# Patient Record
Sex: MALE | Race: WHITE | Employment: UNEMPLOYED | URBAN - METROPOLITAN AREA
[De-identification: names, ages, dates, MRNs, and addresses within clinical notes are randomized per-mention and may not be internally consistent; named-entity substitution may affect disease eponyms.]

---

## 2017-02-24 ENCOUNTER — HOSPITAL ENCOUNTER (EMERGENCY)
Age: 47
Discharge: HOME | End: 2017-02-24
Attending: EMERGENCY MEDICINE
Payer: COMMERCIAL

## 2017-02-24 ENCOUNTER — APPOINTMENT (OUTPATIENT)
Dept: GENERAL RADIOLOGY | Age: 47
End: 2017-02-24
Payer: COMMERCIAL

## 2017-02-24 VITALS
HEART RATE: 110 BPM | DIASTOLIC BLOOD PRESSURE: 87 MMHG | OXYGEN SATURATION: 97 % | BODY MASS INDEX: 31.5 KG/M2 | SYSTOLIC BLOOD PRESSURE: 138 MMHG | TEMPERATURE: 98.2 F | HEIGHT: 71 IN | WEIGHT: 225 LBS | RESPIRATION RATE: 18 BRPM

## 2017-02-24 DIAGNOSIS — L02.414 ABSCESS OF LEFT ARM: Primary | ICD-10-CM

## 2017-02-24 DIAGNOSIS — F41.1 ANXIETY STATE: ICD-10-CM

## 2017-02-24 LAB
ABSOLUTE EOS #: 0 K/UL (ref 0–0.4)
ABSOLUTE LYMPH #: 2.44 K/UL (ref 1–4.8)
ABSOLUTE MONO #: 1.91 K/UL (ref 0.1–0.8)
ANION GAP SERPL CALCULATED.3IONS-SCNC: 13 MMOL/L (ref 9–17)
ATYPICAL LYMPHOCYTE ABSOLUTE COUNT: 0.17 K/UL
ATYPICAL LYMPHOCYTES: 1 % (ref 0–10)
BASOPHILS # BLD: 0 % (ref 0–2)
BASOPHILS ABSOLUTE: 0 K/UL (ref 0–0.2)
BUN BLDV-MCNC: 15 MG/DL (ref 6–20)
BUN/CREAT BLD: ABNORMAL (ref 9–20)
CALCIUM SERPL-MCNC: 9.3 MG/DL (ref 8.6–10.4)
CHLORIDE BLD-SCNC: 96 MMOL/L (ref 98–107)
CO2: 27 MMOL/L (ref 20–31)
CREAT SERPL-MCNC: 0.76 MG/DL (ref 0.7–1.2)
DIFFERENTIAL TYPE: ABNORMAL
EOSINOPHILS RELATIVE PERCENT: 0 % (ref 1–4)
GFR AFRICAN AMERICAN: >60 ML/MIN
GFR NON-AFRICAN AMERICAN: >60 ML/MIN
GFR SERPL CREATININE-BSD FRML MDRD: ABNORMAL ML/MIN/{1.73_M2}
GFR SERPL CREATININE-BSD FRML MDRD: ABNORMAL ML/MIN/{1.73_M2}
GLUCOSE BLD-MCNC: 99 MG/DL (ref 70–99)
HCT VFR BLD CALC: 36 % (ref 41–53)
HEMOGLOBIN: 12.4 G/DL (ref 13.5–17.5)
LACTIC ACID, WHOLE BLOOD: 1.9 MMOL/L (ref 0.7–2.1)
LYMPHOCYTES # BLD: 14 % (ref 24–44)
MCH RBC QN AUTO: 28.9 PG (ref 26–34)
MCHC RBC AUTO-ENTMCNC: 34.4 G/DL (ref 31–37)
MCV RBC AUTO: 83.9 FL (ref 80–100)
MONOCYTES # BLD: 11 % (ref 1–7)
MORPHOLOGY: NORMAL
PDW BLD-RTO: 14.8 % (ref 12.5–15.4)
PLATELET # BLD: 385 K/UL (ref 140–450)
PLATELET ESTIMATE: ABNORMAL
PMV BLD AUTO: 7.4 FL (ref 6–12)
POTASSIUM SERPL-SCNC: 4 MMOL/L (ref 3.7–5.3)
RBC # BLD: 4.29 M/UL (ref 4.5–5.9)
RBC # BLD: ABNORMAL 10*6/UL
SEG NEUTROPHILS: 74 % (ref 36–66)
SEGMENTED NEUTROPHILS ABSOLUTE COUNT: 12.88 K/UL (ref 1.8–7.7)
SODIUM BLD-SCNC: 136 MMOL/L (ref 135–144)
WBC # BLD: 17.4 K/UL (ref 3.5–11)
WBC # BLD: ABNORMAL 10*3/UL

## 2017-02-24 PROCEDURE — G0383 LEV 4 HOSP TYPE B ED VISIT: HCPCS

## 2017-02-24 PROCEDURE — 96365 THER/PROPH/DIAG IV INF INIT: CPT

## 2017-02-24 PROCEDURE — 6360000002 HC RX W HCPCS: Performed by: EMERGENCY MEDICINE

## 2017-02-24 PROCEDURE — 96375 TX/PRO/DX INJ NEW DRUG ADDON: CPT

## 2017-02-24 PROCEDURE — 73060 X-RAY EXAM OF HUMERUS: CPT

## 2017-02-24 PROCEDURE — 2580000003 HC RX 258: Performed by: EMERGENCY MEDICINE

## 2017-02-24 PROCEDURE — 83605 ASSAY OF LACTIC ACID: CPT

## 2017-02-24 PROCEDURE — 85025 COMPLETE CBC W/AUTO DIFF WBC: CPT

## 2017-02-24 PROCEDURE — 80048 BASIC METABOLIC PNL TOTAL CA: CPT

## 2017-02-24 PROCEDURE — 6370000000 HC RX 637 (ALT 250 FOR IP): Performed by: EMERGENCY MEDICINE

## 2017-02-24 PROCEDURE — 10060 I&D ABSCESS SIMPLE/SINGLE: CPT

## 2017-02-24 PROCEDURE — 87040 BLOOD CULTURE FOR BACTERIA: CPT

## 2017-02-24 PROCEDURE — 2500000003 HC RX 250 WO HCPCS: Performed by: EMERGENCY MEDICINE

## 2017-02-24 RX ORDER — ALPRAZOLAM 0.25 MG/1
0.5 TABLET ORAL ONCE
Status: COMPLETED | OUTPATIENT
Start: 2017-02-24 | End: 2017-02-24

## 2017-02-24 RX ORDER — LORAZEPAM 2 MG/ML
1 INJECTION INTRAMUSCULAR ONCE
Status: COMPLETED | OUTPATIENT
Start: 2017-02-24 | End: 2017-02-24

## 2017-02-24 RX ORDER — SULFAMETHOXAZOLE AND TRIMETHOPRIM 800; 160 MG/1; MG/1
2 TABLET ORAL 2 TIMES DAILY
Qty: 40 TABLET | Refills: 0 | Status: SHIPPED | OUTPATIENT
Start: 2017-02-24 | End: 2017-03-06

## 2017-02-24 RX ORDER — SULFAMETHOXAZOLE AND TRIMETHOPRIM 800; 160 MG/1; MG/1
2 TABLET ORAL ONCE
Status: COMPLETED | OUTPATIENT
Start: 2017-02-24 | End: 2017-02-24

## 2017-02-24 RX ORDER — 0.9 % SODIUM CHLORIDE 0.9 %
1000 INTRAVENOUS SOLUTION INTRAVENOUS ONCE
Status: COMPLETED | OUTPATIENT
Start: 2017-02-24 | End: 2017-02-24

## 2017-02-24 RX ORDER — LIDOCAINE HYDROCHLORIDE 10 MG/ML
20 INJECTION, SOLUTION INFILTRATION; PERINEURAL ONCE
Status: COMPLETED | OUTPATIENT
Start: 2017-02-24 | End: 2017-02-24

## 2017-02-24 RX ORDER — KETOROLAC TROMETHAMINE 30 MG/ML
30 INJECTION, SOLUTION INTRAMUSCULAR; INTRAVENOUS ONCE
Status: COMPLETED | OUTPATIENT
Start: 2017-02-24 | End: 2017-02-24

## 2017-02-24 RX ORDER — CEPHALEXIN 500 MG/1
500 CAPSULE ORAL 4 TIMES DAILY
Qty: 40 CAPSULE | Refills: 0 | Status: SHIPPED | OUTPATIENT
Start: 2017-02-24 | End: 2017-03-06

## 2017-02-24 RX ORDER — MORPHINE SULFATE 4 MG/ML
4 INJECTION, SOLUTION INTRAMUSCULAR; INTRAVENOUS ONCE
Status: DISCONTINUED | OUTPATIENT
Start: 2017-02-24 | End: 2017-02-24

## 2017-02-24 RX ORDER — ALPRAZOLAM 0.5 MG/1
0.5 TABLET ORAL NIGHTLY PRN
Qty: 2 TABLET | Refills: 0 | Status: SHIPPED | OUTPATIENT
Start: 2017-02-24 | End: 2017-03-14

## 2017-02-24 RX ADMIN — ALPRAZOLAM 0.5 MG: 0.25 TABLET ORAL at 18:11

## 2017-02-24 RX ADMIN — SODIUM CHLORIDE 1000 ML: 9 INJECTION, SOLUTION INTRAVENOUS at 17:35

## 2017-02-24 RX ADMIN — CEFTRIAXONE SODIUM 1 G: 1 INJECTION, POWDER, FOR SOLUTION INTRAMUSCULAR; INTRAVENOUS at 17:45

## 2017-02-24 RX ADMIN — SULFAMETHOXAZOLE AND TRIMETHOPRIM 2 TABLET: 800; 160 TABLET ORAL at 18:46

## 2017-02-24 RX ADMIN — LIDOCAINE HYDROCHLORIDE 20 ML: 10 INJECTION, SOLUTION INFILTRATION; PERINEURAL at 18:11

## 2017-02-24 RX ADMIN — LORAZEPAM 1 MG: 2 INJECTION INTRAMUSCULAR; INTRAVENOUS at 17:32

## 2017-02-24 RX ADMIN — KETOROLAC TROMETHAMINE 30 MG: 30 INJECTION, SOLUTION INTRAMUSCULAR at 17:32

## 2017-02-24 ASSESSMENT — ENCOUNTER SYMPTOMS
DIARRHEA: 0
ABDOMINAL PAIN: 0
VOMITING: 0
NAUSEA: 0
TROUBLE SWALLOWING: 0
BLOOD IN STOOL: 0
SHORTNESS OF BREATH: 0

## 2017-02-24 ASSESSMENT — PAIN SCALES - GENERAL
PAINLEVEL_OUTOF10: 10

## 2017-02-24 ASSESSMENT — PAIN DESCRIPTION - PAIN TYPE: TYPE: ACUTE PAIN

## 2017-02-24 ASSESSMENT — PAIN DESCRIPTION - LOCATION: LOCATION: ARM

## 2017-02-24 ASSESSMENT — PAIN DESCRIPTION - ORIENTATION: ORIENTATION: LEFT;RIGHT

## 2017-03-02 LAB
CULTURE: NORMAL
Lab: NORMAL
Lab: NORMAL
SPECIMEN DESCRIPTION: NORMAL
SPECIMEN DESCRIPTION: NORMAL
STATUS: NORMAL
STATUS: NORMAL

## 2017-03-14 ENCOUNTER — HOSPITAL ENCOUNTER (EMERGENCY)
Age: 47
Discharge: HOME | End: 2017-03-14
Attending: EMERGENCY MEDICINE
Payer: COMMERCIAL

## 2017-03-14 ENCOUNTER — HOSPITAL ENCOUNTER (EMERGENCY)
Age: 47
Discharge: LEFT BEFORE DISCHARGE | End: 2017-03-14
Attending: EMERGENCY MEDICINE
Payer: COMMERCIAL

## 2017-03-14 VITALS
DIASTOLIC BLOOD PRESSURE: 103 MMHG | TEMPERATURE: 97.6 F | HEART RATE: 97 BPM | RESPIRATION RATE: 16 BRPM | OXYGEN SATURATION: 97 % | SYSTOLIC BLOOD PRESSURE: 156 MMHG

## 2017-03-14 VITALS
DIASTOLIC BLOOD PRESSURE: 105 MMHG | TEMPERATURE: 97.6 F | WEIGHT: 230 LBS | HEIGHT: 71 IN | OXYGEN SATURATION: 95 % | HEART RATE: 77 BPM | SYSTOLIC BLOOD PRESSURE: 155 MMHG | BODY MASS INDEX: 32.2 KG/M2 | RESPIRATION RATE: 18 BRPM

## 2017-03-14 DIAGNOSIS — Z53.20 LEFT BEFORE TREATMENT COMPLETED: Primary | ICD-10-CM

## 2017-03-14 DIAGNOSIS — S81.801A OPEN WOUND OF RIGHT LOWER EXTREMITY, INITIAL ENCOUNTER: Primary | ICD-10-CM

## 2017-03-14 PROCEDURE — 6370000000 HC RX 637 (ALT 250 FOR IP): Performed by: PHYSICIAN ASSISTANT

## 2017-03-14 PROCEDURE — G0381 LEV 2 HOSP TYPE B ED VISIT: HCPCS

## 2017-03-14 PROCEDURE — G0382 LEV 3 HOSP TYPE B ED VISIT: HCPCS

## 2017-03-14 RX ORDER — HYDROXYZINE HYDROCHLORIDE 25 MG/1
25 TABLET, FILM COATED ORAL ONCE
Status: COMPLETED | OUTPATIENT
Start: 2017-03-14 | End: 2017-03-14

## 2017-03-14 RX ORDER — CEPHALEXIN 250 MG/1
500 CAPSULE ORAL ONCE
Status: COMPLETED | OUTPATIENT
Start: 2017-03-14 | End: 2017-03-14

## 2017-03-14 RX ORDER — SULFAMETHOXAZOLE AND TRIMETHOPRIM 800; 160 MG/1; MG/1
1 TABLET ORAL ONCE
Status: COMPLETED | OUTPATIENT
Start: 2017-03-14 | End: 2017-03-14

## 2017-03-14 RX ORDER — SULFAMETHOXAZOLE AND TRIMETHOPRIM 800; 160 MG/1; MG/1
1 TABLET ORAL 2 TIMES DAILY
Qty: 14 TABLET | Refills: 0 | Status: SHIPPED | OUTPATIENT
Start: 2017-03-14 | End: 2017-03-21

## 2017-03-14 RX ORDER — CEPHALEXIN 500 MG/1
500 CAPSULE ORAL 4 TIMES DAILY
Qty: 28 CAPSULE | Refills: 0 | Status: SHIPPED | OUTPATIENT
Start: 2017-03-14 | End: 2017-03-21

## 2017-03-14 RX ORDER — HYDROXYZINE HYDROCHLORIDE 25 MG/1
25 TABLET, FILM COATED ORAL EVERY 8 HOURS PRN
Qty: 10 TABLET | Refills: 0 | Status: SHIPPED | OUTPATIENT
Start: 2017-03-14 | End: 2017-09-27 | Stop reason: ALTCHOICE

## 2017-03-14 RX ADMIN — SULFAMETHOXAZOLE AND TRIMETHOPRIM 1 TABLET: 800; 160 TABLET ORAL at 15:32

## 2017-03-14 RX ADMIN — HYDROXYZINE HYDROCHLORIDE 25 MG: 25 TABLET, FILM COATED ORAL at 15:32

## 2017-03-14 RX ADMIN — CEPHALEXIN 500 MG: 250 CAPSULE ORAL at 15:32

## 2017-03-14 ASSESSMENT — ENCOUNTER SYMPTOMS
SHORTNESS OF BREATH: 0
COUGH: 0
SHORTNESS OF BREATH: 0
TROUBLE SWALLOWING: 0
VOMITING: 0
NAUSEA: 0
WHEEZING: 0
NAUSEA: 0
VOMITING: 0
WHEEZING: 0
TROUBLE SWALLOWING: 0
COLOR CHANGE: 0
COUGH: 0

## 2017-03-14 ASSESSMENT — PAIN DESCRIPTION - PAIN TYPE
TYPE: ACUTE PAIN
TYPE: ACUTE PAIN

## 2017-03-14 ASSESSMENT — PAIN DESCRIPTION - ORIENTATION
ORIENTATION: RIGHT
ORIENTATION: RIGHT

## 2017-03-14 ASSESSMENT — PAIN DESCRIPTION - ONSET
ONSET: PROGRESSIVE
ONSET: ON-GOING

## 2017-03-14 ASSESSMENT — PAIN SCALES - GENERAL
PAINLEVEL_OUTOF10: 8
PAINLEVEL_OUTOF10: 10

## 2017-03-14 ASSESSMENT — PAIN DESCRIPTION - DESCRIPTORS
DESCRIPTORS: ACHING
DESCRIPTORS: ACHING

## 2017-03-14 ASSESSMENT — PAIN DESCRIPTION - LOCATION
LOCATION: LEG
LOCATION: LEG

## 2017-03-14 ASSESSMENT — PAIN DESCRIPTION - PROGRESSION
CLINICAL_PROGRESSION: GRADUALLY WORSENING
CLINICAL_PROGRESSION: GRADUALLY WORSENING

## 2017-09-27 ENCOUNTER — APPOINTMENT (OUTPATIENT)
Dept: CT IMAGING | Age: 47
End: 2017-09-27
Payer: COMMERCIAL

## 2017-09-27 ENCOUNTER — HOSPITAL ENCOUNTER (EMERGENCY)
Age: 47
Discharge: LEFT BEFORE DISCHARGE | End: 2017-09-28
Attending: EMERGENCY MEDICINE | Admitting: INTERNAL MEDICINE
Payer: COMMERCIAL

## 2017-09-27 DIAGNOSIS — D72.829 LEUKOCYTOSIS, UNSPECIFIED TYPE: ICD-10-CM

## 2017-09-27 DIAGNOSIS — A41.9 SEPSIS, DUE TO UNSPECIFIED ORGANISM: ICD-10-CM

## 2017-09-27 DIAGNOSIS — R79.82 ELEVATED C-REACTIVE PROTEIN (CRP): ICD-10-CM

## 2017-09-27 DIAGNOSIS — D64.9 ANEMIA, UNSPECIFIED TYPE: ICD-10-CM

## 2017-09-27 DIAGNOSIS — L02.91 ABSCESS: Primary | ICD-10-CM

## 2017-09-27 PROCEDURE — G0383 LEV 4 HOSP TYPE B ED VISIT: HCPCS

## 2017-09-27 PROCEDURE — 2580000003 HC RX 258: Performed by: EMERGENCY MEDICINE

## 2017-09-27 PROCEDURE — 6370000000 HC RX 637 (ALT 250 FOR IP): Performed by: EMERGENCY MEDICINE

## 2017-09-27 PROCEDURE — 73201 CT UPPER EXTREMITY W/DYE: CPT

## 2017-09-27 PROCEDURE — 85730 THROMBOPLASTIN TIME PARTIAL: CPT

## 2017-09-27 PROCEDURE — 85610 PROTHROMBIN TIME: CPT

## 2017-09-27 PROCEDURE — 81001 URINALYSIS AUTO W/SCOPE: CPT

## 2017-09-27 PROCEDURE — 96361 HYDRATE IV INFUSION ADD-ON: CPT

## 2017-09-27 PROCEDURE — 83605 ASSAY OF LACTIC ACID: CPT

## 2017-09-27 PROCEDURE — 6360000002 HC RX W HCPCS: Performed by: EMERGENCY MEDICINE

## 2017-09-27 PROCEDURE — 96365 THER/PROPH/DIAG IV INF INIT: CPT

## 2017-09-27 PROCEDURE — 10061 I&D ABSCESS COMP/MULTIPLE: CPT

## 2017-09-27 PROCEDURE — 85025 COMPLETE CBC W/AUTO DIFF WBC: CPT

## 2017-09-27 PROCEDURE — 6360000004 HC RX CONTRAST MEDICATION: Performed by: EMERGENCY MEDICINE

## 2017-09-27 PROCEDURE — 87040 BLOOD CULTURE FOR BACTERIA: CPT

## 2017-09-27 PROCEDURE — 87086 URINE CULTURE/COLONY COUNT: CPT

## 2017-09-27 PROCEDURE — 86140 C-REACTIVE PROTEIN: CPT

## 2017-09-27 PROCEDURE — 96372 THER/PROPH/DIAG INJ SC/IM: CPT

## 2017-09-27 PROCEDURE — 80053 COMPREHEN METABOLIC PANEL: CPT

## 2017-09-27 RX ORDER — ACETAMINOPHEN 500 MG
1000 TABLET ORAL ONCE
Status: COMPLETED | OUTPATIENT
Start: 2017-09-27 | End: 2017-09-27

## 2017-09-27 RX ORDER — 0.9 % SODIUM CHLORIDE 0.9 %
30 INTRAVENOUS SOLUTION INTRAVENOUS ONCE
Status: COMPLETED | OUTPATIENT
Start: 2017-09-27 | End: 2017-09-28

## 2017-09-27 RX ORDER — MORPHINE SULFATE 4 MG/ML
4 INJECTION, SOLUTION INTRAMUSCULAR; INTRAVENOUS ONCE
Status: COMPLETED | OUTPATIENT
Start: 2017-09-27 | End: 2017-09-27

## 2017-09-27 RX ORDER — LIDOCAINE HYDROCHLORIDE AND EPINEPHRINE 10; 10 MG/ML; UG/ML
20 INJECTION, SOLUTION INFILTRATION; PERINEURAL ONCE
Status: DISCONTINUED | OUTPATIENT
Start: 2017-09-28 | End: 2017-09-28 | Stop reason: HOSPADM

## 2017-09-27 RX ADMIN — ACETAMINOPHEN 1000 MG: 500 TABLET ORAL at 22:58

## 2017-09-27 RX ADMIN — MORPHINE SULFATE 4 MG: 4 INJECTION, SOLUTION INTRAMUSCULAR; INTRAVENOUS at 22:56

## 2017-09-27 RX ADMIN — SODIUM CHLORIDE 1000 ML: 9 INJECTION, SOLUTION INTRAVENOUS at 22:57

## 2017-09-27 RX ADMIN — VANCOMYCIN HYDROCHLORIDE 1500 MG: 1 INJECTION, POWDER, LYOPHILIZED, FOR SOLUTION INTRAVENOUS at 23:50

## 2017-09-27 RX ADMIN — IOPAMIDOL 90 ML: 755 INJECTION, SOLUTION INTRAVENOUS at 23:27

## 2017-09-27 ASSESSMENT — PAIN DESCRIPTION - DESCRIPTORS: DESCRIPTORS: ACHING;SORE;DISCOMFORT

## 2017-09-27 ASSESSMENT — PAIN DESCRIPTION - FREQUENCY: FREQUENCY: CONTINUOUS

## 2017-09-27 ASSESSMENT — PAIN SCALES - GENERAL
PAINLEVEL_OUTOF10: 10

## 2017-09-27 ASSESSMENT — PAIN DESCRIPTION - PAIN TYPE: TYPE: ACUTE PAIN

## 2017-09-27 ASSESSMENT — PAIN DESCRIPTION - LOCATION: LOCATION: ARM

## 2017-09-27 ASSESSMENT — ENCOUNTER SYMPTOMS: SHORTNESS OF BREATH: 0

## 2017-09-27 ASSESSMENT — PAIN DESCRIPTION - ONSET: ONSET: SUDDEN

## 2017-09-27 ASSESSMENT — PAIN DESCRIPTION - ORIENTATION: ORIENTATION: LEFT

## 2017-09-27 NOTE — ED AVS SNAPSHOT
After Visit Summary  (Discharge Instructions)    Medication List for Home    Based on the information you provided to us as well as any changes during this visit, the following is your updated medication list.  Compare this with your prescription bottles at home. If you have any questions or concerns, contact your primary care physician's office. Daily Medication List (This medication list can be shared with any Healthcare provider who is helping you manage your medications)      There are NEW medications for you. START taking them after you leave the hospital     cephALEXin 500 MG capsule   Commonly known as:  KEFLEX   Take 1 capsule by mouth 2 times daily for 10 days       HYDROcodone-acetaminophen 5-325 MG per tablet   Commonly known as:  NORCO   Take 1 tablet by mouth every 6 hours as needed for Pain . sulfamethoxazole-trimethoprim 800-160 MG per tablet   Commonly known as:  BACTRIM DS   Take 1 tablet by mouth 2 times daily for 10 days            Where to Get Your Medications      You can get these medications from any pharmacy     Bring a paper prescription for each of these medications     cephALEXin 500 MG capsule    HYDROcodone-acetaminophen 5-325 MG per tablet    sulfamethoxazole-trimethoprim 800-160 MG per tablet               Allergies as of 9/28/2017     No Known Allergies      Immunizations as of 9/28/2017     Name Date Dose VIS Date Route    Tdap (Boostrix, Adacel) 12/2/2016 0.5 mL 2/24/2015 Intramuscular         After Visit Summary    This summary was created for you. Thank you for entrusting your care to us.   The following information includes details about your hospital/visit stay along with steps you should take to help with your recovery once you leave the hospital.  In this packet, you will find information about the topics listed below:    · Instructions about your medications including a list of your home medications  · A summary of your hospital visit · Follow-up appointments once you have left the hospital  · Your care plan at home      You may receive a survey regarding the care you received during your stay. Your input is valuable to us. We encourage you to complete and return your survey in the envelope provided. We hope you will choose us in the future for your healthcare needs. Patient Information     Patient Name KIANA Mccall 1970      Care Provided at:     Name Address Phone       [facility] [address] [facility] [phone]            Your Visit    Here you will find information about your visit, including the reason for your visit. Please take this sheet with you when you visit your doctor or other health care provider in the future. It will help determine the best possible medical care for you at that time. If you have any questions once you leave the hospital, please call the department phone number listed below. Diagnoses this visit     Your diagnoses were ABSCESS, SEPSIS, DUE TO UNSPECIFIED ORGANISM ([facility] [address] LEUKOCYTOSIS, UNSPECIFIED TYPE, ANEMIA, UNSPECIFIED TYPE, and ELEVATED C-REACTIVE PROTEIN (CRP). Visit Information     Date of Visit Department Dept Phone    2017 [facility] -[phone]      You were seen by     You were seen by Adele Duke MD, Shaun Barrera MD, Saud Harper MD, Wallace Grove DO, [facility], and Randa Johnston MD.       Follow-up Appointments    Below is a list of your follow-up and future appointments. This may not be a complete list as you may have made appointments directly with providers that we are not aware of or your providers may have made some for you. Please call your providers to confirm appointments. It is important to keep your appointments.  Please bring your current insurance card, photo ID, co-pay, and all medication bottles to your smoke.              Important information for a smoker       SMOKING: QUIT SMOKING. THIS IS THE MOST IMPORTANT ACTION YOU CAN TAKE TO IMPROVE YOUR CURRENT AND FUTURE HEALTH. Call the Critical access hospital3 [facility] NOW ([phone])    Smoking harms nonsmokers. When nonsmokers are around people who smoke, they absorb nicotine, carbon monoxide, and other ingredients of tobacco smoke. DO NOT SMOKE AROUND CHILDREN     Children exposed to secondhand smoke are at an increased risk of:  Sudden Infant Death Syndrome (SIDS), acute respiratory infections, inflammation of the middle ear, and severe asthma. Over a longer time, it causes heart disease and lung cancer. There is no safe level of exposure to secondhand smoke. EDUShart Signup     DocVerse allows you to send messages to your doctor, view your test results, renew your prescriptions, schedule appointments, view visit notes, and more. How Do I Sign Up? 1. In your Internet browser, go to [email or web address]  2. Click on the Sign Up Now link in the Sign In box. You will see the New Member Sign Up page. 3. Enter your DocVerse Access Code exactly as it appears below. You will not need to use this code after youve completed the sign-up process. If you do not sign up before the expiration date, you must request a new code. DocVerse Access Code: [email or web address]  Expires: 11/27/2017 10:57 AM    4. Enter your Social Security Number ([id number]) and Date of Birth (mm/dd/yyyy) as indicated and click Submit. You will be taken to the next sign-up page. 5. Create a DocVerse ID. This will be your DocVerse login ID and cannot be changed, so think of one that is secure and easy to remember. 6. Create a DocVerse password. You can change your password at any time. 7. Enter your Password Reset Question and Answer. This can be used at a later time if you forget your password.

## 2017-09-28 VITALS
HEART RATE: 88 BPM | SYSTOLIC BLOOD PRESSURE: 138 MMHG | WEIGHT: 218 LBS | OXYGEN SATURATION: 99 % | HEIGHT: 71 IN | BODY MASS INDEX: 30.52 KG/M2 | RESPIRATION RATE: 18 BRPM | TEMPERATURE: 99.7 F | DIASTOLIC BLOOD PRESSURE: 98 MMHG

## 2017-09-28 LAB
ABSOLUTE EOS #: 0.14 K/UL (ref 0–0.4)
ABSOLUTE EOS #: 0.62 K/UL (ref 0–0.4)
ABSOLUTE LYMPH #: 1.56 K/UL (ref 1–4.8)
ABSOLUTE LYMPH #: 2.82 K/UL (ref 1–4.8)
ABSOLUTE MONO #: 0.56 K/UL (ref 0.1–0.8)
ABSOLUTE MONO #: 1.25 K/UL (ref 0.1–0.8)
ALBUMIN SERPL-MCNC: 3.1 G/DL (ref 3.5–5.2)
ALBUMIN SERPL-MCNC: 3.3 G/DL (ref 3.5–5.2)
ALBUMIN/GLOBULIN RATIO: 0.9 (ref 1–2.5)
ALBUMIN/GLOBULIN RATIO: 1 (ref 1–2.5)
ALP BLD-CCNC: 58 U/L (ref 40–129)
ALP BLD-CCNC: 60 U/L (ref 40–129)
ALT SERPL-CCNC: 26 U/L (ref 5–41)
ALT SERPL-CCNC: 27 U/L (ref 5–41)
ANION GAP SERPL CALCULATED.3IONS-SCNC: 10 MMOL/L (ref 9–17)
ANION GAP SERPL CALCULATED.3IONS-SCNC: 11 MMOL/L (ref 9–17)
AST SERPL-CCNC: 20 U/L
AST SERPL-CCNC: 22 U/L
BASOPHILS # BLD: 0 %
BASOPHILS # BLD: 0 %
BASOPHILS ABSOLUTE: 0 K/UL (ref 0–0.2)
BASOPHILS ABSOLUTE: 0 K/UL (ref 0–0.2)
BILIRUB SERPL-MCNC: 0.47 MG/DL (ref 0.3–1.2)
BILIRUB SERPL-MCNC: 0.56 MG/DL (ref 0.3–1.2)
BUN BLDV-MCNC: 10 MG/DL (ref 6–20)
BUN BLDV-MCNC: 11 MG/DL (ref 6–20)
BUN/CREAT BLD: ABNORMAL (ref 9–20)
BUN/CREAT BLD: ABNORMAL (ref 9–20)
C-REACTIVE PROTEIN: 90.7 MG/L (ref 0–5)
CALCIUM SERPL-MCNC: 8.2 MG/DL (ref 8.6–10.4)
CALCIUM SERPL-MCNC: 8.4 MG/DL (ref 8.6–10.4)
CHLORIDE BLD-SCNC: 100 MMOL/L (ref 98–107)
CHLORIDE BLD-SCNC: 103 MMOL/L (ref 98–107)
CO2: 24 MMOL/L (ref 20–31)
CO2: 25 MMOL/L (ref 20–31)
CREAT SERPL-MCNC: 0.71 MG/DL (ref 0.7–1.2)
CREAT SERPL-MCNC: 0.8 MG/DL (ref 0.7–1.2)
DIFFERENTIAL TYPE: ABNORMAL
DIFFERENTIAL TYPE: ABNORMAL
EOSINOPHILS RELATIVE PERCENT: 1 %
EOSINOPHILS RELATIVE PERCENT: 4 %
GFR AFRICAN AMERICAN: >60 ML/MIN
GFR AFRICAN AMERICAN: >60 ML/MIN
GFR NON-AFRICAN AMERICAN: >60 ML/MIN
GFR NON-AFRICAN AMERICAN: >60 ML/MIN
GFR SERPL CREATININE-BSD FRML MDRD: ABNORMAL ML/MIN/{1.73_M2}
GLUCOSE BLD-MCNC: 110 MG/DL (ref 70–99)
GLUCOSE BLD-MCNC: 130 MG/DL (ref 70–99)
HCT VFR BLD CALC: 35.3 % (ref 41–53)
HCT VFR BLD CALC: 36.2 % (ref 41–53)
HEMOGLOBIN: 11.6 G/DL (ref 13.5–17.5)
HEMOGLOBIN: 11.7 G/DL (ref 13.5–17.5)
INR BLD: 1
LACTIC ACID, WHOLE BLOOD: 0.9 MMOL/L (ref 0.7–2.1)
LACTIC ACID, WHOLE BLOOD: 1.2 MMOL/L (ref 0.7–2.1)
LACTIC ACID: NORMAL MMOL/L
LYMPHOCYTES # BLD: 10 %
LYMPHOCYTES # BLD: 20 %
MCH RBC QN AUTO: 29 PG (ref 26–34)
MCH RBC QN AUTO: 29.3 PG (ref 26–34)
MCHC RBC AUTO-ENTMCNC: 32.2 G/DL (ref 31–37)
MCHC RBC AUTO-ENTMCNC: 32.8 G/DL (ref 31–37)
MCV RBC AUTO: 88.3 FL (ref 80–100)
MCV RBC AUTO: 90.8 FL (ref 80–100)
MONOCYTES # BLD: 4 %
MONOCYTES # BLD: 8 %
MORPHOLOGY: ABNORMAL
MORPHOLOGY: ABNORMAL
PARTIAL THROMBOPLASTIN TIME: 25.4 SEC (ref 21.3–31.3)
PDW BLD-RTO: 14.5 % (ref 12.5–15.4)
PDW BLD-RTO: 15 % (ref 12.5–15.4)
PLATELET # BLD: 317 K/UL (ref 140–450)
PLATELET # BLD: 337 K/UL (ref 140–450)
PLATELET ESTIMATE: ABNORMAL
PLATELET ESTIMATE: ABNORMAL
PMV BLD AUTO: 7.4 FL (ref 6–12)
PMV BLD AUTO: 7.4 FL (ref 6–12)
POTASSIUM SERPL-SCNC: 3.8 MMOL/L (ref 3.7–5.3)
POTASSIUM SERPL-SCNC: 3.9 MMOL/L (ref 3.7–5.3)
PROTHROMBIN TIME: 10.6 SEC (ref 9.4–12.6)
RBC # BLD: 3.98 M/UL (ref 4.5–5.9)
RBC # BLD: 3.99 M/UL (ref 4.5–5.9)
RBC # BLD: ABNORMAL 10*6/UL
RBC # BLD: ABNORMAL 10*6/UL
SEG NEUTROPHILS: 75 %
SEG NEUTROPHILS: 78 %
SEGMENTED NEUTROPHILS ABSOLUTE COUNT: 10.58 K/UL (ref 1.8–7.7)
SEGMENTED NEUTROPHILS ABSOLUTE COUNT: 12.17 K/UL (ref 1.8–7.7)
SODIUM BLD-SCNC: 136 MMOL/L (ref 135–144)
SODIUM BLD-SCNC: 137 MMOL/L (ref 135–144)
TOTAL PROTEIN: 6.4 G/DL (ref 6.4–8.3)
TOTAL PROTEIN: 6.5 G/DL (ref 6.4–8.3)
TROPONIN INTERP: NORMAL
TROPONIN T: <0.03 NG/ML
WBC # BLD: 14.1 K/UL (ref 3.5–11)
WBC # BLD: 15.6 K/UL (ref 3.5–11)
WBC # BLD: ABNORMAL 10*3/UL
WBC # BLD: ABNORMAL 10*3/UL

## 2017-09-28 PROCEDURE — 6360000002 HC RX W HCPCS

## 2017-09-28 PROCEDURE — 2580000003 HC RX 258: Performed by: NURSE PRACTITIONER

## 2017-09-28 PROCEDURE — 84484 ASSAY OF TROPONIN QUANT: CPT

## 2017-09-28 PROCEDURE — 96375 TX/PRO/DX INJ NEW DRUG ADDON: CPT

## 2017-09-28 PROCEDURE — 6360000002 HC RX W HCPCS: Performed by: NURSE PRACTITIONER

## 2017-09-28 PROCEDURE — 85025 COMPLETE CBC W/AUTO DIFF WBC: CPT

## 2017-09-28 PROCEDURE — 96367 TX/PROPH/DG ADDL SEQ IV INF: CPT

## 2017-09-28 PROCEDURE — 83605 ASSAY OF LACTIC ACID: CPT

## 2017-09-28 PROCEDURE — 96376 TX/PRO/DX INJ SAME DRUG ADON: CPT

## 2017-09-28 PROCEDURE — 87075 CULTR BACTERIA EXCEPT BLOOD: CPT

## 2017-09-28 PROCEDURE — 80053 COMPREHEN METABOLIC PANEL: CPT

## 2017-09-28 PROCEDURE — 96361 HYDRATE IV INFUSION ADD-ON: CPT

## 2017-09-28 PROCEDURE — 6360000002 HC RX W HCPCS: Performed by: EMERGENCY MEDICINE

## 2017-09-28 PROCEDURE — 2580000003 HC RX 258: Performed by: EMERGENCY MEDICINE

## 2017-09-28 PROCEDURE — 87070 CULTURE OTHR SPECIMN AEROBIC: CPT

## 2017-09-28 PROCEDURE — 87205 SMEAR GRAM STAIN: CPT

## 2017-09-28 PROCEDURE — 96366 THER/PROPH/DIAG IV INF ADDON: CPT

## 2017-09-28 PROCEDURE — 36415 COLL VENOUS BLD VENIPUNCTURE: CPT

## 2017-09-28 PROCEDURE — 86140 C-REACTIVE PROTEIN: CPT

## 2017-09-28 PROCEDURE — 6370000000 HC RX 637 (ALT 250 FOR IP): Performed by: EMERGENCY MEDICINE

## 2017-09-28 RX ORDER — SODIUM CHLORIDE 0.9 % (FLUSH) 0.9 %
10 SYRINGE (ML) INJECTION PRN
Status: DISCONTINUED | OUTPATIENT
Start: 2017-09-28 | End: 2017-09-28 | Stop reason: HOSPADM

## 2017-09-28 RX ORDER — MIDAZOLAM HYDROCHLORIDE 1 MG/ML
2 INJECTION INTRAMUSCULAR; INTRAVENOUS ONCE
Status: DISCONTINUED | OUTPATIENT
Start: 2017-09-28 | End: 2017-09-28 | Stop reason: HOSPADM

## 2017-09-28 RX ORDER — SODIUM CHLORIDE 0.9 % (FLUSH) 0.9 %
10 SYRINGE (ML) INJECTION EVERY 12 HOURS SCHEDULED
Status: DISCONTINUED | OUTPATIENT
Start: 2017-09-28 | End: 2017-09-28 | Stop reason: HOSPADM

## 2017-09-28 RX ORDER — SODIUM CHLORIDE 9 MG/ML
INJECTION, SOLUTION INTRAVENOUS CONTINUOUS
Status: DISCONTINUED | OUTPATIENT
Start: 2017-09-28 | End: 2017-09-28 | Stop reason: HOSPADM

## 2017-09-28 RX ORDER — ONDANSETRON 4 MG/1
TABLET, FILM COATED ORAL
Status: DISCONTINUED
Start: 2017-09-28 | End: 2017-09-28 | Stop reason: HOSPADM

## 2017-09-28 RX ORDER — HYDROCODONE BITARTRATE AND ACETAMINOPHEN 5; 325 MG/1; MG/1
1 TABLET ORAL EVERY 6 HOURS PRN
Status: DISCONTINUED | OUTPATIENT
Start: 2017-09-28 | End: 2017-09-28 | Stop reason: HOSPADM

## 2017-09-28 RX ORDER — MIDAZOLAM HYDROCHLORIDE 1 MG/ML
INJECTION INTRAMUSCULAR; INTRAVENOUS
Status: COMPLETED
Start: 2017-09-28 | End: 2017-09-28

## 2017-09-28 RX ORDER — CEPHALEXIN 500 MG/1
500 CAPSULE ORAL 2 TIMES DAILY
Qty: 20 CAPSULE | Refills: 0 | Status: SHIPPED | OUTPATIENT
Start: 2017-09-28 | End: 2017-10-08

## 2017-09-28 RX ORDER — HYDROCODONE BITARTRATE AND ACETAMINOPHEN 5; 325 MG/1; MG/1
1 TABLET ORAL EVERY 6 HOURS PRN
Qty: 10 TABLET | Refills: 0 | Status: SHIPPED | OUTPATIENT
Start: 2017-09-28 | End: 2017-10-05

## 2017-09-28 RX ORDER — ACETAMINOPHEN 325 MG/1
650 TABLET ORAL EVERY 4 HOURS PRN
Status: DISCONTINUED | OUTPATIENT
Start: 2017-09-28 | End: 2017-09-28 | Stop reason: HOSPADM

## 2017-09-28 RX ORDER — SULFAMETHOXAZOLE AND TRIMETHOPRIM 800; 160 MG/1; MG/1
1 TABLET ORAL 2 TIMES DAILY
Qty: 20 TABLET | Refills: 0 | Status: SHIPPED | OUTPATIENT
Start: 2017-09-28 | End: 2017-10-08

## 2017-09-28 RX ORDER — IBUPROFEN 400 MG/1
400 TABLET ORAL EVERY 6 HOURS PRN
Status: DISCONTINUED | OUTPATIENT
Start: 2017-09-28 | End: 2017-09-28 | Stop reason: HOSPADM

## 2017-09-28 RX ADMIN — HYDROMORPHONE HYDROCHLORIDE 1 MG: 1 INJECTION, SOLUTION INTRAMUSCULAR; INTRAVENOUS; SUBCUTANEOUS at 00:00

## 2017-09-28 RX ADMIN — Medication 1 MG: at 08:53

## 2017-09-28 RX ADMIN — HYDROCODONE BITARTRATE AND ACETAMINOPHEN 1 TABLET: 5; 325 TABLET ORAL at 10:59

## 2017-09-28 RX ADMIN — PIPERACILLIN AND TAZOBACTAM 3.38 G: 3; .375 INJECTION, POWDER, LYOPHILIZED, FOR SOLUTION INTRAVENOUS; PARENTERAL at 09:30

## 2017-09-28 RX ADMIN — MIDAZOLAM HYDROCHLORIDE 2 MG: 1 INJECTION, SOLUTION INTRAMUSCULAR; INTRAVENOUS at 00:21

## 2017-09-28 RX ADMIN — HYDROMORPHONE HYDROCHLORIDE 1 MG: 1 INJECTION, SOLUTION INTRAMUSCULAR; INTRAVENOUS; SUBCUTANEOUS at 05:33

## 2017-09-28 RX ADMIN — HYDROMORPHONE HYDROCHLORIDE 1 MG: 1 INJECTION, SOLUTION INTRAMUSCULAR; INTRAVENOUS; SUBCUTANEOUS at 08:53

## 2017-09-28 RX ADMIN — HYDROMORPHONE HYDROCHLORIDE 1 MG: 1 INJECTION, SOLUTION INTRAMUSCULAR; INTRAVENOUS; SUBCUTANEOUS at 02:28

## 2017-09-28 RX ADMIN — SODIUM CHLORIDE: 9 INJECTION, SOLUTION INTRAVENOUS at 09:33

## 2017-09-28 RX ADMIN — DEXTROSE MONOHYDRATE 4.5 G: 5 INJECTION INTRAVENOUS at 02:27

## 2017-09-28 ASSESSMENT — PAIN SCALES - GENERAL
PAINLEVEL_OUTOF10: 10
PAINLEVEL_OUTOF10: 10
PAINLEVEL_OUTOF10: 9
PAINLEVEL_OUTOF10: 9
PAINLEVEL_OUTOF10: 10

## 2017-09-28 NOTE — ED PROVIDER NOTES
[facility]     Emergency Department     Faculty Attestation    I performed a history and physical examination of the patient and discussed management with the resident. I reviewed the residents note and agree with the documented findings and plan of care. Any areas of disagreement are noted on the chart. I was personally present for the key portions of any procedures. I have documented in the chart those procedures where I was not present during the key portions. I have reviewed the emergency nurses triage note. I agree with the chief complaint, past medical history, past surgical history, allergies, medications, social and family history as documented unless otherwise noted below. For Physician Assistant/ Nurse Practitioner cases/documentation I have personally evaluated this patient and have completed at least one if not all key elements of the E/M (history, physical exam, and MDM). Additional findings are as noted. Large abscess lateral left arm just above the elbow, elbow joint does not appear to be involved.        Flako Chan MD  Attending Emergency  Physician             Aimee French MD  09/27/17 3470

## 2017-09-28 NOTE — PROGRESS NOTES
Pharmacy Note  Vancomycin Consult    Neema Mathias is a 52 y.o. male started on Vancomycin for Sepsis; consult received from Dr. Don Hong  to manage therapy. Also receiving the following antibiotics: Zosyn. Patient Active Problem List   Diagnosis    Multiple substance abuse    Alcohol abuse    Facial cellulitis    Fall    Right shoulder pain    Rib pain on left side    Elevated LFTs    Polysubstance abuse    Thrombocytopenia (HCC)    HTN (hypertension)    Major depressive disorder, recurrent episode, severe (HCC)    Compulsive skin picking    Anxiety       Allergies:  Review of patient's allergies indicates no known allergies. Temp max: 100.2    No results for input(s): BUN in the last 72 hours. No results for input(s): CREATININE in the last 72 hours. No results for input(s): WBC in the last 72 hours. No intake or output data in the 24 hours ending 09/27/17 2206    Culture Date      Source                       Results      Ht Readings from Last 1 Encounters:   09/27/17 5' 11\" (1.803 m)        Wt Readings from Last 1 Encounters:   09/27/17 218 lb (98.9 kg)         Body mass index is 30.4 kg/(m^2). CrCl cannot be calculated (Patient's most recent sCr result is older than the maximum 10 days allowed. ). Assessment/Plan:  Will initiate vancomycin 1500 mg IV x1 dose now in ED  Timing of trough level will be determined based on culture results, renal function, and clinical response. Thank you for the consult. Will continue to follow. Isha Bernard Pharm. D.    9/27/2017  10:07 PM
2 times daily for 10 days     Dispense:  20 tablet     Refill:  0    cephALEXin (KEFLEX) 500 MG capsule     Sig: Take 1 capsule by mouth 2 times daily for 10 days     Dispense:  20 capsule     Refill:  0    HYDROcodone-acetaminophen (NORCO) 5-325 MG per tablet     Sig: Take 1 tablet by mouth every 6 hours as needed for Pain .      Dispense:  10 tablet     Refill:  0       LABS / RADIOLOGY:     Labs Reviewed   ANAEROBIC AND AEROBIC CULTURE - Abnormal; Notable for the following:        Result Value    Direct Exam FEW NEUTROPHILS (*)     All other components within normal limits   CBC WITH AUTO DIFFERENTIAL - Abnormal; Notable for the following:     WBC 15.6 (*)     RBC 3.99 (*)     Hemoglobin 11.6 (*)     Hematocrit 35.3 (*)     Segs Absolute 12.17 (*)     Absolute Mono # 1.25 (*)     Absolute Eos # 0.62 (*)     All other components within normal limits   COMPREHENSIVE METABOLIC PANEL - Abnormal; Notable for the following:     Glucose 130 (*)     Calcium 8.4 (*)     Alb 3.3 (*)     All other components within normal limits   C-REACTIVE PROTEIN - Abnormal; Notable for the following:     CRP 90.7 (*)     All other components within normal limits   CBC WITH AUTO DIFFERENTIAL - Abnormal; Notable for the following:     WBC 14.1 (*)     RBC 3.98 (*)     Hemoglobin 11.7 (*)     Hematocrit 36.2 (*)     Segs Absolute 10.58 (*)     All other components within normal limits   COMPREHENSIVE METABOLIC PANEL - Abnormal; Notable for the following:     Glucose 110 (*)     Calcium 8.2 (*)     Alb 3.1 (*)     Albumin/Globulin Ratio 0.9 (*)     All other components within normal limits   CULTURE BLOOD #1   LACTIC ACID, WHOLE BLOOD   APTT   PROTIME-INR   LACTIC ACID, PLASMA   TROPONIN   PREVIOUS SPECIMEN   LACTIC ACID, PLASMA   TROPONIN       Ct Upper Extremity Left W Contrast    Result Date: 9/27/2017  3.7 x 5.4 x 6.1 cm fluid collection within the lateral soft tissues of the distal left upper extremity just proximal to the forearm,

## 2017-09-28 NOTE — ED PROVIDER NOTES
[facility] [facility]  Emergency Department  Emergency Medicine Resident Sign-out     Care of Ministerio Roy was assumed from Dr. Aylin Alberto and is being seen for Abscess (left arm)  . The patient's initial evaluation and plan have been discussed with the prior provider who initially evaluated the patient.      EMERGENCY DEPARTMENT COURSE / MEDICAL DECISION MAKING:       MEDICATIONS GIVEN:  Orders Placed This Encounter   Medications    0.9 % sodium chloride bolus    acetaminophen (TYLENOL) tablet 1,000 mg    piperacillin-tazobactam (ZOSYN) 4.5 g in dextrose 5 % 100 mL IVPB (mini-bag)    vancomycin (VANCOCIN) 1,500 mg in dextrose 5 % 250 mL IVPB    vancomycin (VANCOCIN) intermittent dosing (placeholder)    morphine (PF) injection 4 mg    iopamidol (ISOVUE-370) 76 % injection 90 mL    HYDROmorphone (DILAUDID) injection 1 mg    lidocaine-EPINEPHrine 1 percent-1:921245 injection 20 mL    ondansetron (ZOFRAN) 4 MG tablet     ANTHONY SCHWAB: cabinet override    midazolam (VERSED) 2 MG/2ML injection     ANTHONY SCHWAB: cabinet override    midazolam (VERSED) injection 2 mg    HYDROmorphone (DILAUDID) injection 1 mg       LABS / RADIOLOGY:     Labs Reviewed   CBC WITH AUTO DIFFERENTIAL - Abnormal; Notable for the following:        Result Value    WBC 15.6 (*)     RBC 3.99 (*)     Hemoglobin 11.6 (*)     Hematocrit 35.3 (*)     Segs Absolute 12.17 (*)     Absolute Mono # 1.25 (*)     Absolute Eos # 0.62 (*)     All other components within normal limits   COMPREHENSIVE METABOLIC PANEL - Abnormal; Notable for the following:     Glucose 130 (*)     Calcium 8.4 (*)     Alb 3.3 (*)     All other components within normal limits   C-REACTIVE PROTEIN - Abnormal; Notable for the following:     CRP 90.7 (*)     All other components within normal limits   CULTURE BLOOD #1   CULTURE BLOOD #1   URINE CULTURE   ANAEROBIC AND AEROBIC CULTURE   LACTIC ACID, WHOLE BLOOD   APTT   PROTIME-INR   URINALYSIS WITH MICROSCOPIC   PREVIOUS SPECIMEN       Ct Upper Extremity Left W Contrast    Result Date: 9/27/2017  3.7 x 5.4 x 6.1 cm fluid collection within the lateral soft tissues of the distal left upper extremity just proximal to the forearm, consistent with an abscess. No osteomyelitis or soft tissue gas evident. RECENT VITALS:     Temp: 99.7 °F (37.6 °C),  Pulse: 104, Resp: 18, BP: (!) 147/81, SpO2: 96 %    This patient is a 52 y.o. Male with abscess to arm with surrounding cellulitis, white count of 15,000, admitted for IV antibiotics, started on vancomycin and Zosyn. Has received intermittent doses of Dilaudid for pain control here. Currently awaiting bed. OUTSTANDING TASKS / RECOMMENDATIONS:    1. Awaiting bed     FINAL IMPRESSION:     1. Abscess    2. Sepsis, due to unspecified organism ([facility] [address]    3. Leukocytosis, unspecified type    4. Anemia, unspecified type    5.  Elevated C-reactive protein (CRP)        DISPOSITION:         DISPOSITION:  []  Discharge   []  Transfer -    [x]  Admission - Med surg    []  Against Medical Advice   []  Eloped   FOLLOW-UP: Rocio Snyder NP  [address] [place] [address],[address]: New Prescriptions    No medications on file          Karlene Cagle DO  Emergency Medicine Resident  [address] [facility], [address] [place] [address]  Resident  09/28/17 6923

## 2017-09-28 NOTE — ED NOTES
Pt walk in to ER c/o left arm pain. Pt states he hit his arm on a wooden plank while at work and had pieces of wood stuck in his arm. Pt arrives with large abscess near elbow that appears to be fluid filled large and reddened. Pt arrives guarding arm and tearful. Pt in NAD and rr even and unlabored. Will continue to monitor.      Armando Berry RN  09/27/17 8881

## 2017-09-28 NOTE — ED PROVIDER NOTES
[facility]  ED  Emergency Department Encounter  Emergency Medicine Resident     Pt Name: Rahul Degroot  MRN: [id number]  [place] 1970  Date of evaluation: 9/27/17  PCP:  Toño Colby NP    [address] [place] [address]       Chief Complaint   Patient presents with    Abscess     left arm       HISTORY OF PRESENT ILLNESS  (Location/Symptom, Timing/Onset, Context/Setting, Quality, Duration, Modifying Factors, Severity.)      Rahul Degroot is a 52 y.o. male who presents With a large abscess and surrounding cellulitic region to his left lateral arm just proximal to the elbow. He reports his only been there for a few days. States that then he was working and he thinks he got some wood into it he brings in a Ziploc bag with a ~1 cm splinter in it. No numbness or weakness. History of MRSA. Denies a history of drug abuse however it's in his chart below. Denies fevers or chills. Reports he came in because the pain was too severe. Patient denies any past medical history other than anxiety and depression. He states he's been off of his psychiatric medications for a while but denies any suicidal ideation. PAST MEDICAL / SURGICAL / SOCIAL / FAMILY HISTORY      has a past medical history of Abscess; Anxiety; Cellulitis due to MRSA; Chronic back pain; Depression; Hypertension; Multiple substance abuse; and OCD (obsessive compulsive disorder). has a past surgical history that includes shoulder surgery (Right) and Eye surgery. Social History     Social History    Marital status:      Spouse name: N/A    Number of children: N/A    Years of education: N/A     Occupational History    Not on file.      Social History Main Topics    Smoking status: Former Smoker     Packs/day: 0.50     Years: 25.00     Types: Cigarettes    Smokeless tobacco: Never Used    Alcohol use No      Comment: has not  drank in 2 years    Drug use: Yes     Special: Marijuana      Comment: cady of marijuana/ denies today    Sexual activity: Not on file     Other Topics Concern    Not on file     Social History Narrative         Family History   Problem Relation Age of Onset    Diabetes Mother     Hearing Loss Father     High Blood Pressure Father          Allergies:  Review of patient's allergies indicates no known allergies. Home Medications:  Prior to Admission medications    Not on File       REVIEW OF SYSTEMS    (2-9 systems for level 4, 10 or more for level 5)      Review of Systems   Constitutional: Negative for chills and fever. Respiratory: Negative for shortness of breath. Cardiovascular: Negative for chest pain. Musculoskeletal: Negative for joint swelling. Skin:        Abscess and cellulitis left upper extremity   Neurological: Negative for weakness and numbness. Psychiatric/Behavioral: Negative for self-injury and suicidal ideas. PHYSICAL EXAM   (up to 7 for level 4, 8 or more for level 5)      INITIAL VITALS:   BP (!) 147/81  Pulse 104  Temp 99.7 °F (37.6 °C)  Resp 18  Ht 5' 11\" (1.803 m)  Wt 218 lb (98.9 kg)  SpO2 96%  BMI 30.4 kg/m2    Physical Exam   Constitutional: He is oriented to person, place, and time. He appears well-developed and well-nourished. HENT:   Head: Normocephalic and atraumatic. Eyes: Conjunctivae are normal. Right eye exhibits no discharge. Left eye exhibits no discharge. Neck: No tracheal deviation present. Cardiovascular:   Mild tachycardia   Pulmonary/Chest: Effort normal. No stridor. No respiratory distress. Abdominal: He exhibits no distension. Musculoskeletal: He exhibits no edema or deformity. Neurological: He is alert and oriented to person, place, and time. Skin: Skin is warm and dry. Large abscess with surrounding cellulitic region to patient's left lateral upper extremity proximal to the elbow joint. Does not appear to involve the elbow joint is distally neurovascularly intact.   C picture below   Vitals reviewed. DIFFERENTIAL  DIAGNOSIS     PLAN (LABS / IMAGING / EKG):  Orders Placed This Encounter   Procedures    Culture Blood #1    Culture Blood #1    Urine Culture    ANAEROBIC AND AEROBIC CULTURE    CT UPPER EXTREMITY LEFT W CONTRAST    CBC Auto Differential    Lactic Acid, Whole Blood    Urinalysis with Microscopic    APTT    Protime-INR    Comprehensive Metabolic Panel    C-Reactive Protein    PREVIOUS SPECIMEN    Pharmacy to Dose: Vancomycin    Inpatient consult to Hospitalist    Insert peripheral IV    PATIENT STATUS (FROM ED OR OR/PROCEDURAL) Inpatient     If the patient was admitted, some of the above orders may have been placed by the admitting team(s) and were auto populated above when I refreshed my note prior to signing it. If there is any question, please check for the responsible provider for individual orders in the EHR system. MEDICATIONS ORDERED:  Orders Placed This Encounter   Medications    0.9 % sodium chloride bolus    acetaminophen (TYLENOL) tablet 1,000 mg    piperacillin-tazobactam (ZOSYN) 4.5 g in dextrose 5 % 100 mL IVPB (mini-bag)    vancomycin (VANCOCIN) 1,500 mg in dextrose 5 % 250 mL IVPB    vancomycin (VANCOCIN) intermittent dosing (placeholder)    morphine (PF) injection 4 mg    iopamidol (ISOVUE-370) 76 % injection 90 mL    HYDROmorphone (DILAUDID) injection 1 mg    lidocaine-EPINEPHrine 1 percent-1:930678 injection 20 mL    ondansetron (ZOFRAN) 4 MG tablet     ANTHONY SCHWAB: cabinet override    midazolam (VERSED) 2 MG/2ML injection     ANTHONY SCHWAB: cabinet override    midazolam (VERSED) injection 2 mg    HYDROmorphone (DILAUDID) injection 1 mg     If the patient was admitted, some of the above orders may have been placed by the admitting team(s) and were auto populated above when I refreshed my note prior to signing it.   If there is any question, please check for the responsible provider for individual orders in the EHR system. DDX: abscess, cellulitis, sepsis    DIAGNOSTIC RESULTS / EMERGENCY DEPARTMENT COURSE / MDM     LABS:  Labs Reviewed   CBC WITH AUTO DIFFERENTIAL - Abnormal; Notable for the following:        Result Value    WBC 15.6 (*)     RBC 3.99 (*)     Hemoglobin 11.6 (*)     Hematocrit 35.3 (*)     Segs Absolute 12.17 (*)     Absolute Mono # 1.25 (*)     Absolute Eos # 0.62 (*)     All other components within normal limits   COMPREHENSIVE METABOLIC PANEL - Abnormal; Notable for the following:     Glucose 130 (*)     Calcium 8.4 (*)     Alb 3.3 (*)     All other components within normal limits   C-REACTIVE PROTEIN - Abnormal; Notable for the following:     CRP 90.7 (*)     All other components within normal limits   CULTURE BLOOD #1   CULTURE BLOOD #1   URINE CULTURE   ANAEROBIC AND AEROBIC CULTURE   LACTIC ACID, WHOLE BLOOD   APTT   PROTIME-INR   URINALYSIS WITH MICROSCOPIC   PREVIOUS SPECIMEN     If the patient was admitted, some of the above labs may have been ordered by the admitting team(s) and were auto populated above when I refreshed my note prior to signing it. If there is any question, please check for the responsible provider for individual orders in the EHR system. Additionally, some of the above labs results may still be pending. RADIOLOGY:  All forms of imaging other than ED bedside ultrasounds are viewed and interpreted by a radiologist and their interpretations are displayed below. Ct Upper Extremity Left W Contrast    Result Date: 9/27/2017  3.7 x 5.4 x 6.1 cm fluid collection within the lateral soft tissues of the distal left upper extremity just proximal to the forearm, consistent with an abscess. No osteomyelitis or soft tissue gas evident. EMERGENCY DEPARTMENT COURSE AND MEDICAL DECISION MAKING:  ED Course   Comment By Time   Still cannot get IV access on patient.  Anastacio Blandon DO 09/27 2251 Jan with [facility] accepting the admission and stated that she will place all the admission IMPRESSION      1. Abscess    2. Sepsis, due to unspecified organism ([address]    3. Leukocytosis, unspecified type    4. Anemia, unspecified type    5. Elevated C-reactive protein (CRP)             DISPOSITION / PLAN     DISPOSITION Admitted     If discharged, the patient was instructed to return to the emergency department with any worsening symptoms, if new symptoms arise, or if they have any other concerns. Patient was instructed not to drive home if discharged today and received pain medications or other mind-altering medications while here. Pre-hypertension/Hypertension: In the case that there was an elevated blood pressure reading for this patient today in the emergency department, the patient was informed that he or she may have pre-hypertension or hypertension. It was recommended to the patient to call the primary care provider listed in the discharge instructions or a physician of the patient's choice this week to arrange follow up for further evaluation of possible pre-hypertension or hypertension. PATIENT REFERRED TO:  Karime Lepe NP  [address]  [phone]            DISCHARGE MEDICATIONS:  New Prescriptions    No medications on file       Kendell CARPIO   Emergency Medicine Resident Physician    (Please note that portions of this note were completed with a voice recognition program.  Efforts were made to edit the dictations but occasionally words are mis-transcribed.)     Kendell Queen DO  Resident  09/28/17 0816

## 2017-09-28 NOTE — ED NOTES
Pt stepping out for smoke break. Pt has IV. Pt walking no distress.       Beverly Estrada RN  09/28/17 1720

## 2017-09-28 NOTE — ED NOTES
Vanco infused. Remedicated for pain. Awaits floor bed. Third liter saline infusing. Zosyn initiated.       Ryan San RN  09/28/17 0027

## 2017-09-28 NOTE — ED NOTES
Pt talked into staying. Pt keeps saying he wants to go home and getting upset.       Lito Montgomery RN  09/28/17 2472

## 2017-09-28 NOTE — ED NOTES
AM labs. remedicated for pain. IV 3L NS in.     Awake alert oriented x3,      Marvel Bound, RN  09/28/17 4354

## 2017-10-03 LAB
CULTURE: ABNORMAL
DIRECT EXAM: ABNORMAL
DIRECT EXAM: ABNORMAL
Lab: ABNORMAL
SPECIMEN DESCRIPTION: ABNORMAL
STATUS: ABNORMAL

## 2017-10-04 LAB
CULTURE: NORMAL
CULTURE: NORMAL
Lab: NORMAL
SPECIMEN DESCRIPTION: NORMAL
STATUS: NORMAL

## 2018-07-09 ENCOUNTER — HOSPITAL ENCOUNTER (EMERGENCY)
Age: 48
Discharge: LEFT BEFORE DISCHARGE | End: 2018-07-09
Attending: EMERGENCY MEDICINE
Payer: COMMERCIAL

## 2018-07-09 ENCOUNTER — APPOINTMENT (OUTPATIENT)
Dept: GENERAL RADIOLOGY | Age: 48
End: 2018-07-09
Payer: COMMERCIAL

## 2018-07-09 VITALS
BODY MASS INDEX: 30.8 KG/M2 | WEIGHT: 220 LBS | RESPIRATION RATE: 22 BRPM | HEART RATE: 77 BPM | TEMPERATURE: 98.5 F | HEIGHT: 71 IN | SYSTOLIC BLOOD PRESSURE: 169 MMHG | OXYGEN SATURATION: 100 % | DIASTOLIC BLOOD PRESSURE: 110 MMHG

## 2018-07-09 DIAGNOSIS — K08.9 CHRONIC DENTAL PAIN: Primary | ICD-10-CM

## 2018-07-09 DIAGNOSIS — R77.8 ELEVATED TROPONIN: ICD-10-CM

## 2018-07-09 DIAGNOSIS — G89.29 CHRONIC DENTAL PAIN: Primary | ICD-10-CM

## 2018-07-09 LAB
ABSOLUTE EOS #: 0.24 K/UL (ref 0–0.44)
ABSOLUTE IMMATURE GRANULOCYTE: 0.04 K/UL (ref 0–0.3)
ABSOLUTE LYMPH #: 3.93 K/UL (ref 1.1–3.7)
ABSOLUTE MONO #: 1.46 K/UL (ref 0.1–1.2)
ANION GAP SERPL CALCULATED.3IONS-SCNC: 9 MMOL/L (ref 9–17)
BASOPHILS # BLD: 0 % (ref 0–2)
BASOPHILS ABSOLUTE: 0.06 K/UL (ref 0–0.2)
BUN BLDV-MCNC: 15 MG/DL (ref 6–20)
BUN/CREAT BLD: ABNORMAL (ref 9–20)
CALCIUM SERPL-MCNC: 10.7 MG/DL (ref 8.6–10.4)
CHLORIDE BLD-SCNC: 100 MMOL/L (ref 98–107)
CO2: 31 MMOL/L (ref 20–31)
CREAT SERPL-MCNC: 0.94 MG/DL (ref 0.7–1.2)
DIFFERENTIAL TYPE: ABNORMAL
EKG ATRIAL RATE: 94 BPM
EKG P AXIS: 61 DEGREES
EKG P-R INTERVAL: 130 MS
EKG Q-T INTERVAL: 338 MS
EKG QRS DURATION: 76 MS
EKG QTC CALCULATION (BAZETT): 422 MS
EKG R AXIS: 58 DEGREES
EKG T AXIS: 28 DEGREES
EKG VENTRICULAR RATE: 94 BPM
EOSINOPHILS RELATIVE PERCENT: 2 % (ref 1–4)
GFR AFRICAN AMERICAN: >60 ML/MIN
GFR NON-AFRICAN AMERICAN: >60 ML/MIN
GFR SERPL CREATININE-BSD FRML MDRD: ABNORMAL ML/MIN/{1.73_M2}
GFR SERPL CREATININE-BSD FRML MDRD: ABNORMAL ML/MIN/{1.73_M2}
GLUCOSE BLD-MCNC: 80 MG/DL (ref 70–99)
HCT VFR BLD CALC: 43.6 % (ref 40.7–50.3)
HEMOGLOBIN: 14 G/DL (ref 13–17)
IMMATURE GRANULOCYTES: 0 %
LYMPHOCYTES # BLD: 27 % (ref 24–43)
MCH RBC QN AUTO: 29.2 PG (ref 25.2–33.5)
MCHC RBC AUTO-ENTMCNC: 32.1 G/DL (ref 28.4–34.8)
MCV RBC AUTO: 90.8 FL (ref 82.6–102.9)
MONOCYTES # BLD: 10 % (ref 3–12)
NRBC AUTOMATED: 0 PER 100 WBC
PDW BLD-RTO: 13.9 % (ref 11.8–14.4)
PLATELET # BLD: 422 K/UL (ref 138–453)
PLATELET ESTIMATE: ABNORMAL
PMV BLD AUTO: 9.3 FL (ref 8.1–13.5)
POC TROPONIN I: 0.12 NG/ML (ref 0–0.1)
POC TROPONIN INTERP: ABNORMAL
POTASSIUM SERPL-SCNC: 4.2 MMOL/L (ref 3.7–5.3)
RBC # BLD: 4.8 M/UL (ref 4.21–5.77)
RBC # BLD: ABNORMAL 10*6/UL
SEG NEUTROPHILS: 61 % (ref 36–65)
SEGMENTED NEUTROPHILS ABSOLUTE COUNT: 8.98 K/UL (ref 1.5–8.1)
SODIUM BLD-SCNC: 140 MMOL/L (ref 135–144)
TROPONIN INTERP: NORMAL
TROPONIN T: <0.03 NG/ML
WBC # BLD: 14.7 K/UL (ref 3.5–11.3)
WBC # BLD: ABNORMAL 10*3/UL

## 2018-07-09 PROCEDURE — 71046 X-RAY EXAM CHEST 2 VIEWS: CPT

## 2018-07-09 PROCEDURE — 85025 COMPLETE CBC W/AUTO DIFF WBC: CPT

## 2018-07-09 PROCEDURE — 84484 ASSAY OF TROPONIN QUANT: CPT

## 2018-07-09 PROCEDURE — 6370000000 HC RX 637 (ALT 250 FOR IP): Performed by: STUDENT IN AN ORGANIZED HEALTH CARE EDUCATION/TRAINING PROGRAM

## 2018-07-09 PROCEDURE — 93005 ELECTROCARDIOGRAM TRACING: CPT

## 2018-07-09 PROCEDURE — 99284 EMERGENCY DEPT VISIT MOD MDM: CPT

## 2018-07-09 PROCEDURE — 80048 BASIC METABOLIC PNL TOTAL CA: CPT

## 2018-07-09 RX ORDER — PENICILLIN V POTASSIUM 250 MG/1
500 TABLET ORAL ONCE
Status: COMPLETED | OUTPATIENT
Start: 2018-07-09 | End: 2018-07-09

## 2018-07-09 RX ORDER — LORAZEPAM 2 MG/1
2 TABLET ORAL ONCE
Status: DISCONTINUED | OUTPATIENT
Start: 2018-07-09 | End: 2018-07-09

## 2018-07-09 RX ORDER — ASPIRIN 81 MG/1
324 TABLET, CHEWABLE ORAL ONCE
Status: COMPLETED | OUTPATIENT
Start: 2018-07-09 | End: 2018-07-09

## 2018-07-09 RX ORDER — ACETAMINOPHEN 325 MG/1
325 TABLET ORAL EVERY 6 HOURS PRN
Qty: 90 TABLET | Refills: 0 | Status: SHIPPED | OUTPATIENT
Start: 2018-07-09 | End: 2021-09-15 | Stop reason: SDUPTHER

## 2018-07-09 RX ORDER — ALPRAZOLAM 0.25 MG/1
0.5 TABLET ORAL ONCE
Status: COMPLETED | OUTPATIENT
Start: 2018-07-09 | End: 2018-07-09

## 2018-07-09 RX ORDER — PENICILLIN V POTASSIUM 500 MG/1
500 TABLET ORAL 4 TIMES DAILY
Qty: 40 TABLET | Refills: 0 | Status: ON HOLD | OUTPATIENT
Start: 2018-07-09 | End: 2021-12-10 | Stop reason: HOSPADM

## 2018-07-09 RX ORDER — LORAZEPAM 2 MG/1
2 TABLET ORAL ONCE
Status: COMPLETED | OUTPATIENT
Start: 2018-07-09 | End: 2018-07-09

## 2018-07-09 RX ORDER — ALPRAZOLAM 0.25 MG/1
0.5 TABLET ORAL NIGHTLY PRN
Status: DISCONTINUED | OUTPATIENT
Start: 2018-07-10 | End: 2018-07-09

## 2018-07-09 RX ADMIN — ALPRAZOLAM 0.5 MG: 0.25 TABLET ORAL at 22:15

## 2018-07-09 RX ADMIN — ASPIRIN 81 MG 324 MG: 81 TABLET ORAL at 22:15

## 2018-07-09 RX ADMIN — PENICILLIN V POTASSIUM 500 MG: 250 TABLET ORAL at 22:15

## 2018-07-09 RX ADMIN — LORAZEPAM 2 MG: 2 TABLET ORAL at 20:31

## 2018-07-09 ASSESSMENT — PAIN DESCRIPTION - PAIN TYPE: TYPE: ACUTE PAIN

## 2018-07-09 ASSESSMENT — PAIN SCALES - GENERAL: PAINLEVEL_OUTOF10: 9

## 2018-07-09 ASSESSMENT — PAIN DESCRIPTION - LOCATION: LOCATION: MOUTH;CHEST

## 2018-07-10 ASSESSMENT — ENCOUNTER SYMPTOMS
BACK PAIN: 0
NAUSEA: 0
SHORTNESS OF BREATH: 1
WHEEZING: 0
VOMITING: 0
ABDOMINAL PAIN: 0
DIARRHEA: 0

## 2018-07-10 ASSESSMENT — HEART SCORE: ECG: 1

## 2018-07-10 NOTE — ED NOTES
Pt states that he would like to leave AMA to physician.  Plan to d/c [facility] Rey Gordon RN  07/09/18 [id number]

## 2018-07-10 NOTE — ED PROVIDER NOTES
[facility]  ED  Emergency Department Encounter  Emergency Medicine Resident     Pt Name: Soumya Lockett  MRN: [id number]  [place] 1970  Date of evaluation: 7/9/18  PCP:  BOBBY Andrade [id number]       Chief Complaint   Patient presents with    Panic Attack       HISTORY OF PRESENT ILLNESS  (Location/Symptom, Timing/Onset, Context/Setting, Quality, Duration, Modifying Factors, Severity.)      Soumya Lockett is a 50 y.o. male who presents with Left-sided tooth pain, anxiety attack. Patient notes that it he thought he was having a heart attack earlier today was having chest pain and shortness of breath consistent with previous episodes of panic attack. Patient thinks that the pain in his mouth is triggering his panic attacks and notes that he has been out of his anxiety medication. Patient denies any diaphoresis, nausea, vomiting. Patient denies any drooling and notes that he has not seen a dentist in a few months. PAST MEDICAL / SURGICAL / SOCIAL / FAMILY HISTORY      has a past medical history of Abscess; Anxiety; Cellulitis due to MRSA; Chronic back pain; Depression; Hypertension; Multiple substance abuse; and OCD (obsessive compulsive disorder). has a past surgical history that includes shoulder surgery (Right) and Eye surgery. Social History     Social History    Marital status:      Spouse name: N/A    Number of children: N/A    Years of education: N/A     Occupational History    Not on file.      Social History Main Topics    Smoking status: Former Smoker     Packs/day: 0.50     Years: 25.00     Types: Cigarettes    Smokeless tobacco: Never Used    Alcohol use No      Comment: has not  drank in 2 years    Drug use: Yes     Types: Marijuana      Comment: hx of marijuana/ denies today    Sexual activity: Not on file     Other Topics Concern    Not on file     Social History Narrative    No narrative on file       Patient was tenderness. Musculoskeletal: Normal range of motion. Neurological: He is alert and oriented to person, place, and time. No cranial nerve deficit. Skin: Skin is warm and dry. He is not diaphoretic. Psychiatric: He has a normal mood and affect. Vitals reviewed. DIFFERENTIAL  DIAGNOSIS     PLAN (LABS / IMAGING / EKG):  Orders Placed This Encounter   Procedures    XR CHEST STANDARD (2 VW)    CBC Auto Differential    Basic Metabolic Panel    Troponin    POCT troponin    POCT troponin    EKG 12 Lead       MEDICATIONS ORDERED:  Orders Placed This Encounter   Medications    LORazepam (ATIVAN) tablet 2 mg    aspirin chewable tablet 324 mg    DISCONTD: LORazepam (ATIVAN) tablet 2 mg    DISCONTD: ALPRAZolam (XANAX) tablet 0.5 mg    penicillin v potassium (VEETID) tablet 500 mg    ALPRAZolam (XANAX) tablet 0.5 mg    penicillin v potassium (VEETID) 500 MG tablet     Sig: Take 1 tablet by mouth 4 times daily     Dispense:  40 tablet     Refill:  0    acetaminophen (TYLENOL) 325 MG tablet     Sig: Take 1 tablet by mouth every 6 hours as needed for Pain     Dispense:  90 tablet     Refill:  0       DDX: Chronic dental pain, poor dentition, abscess, ACS/NSTEMI/STEMI/angina, arrhythmia, trauma, aortic dissection,  PE, PNA, pneumothroax, esophageal rupture, tamponade, Cocaine use pneumonia, pericarditis, GERD, MSK, Endocarditis, anxiety      Scores/Criteria: Heart Score    Heart Score for chest pain patients  History:  Moderately Suspicious  ECG: Non-Specifc repolarization disturbance/LBTB/PM  Patient Age: > 39 and < 65 years  *Risk factors for Atherosclerotic disease: Hypertension, Cigarette smoking  Risk Factors: 1 or 2 risk factors  Troponin: > 1 and < 3X normal limit  Heart Score Total: 5    Score 0 - 3 = 2.5%  MACE over next 6 wks = Discharge home  Score 4 - 6 = 20.3%  MACE over next 6 wks = Obs admit  Score 7 - 10 = 72.7%  MACE over next 6 wks = Early invasive Rx     DIAGNOSTIC RESULTS / EMERGENCY DEPARTMENT COURSE / MDM     LABS:  Results for orders placed or performed during the hospital encounter of 07/09/18   CBC Auto Differential   Result Value Ref Range    WBC 14.7 (H) 3.5 - 11.3 k/uL    RBC 4.80 4.21 - 5.77 m/uL    Hemoglobin 14.0 13.0 - 17.0 g/dL    Hematocrit 43.6 40.7 - 50.3 %    MCV 90.8 82.6 - 102.9 fL    MCH 29.2 25.2 - 33.5 pg    MCHC 32.1 28.4 - 34.8 g/dL    RDW 13.9 11.8 - 14.4 %    Platelets 247 417 - 111 k/uL    MPV 9.3 8.1 - 13.5 fL    NRBC Automated 0.0 0.0 per 100 WBC    Differential Type NOT REPORTED     Seg Neutrophils 61 36 - 65 %    Lymphocytes 27 24 - 43 %    Monocytes 10 3 - 12 %    Eosinophils % 2 1 - 4 %    Basophils 0 0 - 2 %    Immature Granulocytes 0 0 %    Segs Absolute 8.98 (H) 1.50 - 8.10 k/uL    Absolute Lymph # 3.93 (H) 1.10 - 3.70 k/uL    Absolute Mono # 1.46 (H) 0.10 - 1.20 k/uL    Absolute Eos # 0.24 0.00 - 0.44 k/uL    Basophils # 0.06 0.00 - 0.20 k/uL    Absolute Immature Granulocyte 0.04 0.00 - 0.30 k/uL    WBC Morphology NOT REPORTED     RBC Morphology NOT REPORTED     Platelet Estimate NOT REPORTED    Basic Metabolic Panel   Result Value Ref Range    Glucose 80 70 - 99 mg/dL    BUN 15 6 - 20 mg/dL    CREATININE 0.94 0.70 - 1.20 mg/dL    Bun/Cre Ratio NOT REPORTED 9 - 20    Calcium 10.7 (H) 8.6 - 10.4 mg/dL    Sodium 140 135 - 144 mmol/L    Potassium 4.2 3.7 - 5.3 mmol/L    Chloride 100 98 - 107 mmol/L    CO2 31 20 - 31 mmol/L    Anion Gap 9 9 - 17 mmol/L    GFR Non-African American >60 >60 mL/min    GFR African American >60 >60 mL/min    GFR Comment          GFR Staging NOT REPORTED    Troponin   Result Value Ref Range    Troponin T <0.03 <0.03 ng/mL    Troponin Interp         POCT troponin   Result Value Ref Range    POC Troponin I 0.12 (HH) 0.00 - 0.10 ng/mL    POC Troponin Interp       The Troponin-I (POC) results cannot be compared to the Troponin-T results.        IMPRESSION: Patient is a 42-year-old male who presents with chest pain and shortness of breath as reevaluation. PROCEDURES:  None    CONSULTS:  None      FINAL IMPRESSION      1. Chronic dental pain    2.  Elevated troponin          DISPOSITION / PLAN     DISPOSITION [place]    PATIENT REFERRED TO:  BOBBY Talley CNP  [address] [place] [id number] [phone]    Call in 1 day      [facility] ED  [address] [facility] [id number] [phone]    If symptoms worsen      DISCHARGE MEDICATIONS:  Discharge Medication List as of 7/9/2018 11:14 PM      START taking these medications    Details   penicillin v potassium (VEETID) 500 MG tablet Take 1 tablet by mouth 4 times daily, Disp-40 tablet, R-0Print      acetaminophen (TYLENOL) 325 MG tablet Take 1 tablet by mouth every 6 hours as needed for Pain, Disp-90 tablet, R-0Print             Kavita Martins DO  Emergency Medicine Resident    (Please note that portions of this note were completed with a voice recognition program.  Efforts were made to edit the dictations but occasionally words are mis-transcribed.)       Kavita Martins DO  Resident  07/10/18 0243

## 2018-07-10 NOTE — ED NOTES
Pt to ED with c/o of anxiety and pain to the mouth. Pt states that he has had some dental problems in the past and was supposed to see a dental surgeon but has not been able to yet. Pt has two teeth in the front that are grinded down and chipped. Pt states that he squeezed his gums and that \"pus shot out\". Pt also has a healing abscess to the L cheek that has had pussy drainage today. Pt states CP likely from his anxiety. Pt is shaking, itching self all over, hyperventilating and unable to tell writer situation calmly. Pt placed on monitor.       Sumit Han RN  07/09/18 2008

## 2018-08-21 ENCOUNTER — HOSPITAL ENCOUNTER (EMERGENCY)
Age: 48
Discharge: LEFT BEFORE DISCHARGE | End: 2018-08-21
Attending: EMERGENCY MEDICINE
Payer: COMMERCIAL

## 2018-08-21 VITALS
SYSTOLIC BLOOD PRESSURE: 141 MMHG | TEMPERATURE: 98.2 F | OXYGEN SATURATION: 98 % | RESPIRATION RATE: 16 BRPM | DIASTOLIC BLOOD PRESSURE: 129 MMHG | HEIGHT: 71 IN | HEART RATE: 115 BPM | WEIGHT: 210 LBS | BODY MASS INDEX: 29.4 KG/M2

## 2018-08-21 DIAGNOSIS — T40.1X4A DIACETYLMORPHINE OVERDOSE, UNDETERMINED INTENT, INITIAL ENCOUNTER: Primary | ICD-10-CM

## 2018-08-21 PROCEDURE — 99283 EMERGENCY DEPT VISIT LOW MDM: CPT

## 2018-08-21 ASSESSMENT — ENCOUNTER SYMPTOMS
WHEEZING: 0
SORE THROAT: 0
TROUBLE SWALLOWING: 0
PHOTOPHOBIA: 0
SHORTNESS OF BREATH: 0
COUGH: 0
NAUSEA: 0
VOMITING: 0
CHEST TIGHTNESS: 0
ABDOMINAL PAIN: 0
BACK PAIN: 0

## 2018-08-21 ASSESSMENT — PAIN DESCRIPTION - PAIN TYPE: TYPE: CHRONIC PAIN

## 2018-08-21 ASSESSMENT — PAIN SCALES - GENERAL: PAINLEVEL_OUTOF10: 7

## 2018-08-21 ASSESSMENT — PAIN DESCRIPTION - LOCATION: LOCATION: BACK

## 2018-08-21 NOTE — ED PROVIDER NOTES
Diacetylmorphine overdose, undetermined intent, initial encounter          DISPOSITION / [facility] 08/21/2018 01:50:52 PM      PATIENT REFERRED TO:  BOBBY Vega - CNP  [address] [address]  [phone]    Schedule an appointment as soon as possible for a visit in 1 week  As needed      DISCHARGE MEDICATIONS:  Discharge Medication List as of 8/21/2018  1:52 PM          Cole Crocker MD  Emergency Medicine Resident    (Please note that portions of this note were completed with a voice recognition program.  Efforts were made to edit the dictations but occasionally words are mis-transcribed.)        Cole Crocker MD  08/21/18 [id number]

## 2019-03-29 ENCOUNTER — HOSPITAL ENCOUNTER (EMERGENCY)
Age: 49
Discharge: HOME | End: 2019-03-29
Attending: EMERGENCY MEDICINE
Payer: COMMERCIAL

## 2019-03-29 ENCOUNTER — APPOINTMENT (OUTPATIENT)
Dept: GENERAL RADIOLOGY | Age: 49
End: 2019-03-29
Payer: COMMERCIAL

## 2019-03-29 VITALS
BODY MASS INDEX: 27.89 KG/M2 | OXYGEN SATURATION: 98 % | HEART RATE: 96 BPM | DIASTOLIC BLOOD PRESSURE: 100 MMHG | SYSTOLIC BLOOD PRESSURE: 143 MMHG | RESPIRATION RATE: 15 BRPM | WEIGHT: 200 LBS | TEMPERATURE: 98.3 F

## 2019-03-29 DIAGNOSIS — L03.114 CELLULITIS OF LEFT HAND: Primary | ICD-10-CM

## 2019-03-29 LAB
ABSOLUTE EOS #: 0.11 K/UL (ref 0–0.44)
ABSOLUTE IMMATURE GRANULOCYTE: 0.05 K/UL (ref 0–0.3)
ABSOLUTE LYMPH #: 3.08 K/UL (ref 1.1–3.7)
ABSOLUTE MONO #: 1.47 K/UL (ref 0.1–1.2)
ANION GAP SERPL CALCULATED.3IONS-SCNC: 11 MMOL/L (ref 9–17)
BASOPHILS # BLD: 0 % (ref 0–2)
BASOPHILS ABSOLUTE: 0.04 K/UL (ref 0–0.2)
BUN BLDV-MCNC: 15 MG/DL (ref 6–20)
BUN/CREAT BLD: NORMAL (ref 9–20)
C-REACTIVE PROTEIN: 9.5 MG/L (ref 0–5)
CALCIUM SERPL-MCNC: 10.1 MG/DL (ref 8.6–10.4)
CHLORIDE BLD-SCNC: 104 MMOL/L (ref 98–107)
CO2: 26 MMOL/L (ref 20–31)
CREAT SERPL-MCNC: 0.71 MG/DL (ref 0.7–1.2)
DIFFERENTIAL TYPE: ABNORMAL
EOSINOPHILS RELATIVE PERCENT: 1 % (ref 1–4)
GFR AFRICAN AMERICAN: >60 ML/MIN
GFR NON-AFRICAN AMERICAN: >60 ML/MIN
GFR SERPL CREATININE-BSD FRML MDRD: NORMAL ML/MIN/{1.73_M2}
GFR SERPL CREATININE-BSD FRML MDRD: NORMAL ML/MIN/{1.73_M2}
GLUCOSE BLD-MCNC: 85 MG/DL (ref 70–99)
HCT VFR BLD CALC: 40.9 % (ref 40.7–50.3)
HEMOGLOBIN: 13.3 G/DL (ref 13–17)
IMMATURE GRANULOCYTES: 0 %
LYMPHOCYTES # BLD: 22 % (ref 24–43)
MCH RBC QN AUTO: 29.8 PG (ref 25.2–33.5)
MCHC RBC AUTO-ENTMCNC: 32.5 G/DL (ref 28.4–34.8)
MCV RBC AUTO: 91.5 FL (ref 82.6–102.9)
MONOCYTES # BLD: 11 % (ref 3–12)
NRBC AUTOMATED: 0 PER 100 WBC
PDW BLD-RTO: 13 % (ref 11.8–14.4)
PLATELET # BLD: 512 K/UL (ref 138–453)
PLATELET ESTIMATE: ABNORMAL
PMV BLD AUTO: 9.1 FL (ref 8.1–13.5)
POTASSIUM SERPL-SCNC: 3.9 MMOL/L (ref 3.7–5.3)
RBC # BLD: 4.47 M/UL (ref 4.21–5.77)
RBC # BLD: ABNORMAL 10*6/UL
SEDIMENTATION RATE, ERYTHROCYTE: 23 MM (ref 0–10)
SEG NEUTROPHILS: 66 % (ref 36–65)
SEGMENTED NEUTROPHILS ABSOLUTE COUNT: 9.13 K/UL (ref 1.5–8.1)
SODIUM BLD-SCNC: 141 MMOL/L (ref 135–144)
WBC # BLD: 13.9 K/UL (ref 3.5–11.3)
WBC # BLD: ABNORMAL 10*3/UL

## 2019-03-29 PROCEDURE — 96365 THER/PROPH/DIAG IV INF INIT: CPT

## 2019-03-29 PROCEDURE — 6360000002 HC RX W HCPCS: Performed by: EMERGENCY MEDICINE

## 2019-03-29 PROCEDURE — 6370000000 HC RX 637 (ALT 250 FOR IP): Performed by: EMERGENCY MEDICINE

## 2019-03-29 PROCEDURE — 73110 X-RAY EXAM OF WRIST: CPT

## 2019-03-29 PROCEDURE — 86140 C-REACTIVE PROTEIN: CPT

## 2019-03-29 PROCEDURE — 96366 THER/PROPH/DIAG IV INF ADDON: CPT

## 2019-03-29 PROCEDURE — 96375 TX/PRO/DX INJ NEW DRUG ADDON: CPT

## 2019-03-29 PROCEDURE — 99283 EMERGENCY DEPT VISIT LOW MDM: CPT

## 2019-03-29 PROCEDURE — 96376 TX/PRO/DX INJ SAME DRUG ADON: CPT

## 2019-03-29 PROCEDURE — 85025 COMPLETE CBC W/AUTO DIFF WBC: CPT

## 2019-03-29 PROCEDURE — 80048 BASIC METABOLIC PNL TOTAL CA: CPT

## 2019-03-29 PROCEDURE — 85651 RBC SED RATE NONAUTOMATED: CPT

## 2019-03-29 PROCEDURE — 73130 X-RAY EXAM OF HAND: CPT

## 2019-03-29 RX ORDER — CEPHALEXIN 500 MG/1
500 CAPSULE ORAL ONCE
Status: COMPLETED | OUTPATIENT
Start: 2019-03-29 | End: 2019-03-29

## 2019-03-29 RX ORDER — CEPHALEXIN 500 MG/1
500 CAPSULE ORAL 4 TIMES DAILY
Qty: 28 CAPSULE | Refills: 0 | Status: SHIPPED | OUTPATIENT
Start: 2019-03-29 | End: 2019-04-05

## 2019-03-29 RX ORDER — MORPHINE SULFATE 4 MG/ML
4 INJECTION, SOLUTION INTRAMUSCULAR; INTRAVENOUS ONCE
Status: COMPLETED | OUTPATIENT
Start: 2019-03-29 | End: 2019-03-29

## 2019-03-29 RX ORDER — OXYCODONE HYDROCHLORIDE AND ACETAMINOPHEN 5; 325 MG/1; MG/1
1 TABLET ORAL EVERY 6 HOURS PRN
Qty: 12 TABLET | Refills: 0 | Status: SHIPPED | OUTPATIENT
Start: 2019-03-29 | End: 2019-04-01

## 2019-03-29 RX ORDER — SULFAMETHOXAZOLE AND TRIMETHOPRIM 800; 160 MG/1; MG/1
1 TABLET ORAL 2 TIMES DAILY
Qty: 14 TABLET | Refills: 0 | Status: SHIPPED | OUTPATIENT
Start: 2019-03-29 | End: 2019-04-05

## 2019-03-29 RX ORDER — OXYCODONE HYDROCHLORIDE AND ACETAMINOPHEN 5; 325 MG/1; MG/1
1 TABLET ORAL ONCE
Status: DISCONTINUED | OUTPATIENT
Start: 2019-03-29 | End: 2019-03-29

## 2019-03-29 RX ORDER — SULFAMETHOXAZOLE AND TRIMETHOPRIM 800; 160 MG/1; MG/1
1 TABLET ORAL ONCE
Status: COMPLETED | OUTPATIENT
Start: 2019-03-29 | End: 2019-03-29

## 2019-03-29 RX ORDER — OXYCODONE HYDROCHLORIDE AND ACETAMINOPHEN 5; 325 MG/1; MG/1
1 TABLET ORAL EVERY 6 HOURS PRN
Qty: 12 TABLET | Refills: 0 | Status: SHIPPED | OUTPATIENT
Start: 2019-03-29 | End: 2019-03-29 | Stop reason: SDUPTHER

## 2019-03-29 RX ORDER — OXYCODONE HYDROCHLORIDE AND ACETAMINOPHEN 5; 325 MG/1; MG/1
2 TABLET ORAL ONCE
Status: COMPLETED | OUTPATIENT
Start: 2019-03-29 | End: 2019-03-29

## 2019-03-29 RX ADMIN — MORPHINE SULFATE 4 MG: 4 INJECTION INTRAVENOUS at 21:13

## 2019-03-29 RX ADMIN — OXYCODONE HYDROCHLORIDE AND ACETAMINOPHEN 2 TABLET: 5; 325 TABLET ORAL at 22:54

## 2019-03-29 RX ADMIN — SULFAMETHOXAZOLE AND TRIMETHOPRIM 1 TABLET: 800; 160 TABLET ORAL at 22:54

## 2019-03-29 RX ADMIN — Medication 1250 MG: at 21:13

## 2019-03-29 RX ADMIN — CEPHALEXIN 500 MG: 500 CAPSULE ORAL at 22:54

## 2019-03-29 RX ADMIN — MORPHINE SULFATE 4 MG: 4 INJECTION INTRAVENOUS at 22:02

## 2019-03-29 ASSESSMENT — PAIN SCALES - GENERAL
PAINLEVEL_OUTOF10: 9
PAINLEVEL_OUTOF10: 7
PAINLEVEL_OUTOF10: 9
PAINLEVEL_OUTOF10: 8

## 2019-03-30 NOTE — ED PROVIDER NOTES
Emergency Medicine Attending Note    I have seen and examined the patient in conjunction with the Resident/MLP. Please see my key portion documented:      I agree with the assessment and plan as discussed with Dr. Frantz Casey. Electronically signed:  NEIL Ayala MD  03/29/19 0684

## 2019-04-01 ASSESSMENT — ENCOUNTER SYMPTOMS
COUGH: 0
WHEEZING: 0
NAUSEA: 0
DIARRHEA: 0
RHINORRHEA: 0
CONSTIPATION: 0
SHORTNESS OF BREATH: 0
SORE THROAT: 0
ABDOMINAL PAIN: 0
VOMITING: 0

## 2019-04-01 NOTE — ED PROVIDER NOTES
[facility] ED  Emergency Department Encounter  Emergency [address] [facility]     Pt Name: Mariusz Mayberry  MRN: [id number]  [place] 1970  Date of evaluation: 4/1/19  PCP:  No primary care provider on file. CHIEF COMPLAINT       Chief Complaint   Patient presents with    Hand Injury     Pt reports laceration to finger a week ago when taking out the trash. Pt reports he took leftover amoxycillin. Pt reports pain at 9/10 and feels like \"a knife is in my hand. \" Swelling and pain following up left arm. HISTORY OF PRESENT ILLNESS  (Location/Symptom, Timing/Onset, Context/Setting, Quality, Duration, Modifying Factors, Severity.)      Mariusz Mayberry is a 50 y.o. male who presents with complaints of left hand swelling and pain. Patient states that about a week ago he accidentally left middle finger while taking the trash out and that he has been healing but is not noticing pain and swelling of the entire hand with decreased range of motion secondary to the pain soon after. Patient states that he took an old amoxicillin pill 3 times a day for the past 5 days without improvement in his symptoms. Patient denies any purulent drainage, erythema, fever, chills, chest pain, shortness of breath, nausea, vomiting, abdominal pain, or changes in bowel or urinary habits. Patient denies prior injury to the hand or surgeries to the hand. Patient states that he is up-to-date on immunizations within the last 5 years. PAST MEDICAL / SURGICAL / SOCIAL / FAMILY HISTORY      has a past medical history of Abscess, Anxiety, Cellulitis due to MRSA, Chronic back pain, Depression, Hypertension, Multiple substance abuse, and OCD (obsessive compulsive disorder). has a past surgical history that includes shoulder surgery (Right) and Eye surgery.     Social History     Socioeconomic History    Marital status:      Spouse name: Not on file    Number of children: Not on file    Years of education: Not on file    Highest education level: Not on file   Occupational History    Not on file   Social Needs    Financial resource strain: Not on file    Food insecurity:     Worry: Not on file     Inability: Not on file    Transportation needs:     Medical: Not on file     Non-medical: Not on file   Tobacco Use    Smoking status: Former Smoker     Packs/day: 0.50     Years: 25.00     Pack years: 12.50     Types: Cigarettes    Smokeless tobacco: Never Used   Substance and Sexual Activity    Alcohol use: No     Alcohol/week: 0.0 oz     Comment: has not  drank in 2 years    Drug use: Yes     Types: Marijuana     Comment: hx of marijuana/ denies today    Sexual activity: Not on file   Lifestyle    Physical activity:     Days per week: Not on file     Minutes per session: Not on file    Stress: Not on file   Relationships    Social connections:     Talks on phone: Not on file     Gets together: Not on file     Attends [employer]ical service: Not on file     Active member of club or organization: Not on file     Attends meetings of clubs or organizations: Not on file     Relationship status: Not on file    Intimate partner violence:     Fear of current or ex partner: Not on file     Emotionally abused: Not on file     Physically abused: Not on file     Forced sexual activity: Not on file   Other Topics Concern    Not on file   Social History Narrative    Not on file       Family History   Problem Relation Age of Onset    Diabetes Mother     Hearing Loss Father     High Blood Pressure Father        Allergies:  Patient has no known allergies. Home Medications:  Prior to Admission medications    Medication Sig Start Date End Date Taking?  Authorizing Provider   cephALEXin (KEFLEX) 500 MG capsule Take 1 capsule by mouth 4 times daily for 7 days 3/29/19 4/5/19 Yes Mikaela Garcia MD   sulfamethoxazole-trimethoprim (BACTRIM DS) 800-160 MG per tablet Take 1 tablet by mouth 2 times daily for 7 days 3/29/19 4/5/19 Yes Marco Antonio Palomo MD   oxyCODONE-acetaminophen (PERCOCET) 5-325 MG per tablet Take 1 tablet by mouth every 6 hours as needed for Pain for up to 3 days. 3/29/19 4/1/19 Yes Marco Antonio Palomo MD   penicillin v potassium (VEETID) 500 MG tablet Take 1 tablet by mouth 4 times daily 7/9/18   Cherylene Jersey, DO   acetaminophen (TYLENOL) 325 MG tablet Take 1 tablet by mouth every 6 hours as needed for Pain 7/9/18   Cherylene Jersey, DO       REVIEW OF SYSTEMS    (2-9 systems for level 4, 10 or more for level 5)      Review of Systems   Constitutional: Negative for activity change, appetite change, chills and fever. HENT: Negative for congestion, rhinorrhea and sore throat. Eyes: Negative for visual disturbance. Respiratory: Negative for cough, shortness of breath and wheezing. Cardiovascular: Negative for chest pain. Gastrointestinal: Negative for abdominal pain, constipation, diarrhea, nausea and vomiting. Endocrine: Negative for polyuria. Genitourinary: Negative for dysuria, frequency, hematuria and urgency. Musculoskeletal: Positive for arthralgias and myalgias. Negative for neck pain and neck stiffness. Skin: Positive for rash and wound. Neurological: Negative for weakness, numbness and headaches. PHYSICAL EXAM   (up to 7 for level 4, 8 or more for level 5)      INITIAL VITALS:   BP (!) 143/100   Pulse 96   Temp 98.3 °F (36.8 °C) (Oral)   Resp 15   Wt 200 lb (90.7 kg)   SpO2 98%   BMI 27.89 kg/m²     Physical Exam   Constitutional: He is oriented to person, place, and time. He appears well-developed and well-nourished. No distress. Patient is alert and oriented x4, does appear to be in acute distress due to left hand pain, lying uncomfortably in bed   HENT:   Head: Normocephalic and atraumatic. Eyes: Pupils are equal, round, and reactive to light. Conjunctivae and EOM are normal. Right eye exhibits no discharge. Left eye exhibits no discharge.    Neck: Normal range of motion. Neck supple. Cardiovascular: Normal rate, regular rhythm, normal heart sounds and intact distal pulses. Exam reveals no gallop and no friction rub. No murmur heard. Pulmonary/Chest: Effort normal and breath sounds normal. No stridor. No respiratory distress. He has no wheezes. He has no rales. He exhibits no tenderness. Abdominal: Soft. Bowel sounds are normal. He exhibits no distension and no mass. There is no tenderness. There is no rebound and no guarding. No hernia. Musculoskeletal: Normal range of motion. He exhibits no edema, tenderness or deformity. Significantly swollen left hand most notable on the dorsum side, with noted small scab in the middle of the dorsum of the hand, well healing 3 cm laceration on the radial side of the left middle finger distally, no tenderness to palpation of the 5 digits of the left, no fusiform swelling of the digits, no pain with passive extension or flexion of the digits, there is decreased range of motion motion at the wrist and decreased hand  due to pain, swelling extends from hand to distal wrist, no sensory deficits, radial and ulnar pulses intact, capillary refill less than 2 seconds   Neurological: He is alert and oriented to person, place, and time. Skin: Skin is warm and dry. He is not diaphoretic.        DIFFERENTIAL  DIAGNOSIS     PLAN (LABS / IMAGING / EKG):  Orders Placed This Encounter   Procedures    XR HAND LEFT (MIN 3 VIEWS)    XR WRIST LEFT (MIN 3 VIEWS)    CBC WITH AUTO DIFFERENTIAL    BASIC METABOLIC PANEL    C-Reactive Protein    Sedimentation Rate       MEDICATIONS ORDERED:  Orders Placed This Encounter   Medications    DISCONTD: oxyCODONE-acetaminophen (PERCOCET) 5-325 MG per tablet 1 tablet    morphine injection 4 mg    vancomycin (VANCOCIN) 1250 mg in dextrose 5 % 250 mL IVPB    morphine injection 4 mg    cephALEXin (KEFLEX) 500 MG capsule     Sig: Take 1 capsule by mouth 4 times daily for 7 days     Dispense:  28 capsule     Refill:  0    sulfamethoxazole-trimethoprim (BACTRIM DS) 800-160 MG per tablet     Sig: Take 1 tablet by mouth 2 times daily for 7 days     Dispense:  14 tablet     Refill:  0    DISCONTD: oxyCODONE-acetaminophen (PERCOCET) 5-325 MG per tablet     Sig: Take 1 tablet by mouth every 6 hours as needed for Pain for up to 3 days. Dispense:  12 tablet     Refill:  0    oxyCODONE-acetaminophen (PERCOCET) 5-325 MG per tablet 2 tablet    cephALEXin (KEFLEX) capsule 500 mg    sulfamethoxazole-trimethoprim (BACTRIM DS;SEPTRA DS) 800-160 MG per tablet 1 tablet    oxyCODONE-acetaminophen (PERCOCET) 5-325 MG per tablet     Sig: Take 1 tablet by mouth every 6 hours as needed for Pain for up to 3 days.      Dispense:  12 tablet     Refill:  0       DDX: Cellulitis, abscess, flexor tenosynovitis, laceration, carpal tunnel syndrome,     DIAGNOSTIC RESULTS / EMERGENCY DEPARTMENT COURSE / MDM     LABS:  Results for orders placed or performed during the hospital encounter of 03/29/19   CBC WITH AUTO DIFFERENTIAL   Result Value Ref Range    WBC 13.9 (H) 3.5 - 11.3 k/uL    RBC 4.47 4.21 - 5.77 m/uL    Hemoglobin 13.3 13.0 - 17.0 g/dL    Hematocrit 40.9 40.7 - 50.3 %    MCV 91.5 82.6 - 102.9 fL    MCH 29.8 25.2 - 33.5 pg    MCHC 32.5 28.4 - 34.8 g/dL    RDW 13.0 11.8 - 14.4 %    Platelets 644 (H) 815 - 453 k/uL    MPV 9.1 8.1 - 13.5 fL    NRBC Automated 0.0 0.0 per 100 WBC    Differential Type NOT REPORTED     Seg Neutrophils 66 (H) 36 - 65 %    Lymphocytes 22 (L) 24 - 43 %    Monocytes 11 3 - 12 %    Eosinophils % 1 1 - 4 %    Basophils 0 0 - 2 %    Immature Granulocytes 0 0 %    Segs Absolute 9.13 (H) 1.50 - 8.10 k/uL    Absolute Lymph # 3.08 1.10 - 3.70 k/uL    Absolute Mono # 1.47 (H) 0.10 - 1.20 k/uL    Absolute Eos # 0.11 0.00 - 0.44 k/uL    Basophils # 0.04 0.00 - 0.20 k/uL    Absolute Immature Granulocyte 0.05 0.00 - 0.30 k/uL    WBC Morphology NOT REPORTED     RBC Morphology NOT REPORTED     Platelet Estimate NOT REPORTED    BASIC METABOLIC PANEL   Result Value Ref Range    Glucose 85 70 - 99 mg/dL    BUN 15 6 - 20 mg/dL    CREATININE 0.71 0.70 - 1.20 mg/dL    Bun/Cre Ratio NOT REPORTED 9 - 20    Calcium 10.1 8.6 - 10.4 mg/dL    Sodium 141 135 - 144 mmol/L    Potassium 3.9 3.7 - 5.3 mmol/L    Chloride 104 98 - 107 mmol/L    CO2 26 20 - 31 mmol/L    Anion Gap 11 9 - 17 mmol/L    GFR Non-African American >60 >60 mL/min    GFR African American >60 >60 mL/min    GFR Comment          GFR Staging NOT REPORTED    C-Reactive Protein   Result Value Ref Range    CRP 9.5 (H) 0.0 - 5.0 mg/L   Sedimentation Rate   Result Value Ref Range    Sed Rate 23 (H) 0 - 10 mm       IMPRESSION: 68-year-old male presented with complaints of that hand swelling and pain preceded by laceration to the left middle finger while taking out trash prior to the swelling and pain. Stable vitals except for mild hypertension. Patient does appear to be mild acute distress secondary to pain of the left hand which is noted to be swollen with decreased range of motion due to pain but no evidence concerning for tenosynovitis. We'll obtain CBC, BMP, ESR, CRP, and x-ray of the hand, wrist, and will give patient saline and morphine, as well as vancomycin. Results came back unremarkable for any acute findings except for mildly elevated ESR and CRP and WBC. Patient reevaluated and still complaining of pain. Patient states he would prefer to be discharged as he would like to make it to his daughter's wedding tomorrow for which she has a flight tomorrow morning out of town. Given no evidence of severe infection, it is reasonable to send patient home with Keflex and Bactrim and Percocet prescription which patient is in agreement with. Patient advised to follow up with PCP and to return to the ED for any worsening symptoms. Patient voiced understanding and agreed with plan.       RADIOLOGY:  Xr Wrist Left (min 3 Views)    Result Date: 3/29/2019  EXAMINATION: 3 XRAY VIEWS OF THE LEFT HAND;  3 XRAY VIEWS OF THE LEFT WRIST 3/29/2019 9:49 pm COMPARISON: None. HISTORY: ORDERING SYSTEM PROVIDED HISTORY: swelling, pain TECHNOLOGIST PROVIDED HISTORY: swelling, pain FINDINGS: There is no fracture, effusion or dislocation. The osseous structures appear intact. The soft tissues are edematous. The joint spaces are normal.  There is no evidence for foreign body seen. No bone or joint abnormality. Diffuse soft tissue swelling. Xr Hand Left (min 3 Views)    Result Date: 3/29/2019  EXAMINATION: 3 XRAY VIEWS OF THE LEFT HAND;  3 XRAY VIEWS OF THE LEFT WRIST 3/29/2019 9:49 pm COMPARISON: None. HISTORY: ORDERING SYSTEM PROVIDED HISTORY: swelling, pain TECHNOLOGIST PROVIDED HISTORY: swelling, pain FINDINGS: There is no fracture, effusion or dislocation. The osseous structures appear intact. The soft tissues are edematous. The joint spaces are normal.  There is no evidence for foreign body seen. No bone or joint abnormality. Diffuse soft tissue swelling. EKG  None    All EKG's are interpreted by the Emergency Department Physician who either signs or Co-signsthis chart in the absence of a cardiologist.    EMERGENCY DEPARTMENT COURSE:  Refer to [facility]    PROCEDURES:  None    CONSULTS:  None    CRITICAL CARE:  None    FINAL IMPRESSION      1. Cellulitis of left hand          DISPOSITION / PLAN     DISPOSITION Decision To Discharge 03/29/2019 10:33:38 PM      PATIENT REFERRED TO:  No follow-up provider specified.     DISCHARGE MEDICATIONS:  Discharge Medication List as of 3/29/2019 10:44 PM      START taking these medications    Details   cephALEXin (KEFLEX) 500 MG capsule Take 1 capsule by mouth 4 times daily for 7 days, Disp-28 capsule, R-0Print      sulfamethoxazole-trimethoprim (BACTRIM DS) 800-160 MG per tablet Take 1 tablet by mouth 2 times daily for 7 days, Disp-14 tablet, R-0Print      oxyCODONE-acetaminophen (PERCOCET) 5-325 MG per tablet Take 1 tablet by mouth every 6 hours as needed for Pain for up to 3 days. , Disp-12 tablet, R-0Print             Broderick Davis MD  Emergency Medicine Resident    (Please note that portions of this note were completed with a voice recognition program.  Effortswere made to edit the dictations but occasionally words are mis-transcribed.)        Broderick Davis MD  Resident  04/01/19 0506

## 2019-12-21 ENCOUNTER — HOSPITAL ENCOUNTER (EMERGENCY)
Age: 49
Discharge: HOME | End: 2019-12-21
Attending: EMERGENCY MEDICINE
Payer: COMMERCIAL

## 2019-12-21 ENCOUNTER — APPOINTMENT (OUTPATIENT)
Dept: GENERAL RADIOLOGY | Age: 49
End: 2019-12-21
Payer: COMMERCIAL

## 2019-12-21 VITALS
BODY MASS INDEX: 27.89 KG/M2 | HEART RATE: 96 BPM | WEIGHT: 200 LBS | OXYGEN SATURATION: 98 % | DIASTOLIC BLOOD PRESSURE: 109 MMHG | TEMPERATURE: 97.3 F | SYSTOLIC BLOOD PRESSURE: 157 MMHG | RESPIRATION RATE: 17 BRPM

## 2019-12-21 DIAGNOSIS — M25.511 ACUTE PAIN OF RIGHT SHOULDER: Primary | ICD-10-CM

## 2019-12-21 PROCEDURE — 96372 THER/PROPH/DIAG INJ SC/IM: CPT

## 2019-12-21 PROCEDURE — 6370000000 HC RX 637 (ALT 250 FOR IP): Performed by: STUDENT IN AN ORGANIZED HEALTH CARE EDUCATION/TRAINING PROGRAM

## 2019-12-21 PROCEDURE — 99283 EMERGENCY DEPT VISIT LOW MDM: CPT

## 2019-12-21 PROCEDURE — 73030 X-RAY EXAM OF SHOULDER: CPT

## 2019-12-21 PROCEDURE — 6360000002 HC RX W HCPCS: Performed by: STUDENT IN AN ORGANIZED HEALTH CARE EDUCATION/TRAINING PROGRAM

## 2019-12-21 RX ORDER — ACETAMINOPHEN 500 MG
1000 TABLET ORAL ONCE
Status: COMPLETED | OUTPATIENT
Start: 2019-12-21 | End: 2019-12-21

## 2019-12-21 RX ORDER — KETOROLAC TROMETHAMINE 30 MG/ML
30 INJECTION, SOLUTION INTRAMUSCULAR; INTRAVENOUS ONCE
Status: COMPLETED | OUTPATIENT
Start: 2019-12-21 | End: 2019-12-21

## 2019-12-21 RX ADMIN — KETOROLAC TROMETHAMINE 30 MG: 30 INJECTION, SOLUTION INTRAMUSCULAR at 15:29

## 2019-12-21 RX ADMIN — ACETAMINOPHEN 1000 MG: 500 TABLET ORAL at 15:28

## 2019-12-21 ASSESSMENT — ENCOUNTER SYMPTOMS
RHINORRHEA: 0
NAUSEA: 0
PHOTOPHOBIA: 0
SHORTNESS OF BREATH: 0
VOMITING: 0
ABDOMINAL PAIN: 0
WHEEZING: 0

## 2019-12-21 ASSESSMENT — PAIN DESCRIPTION - DESCRIPTORS: DESCRIPTORS: ACHING

## 2019-12-21 ASSESSMENT — PAIN DESCRIPTION - PAIN TYPE: TYPE: ACUTE PAIN

## 2019-12-21 ASSESSMENT — PAIN SCALES - GENERAL: PAINLEVEL_OUTOF10: 8

## 2019-12-21 ASSESSMENT — PAIN DESCRIPTION - ORIENTATION: ORIENTATION: RIGHT

## 2019-12-21 ASSESSMENT — PAIN DESCRIPTION - LOCATION: LOCATION: SHOULDER

## 2019-12-21 ASSESSMENT — PAIN DESCRIPTION - ONSET: ONSET: SUDDEN

## 2019-12-21 ASSESSMENT — PAIN DESCRIPTION - FREQUENCY: FREQUENCY: CONTINUOUS

## 2020-04-29 ENCOUNTER — TELEPHONE (OUTPATIENT)
Dept: INTERNAL MEDICINE | Age: 50
End: 2020-04-29

## 2020-04-29 NOTE — TELEPHONE ENCOUNTER
Chart Reviewed. Patient established care with Nory Nguyen on 3/31/2020. He was evaluated for \"chronic right shoulder pain\". Referrals were placed for Orthopeidc Surgery and Pain Management. I do not see a history taken regarding \"back issues\". Patient can book a follow-up visit, evisit/telephone encounter for further evaluation of this. Thank you.   Electronically signed by Ti Paulino MD on 4/29/2020 at 3:52 PM

## 2020-05-19 ENCOUNTER — HOSPITAL ENCOUNTER (OUTPATIENT)
Dept: PAIN MANAGEMENT | Age: 50
Discharge: HOME | End: 2020-05-19
Payer: COMMERCIAL

## 2020-05-19 PROCEDURE — 99211 OFF/OP EST MAY X REQ PHY/QHP: CPT | Performed by: PAIN MEDICINE

## 2021-01-29 ENCOUNTER — HOSPITAL ENCOUNTER (OUTPATIENT)
Facility: MEDICAL CENTER | Age: 51
Discharge: HOME | End: 2021-01-29

## 2021-09-15 ENCOUNTER — APPOINTMENT (OUTPATIENT)
Dept: CT IMAGING | Age: 51
End: 2021-09-15
Payer: COMMERCIAL

## 2021-09-15 ENCOUNTER — HOSPITAL ENCOUNTER (EMERGENCY)
Age: 51
Discharge: HOME | End: 2021-09-15
Attending: EMERGENCY MEDICINE
Payer: COMMERCIAL

## 2021-09-15 ENCOUNTER — APPOINTMENT (OUTPATIENT)
Dept: GENERAL RADIOLOGY | Age: 51
End: 2021-09-15
Payer: COMMERCIAL

## 2021-09-15 VITALS
SYSTOLIC BLOOD PRESSURE: 136 MMHG | RESPIRATION RATE: 20 BRPM | HEART RATE: 99 BPM | OXYGEN SATURATION: 96 % | DIASTOLIC BLOOD PRESSURE: 91 MMHG | TEMPERATURE: 97 F | HEIGHT: 71 IN | BODY MASS INDEX: 30.8 KG/M2 | WEIGHT: 220 LBS

## 2021-09-15 DIAGNOSIS — S76.302A LEFT HAMSTRING INJURY, INITIAL ENCOUNTER: Primary | ICD-10-CM

## 2021-09-15 PROCEDURE — 99283 EMERGENCY DEPT VISIT LOW MDM: CPT

## 2021-09-15 PROCEDURE — 73502 X-RAY EXAM HIP UNI 2-3 VIEWS: CPT

## 2021-09-15 PROCEDURE — 96372 THER/PROPH/DIAG INJ SC/IM: CPT

## 2021-09-15 PROCEDURE — 6360000002 HC RX W HCPCS: Performed by: EMERGENCY MEDICINE

## 2021-09-15 PROCEDURE — 6370000000 HC RX 637 (ALT 250 FOR IP): Performed by: EMERGENCY MEDICINE

## 2021-09-15 PROCEDURE — 74176 CT ABD & PELVIS W/O CONTRAST: CPT

## 2021-09-15 RX ORDER — LANOLIN ALCOHOL/MO/W.PET/CERES
3 CREAM (GRAM) TOPICAL NIGHTLY PRN
Qty: 30 TABLET | Refills: 0 | Status: ON HOLD | OUTPATIENT
Start: 2021-09-15 | End: 2022-01-10 | Stop reason: HOSPADM

## 2021-09-15 RX ORDER — ACETAMINOPHEN 500 MG
1000 TABLET ORAL EVERY 8 HOURS PRN
Qty: 21 TABLET | Refills: 0 | Status: ON HOLD | OUTPATIENT
Start: 2021-09-15 | End: 2021-12-10 | Stop reason: HOSPADM

## 2021-09-15 RX ORDER — KETOROLAC TROMETHAMINE 15 MG/ML
15 INJECTION, SOLUTION INTRAMUSCULAR; INTRAVENOUS ONCE
Status: COMPLETED | OUTPATIENT
Start: 2021-09-15 | End: 2021-09-15

## 2021-09-15 RX ORDER — ACETAMINOPHEN 325 MG/1
650 TABLET ORAL ONCE
Status: COMPLETED | OUTPATIENT
Start: 2021-09-15 | End: 2021-09-15

## 2021-09-15 RX ORDER — KETOROLAC TROMETHAMINE 30 MG/ML
30 INJECTION, SOLUTION INTRAMUSCULAR; INTRAVENOUS ONCE
Status: DISCONTINUED | OUTPATIENT
Start: 2021-09-15 | End: 2021-09-15

## 2021-09-15 RX ORDER — NAPROXEN 375 MG/1
375 TABLET ORAL 2 TIMES DAILY PRN
Qty: 14 TABLET | Refills: 0 | Status: ON HOLD | OUTPATIENT
Start: 2021-09-15 | End: 2021-12-10 | Stop reason: HOSPADM

## 2021-09-15 RX ORDER — 0.9 % SODIUM CHLORIDE 0.9 %
1000 INTRAVENOUS SOLUTION INTRAVENOUS ONCE
Status: DISCONTINUED | OUTPATIENT
Start: 2021-09-15 | End: 2021-09-15 | Stop reason: HOSPADM

## 2021-09-15 RX ADMIN — ACETAMINOPHEN 650 MG: 325 TABLET ORAL at 14:00

## 2021-09-15 RX ADMIN — KETOROLAC TROMETHAMINE 15 MG: 15 INJECTION, SOLUTION INTRAMUSCULAR; INTRAVENOUS at 14:00

## 2021-09-15 ASSESSMENT — ENCOUNTER SYMPTOMS
ABDOMINAL PAIN: 1
CONSTIPATION: 1
NAUSEA: 0
COLOR CHANGE: 0
VOMITING: 0
DIARRHEA: 0

## 2021-09-15 ASSESSMENT — PAIN DESCRIPTION - LOCATION: LOCATION: LEG

## 2021-09-15 ASSESSMENT — PAIN SCALES - GENERAL
PAINLEVEL_OUTOF10: 9
PAINLEVEL_OUTOF10: 9

## 2021-09-15 ASSESSMENT — PAIN DESCRIPTION - PAIN TYPE: TYPE: ACUTE PAIN

## 2021-09-15 ASSESSMENT — PAIN DESCRIPTION - FREQUENCY: FREQUENCY: INTERMITTENT

## 2021-09-15 ASSESSMENT — PAIN DESCRIPTION - DESCRIPTORS: DESCRIPTORS: STABBING

## 2021-09-15 ASSESSMENT — PAIN DESCRIPTION - ORIENTATION: ORIENTATION: LEFT

## 2021-09-15 NOTE — ED PROVIDER NOTES
[address]  Emergency Department Encounter     Pt Name: Lashanda Gonzalez  MRN: [id number]  [place] 1970  Date of evaluation: 9/15/21  PCP:  No primary care provider on file. CHIEF COMPLAINT       Chief Complaint   Patient presents with    Leg Pain     left    Back Pain       HISTORY OF PRESENT ILLNESS  (Location/Symptom, Timing/Onset, Context/Setting, Quality, Duration, Modifying Factors, Severity.)    Lashanda Gonzalez is a 46 y.o. male who presents with left-sided hip, groin, buttock, leg pain is been going on for the past 5 days that is progressively been getting worse. No falls trips slips or injury to the area. No overexertion. No testicular penile pain discharge or swelling. No urinary symptoms. Patient has chronic back pain but states that his pain is at its baseline and this feels different and is more lower. Does not have a history of kidney stones that he is aware of. No leg swelling or changes in color. PAST MEDICAL / SURGICAL / SOCIAL / FAMILY HISTORY    has a past medical history of Abscess, Anxiety, Cellulitis due to MRSA, Chronic back pain, Depression, Hypertension, Multiple substance abuse ([address] and OCD (obsessive compulsive disorder). has a past surgical history that includes shoulder surgery (Right) and Eye surgery.     Social History     Socioeconomic History    Marital status:      Spouse name: Not on file    Number of children: Not on file    Years of education: Not on file    Highest education level: Not on file   Occupational History    Not on file   Tobacco Use    Smoking status: Former Smoker     Packs/day: 0.50     Years: 25.00     Pack years: 12.50     Types: Cigarettes    Smokeless tobacco: Never Used   Substance and Sexual Activity    Alcohol use: No     Alcohol/week: 0.0 standard drinks     Comment: has not  drank in 2 years    Drug use: Yes     Types: Marijuana     Comment: hx of marijuana/ denies today    Sexual activity: Not on file   Other Topics Concern    Not on file   Social History Narrative    Not on file     Social Determinants of Health     Financial Resource Strain:     Difficulty of Paying Living Expenses:    Food Insecurity:     Worried About Running Out of Food in the Last Year:     [address] in the Last Year:    Transportation Needs:     Lack of Transportation (Medical):  Lack of Transportation (Non-Medical):    Physical Activity:     Days of Exercise per Week:     Minutes of Exercise per Session:    Stress:     Feeling of Stress :    Social Connections:     Frequency of Communication with Friends and Family:     Frequency of Social Gatherings with Friends and Family:     Attends Protestant Services:     Active Member of Clubs or Organizations:     Attends Club or Organization Meetings:     Marital Status:    Intimate Partner Violence:     Fear of Current or Ex-Partner:     Emotionally Abused:     Physically Abused:     Sexually Abused:        Family History   Problem Relation Age of Onset    Diabetes Mother     Hearing Loss Father     High Blood Pressure Father        Allergies:    Patient has no known allergies. Home Medications:  Prior to Admission medications    Medication Sig Start Date End Date Taking?  Authorizing Provider   acetaminophen (TYLENOL) 500 MG tablet Take 2 tablets by mouth every 8 hours as needed for Pain 9/15/21  Yes Marlene Sher, DO   naproxen (NAPROSYN) 375 MG tablet Take 1 tablet by mouth 2 times daily as needed for Pain 9/15/21 9/22/21 Yes Marlene Sher DO   melatonin (RA MELATONIN) 3 MG TABS tablet Take 1 tablet by mouth nightly as needed (insomnia) 9/15/21  Yes [address], DO   traZODone (DESYREL) 50 MG tablet Take 1 tablet by mouth nightly 3/31/20   Marcella Michael MD   penicillin v potassium (VEETID) 500 MG tablet Take 1 tablet by mouth 4 times daily 7/9/18   Vania Prieto DO       REVIEW OF SYSTEMS    (2-9 systems for level 4, 10 or more for level 5)    Review of Systems   Constitutional: Negative for chills, diaphoresis and fever. Gastrointestinal: Positive for abdominal pain and constipation (chronic). Negative for diarrhea, nausea and vomiting. Endocrine: Negative for polyuria. Genitourinary: Negative for decreased urine volume, difficulty urinating, discharge, dysuria, flank pain, frequency, hematuria, penile pain, penile swelling, scrotal swelling, testicular pain and urgency. Musculoskeletal: Positive for arthralgias and myalgias. Skin: Negative for color change. PHYSICAL EXAM   (up to 7 for level 4, 8 or more for level 5)    VITALS:   Vitals:    09/15/21 1100 09/15/21 1102 09/15/21 1103   BP:   (!) 136/91   Pulse:  99    Resp:  20    Temp:  97 °F (36.1 °C)    TempSrc:  Oral    SpO2:  96%    Weight: 220 lb (99.8 kg)     Height: 5' 11\" (1.803 m)         Physical Exam  Vitals and nursing note reviewed. Exam conducted with a chaperone present. Constitutional:       General: He is not in acute distress. Appearance: He is well-developed. He is not diaphoretic. HENT:      Head: Normocephalic and atraumatic. Eyes:      Conjunctiva/sclera: Conjunctivae normal.      Pupils: Pupils are equal, round, and reactive to light. Cardiovascular:      Rate and Rhythm: Normal rate and regular rhythm. Heart sounds: Normal heart sounds. Pulmonary:      Effort: Pulmonary effort is normal. No respiratory distress. Breath sounds: Normal breath sounds. No wheezing, rhonchi or rales. Abdominal:      General: There is no distension. Palpations: Abdomen is soft. Tenderness: There is abdominal tenderness in the left lower quadrant. There is no right CVA tenderness, left CVA tenderness, guarding or rebound. Genitourinary:     Penis: Normal.       Testes: Normal.      Rectum: Normal.   Musculoskeletal:         General: Tenderness present. Normal range of motion.       Cervical back: Normal and normal range of motion. Thoracic back: Normal.        Back:    Skin:     General: Skin is warm and dry. Findings: No bruising, erythema or rash. Neurological:      General: No focal deficit present. Mental Status: He is alert. Psychiatric:         Behavior: Behavior normal.         DIFFERENTIAL  DIAGNOSIS   PLAN (LABS / IMAGING / EKG):  Orders Placed This Encounter   Procedures    CT ABDOMEN PELVIS WO CONTRAST Additional Contrast? None    XR HIP 2-3 VW W PELVIS LEFT    URINALYSIS WITH MICROSCOPIC    CBC WITH AUTO DIFFERENTIAL    Basic Metabolic Panel       MEDICATIONS ORDERED:  Orders Placed This Encounter   Medications    DISCONTD: ketorolac (TORADOL) injection 30 mg    acetaminophen (TYLENOL) tablet 650 mg    0.9 % sodium chloride bolus    ketorolac (TORADOL) injection 15 mg    acetaminophen (TYLENOL) 500 MG tablet     Sig: Take 2 tablets by mouth every 8 hours as needed for Pain     Dispense:  21 tablet     Refill:  0    naproxen (NAPROSYN) 375 MG tablet     Sig: Take 1 tablet by mouth 2 times daily as needed for Pain     Dispense:  14 tablet     Refill:  0    melatonin (RA MELATONIN) 3 MG TABS tablet     Sig: Take 1 tablet by mouth nightly as needed (insomnia)     Dispense:  30 tablet     Refill:  0       DIAGNOSTIC RESULTS / EMERGENCYDEPARTMENT COURSE / MDM   LABS:  Labs Reviewed   URINALYSIS WITH MICROSCOPIC   CBC WITH AUTO DIFFERENTIAL   BASIC METABOLIC PANEL       RADIOLOGY:  CT ABDOMEN PELVIS WO CONTRAST Additional Contrast? None    Result Date: 9/15/2021  EXAMINATION: CT OF THE ABDOMEN AND PELVIS WITHOUT CONTRAST 9/15/2021 12:30 pm TECHNIQUE: CT of the abdomen and pelvis was performed without the administration of intravenous contrast. Multiplanar reformatted images are provided for review. Dose modulation, iterative reconstruction, and/or weight based adjustment of the mA/kV was utilized to reduce the radiation dose to as low as reasonably achievable.  COMPARISON: 8/20/2014 HISTORY: ORDERING SYSTEM PROVIDED HISTORY: LLQ abd pain, hip pain, groin pain TECHNOLOGIST PROVIDED HISTORY: LLQ abd pain, hip pain, groin pain Decision Support Exception - unselect if not a suspected or confirmed emergency medical condition->Emergency Medical Condition (MA) Reason for Exam: LLQ abd pain, hip pain, groin pain Acuity: Unknown Type of Exam: Unknown FINDINGS: Lower Chest: The lower mediastinal structures are unremarkable. The lung bases are clear. Organs: No acute abnormality identified involving the unenhanced liver, gallbladder, spleen, adrenal glands, and kidneys. There is a dominant simple cyst involving the upper pole left kidney. GI/Bowel: The small bowel and colon are normal in caliber. The appendix is normal.  No focal inflammatory changes are present involving the GI tract. Pelvis: The urinary bladder and distal ureters are unremarkable. The prostate and seminal vesicles are age-appropriate. No free fluid in the pelvis. No pelvic lymphadenopathy. Peritoneum/Retroperitoneum: The abdominal aorta is normal in caliber. No intraperitoneal free air or free fluid is present. Bones/Soft Tissues: No acute fracture or dislocation. There is a indeterminate, lytic lesion involving the left ischial tuberosity measuring 1.7 x 3.9 cm, with some associated cortical destruction. This is new from previous exam.     No acute findings within the abdomen or pelvis. Lytic 1.7 x 3.9 cm bone lesion involving the left ischial tuberosity, new from previous CT. This is nonspecific, but metastatic disease would be a strong consideration. Differential diagnosis would also include etiologies such as myeloma or lymphoma. Consider whole body bone scan to evaluate for any additional lesions. Correlate with any known history of malignancy. XR HIP 2-3 VW W PELVIS LEFT    Result Date: 9/15/2021  EXAMINATION: ONE XRAY VIEW OF THE PELVIS AND TWO XRAY VIEWS LEFT HIP 9/15/2021 1:28 pm COMPARISON: None. HISTORY: ORDERING SYSTEM PROVIDED HISTORY: L groin, buttocks, leg pain TECHNOLOGIST PROVIDED HISTORY: L groin, buttocks, leg pain FINDINGS: There are severe degenerative changes of visualized lower lumbar vertebral bodies Uncompromised left sacroiliac and left hip joints without definite fracture, dislocation, significant degenerative/erosive change, radiopaque foreign body or bony destructive lesion     Severe degenerative changes of visualized lower lumbar vertebral bodies Unremarkable left hip series         EMERGENCY DEPARTMENT COURSE:  ED Course as of Sep 15 1358   Wed Sep 15, 2021   1155 Patient requesting no narcotics     [AO]   1214 Multiple attempts at IV access without success    [AO]   1307 CT ABDOMEN PELVIS WO CONTRAST Additional Contrast? None [AO]   1344 XR HIP 2-3 VW W PELVIS LEFT [AO]      ED Course User Index  [AO] Luwana Favre,        MDM  Number of Diagnoses or Management Options     Amount and/or Complexity of Data Reviewed  Clinical lab tests: ordered and reviewed  Tests in the radiology section of CPT®: ordered and reviewed  Review and summarize past medical records: yes  Independent visualization of images, tracings, or specimens: yes    Patient Progress  Patient progress: stable      PROCEDURES:  Procedures     CONSULTS:  None    CRITICAL CARE:  NONE    FINAL IMPRESSION     1. Left hamstring injury, initial encounter        DISPOSITION / PLAN   DISPOSITION Decision To Discharge 09/15/2021 01:54:56 PM      Evaluation and treatment course in the ED, and plan of care upon discharge was discussed in length with the patient. Patient had no further questions prior to being discharged and was instructed to return to the ED for new or worsening symptoms. Any changes to existing medications or new prescriptions were reviewed with patient and they expressed understanding of how to correctly take their medications and the possible side effects.     PATIENT REFERRED TO:  [facility]  [address] [facility]  [phone]    As needed, If symptoms worsen      DISCHARGE MEDICATIONS:  New Prescriptions    MELATONIN (RA MELATONIN) 3 MG TABS TABLET    Take 1 tablet by mouth nightly as needed (insomnia)    NAPROXEN (NAPROSYN) 375 MG TABLET    Take 1 tablet by mouth 2 times daily as needed for Pain       Marlene Tompkins DO  Emergency Medicine Physician    (Please note that portions of this note were completed with a voice recognition program.  Efforts were made to edit the dictations but occasionally words are mis-transcribed.)        [address],   09/15/21 0038

## 2021-12-07 ENCOUNTER — HOSPITAL ENCOUNTER (INPATIENT)
Age: 51
LOS: 3 days | Discharge: HOME | DRG: 343 | End: 2021-12-10
Attending: EMERGENCY MEDICINE | Admitting: INTERNAL MEDICINE
Payer: COMMERCIAL

## 2021-12-07 ENCOUNTER — APPOINTMENT (OUTPATIENT)
Dept: CT IMAGING | Age: 51
DRG: 343 | End: 2021-12-07
Payer: COMMERCIAL

## 2021-12-07 DIAGNOSIS — M89.9 LESION OF PELVIC BONE: Primary | ICD-10-CM

## 2021-12-07 PROBLEM — R26.2 CANNOT WALK: Status: ACTIVE | Noted: 2021-12-07

## 2021-12-07 PROBLEM — M89.8X5 LYTIC BONE LESION OF HIP: Status: ACTIVE | Noted: 2021-12-07

## 2021-12-07 LAB
ABSOLUTE EOS #: 0.24 K/UL (ref 0–0.44)
ABSOLUTE IMMATURE GRANULOCYTE: 0.04 K/UL (ref 0–0.3)
ABSOLUTE LYMPH #: 3.88 K/UL (ref 1.1–3.7)
ABSOLUTE MONO #: 1.15 K/UL (ref 0.1–1.2)
ANION GAP SERPL CALCULATED.3IONS-SCNC: 12 MMOL/L (ref 9–17)
BASOPHILS # BLD: 0 % (ref 0–2)
BASOPHILS ABSOLUTE: 0.03 K/UL (ref 0–0.2)
BUN BLDV-MCNC: 15 MG/DL (ref 6–20)
BUN/CREAT BLD: ABNORMAL (ref 9–20)
CALCIUM SERPL-MCNC: 11.1 MG/DL (ref 8.6–10.4)
CHLORIDE BLD-SCNC: 104 MMOL/L (ref 98–107)
CO2: 24 MMOL/L (ref 20–31)
CREAT SERPL-MCNC: 0.99 MG/DL (ref 0.7–1.2)
DIFFERENTIAL TYPE: ABNORMAL
EOSINOPHILS RELATIVE PERCENT: 2 % (ref 1–4)
GFR AFRICAN AMERICAN: >60 ML/MIN
GFR NON-AFRICAN AMERICAN: >60 ML/MIN
GFR SERPL CREATININE-BSD FRML MDRD: ABNORMAL ML/MIN/{1.73_M2}
GFR SERPL CREATININE-BSD FRML MDRD: ABNORMAL ML/MIN/{1.73_M2}
GLUCOSE BLD-MCNC: 86 MG/DL (ref 70–99)
HCT VFR BLD CALC: 38.8 % (ref 40.7–50.3)
HEMOGLOBIN: 12.7 G/DL (ref 13–17)
IMMATURE GRANULOCYTES: 0 %
LYMPHOCYTES # BLD: 31 % (ref 24–43)
MCH RBC QN AUTO: 29.8 PG (ref 25.2–33.5)
MCHC RBC AUTO-ENTMCNC: 32.7 G/DL (ref 28.4–34.8)
MCV RBC AUTO: 91.1 FL (ref 82.6–102.9)
MONOCYTES # BLD: 9 % (ref 3–12)
NRBC AUTOMATED: 0 PER 100 WBC
PDW BLD-RTO: 12.4 % (ref 11.8–14.4)
PLATELET # BLD: 469 K/UL (ref 138–453)
PLATELET ESTIMATE: ABNORMAL
PMV BLD AUTO: 9.1 FL (ref 8.1–13.5)
POTASSIUM SERPL-SCNC: 4.1 MMOL/L (ref 3.7–5.3)
RBC # BLD: 4.26 M/UL (ref 4.21–5.77)
RBC # BLD: ABNORMAL 10*6/UL
SARS-COV-2, RAPID: NOT DETECTED
SEG NEUTROPHILS: 58 % (ref 36–65)
SEGMENTED NEUTROPHILS ABSOLUTE COUNT: 7.21 K/UL (ref 1.5–8.1)
SODIUM BLD-SCNC: 140 MMOL/L (ref 135–144)
SPECIMEN DESCRIPTION: NORMAL
WBC # BLD: 12.6 K/UL (ref 3.5–11.3)
WBC # BLD: ABNORMAL 10*3/UL

## 2021-12-07 PROCEDURE — 73700 CT LOWER EXTREMITY W/O DYE: CPT

## 2021-12-07 PROCEDURE — 87635 SARS-COV-2 COVID-19 AMP PRB: CPT

## 2021-12-07 PROCEDURE — 99284 EMERGENCY DEPT VISIT MOD MDM: CPT

## 2021-12-07 PROCEDURE — 85025 COMPLETE CBC W/AUTO DIFF WBC: CPT

## 2021-12-07 PROCEDURE — 2580000003 HC RX 258: Performed by: INTERNAL MEDICINE

## 2021-12-07 PROCEDURE — 96372 THER/PROPH/DIAG INJ SC/IM: CPT

## 2021-12-07 PROCEDURE — 96374 THER/PROPH/DIAG INJ IV PUSH: CPT

## 2021-12-07 PROCEDURE — 6370000000 HC RX 637 (ALT 250 FOR IP): Performed by: STUDENT IN AN ORGANIZED HEALTH CARE EDUCATION/TRAINING PROGRAM

## 2021-12-07 PROCEDURE — 80048 BASIC METABOLIC PNL TOTAL CA: CPT

## 2021-12-07 PROCEDURE — 6360000002 HC RX W HCPCS: Performed by: NURSE PRACTITIONER

## 2021-12-07 PROCEDURE — 6360000002 HC RX W HCPCS: Performed by: INTERNAL MEDICINE

## 2021-12-07 PROCEDURE — 6360000002 HC RX W HCPCS: Performed by: STUDENT IN AN ORGANIZED HEALTH CARE EDUCATION/TRAINING PROGRAM

## 2021-12-07 PROCEDURE — 99222 1ST HOSP IP/OBS MODERATE 55: CPT | Performed by: INTERNAL MEDICINE

## 2021-12-07 PROCEDURE — 1200000000 HC SEMI PRIVATE

## 2021-12-07 PROCEDURE — 96376 TX/PRO/DX INJ SAME DRUG ADON: CPT

## 2021-12-07 PROCEDURE — 6370000000 HC RX 637 (ALT 250 FOR IP): Performed by: INTERNAL MEDICINE

## 2021-12-07 PROCEDURE — 96375 TX/PRO/DX INJ NEW DRUG ADDON: CPT

## 2021-12-07 RX ORDER — LANOLIN ALCOHOL/MO/W.PET/CERES
3 CREAM (GRAM) TOPICAL NIGHTLY PRN
Status: DISCONTINUED | OUTPATIENT
Start: 2021-12-07 | End: 2021-12-10 | Stop reason: HOSPADM

## 2021-12-07 RX ORDER — ACETAMINOPHEN 325 MG/1
650 TABLET ORAL ONCE
Status: COMPLETED | OUTPATIENT
Start: 2021-12-07 | End: 2021-12-07

## 2021-12-07 RX ORDER — POTASSIUM CHLORIDE 20 MEQ/1
40 TABLET, EXTENDED RELEASE ORAL PRN
Status: DISCONTINUED | OUTPATIENT
Start: 2021-12-07 | End: 2021-12-10 | Stop reason: HOSPADM

## 2021-12-07 RX ORDER — TRAZODONE HYDROCHLORIDE 50 MG/1
50 TABLET ORAL NIGHTLY
Status: DISCONTINUED | OUTPATIENT
Start: 2021-12-07 | End: 2021-12-10 | Stop reason: HOSPADM

## 2021-12-07 RX ORDER — FENTANYL CITRATE 50 UG/ML
50 INJECTION, SOLUTION INTRAMUSCULAR; INTRAVENOUS ONCE
Status: COMPLETED | OUTPATIENT
Start: 2021-12-07 | End: 2021-12-07

## 2021-12-07 RX ORDER — DIPHENHYDRAMINE HYDROCHLORIDE 50 MG/ML
25 INJECTION INTRAMUSCULAR; INTRAVENOUS EVERY 6 HOURS PRN
Status: DISCONTINUED | OUTPATIENT
Start: 2021-12-07 | End: 2021-12-10 | Stop reason: HOSPADM

## 2021-12-07 RX ORDER — FENTANYL CITRATE 50 UG/ML
50 INJECTION, SOLUTION INTRAMUSCULAR; INTRAVENOUS ONCE
Status: DISCONTINUED | OUTPATIENT
Start: 2021-12-07 | End: 2021-12-07

## 2021-12-07 RX ORDER — ONDANSETRON 4 MG/1
4 TABLET, ORALLY DISINTEGRATING ORAL EVERY 8 HOURS PRN
Status: DISCONTINUED | OUTPATIENT
Start: 2021-12-07 | End: 2021-12-10 | Stop reason: HOSPADM

## 2021-12-07 RX ORDER — MORPHINE SULFATE 4 MG/ML
4 INJECTION, SOLUTION INTRAMUSCULAR; INTRAVENOUS
Status: DISCONTINUED | OUTPATIENT
Start: 2021-12-07 | End: 2021-12-07

## 2021-12-07 RX ORDER — SODIUM CHLORIDE 9 MG/ML
25 INJECTION, SOLUTION INTRAVENOUS PRN
Status: DISCONTINUED | OUTPATIENT
Start: 2021-12-07 | End: 2021-12-10 | Stop reason: HOSPADM

## 2021-12-07 RX ORDER — OXYCODONE HYDROCHLORIDE 5 MG/1
10 TABLET ORAL EVERY 4 HOURS PRN
Status: DISCONTINUED | OUTPATIENT
Start: 2021-12-07 | End: 2021-12-09

## 2021-12-07 RX ORDER — FENTANYL CITRATE 50 UG/ML
100 INJECTION, SOLUTION INTRAMUSCULAR; INTRAVENOUS ONCE
Status: COMPLETED | OUTPATIENT
Start: 2021-12-07 | End: 2021-12-07

## 2021-12-07 RX ORDER — SODIUM CHLORIDE 0.9 % (FLUSH) 0.9 %
10 SYRINGE (ML) INJECTION PRN
Status: DISCONTINUED | OUTPATIENT
Start: 2021-12-07 | End: 2021-12-10 | Stop reason: HOSPADM

## 2021-12-07 RX ORDER — OXYCODONE HYDROCHLORIDE 5 MG/1
5 TABLET ORAL EVERY 4 HOURS PRN
Status: DISCONTINUED | OUTPATIENT
Start: 2021-12-07 | End: 2021-12-09

## 2021-12-07 RX ORDER — MORPHINE SULFATE 4 MG/ML
4 INJECTION, SOLUTION INTRAMUSCULAR; INTRAVENOUS ONCE
Status: DISCONTINUED | OUTPATIENT
Start: 2021-12-07 | End: 2021-12-07

## 2021-12-07 RX ORDER — ORPHENADRINE CITRATE 30 MG/ML
60 INJECTION INTRAMUSCULAR; INTRAVENOUS ONCE
Status: COMPLETED | OUTPATIENT
Start: 2021-12-07 | End: 2021-12-07

## 2021-12-07 RX ORDER — ONDANSETRON 2 MG/ML
4 INJECTION INTRAMUSCULAR; INTRAVENOUS EVERY 6 HOURS PRN
Status: DISCONTINUED | OUTPATIENT
Start: 2021-12-07 | End: 2021-12-10 | Stop reason: HOSPADM

## 2021-12-07 RX ORDER — ACETAMINOPHEN 325 MG/1
650 TABLET ORAL EVERY 6 HOURS PRN
Status: DISCONTINUED | OUTPATIENT
Start: 2021-12-07 | End: 2021-12-10 | Stop reason: HOSPADM

## 2021-12-07 RX ORDER — POTASSIUM CHLORIDE 7.45 MG/ML
10 INJECTION INTRAVENOUS PRN
Status: DISCONTINUED | OUTPATIENT
Start: 2021-12-07 | End: 2021-12-10 | Stop reason: HOSPADM

## 2021-12-07 RX ORDER — SODIUM CHLORIDE 0.9 % (FLUSH) 0.9 %
5-40 SYRINGE (ML) INJECTION EVERY 12 HOURS SCHEDULED
Status: DISCONTINUED | OUTPATIENT
Start: 2021-12-07 | End: 2021-12-10 | Stop reason: HOSPADM

## 2021-12-07 RX ORDER — MAGNESIUM SULFATE 1 G/100ML
1000 INJECTION INTRAVENOUS PRN
Status: DISCONTINUED | OUTPATIENT
Start: 2021-12-07 | End: 2021-12-10 | Stop reason: HOSPADM

## 2021-12-07 RX ORDER — ACETAMINOPHEN 500 MG
1000 TABLET ORAL EVERY 8 HOURS PRN
Status: DISCONTINUED | OUTPATIENT
Start: 2021-12-07 | End: 2021-12-07 | Stop reason: SDUPTHER

## 2021-12-07 RX ORDER — MORPHINE SULFATE 4 MG/ML
2 INJECTION, SOLUTION INTRAMUSCULAR; INTRAVENOUS ONCE
Status: DISCONTINUED | OUTPATIENT
Start: 2021-12-07 | End: 2021-12-07

## 2021-12-07 RX ORDER — ACETAMINOPHEN 650 MG/1
650 SUPPOSITORY RECTAL EVERY 6 HOURS PRN
Status: DISCONTINUED | OUTPATIENT
Start: 2021-12-07 | End: 2021-12-10 | Stop reason: HOSPADM

## 2021-12-07 RX ORDER — POLYETHYLENE GLYCOL 3350 17 G/17G
17 POWDER, FOR SOLUTION ORAL DAILY PRN
Status: DISCONTINUED | OUTPATIENT
Start: 2021-12-07 | End: 2021-12-09

## 2021-12-07 RX ORDER — MORPHINE SULFATE 4 MG/ML
2 INJECTION, SOLUTION INTRAMUSCULAR; INTRAVENOUS
Status: DISCONTINUED | OUTPATIENT
Start: 2021-12-07 | End: 2021-12-07

## 2021-12-07 RX ADMIN — OXYCODONE 10 MG: 5 TABLET ORAL at 20:35

## 2021-12-07 RX ADMIN — ORPHENADRINE CITRATE 60 MG: 30 INJECTION INTRAMUSCULAR; INTRAVENOUS at 12:28

## 2021-12-07 RX ADMIN — ACETAMINOPHEN 650 MG: 325 TABLET ORAL at 12:28

## 2021-12-07 RX ADMIN — FENTANYL CITRATE 50 MCG: 50 INJECTION, SOLUTION INTRAMUSCULAR; INTRAVENOUS at 13:05

## 2021-12-07 RX ADMIN — MORPHINE SULFATE 4 MG: 4 INJECTION INTRAVENOUS at 19:02

## 2021-12-07 RX ADMIN — ENOXAPARIN SODIUM 40 MG: 100 INJECTION SUBCUTANEOUS at 19:02

## 2021-12-07 RX ADMIN — HYDROMORPHONE HYDROCHLORIDE 1 MG: 1 INJECTION, SOLUTION INTRAMUSCULAR; INTRAVENOUS; SUBCUTANEOUS at 16:45

## 2021-12-07 RX ADMIN — DIPHENHYDRAMINE HYDROCHLORIDE 25 MG: 50 INJECTION, SOLUTION INTRAMUSCULAR; INTRAVENOUS at 19:17

## 2021-12-07 RX ADMIN — HYDROMORPHONE HYDROCHLORIDE 0.5 MG: 1 INJECTION, SOLUTION INTRAMUSCULAR; INTRAVENOUS; SUBCUTANEOUS at 22:40

## 2021-12-07 RX ADMIN — FENTANYL CITRATE 100 MCG: 50 INJECTION, SOLUTION INTRAMUSCULAR; INTRAVENOUS at 15:37

## 2021-12-07 RX ADMIN — SODIUM CHLORIDE, PRESERVATIVE FREE 10 ML: 5 INJECTION INTRAVENOUS at 22:20

## 2021-12-07 ASSESSMENT — PAIN SCALES - GENERAL
PAINLEVEL_OUTOF10: 10
PAINLEVEL_OUTOF10: 7
PAINLEVEL_OUTOF10: 10
PAINLEVEL_OUTOF10: 10

## 2021-12-07 ASSESSMENT — ENCOUNTER SYMPTOMS
VOMITING: 0
DIARRHEA: 0
SHORTNESS OF BREATH: 0
TROUBLE SWALLOWING: 0
CHEST TIGHTNESS: 0
NAUSEA: 0
EYES NEGATIVE: 1
VOICE CHANGE: 0
APNEA: 0
BACK PAIN: 1
ABDOMINAL DISTENTION: 0

## 2021-12-07 ASSESSMENT — PAIN DESCRIPTION - DESCRIPTORS: DESCRIPTORS: CRAMPING;STABBING

## 2021-12-07 ASSESSMENT — PAIN DESCRIPTION - ORIENTATION: ORIENTATION: LEFT

## 2021-12-07 ASSESSMENT — PAIN DESCRIPTION - ONSET: ONSET: AWAKENED FROM SLEEP

## 2021-12-07 ASSESSMENT — PAIN DESCRIPTION - PAIN TYPE: TYPE: ACUTE PAIN

## 2021-12-07 ASSESSMENT — PAIN DESCRIPTION - FREQUENCY: FREQUENCY: CONTINUOUS

## 2021-12-07 NOTE — Clinical Note
Patient Class: Inpatient [101]   REQUIRED: Diagnosis: Lytic bone lesion of hip [343933]   Estimated Length of Stay: Estimated stay of more than 2 midnights

## 2021-12-07 NOTE — ED NOTES
RN informed by Patric Asencio that per doctors taking care of pt they allowed him to leave and go home to get clothes and let his dogs out with the assumption that he will be able to return to his room. It is the writers understanding that the pt left with hs IV in and shortly after receiving dilaudid for pain. Coordinator made aware of situation.      Aubrey Correa RN  12/07/21 4865

## 2021-12-07 NOTE — ED PROVIDER NOTES
[facility] ED  Emergency Department Encounter  Emergency Medicine Resident     Pt Name: Pearl Santana  ISJ:[id number]  [place] 1970  Date of evaluation: 12/7/21  PCP:  No primary care provider on file. CHIEF COMPLAINT       Chief Complaint   Patient presents with    Leg Pain       HISTORY OF PRESENT ILLNESS  (Location/Symptom, Timing/Onset, Context/Setting, Quality, Duration, ModifyingFactors, Severity.)      Pearl Santana is a 46 y.o. male presents emerge department for evaluation of significant left-sided hip pain. Patient states it is been dealing with the pain for multiple months and is becoming acutely exacerbated last week. Patient states that this is a point now where he is unable to walk on it, has difficulty standing and certainly cannot sit or lay down. Patient appears very uncomfortable, states that he did have a CT scan of the hip which showed that he might of had some kind of bone lesion as well as he thought he saw there was bone-on-bone. Patient stated his pain is a 10 out of 10 and radiates down his entire leg. Patient states that there has been no relief with over-the-counter medications. Patient states he does have a bone scan scheduled but is been unable to completed as of yet. PAST MEDICAL / SURGICAL / SOCIAL /FAMILY HISTORY      has a past medical history of Abscess, Anxiety, Cellulitis due to MRSA, Chronic back pain, Depression, Hypertension, Multiple substance abuse ([address] and OCD (obsessive compulsive disorder). No other pertinent PMH on review with patient/guardian. has a past surgical history that includes shoulder surgery (Right) and Eye surgery. No other pertinent PSH on review with patient/guardian.   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 Allergies:  Patient has no known allergies. Home Medications:  Prior to Admission medications    Medication Sig Start Date End Date Taking? Authorizing Provider   acetaminophen (TYLENOL) 500 MG tablet Take 2 tablets by mouth every 8 hours as needed for Pain 9/15/21   Marlene Wong,    naproxen (NAPROSYN) 375 MG tablet Take 1 tablet by mouth 2 times daily as needed for Pain 9/15/21 9/22/21  Marlene Wong DO   melatonin (RA MELATONIN) 3 MG TABS tablet Take 1 tablet by mouth nightly as needed (insomnia) 9/15/21   Marlene Wong DO   traZODone (DESYREL) 50 MG tablet Take 1 tablet by mouth nightly 3/31/20   Sylvia Nava MD   penicillin v potassium (VEETID) 500 MG tablet Take 1 tablet by mouth 4 times daily 7/9/18   [facility],        REVIEW OF SYSTEMS    (2-9 systems for level 4, 10 ormore for level 5)      Review of Systems   Constitutional: Positive for activity change. Negative for appetite change, fatigue and fever. HENT: Negative for congestion, trouble swallowing and voice change. Eyes: Negative. Respiratory: Negative for apnea, chest tightness and shortness of breath. Cardiovascular: Negative. Gastrointestinal: Negative for abdominal distention, diarrhea, nausea and vomiting. Genitourinary: Negative for difficulty urinating. Musculoskeletal: Positive for arthralgias, back pain, gait problem and myalgias. Negative for neck pain and neck stiffness. Skin: Negative. Neurological: Negative for dizziness and headaches. Psychiatric/Behavioral: Negative for agitation. PHYSICAL EXAM   (up to 7 for level 4, 8 or more for level 5)      INITIAL VITALS:   /64   Pulse 96   Temp 98.1 °F (36.7 °C) (Oral)   Ht 5' 11\" (1.803 m)   Wt 220 lb (99.8 kg)   SpO2 96%   BMI 30.68 kg/m²     Physical Exam  Constitutional:       Appearance: He is ill-appearing.    HENT:      Head: Normocephalic and atraumatic. Nose: Nose normal.      Mouth/Throat:      Mouth: Mucous membranes are moist.      Pharynx: Oropharynx is clear. Eyes:      Extraocular Movements: Extraocular movements intact. Conjunctiva/sclera: Conjunctivae normal.      Pupils: Pupils are equal, round, and reactive to light. Cardiovascular:      Rate and Rhythm: Regular rhythm. Tachycardia present. Pulses: Normal pulses. Heart sounds: Normal heart sounds. Pulmonary:      Effort: Pulmonary effort is normal.      Breath sounds: Normal breath sounds. Musculoskeletal:      Cervical back: Normal range of motion and neck supple. Right hip: Normal.      Left hip: Tenderness and bony tenderness present. Decreased range of motion. Comments: No significant deformity can be appreciated in the hip, major areas of pain all the way up and down the hip on soft tissues as well as on the hip bone proper. No focal neurological deficit could be appreciated. Patient was able to move toes and has full feeling. Skin:     General: Skin is warm and dry. Capillary Refill: Capillary refill takes less than 2 seconds. Neurological:      General: No focal deficit present. Mental Status: He is alert. Mental status is at baseline.    Psychiatric:         Mood and Affect: Mood normal.         DIFFERENTIAL  DIAGNOSIS     DDX: Arthritis, nerve compression, metastatic disease  PLAN (LABS / IMAGING / EKG):  Orders Placed This Encounter   Procedures    CT HIP LEFT WO CONTRAST    CBC Auto Differential    Basic Metabolic Panel w/ Reflex to MG    Inpatient consult to Hospitalist    Inpatient consult to Hem/Onc       MEDICATIONS ORDERED:  Orders Placed This Encounter   Medications    fentaNYL (SUBLIMAZE) injection 50 mcg    acetaminophen (TYLENOL) tablet 650 mg    orphenadrine (NORFLEX) injection 60 mg    DISCONTD: fentaNYL (SUBLIMAZE) injection 50 mcg    DISCONTD: morphine (PF) injection 2 mg    DISCONTD: morphine (PF) injection 4 mg    fentaNYL (SUBLIMAZE) injection 100 mcg           DIAGNOSTIC RESULTS / EMERGENCY DEPARTMENT COURSE / MDM     LABS:  No results found for this visit on 12/07/21. IMPRESSION/MDM/ED COURSE:  46 y.o. male presented with significant pain in the left hip which is been acutely exacerbated over the past week. High concern at this time for possible metastatic disease secondary to therapy lesions spot seen on prior CT that was done several months ago. Patient has been unable to complete his bone scan. We will treat symptomatically while in the emergency department and get a repeat CT left hip. Depending on findings will admit to the hospital for heme-onc evaluation or outpatient follow-up with bone scans with analgesic medication. ED Course as of 12/07/21 1624   Tue Dec 07, 2021   1623 CT scan positive for concerning findings for metastatic bone cancer. Hematology and oncology consulted. Spoke with [facility]. Stated they would accept the patient for hematology oncology evaluation and pain control. [ES]      ED Course User Index  [ES] Mervin Black MD       RADIOLOGY:  CT HIP LEFT WO CONTRAST   Final Result   1. Expansile lytic lesion involving the majority of the left inferior pubic   ramus extending into the posterior/inferior portion of the left acetabulum. The differential includes metastatic disease versus primary bone neoplasm. Consider whole-body bone scan versus PET scan to evaluate for any additional   lesions. The degree of osseous replacement by the mass is significant which   could result in a pathologic fracture. 2.  Additional 1.2 cm lytic lesion in the left iliac bone. EKG  None    All EKG's are interpreted by the Emergency Department Physician who either signs or Co-signs this chart in the absence of a cardiologist.      PROCEDURES:  None  CONSULTS:  IP CONSULT TO HOSPITALIST  IP CONSULT TO HEM/ONC        FINAL IMPRESSION      1.  Lesion of pelvic bone          DISPOSITION / PLAN     DISPOSITION Decision To Admit 12/07/2021 04:10:23 PM      PATIENT REFERREDTO:  No follow-up provider specified.     DISCHARGE MEDICATIONS:  New Prescriptions    No medications on file       Jacobo Mathias MD  PGY 2  Resident Physician Emergency Medicine  12/07/21 4:24 PM        (Please note that portions of this note were completed with a voice recognition program.Efforts were made to edit the dictations but occasionally words are mis-transcribed.)        Jacobo Mathias MD  Resident  12/07/21 0191

## 2021-12-07 NOTE — H&P
[facility]  Office: [address], DO, Howie Melara, DO, Alison Benson DO, Nikhil Roque, DO, Melissa Franklin MD, Manju Mederos MD, Lauren Da Silva MD, Jagdeep Shipman MD, Carolina Shrestha MD, Virginia Ta MD, Princess Bray MD, Alex Thurman, DO, Silas Arnett DO, Duarte Mccray MD,  Héctor Raygoza DO, Jenaro Murphy MD, Jerome Alonso MD, Maria M Maddox MD, Amirah Rollins MD, Olvin Quach MD, Nohemi Mckinney MD, Suzy Han MD, Denisha Thompson, [facility], [facility], [facility], Hiram Weinstein, [facility], Fidel Walls, CNS, Zeina Martinez, CNP, Fay Barriga, CNP, Al Motley, CNP, Grant Castañeda, CNP, Alen Camacho, CNP, Johanna Sinclair PA-C, [place], [facility], Joaquin Berry, CNP, Ashleigh Casey, CNP, Giovanni Nguyen, CNP, Kellie Iraheta, CNP, Leonora Zhu, CNP, Maverick Robertson, CNP, Rozina Cole, CNP         [address]    HISTORY AND PHYSICAL EXAMINATION            Date:   12/7/2021  Patient name:  Esthela Hull  Date of admission:  12/7/2021 11:35 AM  MRN:   [id number]  Account:  [de-identified]  YOB: 1970  PCP:    No primary care provider on file. Room:   [facility]  Code Status:    Full Code    Chief Complaint:     Chief Complaint   Patient presents with    Leg Pain       History Obtained From:     patient, electronic medical record    History of Present Illness:     Esthela Hull is a 46 y.o. L who initially presented to our hospital with complaints of left hip pain. CT of his hip showed an expansile lytic lesion involving the majority of the left inferior pubic ramus extending into the posterior/inferior portion of the left acetabulum. Differential includes metastatic disease versus primary bone neoplasm. Patient was having significant pain making it difficult for him to ambulate. He says pain has been getting extensively worse over the last 2 weeks. Vitals on admission were stable. Labs also unremarkable. Patient denies any chest pain, nausea, vomiting. Admits to some weight loss. Past Medical History:     Past Medical History:   Diagnosis Date    Abscess     rt ear    Anxiety 10/5/2015    Cellulitis due to MRSA     Chronic back pain     Depression     Hypertension     Multiple substance abuse ([facility] [address]     OCD (obsessive compulsive disorder)         Past Surgical History:     Past Surgical History:   Procedure Laterality Date    EYE SURGERY      plate    SHOULDER SURGERY Right     [facility] [place], labial tear        Medications Prior to Admission:     Prior to Admission medications    Medication Sig Start Date End Date Taking? Authorizing Provider   acetaminophen (TYLENOL) 500 MG tablet Take 2 tablets by mouth every 8 hours as needed for Pain 9/15/21   Marlene Jearld Gamma, DO   naproxen (NAPROSYN) 375 MG tablet Take 1 tablet by mouth 2 times daily as needed for Pain 9/15/21 9/22/21  Marlene Jearld Gamma, DO   melatonin (RA MELATONIN) 3 MG TABS tablet Take 1 tablet by mouth nightly as needed (insomnia) 9/15/21   Marlene Jearld Gamma, DO   traZODone (DESYREL) 50 MG tablet Take 1 tablet by mouth nightly 3/31/20   Martin Palm MD   penicillin v potassium (VEETID) 500 MG tablet Take 1 tablet by mouth 4 times daily 7/9/18   Jonny Miller DO        Allergies:     Patient has no known allergies. Social History:     Tobacco:    reports that he has quit smoking. His smoking use included cigarettes. He has a 12.50 pack-year smoking history. He has never used smokeless tobacco.  Alcohol:      reports no history of alcohol use. Drug Use:  reports current drug use. Drug: Marijuana Mili Lux). Family History:     Family History   Problem Relation Age of Onset    Diabetes Mother     Hearing Loss Father     High Blood Pressure Father        Review of Systems:     Positive and Negative as described in HPI.     CONSTITUTIONAL:  negative for fevers, chills, sweats, fatigue, admits to 20 pound weight loss  HEENT:  negative for vision, hearing changes, runny nose, throat pain  RESPIRATORY:  negative for shortness of breath, cough, congestion, wheezing  CARDIOVASCULAR:  negative for chest pain, palpitations  GASTROINTESTINAL:  negative for nausea, vomiting, diarrhea, constipation, change in bowel habits, abdominal pain   GENITOURINARY:  negative for difficulty of urination, burning with urination, frequency   INTEGUMENT:  negative for rash, skin lesions, easy bruising   HEMATOLOGIC/LYMPHATIC:  negative for swelling/edema   ALLERGIC/IMMUNOLOGIC:  negative for urticaria , itching  ENDOCRINE:  negative increase in drinking, increase in urination, hot or cold intolerance  MUSCULOSKELETAL: Admits to muscle aches and pains over hip. Admits to weakness and severe pain with ambulation  NEUROLOGICAL:  negative for headaches, dizziness, lightheadedness, numbness, pain, tingling extremities  BEHAVIOR/PSYCH:  negative for depression, anxiety    Physical Exam:   /64   Pulse 96   Temp 98.1 °F (36.7 °C) (Oral)   Ht 5' 11\" (1.803 m)   Wt 220 lb (99.8 kg)   SpO2 96%   BMI 30.68 kg/m²   Temp (24hrs), Av.1 °F (36.7 °C), Min:98.1 °F (36.7 °C), Max:98.1 °F (36.7 °C)    No results for input(s): POCGLU in the last 72 hours.   No intake or output data in the 24 hours ending 21    General Appearance: alert, well appearing, and in no acute distress  Mental status: oriented to person, place, and time  Head: normocephalic, atraumatic  Eye: no icterus, redness, pupils equal and reactive, extraocular eye movements intact, conjunctiva clear  Ear: normal external ear, no discharge, hearing intact  Nose: no drainage noted  Mouth: mucous membranes moist  Neck: supple, no carotid bruits, thyroid not palpable  Lungs: Bilateral equal air entry, clear to ausculation, no wheezing, rales or rhonchi, normal effort  Cardiovascular: normal rate, regular rhythm, no murmur, gallop, rub  Abdomen: Soft, nontender, nondistended, normal bowel sounds, no hepatomegaly or splenomegaly  Neurologic: There are no new focal motor or sensory deficits, normal muscle tone and bulk, no abnormal sensation, normal speech, cranial nerves II through XII grossly intact  Skin: No gross lesions, rashes, bruising or bleeding on exposed skin area  Extremities: peripheral pulses palpable, no pedal edema or calf pain with palpation has pain of hip with palpation and ambulation  Psych: normal affect    Investigations:      Laboratory Testing:  Recent Results (from the past 24 hour(s))   COVID-19, Rapid    Collection Time: 12/07/21  4:57 PM    Specimen: Nasopharyngeal Swab   Result Value Ref Range    Specimen Description . NASOPHARYNGEAL SWAB     SARS-CoV-2, Rapid Not Detected Not Detected   CBC Auto Differential    Collection Time: 12/07/21  4:58 PM   Result Value Ref Range    WBC 12.6 (H) 3.5 - 11.3 k/uL    RBC 4.26 4.21 - 5.77 m/uL    Hemoglobin 12.7 (L) 13.0 - 17.0 g/dL    Hematocrit 38.8 (L) 40.7 - 50.3 %    MCV 91.1 82.6 - 102.9 fL    MCH 29.8 25.2 - 33.5 pg    MCHC 32.7 28.4 - 34.8 g/dL    RDW 12.4 11.8 - 14.4 %    Platelets 031 (H) 152 - 453 k/uL    MPV 9.1 8.1 - 13.5 fL    NRBC Automated 0.0 0.0 per 100 WBC    Differential Type NOT REPORTED     Seg Neutrophils 58 36 - 65 %    Lymphocytes 31 24 - 43 %    Monocytes 9 3 - 12 %    Eosinophils % 2 1 - 4 %    Basophils 0 0 - 2 %    Immature Granulocytes 0 0 %    Segs Absolute 7.21 1.50 - 8.10 k/uL    Absolute Lymph # 3.88 (H) 1.10 - 3.70 k/uL    Absolute Mono # 1.15 0.10 - 1.20 k/uL    Absolute Eos # 0.24 0.00 - 0.44 k/uL    Basophils Absolute 0.03 0.00 - 0.20 k/uL    Absolute Immature Granulocyte 0.04 0.00 - 0.30 k/uL    WBC Morphology NOT REPORTED     RBC Morphology NOT REPORTED     Platelet Estimate NOT REPORTED    Basic Metabolic Panel w/ Reflex to MG    Collection Time: 12/07/21  4:58 PM   Result Value Ref Range    Glucose 86 70 - 99 mg/dL    BUN 15 6 - 20 mg/dL    CREATININE 0.99 0.70 - 1.20 mg/dL    Bun/Cre Ratio NOT REPORTED 9 - 20    Calcium 11.1 (H) 8.6 - 10.4 mg/dL    Sodium 140 135 - 144 mmol/L    Potassium 4.1 3.7 - 5.3 mmol/L    Chloride 104 98 - 107 mmol/L    CO2 24 20 - 31 mmol/L    Anion Gap 12 9 - 17 mmol/L    GFR Non-African American >60 >60 mL/min    GFR African American >60 >60 mL/min    GFR Comment          GFR Staging NOT REPORTED        Imaging/Diagnostics:  CT HIP LEFT WO CONTRAST    Result Date: 12/7/2021  1. Expansile lytic lesion involving the majority of the left inferior pubic ramus extending into the posterior/inferior portion of the left acetabulum. The differential includes metastatic disease versus primary bone neoplasm. Consider whole-body bone scan versus PET scan to evaluate for any additional lesions. The degree of osseous replacement by the mass is significant which could result in a pathologic fracture. 2.  Additional 1.2 cm lytic lesion in the left iliac bone. Assessment :      Hospital Problems           Last Modified POA    * (Principal) Bone lesion 12/7/2021 Yes    Multiple substance abuse ([facility] [address] 12/7/2021 Yes    Overview Signed 3/9/2012  1:08 PM by Mitchel Powers     Cocaine, cannabinoids         Cannot walk 12/7/2021 Yes    Lytic bone lesion of hip 12/7/2021 Yes          Plan:     Patient status inpatient in the Med/Surge    New Bone lesion concerning for Malignancy: CT of her hip shows an expansile lytic lesion involving the majority of the left inferior pubic ramus extending into the posterior/inferior portion of the left acetabulum. Differential did include metastatic disease versus primary bone neoplasm. Patient mated for pain control and for further work-up. Oncology consulted, patient will need biopsy at some point. Continue multimodal pain control with NSAIDs and as needed oral pain medication. Continue PT OT. History of substance abuse: Patient has history of cocaine and marijuana use. Currently denies any use. Labs, imaging reviewed.   DVT prophylaxis    Consultations:   David Bradshaw TO HOSPITALIST  IP CONSULT TO HEM/ONC  IP CONSULT TO CASE MANAGEMENT     Patient is admitted as inpatient status because of co-morbidities listed above, severity of signs and symptoms as outlined, requirement for current medical therapies and most importantly because of direct risk to patient if care not provided in a hospital setting. Expected length of stay > 48 hours. David Taylor DO  12/7/2021  6:44 PM    Copy sent to Dr. Porter Situ primary care provider on file.

## 2021-12-07 NOTE — ED NOTES
Bed: 42  Expected date:   Expected time:   Means of arrival:   Comments:     Sanam Hidalgo RN  12/07/21 [id number]

## 2021-12-07 NOTE — ED PROVIDER NOTES
[facility] ED  Emergency Department  Faculty Sign-Out Addendum     Care of Alka Edwards was assumed from previous attending and is being seen for Leg Pain  . The patient's initial evaluation and plan have been discussed with the prior provider who initially evaluated the patient. Handoff taken on the following patient from prior Attending Physician:    Darshana Gutierrez    I was available and discussed any additional care issues that arose and coordinated the management plans with the resident(s) caring for the patient during my duty period. Any areas of disagreement with residents documentation of care or procedures are noted on the chart. I was personally present for the key portions of any/all procedures during my duty period. I have documented in the chart those procedures where I was not present during the key portions. EMERGENCY DEPARTMENT COURSE / MEDICAL DECISION MAKING:       MEDICATIONS GIVEN:  Orders Placed This Encounter   Medications    fentaNYL (SUBLIMAZE) injection 50 mcg    acetaminophen (TYLENOL) tablet 650 mg    orphenadrine (NORFLEX) injection 60 mg    DISCONTD: fentaNYL (SUBLIMAZE) injection 50 mcg    DISCONTD: morphine (PF) injection 2 mg    DISCONTD: morphine (PF) injection 4 mg    fentaNYL (SUBLIMAZE) injection 100 mcg       LABS / RADIOLOGY:     Labs Reviewed - No data to display    CT HIP LEFT WO CONTRAST    Result Date: 12/7/2021  EXAMINATION: CT OF THE LEFT HIP WITHOUT CONTRAST 12/7/2021 3:09 pm TECHNIQUE: CT of the left hip was performed without the administration of intravenous contrast.  Multiplanar reformatted images are provided for review. Dose modulation, iterative reconstruction, and/or weight based adjustment of the mA/kV was utilized to reduce the radiation dose to as low as reasonably achievable.  COMPARISON: CT abdomen/pelvis dated 15 September 2021 HISTORY ORDERING SYSTEM PROVIDED HISTORY: Worsening pain with possible metastatic disease, unable to walk or ambulate on left leg, severe left hip pain TECHNOLOGIST PROVIDED HISTORY: Worsening pain with possible metastatic disease, unable to walk or ambulate on left leg, severe left hip pain Reason for Exam: Worsening pain with possible metastatic disease, unable to walk or ambulate on left leg, severe left hip pain Acuity: Unknown Type of Exam: Unknown FINDINGS: Bones: There is an expansile, lytic lesion occupying the majority of the left inferior pubic ramus which extends into the posterior/inferior portion of the left acetabulum. There is an additional lytic lesion measuring 1.2 cm in the left iliac bone (series 2, image 58). Soft Tissue: There is an expansile soft tissue mass invading the left inferior pubic ramus extending into the left acetabulum. The visualized intrapelvic organs appear normal.     1.  Expansile lytic lesion involving the majority of the left inferior pubic ramus extending into the posterior/inferior portion of the left acetabulum. The differential includes metastatic disease versus primary bone neoplasm. Consider whole-body bone scan versus PET scan to evaluate for any additional lesions. The degree of osseous replacement by the mass is significant which could result in a pathologic fracture. 2.  Additional 1.2 cm lytic lesion in the left iliac bone. RECENT VITALS:     Temp: 98.1 °F (36.7 °C),  Pulse: 96,  , BP: 130/64, SpO2: 96 %    This patient is a 46 y.o. Male with hip pain. Previously had imaging that showed possible metastatic lesion in the hip. CT today shows expanding lytic lesion of the left inferior pubic ramus concerning for malignancy.     OUTSTANDING TASKS / RECOMMENDATIONS:    1. Reassess, potential admission      Reyes Dvaalos MD, Soumya Hanley  Attending Emergency Physician  [address] [place] [address] Drive ED        Sami Andrade MD  12/07/21 1707

## 2021-12-07 NOTE — ED NOTES
Pt provided with meal tray & water, call light within reach, will cont to monitor      Negin Pierce RN  12/07/21 [id number]

## 2021-12-07 NOTE — ED PROVIDER NOTES
[facility]     Emergency Department     Faculty Note/ Attestation      Pt Name: Abad Christina                                       MRN: [id number]  Maryjogfgian 1970  Date of evaluation: 12/7/2021  Patients PCP:    No primary care provider on file. Attestation  I performed a history and physical examination of the patient/ or directly observed  and discussed management with the resident. I reviewed the residents note and agree with the documented findings and plan of care. Any areas of disagreement are noted on the chart. I was personally present for the key portions of any procedures. I have documented in the chart those procedures where I was not present during the key portions. I have reviewed the emergency nurses triage note. I agree with the chief complaint, past medical history, past surgical history, allergies, medications, social and family history as documented unless otherwise noted below. For Physician Assistant/ Nurse Practitioner cases/documentation I have personally evaluated this patient and have completed at least one if not all key elements of the E/M (history, physical exam, and MDM). Additional findings are as noted. This patient was evaluated in the Emergency Department for symptoms described in the history of present illness. The patient was evaluated in the context of the global COVID-19 pandemic, which necessitated consideration that the patient might be at risk for infection with the SARS-CoV-2 virus that causes COVID-19. Institutional protocols and algorithms that pertain to the evaluation of patients at risk for COVID-19 are in a state of rapid change based on information released by regulatory bodies including the CDC and federal and state organizations. These policies and algorithms were followed during the patient's care in the ED.      Initial Screens:             Vitals:    Vitals:    12/07/21 1150   BP: 130/64   Pulse: 96   Temp: 98.1 °F

## 2021-12-08 ENCOUNTER — HOSPITAL ENCOUNTER (OUTPATIENT)
Dept: NUCLEAR MEDICINE | Age: 51
Discharge: HOME | DRG: 343 | End: 2021-12-10
Payer: COMMERCIAL

## 2021-12-08 DIAGNOSIS — M89.8X5 LYTIC BONE LESION OF HIP: ICD-10-CM

## 2021-12-08 PROCEDURE — 97535 SELF CARE MNGMENT TRAINING: CPT

## 2021-12-08 PROCEDURE — 99232 SBSQ HOSP IP/OBS MODERATE 35: CPT | Performed by: PHYSICIAN ASSISTANT

## 2021-12-08 PROCEDURE — 6360000002 HC RX W HCPCS: Performed by: PHYSICIAN ASSISTANT

## 2021-12-08 PROCEDURE — 6370000000 HC RX 637 (ALT 250 FOR IP): Performed by: INTERNAL MEDICINE

## 2021-12-08 PROCEDURE — 6360000002 HC RX W HCPCS: Performed by: NURSE PRACTITIONER

## 2021-12-08 PROCEDURE — 3430000000 HC RX DIAGNOSTIC RADIOPHARMACEUTICAL: Performed by: INTERNAL MEDICINE

## 2021-12-08 PROCEDURE — 78315 BONE IMAGING 3 PHASE: CPT

## 2021-12-08 PROCEDURE — 1200000000 HC SEMI PRIVATE

## 2021-12-08 PROCEDURE — 99254 IP/OBS CNSLTJ NEW/EST MOD 60: CPT | Performed by: INTERNAL MEDICINE

## 2021-12-08 PROCEDURE — 2580000003 HC RX 258: Performed by: INTERNAL MEDICINE

## 2021-12-08 PROCEDURE — A9503 TC99M MEDRONATE: HCPCS | Performed by: INTERNAL MEDICINE

## 2021-12-08 PROCEDURE — 97166 OT EVAL MOD COMPLEX 45 MIN: CPT

## 2021-12-08 PROCEDURE — 97162 PT EVAL MOD COMPLEX 30 MIN: CPT

## 2021-12-08 PROCEDURE — 97530 THERAPEUTIC ACTIVITIES: CPT

## 2021-12-08 PROCEDURE — 94761 N-INVAS EAR/PLS OXIMETRY MLT: CPT

## 2021-12-08 PROCEDURE — 6360000002 HC RX W HCPCS: Performed by: INTERNAL MEDICINE

## 2021-12-08 RX ORDER — TC 99M MEDRONATE 20 MG/10ML
26 INJECTION, POWDER, LYOPHILIZED, FOR SOLUTION INTRAVENOUS
Status: COMPLETED | OUTPATIENT
Start: 2021-12-08 | End: 2021-12-08

## 2021-12-08 RX ORDER — FENTANYL CITRATE 50 UG/ML
25 INJECTION, SOLUTION INTRAMUSCULAR; INTRAVENOUS ONCE
Status: COMPLETED | OUTPATIENT
Start: 2021-12-08 | End: 2021-12-08

## 2021-12-08 RX ORDER — FENTANYL CITRATE 50 UG/ML
50 INJECTION, SOLUTION INTRAMUSCULAR; INTRAVENOUS ONCE
Status: COMPLETED | OUTPATIENT
Start: 2021-12-08 | End: 2021-12-08

## 2021-12-08 RX ORDER — FENTANYL CITRATE 50 UG/ML
50 INJECTION, SOLUTION INTRAMUSCULAR; INTRAVENOUS
Status: DISCONTINUED | OUTPATIENT
Start: 2021-12-08 | End: 2021-12-08

## 2021-12-08 RX ORDER — SODIUM CHLORIDE 0.9 % (FLUSH) 0.9 %
10 SYRINGE (ML) INJECTION PRN
Status: DISCONTINUED | OUTPATIENT
Start: 2021-12-08 | End: 2021-12-11 | Stop reason: HOSPADM

## 2021-12-08 RX ADMIN — Medication 3 MG: at 20:04

## 2021-12-08 RX ADMIN — SODIUM CHLORIDE, PRESERVATIVE FREE 10 ML: 5 INJECTION INTRAVENOUS at 20:05

## 2021-12-08 RX ADMIN — OXYCODONE 10 MG: 5 TABLET ORAL at 05:29

## 2021-12-08 RX ADMIN — OXYCODONE 10 MG: 5 TABLET ORAL at 17:28

## 2021-12-08 RX ADMIN — FENTANYL CITRATE 50 MCG: 50 INJECTION INTRAMUSCULAR; INTRAVENOUS at 14:51

## 2021-12-08 RX ADMIN — FENTANYL CITRATE 50 MCG: 50 INJECTION INTRAMUSCULAR; INTRAVENOUS at 12:15

## 2021-12-08 RX ADMIN — OXYCODONE 10 MG: 5 TABLET ORAL at 22:02

## 2021-12-08 RX ADMIN — TC 99M MEDRONATE 26 MILLICURIE: 20 INJECTION, POWDER, LYOPHILIZED, FOR SOLUTION INTRAVENOUS at 08:20

## 2021-12-08 RX ADMIN — HYDROMORPHONE HYDROCHLORIDE 1 MG: 1 INJECTION, SOLUTION INTRAMUSCULAR; INTRAVENOUS; SUBCUTANEOUS at 20:04

## 2021-12-08 RX ADMIN — FENTANYL CITRATE 50 MCG: 50 INJECTION INTRAMUSCULAR; INTRAVENOUS at 17:24

## 2021-12-08 RX ADMIN — OXYCODONE 10 MG: 5 TABLET ORAL at 00:11

## 2021-12-08 RX ADMIN — SODIUM CHLORIDE, PRESERVATIVE FREE 10 ML: 5 INJECTION INTRAVENOUS at 08:20

## 2021-12-08 RX ADMIN — FENTANYL CITRATE 50 MCG: 50 INJECTION INTRAMUSCULAR; INTRAVENOUS at 10:38

## 2021-12-08 RX ADMIN — TRAZODONE HYDROCHLORIDE 50 MG: 50 TABLET ORAL at 20:04

## 2021-12-08 RX ADMIN — TRAZODONE HYDROCHLORIDE 50 MG: 50 TABLET ORAL at 00:11

## 2021-12-08 RX ADMIN — ENOXAPARIN SODIUM 40 MG: 100 INJECTION SUBCUTANEOUS at 09:08

## 2021-12-08 RX ADMIN — OXYCODONE 10 MG: 5 TABLET ORAL at 09:08

## 2021-12-08 RX ADMIN — SODIUM CHLORIDE, PRESERVATIVE FREE 10 ML: 5 INJECTION INTRAVENOUS at 09:55

## 2021-12-08 RX ADMIN — Medication 3 MG: at 00:11

## 2021-12-08 RX ADMIN — ACETAMINOPHEN 650 MG: 325 TABLET ORAL at 00:10

## 2021-12-08 RX ADMIN — FENTANYL CITRATE 25 MCG: 50 INJECTION, SOLUTION INTRAMUSCULAR; INTRAVENOUS at 07:34

## 2021-12-08 ASSESSMENT — PAIN SCALES - GENERAL
PAINLEVEL_OUTOF10: 2
PAINLEVEL_OUTOF10: 8
PAINLEVEL_OUTOF10: 10
PAINLEVEL_OUTOF10: 5
PAINLEVEL_OUTOF10: 9
PAINLEVEL_OUTOF10: 10
PAINLEVEL_OUTOF10: 9
PAINLEVEL_OUTOF10: 10
PAINLEVEL_OUTOF10: 10
PAINLEVEL_OUTOF10: 5
PAINLEVEL_OUTOF10: 10
PAINLEVEL_OUTOF10: 7
PAINLEVEL_OUTOF10: 10

## 2021-12-08 ASSESSMENT — PAIN DESCRIPTION - PROGRESSION

## 2021-12-08 ASSESSMENT — PAIN - FUNCTIONAL ASSESSMENT
PAIN_FUNCTIONAL_ASSESSMENT: ACTIVITIES ARE NOT PREVENTED
PAIN_FUNCTIONAL_ASSESSMENT: PREVENTS OR INTERFERES WITH ALL ACTIVE AND SOME PASSIVE ACTIVITIES

## 2021-12-08 ASSESSMENT — PAIN DESCRIPTION - FREQUENCY
FREQUENCY: CONTINUOUS
FREQUENCY: CONTINUOUS

## 2021-12-08 ASSESSMENT — PAIN DESCRIPTION - ORIENTATION
ORIENTATION: LEFT
ORIENTATION: MID

## 2021-12-08 ASSESSMENT — PAIN DESCRIPTION - DESCRIPTORS: DESCRIPTORS: STABBING;THROBBING

## 2021-12-08 ASSESSMENT — PAIN DESCRIPTION - PAIN TYPE
TYPE: ACUTE PAIN

## 2021-12-08 ASSESSMENT — PAIN DESCRIPTION - ONSET
ONSET: ON-GOING
ONSET: AWAKENED FROM SLEEP

## 2021-12-08 ASSESSMENT — PAIN DESCRIPTION - LOCATION
LOCATION: BUTTOCKS
LOCATION: LEG;HIP
LOCATION: LEG

## 2021-12-08 ASSESSMENT — PAIN DESCRIPTION - DIRECTION: RADIATING_TOWARDS: GROIN

## 2021-12-08 NOTE — CARE COORDINATION
Case Management Initial Discharge Plan  Katlyn Stoner,             Met with:patient to discuss discharge plans. Information verified: address, contacts, phone number, , insurance yes  Insurance Provider: [facility]     Emergency Contact/Next of Kin name & number: as per face sheet Hunterfernando Bautista - parent as face sheet    Who are involved in patient's support system? PCP: No primary care provider on file. Date of last visit: had a list tried to call Nadine Burkitt ? Pt not sure he states they were rude and laughed at him / needs another choice ? Discharge Planning    Living Arrangements:  Parent     Home has 2 stories  2 stairs to climb to get into front door, flightstairs to climb to reach second floor  Location of bedroom/bathroom in home up    Patient able to perform ADL's:Independent    Current Services (outpatient & in home) no  DME equipment: no  DME provider: no    Is patient receiving oral anticoagulation therapy? No    If indicated:   Physician managing anticoagulation treatment: no  Where does patient obtain lab work for ATC treatment? no      Potential Assistance Needed:  N/A    Patient agreeable to home care: No  [place] of choice provided:  no    Prior SNF/Rehab Placement and Facility: no  Agreeable to SNF/Rehab: No  [place] of choice provided: no     Evaluation: no    Expected Discharge date:  21    Patient expects to be discharged to: home      If home: is the family and/or caregiver wiling & able to provide support at home? mom  Who will be providing this support? mom*    Follow Up Appointment: Best Day/ Time: Monday AM    Transportation provider: family  Transportation arrangements needed for discharge: No    Readmission Risk              Risk of Unplanned Readmission:  12             Does patient have a readmission risk score greater than 14?: No  If yes, follow-up appointment must be made within 7 days of discharge.      Goals of Care:       Educated pt on transitional options,will provided freedom of choice as needed and is agreeable with plan      Discharge Plan: home independently with parent needs pcp ?  Has ride           Electronically signed by Wesley Cuadra RN on 12/8/21 at 11:07 AM EST

## 2021-12-08 NOTE — PROGRESS NOTES
Physical Therapy    Facility/Department: [facility] RENAL//MED SURG  Initial Assessment    NAME: Sharron Avery  : 1970  MRN: [id number]    Date of Service: 2021     Chief Complaint   Patient presents with    Leg Pain     Discharge Recommendations:  Patient would benefit from continued therapy after discharge   PT Equipment Recommendations  Equipment Needed: Yes  Mobility Devices: Duy Nailer: Rolling  Assessment   Body structures, Functions, Activity limitations: Decreased functional mobility ; Increased pain; Decreased endurance  Assessment: The ept ambulated 25 ft with a RW x CGA. He reported a pain level of 10/10 limiting his ability to mobilize. He could benefit from a continuation of PT for gait and strengthening following his DC  Prognosis: Good  Decision Making: Medium Complexity  PT Education: Goals; PT Role; Plan of Care  REQUIRES PT FOLLOW UP: Yes  Activity Tolerance  Activity Tolerance: Patient limited by fatigue; Patient limited by pain       Patient Diagnosis(es): The encounter diagnosis was Lesion of pelvic bone.     has a past medical history of Abscess, Anxiety, Cellulitis due to MRSA, Chronic back pain, Depression, Hypertension, Multiple substance abuse ([address] and OCD (obsessive compulsive disorder). has a past surgical history that includes shoulder surgery (Right) and Eye surgery. Restrictions  Restrictions/Precautions  Restrictions/Precautions: General Precautions, Fall Risk  Required Braces or Orthoses?: No  Position Activity Restriction  Other position/activity restrictions:  Up with Assist  Vision/Hearing  Vision: Within Functional Limits  Hearing: Within functional limits     Subjective  General  Patient assessed for rehabilitation services?: Yes  Response To Previous Treatment: Not applicable  Family / Caregiver Present: No  Follows Commands: Within Functional Limits  Subjective  Subjective: RN and pt agreeable to PT eval  Pain Screening  Patient Currently in Pain: Yes  Pain Assessment  Pain Assessment: 0-10  Pain Level: 10  Pain Location: Leg; Hip  Pain Orientation: Left  Vital Signs  Patient Currently in Pain: Yes     Orientation  Orientation  Overall Orientation Status: Within Functional Limits  Social/Functional History  Social/Functional History  Lives With: Family (Lives with elderly parents)  Type of Home: House  Home Layout: Two level, Able to Live on Main level with bedroom/bathroom   Home Access:   Entrance Stairs - Number of Steps: 1 step  Entrance Stairs - Rails: Right  Bathroom Shower/Tub: Tub/Shower unit   Bathroom Toilet: Handicap height  Bathroom Equipment:    Home Equipment:    Receives Help From: Family (Has assist from Mother for some cooking // cleaning // home mgmt)  ADL Assistance: Independent (Pt reports Mod I without DME for all ADLs; but reports increasing difficulty recently d/t exacerbation of chronic back and Left hip pain)  Homemaking Assistance: Needs assistance  Meal Prep: Other (comment) (Mod I Light Meal Prep // Pt and Mother share this task)  Laundry:  (Pt's Mother completes)  Cleaning:  (Pt completes Light Clean-Up // Pt's Mother completes heavy cleaning)  Driving: Other (comment) (Pt does not drive. He has transportation assist from family and medical transport.)  Homemaking Responsibilities: No  Ambulation Assistance: Independent  Transfer Assistance: Independent  Active : No  Patient's  Info: Parents drive or provided by Job and Family srevices  Occupation: On disability  Leisure & Hobbies: 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 Additional Comments: Reports worsening of ability to complete tasks more recently d/t exacerbation of chronic back pain and Left hip pain.   Cognition     Objective     AROM RLE (degrees)  RLE AROM: WFL  AROM LLE (degrees)  LLE AROM : WFL  AROM RUE (degrees)  RUE AROM : WFL  AROM LUE (degrees)  LUE AROM : WFL  Strength RLE  Strength RLE: WNL  Strength LLE  Strength LLE: WFL  Strength RUE  Strength RUE: WFL  Strength LUE  Strength LUE: WFL     Sensation  Overall Sensation Status: WFL  Bed mobility  Supine to Sit: Contact guard assistance  Sit to Supine: Contact guard assistance  Scooting: Contact guard assistance  Transfers  Sit to Stand: Contact guard assistance  Stand to sit: Contact guard assistance  Ambulation  Ambulation?: Yes  More Ambulation?: Yes  Ambulation 1  Surface: level tile  Device: Rolling Walker  Assistance: Contact guard assistance  Distance: amb 25 ft with a RW x CGA  Comments: The pt required verbal cues for safe use of the walker.      Balance  Posture: Good  Sitting - Static: Fair  Sitting - Dynamic: Poor  Standing - Static: Poor  Standing - Dynamic: Poor        Plan   Plan  Times per week: 5-6x wk  Current Treatment Recommendations: Strengthening, Functional Mobility Training, Gait Training, Safety Education & Training, Endurance Training, Stair training, ROM, Pain Management  Safety Devices  Type of devices: Nurse notified, Left in bed, Call light within reach    G-Code       OutComes Score     AM-PAC Score  AM-PAC Inpatient Mobility Raw Score : 17 (12/08/21 1221)  AM-PAC Inpatient T-Scale Score : 42.13 (12/08/21 1221)  Mobility Inpatient CMS 0-100% Score: 50.57 (12/08/21 1221)  Mobility Inpatient CMS G-Code Modifier : CK (12/08/21 1221)       Goals  Short term goals  Time Frame for Short term goals: 10 visits  Short term goal 1: transfers with SBA  Short term goal 2: amb 150 ft with a RW x SBA  Short term goal 3: ascend/descend 4 steps with SBA  Short term goal 4: 20 min exercise program x SBA  Patient Goals   Patient goals : Decrease L Hip/back pain       Therapy Time   Individual Concurrent Group Co-treatment   Time In 0930         Time Out 8340         Minutes 25             1 of [address] [facility], PT

## 2021-12-08 NOTE — PROGRESS NOTES
Comprehensive Nutrition Assessment    Type and Reason for Visit:  Initial, Positive Nutrition Screen (Weight Loss)    Nutrition Recommendations/Plan:   - Continue Regular Diet as tolerated  - Start high calorie oral nutrition supplement (Ensure Enlive) 2x/d as tolerated  - Obtain patient's current weight, as able  - Monitor/encourage PO intakes     Nutrition Assessment:  Patient seen for weight loss. Reports a 20 lb weight loss x one week. Unable to accurately assess for weight loss as current weight is subjective. Will ask nursing to obtain patient's current weight. Patient reports his appetite is okay. Was able to eat % of Breakfast this morning. Patient wanting to try chocolate Ensure supplements with meals. Labs/Meds reviewed. Malnutrition Assessment:  Malnutrition Status:  Insufficient data    Context:  Chronic Illness     Findings of the 6 clinical characteristics of malnutrition:  Energy Intake:  Mild decrease in energy intake   Weight Loss:  Unable to assess     Body Fat Loss:  No significant body fat loss     Muscle Mass Loss:  No significant muscle mass loss    Fluid Accumulation:  No significant fluid accumulation     Strength:  Not Performed    Estimated Daily Nutrient Needs:  Energy (kcal):  2150 kcal/d (MSJ x 1.15); Weight Used for Energy Requirements:  Admission     Protein (g):   g protein/d (1.1-1.3 g/kg); Weight Used for Protein Requirements:  Ideal        Fluid (ml/day):  2600 mL fluid/d or per MD; Method Used for Fluid Requirements:  Stephanie Erickson      Nutrition Related Findings:  Labs/Meds reviewed. Last BM unknown. Wounds:  None       Current Nutrition Therapies:    ADULT DIET;  Regular  ADULT ORAL NUTRITION SUPPLEMENT; Breakfast, Dinner; Standard High Calorie/High Protein Oral Supplement    Anthropometric Measures:  · Height: 5' 11\" (180.3 cm)  · Current Body Weight: 220 lb (99.8 kg)   · Admission Body Weight: 220 lb (99.8 kg) (Stated)    · Usual Body Weight: (Unknown - no weight hx per EHR)     · Ideal Body Weight: 172 lbs; % Ideal Body Weight 127.9 %   · BMI: 30.7  · BMI Categories: Obese Class 1 (BMI 30.0-34. 9)       Nutrition Diagnosis:   · Unintended weight loss related to inadequate protein-energy intake as evidenced by weight loss (per patient report)    Nutrition Interventions:   Food and/or Nutrient Delivery:  Continue Current Diet, Start Oral Nutrition Supplement  Nutrition Education/Counseling:  No recommendation at this time   Coordination of Nutrition Care:  Continue to monitor while inpatient    Goals:  PO intake to meet > 75% of estimated nutrition needs       Nutrition Monitoring and Evaluation:   Behavioral-Environmental Outcomes:  None Identified   Food/Nutrient Intake Outcomes:  Food and Nutrient Intake, Supplement Intake  Physical Signs/Symptoms Outcomes:  Biochemical Data, GI Status, Fluid Status or Edema, Weight, Skin, Nutrition Focused Physical Findings     Discharge Planning:     Too soon to determine     Electronically signed by Olivia Olsen RD, LD on 12/8/21 at 4:21 PM EST    Contact: [phone]

## 2021-12-08 NOTE — CONSULTS
Today's Date: 12/8/2021  Patient Name: Carlos Smith  Date of admission: 12/7/2021 11:35 AM  Patient's age: 46 y.o., 1970  Admission Dx: Lytic bone lesion of hip [M89.8X5]  Lesion of pelvic bone [M89.9]    Reason for Consult: management recommendations  Requesting Physician: Carlos Castillo MD    CHIEF COMPLAINT:  Leg pain     History Obtained From:  patient, electronic medical record    HISTORY OF PRESENT ILLNESS:      The patient is a 46 y.o.  male him to the hospital with left-sided leg pain. CT scan was done in the ED which showed lytic lesions involving left inferior pubic ramus extending into the posterior inferior portion of the left acetabulum. And one of the differential was metastatic versus primary bone neoplasm. Heme-onc has been consulted  For possible malignancy and work-up. Patient hemodynamically stable. On  labs calcium is elevated. Past Medical History:   has a past medical history of Abscess, Anxiety, Cellulitis due to MRSA, Chronic back pain, Depression, Hypertension, Multiple substance abuse ([address] and OCD (obsessive compulsive disorder). Past Surgical History:   has a past surgical history that includes shoulder surgery (Right) and Eye surgery. Medications:    Reviewed in Epic     Allergies:  Morphine    Social History:   reports that he has quit smoking. His smoking use included cigarettes. He has a 12.50 pack-year smoking history. He has never used smokeless tobacco. He reports current drug use. Drug: Marijuana [employer] He reports that he does not drink alcohol. Family History: family history includes Diabetes in his mother; Hearing Loss in his father; High Blood Pressure in his father. REVIEW OF SYSTEMS:    Constitutional: No fever or chills.  No night sweats, no weight loss   Eyes: No eye discharge, double vision, or eye pain   HEENT: negative for sore mouth, sore throat, hoarseness and voice change   Respiratory: negative for cough , sputum, dyspnea, wheezing, hemoptysis, chest pain   Cardiovascular: negative for chest pain, dyspnea, palpitations, orthopnea, PND   Gastrointestinal: negative for nausea, vomiting, diarrhea, constipation, abdominal pain, Dysphagia, hematemesis and hematochezia   Genitourinary: negative for frequency, dysuria, nocturia, urinary incontinence, and hematuria   Integument: negative for rash, skin lesions, bruises.    Hematologic/Lymphatic: negative for easy bruising, bleeding, lymphadenopathy, or petechiae   Endocrine: negative for heat or cold intolerance,weight changes, change in bowel habits and hair loss   Musculoskeletal: negative for myalgias, arthralgias, pain, joint swelling,and bone pain   Neurological: negative for headaches, dizziness, seizures, weakness, numbness    PHYSICAL EXAM:      /68   Pulse 73   Temp 98.1 °F (36.7 °C) (Oral)   Resp 22   Ht 5' 11\" (1.803 m)   Wt 220 lb (99.8 kg)   SpO2 97%   BMI 30.68 kg/m²    Temp (24hrs), Av.9 °F (36.6 °C), Min:97 °F (36.1 °C), Max:98.8 °F (37.1 °C)    General appearance - well appearing, no in pain or distress   Mental status - alert and cooperative   Eyes - pupils equal and reactive, extraocular eye movements intact   Ears - bilateral TM's and external ear canals normal   Mouth - mucous membranes moist, pharynx normal without lesions   Neck - supple, no significant adenopathy   Lymphatics - no palpable lymphadenopathy, no hepatosplenomegaly   Chest - clear to auscultation, no wheezes, rales or rhonchi, symmetric air entry   Heart - normal rate, regular rhythm, normal S1, S2, no murmurs  Abdomen - soft, nontender, nondistended, no masses or organomegaly   Neurological - alert, oriented, normal speech, no focal findings or movement disorder noted   Musculoskeletal - no joint tenderness, deformity or swelling   Extremities - peripheral pulses normal, no pedal edema, no clubbing or cyanosis   Skin - normal coloration and turgor, no rashes, no suspicious skin lesions noted ,    DATA:    Labs:   CBC: Recent Labs     12/07/21  1658   WBC 12.6*   HGB 12.7*   HCT 38.8*   *     BMP:   Recent Labs     12/07/21  1658      K 4.1   CO2 24   BUN 15   CREATININE 0.99   LABGLOM >60   GLUCOSE 86     PT/INR: No results for input(s): PROTIME, INR in the last 72 hours. IMAGING DATA:      Primary Problem  Bone lesion    Active Hospital Problems    Diagnosis Date Noted    Bone lesion [M89.9] 12/07/2021    Cannot walk [R26.2] 12/07/2021    Lytic bone lesion of hip [M89.8X5] 12/07/2021    Multiple substance abuse ([facility] [address] [F19.10] 03/09/2012         IMPRESSION:   1. Lytic bone lesion   2. Substance abuse    RECOMMENDATIONS:  1. Labs and chart reviewed  2. Will need a tissue biopsy  3. Will discuss with attending for possible IR guided bone biopsy. 4. Pain control      Elizabeth Liang MD  PGY-3, Internal medicine resident  [place], [place]    Attending Physician Statement   I have discussed the care of this patient, including pertinent history and exam findings, with the resident. I have seen and examined the patient and the key elements of all parts of the encounter have been performed by me. I agree with the assessment, plan and orders as documented by the resident and with changes made to the note.       Plan for CT-guided biopsy  Order myeloma work-up  Further recommendations based on tissue diagnosis  Continue pain management    Deedee Collazo MD  Hematology/Oncology    Cell: [phone]

## 2021-12-08 NOTE — PROGRESS NOTES
Occupational Therapy   Occupational Therapy Initial Assessment    Date: 2021   Patient Name: Marybeth Curran  MRN: [id number]     : 1970    Date of Service: 2021  Chief Complaint   Patient presents with    Leg Pain     Discharge Recommendations:  Further therapy recommended at discharge. OT Equipment Recommendations  Equipment Needed: Yes  Mobility Devices: Rodney Cliff; ADL Assistive Devices  Walker: Rolling  ADL Assistive Devices: Shower Chair with back; Grab Bars - shower; [employer] [facility] - toilet; Reacher; Long-handled Shoe Horn; Long-handled Sponge    Assessment   Performance deficits / Impairments: Decreased functional mobility ; Decreased endurance; Decreased ADL status; Decreased balance; Decreased strength; Decreased safe awareness; Decreased high-level IADLs  Assessment: Pt is motivated to participated in OT services and to return to prior living situation. However, at this time, Pt's level of independence is currently limited in his Daily Tasks, Mobility, and Activity Tolerance. Because of this, Pt will benefit from continued participation in OT in order to improve overall functional task performance. Prognosis: Good  Decision Making: Medium Complexity  OT Education: OT Role; Plan of Care; Family Education; Precautions; ADL Adaptive Strategies; Transfer Training; Energy Conservation  Patient Education: Pt verbalized good understanding of education. Will benefit from continued education / reinforcement of education. Barriers to Learning: Pt is limited by high levels of pain, which cause him to have decreased processing of information / ability to multi-task this date. REQUIRES OT FOLLOW UP: Yes  Activity Tolerance  Activity Tolerance: Patient limited by pain  Activity Tolerance: Was unable to find comfortable position during evaluation. Safety Devices  Safety Devices in place: Yes  Type of devices: All fall risk precautions in place;  Left in bed; Call light within reach; Nurse notified; Patient at risk for falls; Positioning belt  Restraints  Initially in place: No         Patient Diagnosis(es): The encounter diagnosis was Lesion of pelvic bone.   has a past medical history of Abscess, Anxiety, Cellulitis due to MRSA, Chronic back pain, Depression, Hypertension, Multiple substance abuse ([address] and OCD (obsessive compulsive disorder). has a past surgical history that includes shoulder surgery (Right) and Eye surgery. Restrictions  Restrictions/Precautions  Restrictions/Precautions: General Precautions, Fall Risk  Required Braces or Orthoses?: No  Position Activity Restriction  Other position/activity restrictions: Up with Assist    Subjective   General  Chart Reviewed: Yes  Patient assessed for rehabilitation services?: Yes  Family / Caregiver Present: No  Subjective  Subjective: RN approved therapy participation this date. Pt was agreeable to participate.   Patient Currently in Pain: Yes  Pain Assessment  Pain Assessment: 0-10  Pain Level: 5  Pain Type: Acute pain  Pain Location: Leg; Hip  Pain Orientation: Left  Pain Frequency: Continuous  Pain Onset: Awakened from sleep  Clinical Progression: Not changed  Functional Pain Assessment: Prevents or interferes with all active and some passive activities  Non-Pharmaceutical Pain Intervention(s): Repositioned  Response to Pain Intervention: Patient Satisfied  Vital Signs  Pulse: 71 (at rest / supine)  BP: (!) 147/84 (supine after participation in therapy evaluation (reporting 10/10 for pain in Left hip))  Level of Consciousness: Alert (0)  Patient Currently in Pain: Yes  Oxygen Therapy  SpO2: 97 %  O2 Device: None (Room air)    Social/Functional History  Social/Functional History  Lives With: Family (Lives with elderly parents)  Type of Home: House  Home Layout: Able to Live on Main level with bedroom/bathroom  Home Access: Stairs to enter with rails  Entrance Stairs - Number of Steps: 1 step  Entrance Stairs - Rails: Right  Bathroom Shower/Tub: Tub/Shower unit  Bathroom Toilet: Handicap height  Home Equipment:  (No current DME)  Receives Help From: Family (Has assist from Mother for some cooking // cleaning // home mgmt)  ADL Assistance: Independent (Pt reports Mod I without DME for all ADLs; but reports increasing difficulty recently d/t exacerbation of chronic back and Left hip pain)  Homemaking Assistance: Needs assistance  Meal Prep: Other (comment) (Mod I Light Meal Prep // Pt and Mother share this task)  Laundry:  (Pt's Mother completes)  Cleaning:  (Pt completes Light Clean-Up // Pt's Mother completes heavy cleaning)  Driving: Other (comment) (Pt does not drive. He has transportation assist from family and medical transport.)  Homemaking Responsibilities: No  Ambulation Assistance: Independent  Transfer Assistance: Independent  Active : No  Patient's  Info: Parents drive or provided by Job and Family srevices  Occupation: On disability  Leisure & Hobbies: 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 Additional Comments: Reports worsening of ability to complete tasks more recently d/t exacerbation of chronic back pain and Left hip pain. Objective   Vision: Within Functional Limits  Hearing: Within functional limits    Orientation  Overall Orientation Status: Within Functional Limits     Balance  Sitting Balance: Stand by assistance  Standing Balance: Contact guard assistance  Standing Balance  Time: Pt demo'd ability to maintain dynamic sitting balance at EOB on several occasions with SBA for short stints (<90 seconds each d/t poor ability to find comfortable position from high levels of Left Hip pain). Maintained static standing at the toilet (with UE support x1) ~60 seconds with CGA progressing to SBA. Activity: Toileting task while in standing (static standing).   Functional Mobility  Functional - Mobility Device: Rolling Walker  Activity: To/from bathroom  Assist Level: Contact guard assistance  Functional Mobility Comments: Pt demo'd functional in-room mobility and in-room negotiation x2 trials with use of RW. Pt required CGA and Mod tactile / Min verbal cues for safe and accurate use of RW, maintenance of balance. Toilet Transfers  Equipment Used: Standard toilet  Toilet Transfer: Contact guard assistance  Toilet Transfers Comments: Required Min verbal and Mod tactile cues for overall safety, maintenance of balance, and safe / accurate use of DME (RW). Demo'd impulsivity with movement. No LOB. ADL  Feeding: Setup (assessment based on clinical judgement)  Grooming: Contact guard assistance  UE Bathing: Minimal assistance  LE Bathing: Moderate assistance  UE Dressing: Minimal assistance  LE Dressing: Moderate assistance (to don shoes and socks at EOB)  Toileting: Minimal assistance  Additional Comments: Pt required increased time for taask completion, increased time for processing of all directions. He also required Min verbal cues / Mod tactile cues for maintenance of balance, activity pacing. Tone RUE  RUE Tone: Normotonic  Tone LUE  LUE Tone: Normotonic  Coordination  Coordination and Movement description: Decreased speed; Ataxia; Decreased accuracy  Quality of Movement Other  Comment: Slight ataxia noted in BUEs with targeted movement / coordination     Bed mobility  Supine to Sit: Stand by assistance  Sit to Supine: Stand by assistance  Scooting: Stand by assistance  Comment: Pt required SBA d/t impulsivity with movement. Transfers  Sit to stand: Contact guard assistance (with RW)  Stand to sit: Contact guard assistance (with RW)  Transfer Comments: Required Min verbal and Mod tactile cues for overall safety, maintenance of balance, and safe / accurate use of DME (RW). Demo'd impulsivity with movement. Cognition  Overall Cognitive Status: Exceptions  Arousal/Alertness: Delayed responses to stimuli  Following Commands:  Follows one step commands with increased time  Attention Span: Difficulty dividing attention  Memory: Appears intact  Safety Judgement: Decreased awareness of need for safety; Decreased awareness of need for assistance  Problem Solving: Decreased awareness of errors  Insights: Decreased awareness of deficits  Initiation: Does not require cues  Sequencing: Does not require cues  Cognition Comment: Pt was able to accurately participate in tasks but demo'd increased time for processing, difficulty attending to tasks (d/t significant pain in Left hip and lack of ability to find position of comfort). He demo'd decreased insight and impulsivity with movement / activity, requiring Min verbal and Mod tactile cues for safety overall and safety with RW.      Sensation  Overall Sensation Status: WFL      LUE AROM (degrees)  LUE AROM : WFL  Left Hand AROM (degrees)  Left Hand AROM: WFL  RUE AROM (degrees)  RUE AROM : WFL  Right Hand AROM (degrees)  Right Hand AROM: WFL  LUE Strength  L Hand General: 3+/5  LUE Strength Comment: BUE MMT 3+/5 overall this date (difficult to assess d/t high levels of pain and inability to find position of comfort)  RUE Strength  R Hand General: 3+/5  RUE Strength Comment: BUE MMT 3+/5 overall this date (difficult to assess d/t high levels of pain and inability to find position of comfort)    Plan   Plan  Times per week: 3-5x/week  Times per day: Daily  Current Treatment Recommendations: Strengthening, Patient/Caregiver Education & Training, Home Management Training, Equipment Evaluation, Education, & procurement, Balance Training, Neuromuscular Re-education, Functional Mobility Training, Endurance Training, Safety Education & Training, Self-Care / ADL, Pain Management    AM-PAC Score  AM-PAC Inpatient Daily Activity Raw Score: 16 (12/08/21 1206)  AM-PAC Inpatient ADL T-Scale Score : 35.96 (12/08/21 1206)  ADL Inpatient CMS 0-100% Score: 53.32 (12/08/21 1206)  ADL Inpatient CMS G-Code Modifier : CK (12/08/21 1206)    Goals  Short term goals  Short term goal 1: Pt will complete UB ADLs with Mod I with Good Integration of EC techniques. Short term goal 2: Pt will complete LB ADLs with Mod I with Good Integration of Body Mechanics. Short term goal 3: Pt will participate in Bathroom Transfers with Mod I with Correct Use of AE / DME. Short term goal 4: Pt will maintain Dynamic Standing Balance >10 minutes while reaching BUEs in all planes. Short term goal 5: Pt will participate in Item Retrieval and Transport (of everyday items) with Mod I without LOB.        Therapy Time   Individual Concurrent Group Co-treatment   Time In 0930         Time Out 0954         Minutes 24         Timed Code Treatment Minutes: 8 Minutes (ADL)       CARMELO Guerrero, OTR/L

## 2021-12-08 NOTE — ED NOTES
Pt had redness/itching to left arm around IV site after Morphine administration, Admitting notified      Leonidas Hadley RN  12/07/21 [id number]

## 2021-12-08 NOTE — ED NOTES
Report given to Saurabh Umanzor, with verbal understanding of the patient needs and concerns      Evens Eid RN  12/08/21 [id number]

## 2021-12-08 NOTE — PROGRESS NOTES
[facility]  Office: [address], DO, Cobrookeroxana Eder, DO, Myra David, DO, Neto Yanez Blood, DO, Gareth Dorman MD, Estella Matt MD, Ginger Olvera MD, Tye Martin MD, Brenda Dunbar MD, Sin Keane MD, Aminata Lim MD, Alicia Jose, DO, Alberto Elizalde DO, Radha Samuel MD,  Conor Rodriguez DO, Nurys Gudino MD, Talya Villanueva MD, Lawanda Khanna MD, Aminah Goodman MD, Unruly Sanchez MD, Cielo Ferrari MD, Fitz Delgado MD, Krysta Pickens, [facility], [facility], CNP, David Mccann, CNP, Yared Harvey, CNS, Sylvain Hidalgo, Brenda Harley, CNP, Madalyn Bueno, [facility], Jocelyne Howard, [facility], Cuauhtemoc Walker, CNP, Rowan Oppenheim, PA-C, Shavonne Smith, [facility], Osmany Agustin, CNP, Luna Brantley, CNP, Amrita Samuels, CNP, Shanice Rico, CNP, Yassine Han, CNP, Alvaro Monreal, CNP, Chencho Callahan, [address] [place] [address]    Progress Note    12/8/2021    11:34 AM    Name:   Ke Wisdom  MRN:     [id number]     Acct:      [de-identified]   Room:   [address] [place] [address] Day:  1  Admit Date:  12/7/2021 11:35 AM    PCP:   No primary care provider on file. Code Status:  Full Code    Subjective:     C/C:   Chief Complaint   Patient presents with    Leg Pain     Interval History Status: not changed. Patient was seen and evaluated this morning. He complains to me that he has severe left hip pain and cannot get comfortable in bed. He otherwise has no additional complaints. He underwent nuclear medicine bone scan this morning. Brief History:     Ke Wisdom is a 46 y.o. L who initially presented to our hospital with complaints of left hip pain. CT of his hip showed an expansile lytic lesion involving the majority of the left inferior pubic ramus extending into the posterior/inferior portion of the left acetabulum. Differential includes metastatic disease versus primary bone neoplasm.   Patient was having significant pain making it difficult for him to ambulate. He says pain has been getting extensively worse over the last 2 weeks. Vitals on admission were stable. Labs also unremarkable. Patient denies any chest pain, nausea, vomiting. Admits to some weight loss. Review of Systems:     Constitutional:  negative for chills, fevers, sweats  Respiratory:  negative for cough, dyspnea on exertion, shortness of breath, wheezing  Cardiovascular:  negative for chest pain, chest pressure/discomfort, lower extremity edema, palpitations  Gastrointestinal:  negative for abdominal pain, constipation, diarrhea, nausea, vomiting  Neurological:  negative for dizziness, headache    Medications: Allergies: Allergies   Allergen Reactions    Morphine Itching       Current Meds:   Scheduled Meds:    influenza virus vaccine  0.5 mL IntraMUSCular Prior to discharge    traZODone  50 mg Oral Nightly    sodium chloride flush  5-40 mL IntraVENous 2 times per day    enoxaparin  40 mg SubCUTAneous Daily     Continuous Infusions:    sodium chloride       PRN Meds: fentanNYL, oxyCODONE **OR** oxyCODONE, melatonin, sodium chloride flush, sodium chloride, potassium chloride **OR** potassium alternative oral replacement **OR** potassium chloride, magnesium sulfate, ondansetron **OR** ondansetron, polyethylene glycol, acetaminophen **OR** acetaminophen, diphenhydrAMINE    Data:     Past Medical History:   has a past medical history of Abscess, Anxiety, Cellulitis due to MRSA, Chronic back pain, Depression, Hypertension, Multiple substance abuse ([address] and OCD (obsessive compulsive disorder). Social History:   reports that he has quit smoking. His smoking use included cigarettes. He has a 12.50 pack-year smoking history. He has never used smokeless tobacco. He reports current drug use. Drug: Marijuana Brooke Amble). He reports that he does not drink alcohol.      Family History:   Family History   Problem Relation Age of Onset    Diabetes Mother     Hearing Loss Father     High Blood Pressure Father        Vitals:  /68   Pulse 73   Temp 98.1 °F (36.7 °C) (Oral)   Resp 22   Ht 5' 11\" (1.803 m)   Wt 220 lb (99.8 kg)   SpO2 97%   BMI 30.68 kg/m²   Temp (24hrs), Av.9 °F (36.6 °C), Min:97 °F (36.1 °C), Max:98.8 °F (37.1 °C)    No results for input(s): POCGLU in the last 72 hours. I/O (24Hr): Intake/Output Summary (Last 24 hours) at 2021 1134  Last data filed at 2021 0925  Gross per 24 hour   Intake 360 ml   Output --   Net 360 ml       Labs:  Hematology:  Recent Labs     21  1658   WBC 12.6*   RBC 4.26   HGB 12.7*   HCT 38.8*   MCV 91.1   MCH 29.8   MCHC 32.7   RDW 12.4   *   MPV 9.1     Chemistry:  Recent Labs     21  1658      K 4.1      CO2 24   GLUCOSE 86   BUN 15   CREATININE 0.99   ANIONGAP 12   LABGLOM >60   GFRAA >60   CALCIUM 11.1*   No results for input(s): PROT, LABALBU, LABA1C, T5KQGJL, D5WLSBL, FT4, TSH, AST, ALT, LDH, GGT, ALKPHOS, LABGGT, BILITOT, BILIDIR, AMMONIA, AMYLASE, LIPASE, LACTATE, CHOL, HDL, LDLCHOLESTEROL, CHOLHDLRATIO, TRIG, VLDL, LFM81UG, PHENYTOIN, PHENYF, URICACID, POCGLU in the last 72 hours. ABG:  Lab Results   Component Value Date    FIO2 ROOM AIR 2014     Lab Results   Component Value Date/Time    SPECIAL NOT REPORTED 2017 12:53 AM     Lab Results   Component Value Date/Time    CULTURE VIRIDANS STREPTOCOCCUS GROUP LIGHT GROWTH (A) 2017 12:53 AM    CULTURE NO ANAEROBIC ORGANISMS ISOLATED AT 5 DAYS (A) 2017 12:53 AM    CULTURE  2017 12:53 AM     [address] [phone]       Radiology:  CT HIP LEFT WO CONTRAST    Result Date: 2021  1. Expansile lytic lesion involving the majority of the left inferior pubic ramus extending into the posterior/inferior portion of the left acetabulum. The differential includes metastatic disease versus primary bone neoplasm.  Consider whole-body bone scan versus PET scan to evaluate for any additional lesions. The degree of osseous replacement by the mass is significant which could result in a pathologic fracture. 2.  Additional 1.2 cm lytic lesion in the left iliac bone. Physical Examination:        General appearance:  alert, cooperative. Restless secondary to pain  Mental Status:  oriented to person, place and time and normal affect  Lungs: Faint expiratory wheezing bilateral lower to mid lung fields. Otherwise clear to auscultation bilaterally  Heart:  regular rate and rhythm, no murmur  Abdomen:  soft, nontender, nondistended, normal bowel sounds, no masses, hepatomegaly, splenomegaly  Extremities:  no edema, redness, tenderness in the calves  Skin:  no gross lesions, rashes, induration    Assessment:        Hospital Problems           Last Modified POA    * (Principal) Bone lesion 12/7/2021 Yes    Multiple substance abuse ([facility] 12/7/2021 Yes    Overview Signed 3/9/2012  1:08 PM by Marnie Sanchez     Cocaine, cannabinoids         Major depressive disorder, recurrent episode, severe ([facility] [address] [facility] 12/8/2021 Yes    Cannot walk 12/7/2021 Yes    Lytic bone lesion of hip 12/7/2021 Yes          Plan:        Left pelvis/hip lytic lesion:  Reviewed CT of hip, which shows an expansile lytic lesion involving the majority of the left inferior pubic ramus extending into the posterior/inferior portion of the left acetabulum. Differential did include metastatic disease versus primary bone neoplasm. Continue pain regimen as ordered. Oncology consulted. Patient underwent nuclear medicine bone scan this morning. Patient will need biopsy at some point. Continue PT OT. Hypercalcemia: Suspect secondary to lytic bone lesion. Continue to monitor. History of substance abuse: Patient has history of cocaine and marijuana use. Currently denies any use.     DVT prophylaxis as ordered     Divya Damon PA-C  12/8/2021  11:34 AM

## 2021-12-09 ENCOUNTER — APPOINTMENT (OUTPATIENT)
Dept: CT IMAGING | Age: 51
DRG: 343 | End: 2021-12-09
Payer: COMMERCIAL

## 2021-12-09 PROBLEM — E83.52 HYPERCALCEMIA: Status: ACTIVE | Noted: 2021-12-09

## 2021-12-09 LAB
ABSOLUTE EOS #: 0.17 K/UL (ref 0–0.44)
ABSOLUTE IMMATURE GRANULOCYTE: 0.03 K/UL (ref 0–0.3)
ABSOLUTE LYMPH #: 2.97 K/UL (ref 1.1–3.7)
ABSOLUTE MONO #: 1.19 K/UL (ref 0.1–1.2)
ANION GAP SERPL CALCULATED.3IONS-SCNC: 9 MMOL/L (ref 9–17)
BASOPHILS # BLD: 0 % (ref 0–2)
BASOPHILS ABSOLUTE: 0.04 K/UL (ref 0–0.2)
BUN BLDV-MCNC: 20 MG/DL (ref 6–20)
BUN/CREAT BLD: ABNORMAL (ref 9–20)
CALCIUM SERPL-MCNC: 10 MG/DL (ref 8.6–10.4)
CHLORIDE BLD-SCNC: 98 MMOL/L (ref 98–107)
CO2: 25 MMOL/L (ref 20–31)
CREAT SERPL-MCNC: 0.73 MG/DL (ref 0.7–1.2)
DIFFERENTIAL TYPE: ABNORMAL
EOSINOPHILS RELATIVE PERCENT: 2 % (ref 1–4)
FREE KAPPA/LAMBDA RATIO: 1.27 (ref 0.26–1.65)
GFR AFRICAN AMERICAN: >60 ML/MIN
GFR NON-AFRICAN AMERICAN: >60 ML/MIN
GFR SERPL CREATININE-BSD FRML MDRD: ABNORMAL ML/MIN/{1.73_M2}
GFR SERPL CREATININE-BSD FRML MDRD: ABNORMAL ML/MIN/{1.73_M2}
GLUCOSE BLD-MCNC: 111 MG/DL (ref 70–99)
HCT VFR BLD CALC: 36.2 % (ref 40.7–50.3)
HEMOGLOBIN: 12.2 G/DL (ref 13–17)
IMMATURE GRANULOCYTES: 0 %
KAPPA FREE LIGHT CHAINS QNT: 2.77 MG/DL (ref 0.37–1.94)
LAMBDA FREE LIGHT CHAINS QNT: 2.18 MG/DL (ref 0.57–2.63)
LYMPHOCYTES # BLD: 28 % (ref 24–43)
MAGNESIUM: 1.8 MG/DL (ref 1.6–2.6)
MCH RBC QN AUTO: 30.4 PG (ref 25.2–33.5)
MCHC RBC AUTO-ENTMCNC: 33.7 G/DL (ref 28.4–34.8)
MCV RBC AUTO: 90.3 FL (ref 82.6–102.9)
MONOCYTES # BLD: 11 % (ref 3–12)
NRBC AUTOMATED: 0 PER 100 WBC
PDW BLD-RTO: 12.2 % (ref 11.8–14.4)
PLATELET # BLD: 397 K/UL (ref 138–453)
PLATELET ESTIMATE: ABNORMAL
PMV BLD AUTO: 9.1 FL (ref 8.1–13.5)
POTASSIUM SERPL-SCNC: 3.5 MMOL/L (ref 3.7–5.3)
RBC # BLD: 4.01 M/UL (ref 4.21–5.77)
RBC # BLD: ABNORMAL 10*6/UL
SEG NEUTROPHILS: 59 % (ref 36–65)
SEGMENTED NEUTROPHILS ABSOLUTE COUNT: 6.08 K/UL (ref 1.5–8.1)
SODIUM BLD-SCNC: 132 MMOL/L (ref 135–144)
WBC # BLD: 10.5 K/UL (ref 3.5–11.3)
WBC # BLD: ABNORMAL 10*3/UL

## 2021-12-09 PROCEDURE — 2580000003 HC RX 258: Performed by: INTERNAL MEDICINE

## 2021-12-09 PROCEDURE — 6370000000 HC RX 637 (ALT 250 FOR IP): Performed by: INTERNAL MEDICINE

## 2021-12-09 PROCEDURE — APPSS30 APP SPLIT SHARED TIME 16-30 MINUTES: Performed by: PHYSICIAN ASSISTANT

## 2021-12-09 PROCEDURE — 1200000000 HC SEMI PRIVATE

## 2021-12-09 PROCEDURE — 86334 IMMUNOFIX E-PHORESIS SERUM: CPT

## 2021-12-09 PROCEDURE — 88342 IMHCHEM/IMCYTCHM 1ST ANTB: CPT

## 2021-12-09 PROCEDURE — 6360000002 HC RX W HCPCS: Performed by: INTERNAL MEDICINE

## 2021-12-09 PROCEDURE — 36415 COLL VENOUS BLD VENIPUNCTURE: CPT

## 2021-12-09 PROCEDURE — 6360000002 HC RX W HCPCS: Performed by: PHYSICIAN ASSISTANT

## 2021-12-09 PROCEDURE — 88341 IMHCHEM/IMCYTCHM EA ADD ANTB: CPT

## 2021-12-09 PROCEDURE — 84155 ASSAY OF PROTEIN SERUM: CPT

## 2021-12-09 PROCEDURE — 0QB33ZX EXCISION OF LEFT PELVIC BONE, PERCUTANEOUS APPROACH, DIAGNOSTIC: ICD-10-PCS | Performed by: RADIOLOGY

## 2021-12-09 PROCEDURE — 83883 ASSAY NEPHELOMETRY NOT SPEC: CPT

## 2021-12-09 PROCEDURE — 80048 BASIC METABOLIC PNL TOTAL CA: CPT

## 2021-12-09 PROCEDURE — 2709999900 CT BIOPSY SUPERFICIAL BONE PERCUTANEOUS

## 2021-12-09 PROCEDURE — G0103 PSA SCREENING: HCPCS

## 2021-12-09 PROCEDURE — 6370000000 HC RX 637 (ALT 250 FOR IP): Performed by: PHYSICIAN ASSISTANT

## 2021-12-09 PROCEDURE — 99232 SBSQ HOSP IP/OBS MODERATE 35: CPT | Performed by: INTERNAL MEDICINE

## 2021-12-09 PROCEDURE — 6360000002 HC RX W HCPCS: Performed by: RADIOLOGY

## 2021-12-09 PROCEDURE — 85025 COMPLETE CBC W/AUTO DIFF WBC: CPT

## 2021-12-09 PROCEDURE — 88307 TISSUE EXAM BY PATHOLOGIST: CPT

## 2021-12-09 PROCEDURE — 83735 ASSAY OF MAGNESIUM: CPT

## 2021-12-09 PROCEDURE — 84165 PROTEIN E-PHORESIS SERUM: CPT

## 2021-12-09 PROCEDURE — 2709999900 HC NON-CHARGEABLE SUPPLY

## 2021-12-09 RX ORDER — FENTANYL CITRATE 50 UG/ML
INJECTION, SOLUTION INTRAMUSCULAR; INTRAVENOUS
Status: COMPLETED | OUTPATIENT
Start: 2021-12-09 | End: 2021-12-09

## 2021-12-09 RX ORDER — SENNA AND DOCUSATE SODIUM 50; 8.6 MG/1; MG/1
2 TABLET, FILM COATED ORAL DAILY
Status: DISCONTINUED | OUTPATIENT
Start: 2021-12-09 | End: 2021-12-10 | Stop reason: HOSPADM

## 2021-12-09 RX ORDER — LISINOPRIL 5 MG/1
5 TABLET ORAL DAILY
Status: DISCONTINUED | OUTPATIENT
Start: 2021-12-09 | End: 2021-12-10 | Stop reason: HOSPADM

## 2021-12-09 RX ORDER — LORAZEPAM 1 MG/1
1 TABLET ORAL EVERY 6 HOURS PRN
Status: DISCONTINUED | OUTPATIENT
Start: 2021-12-09 | End: 2021-12-10 | Stop reason: HOSPADM

## 2021-12-09 RX ORDER — OXYCODONE HYDROCHLORIDE AND ACETAMINOPHEN 5; 325 MG/1; MG/1
2 TABLET ORAL EVERY 6 HOURS PRN
Status: DISCONTINUED | OUTPATIENT
Start: 2021-12-09 | End: 2021-12-10 | Stop reason: HOSPADM

## 2021-12-09 RX ORDER — AMLODIPINE BESYLATE 5 MG/1
5 TABLET ORAL DAILY
Status: DISCONTINUED | OUTPATIENT
Start: 2021-12-09 | End: 2021-12-09

## 2021-12-09 RX ORDER — PANTOPRAZOLE SODIUM 40 MG/1
40 TABLET, DELAYED RELEASE ORAL
Status: DISCONTINUED | OUTPATIENT
Start: 2021-12-10 | End: 2021-12-10 | Stop reason: HOSPADM

## 2021-12-09 RX ORDER — OXYCODONE HCL 20 MG/1
20 TABLET, FILM COATED, EXTENDED RELEASE ORAL EVERY 12 HOURS SCHEDULED
Status: DISCONTINUED | OUTPATIENT
Start: 2021-12-09 | End: 2021-12-09

## 2021-12-09 RX ORDER — LORAZEPAM 0.5 MG/1
0.5 TABLET ORAL ONCE
Status: COMPLETED | OUTPATIENT
Start: 2021-12-09 | End: 2021-12-09

## 2021-12-09 RX ORDER — POLYETHYLENE GLYCOL 3350 17 G/17G
17 POWDER, FOR SOLUTION ORAL DAILY
Status: DISCONTINUED | OUTPATIENT
Start: 2021-12-09 | End: 2021-12-10 | Stop reason: HOSPADM

## 2021-12-09 RX ORDER — OXYCODONE HCL 20 MG/1
20 TABLET, FILM COATED, EXTENDED RELEASE ORAL EVERY 12 HOURS SCHEDULED
Status: DISCONTINUED | OUTPATIENT
Start: 2021-12-09 | End: 2021-12-10

## 2021-12-09 RX ORDER — KETOROLAC TROMETHAMINE 30 MG/ML
30 INJECTION, SOLUTION INTRAMUSCULAR; INTRAVENOUS EVERY 6 HOURS PRN
Status: DISCONTINUED | OUTPATIENT
Start: 2021-12-09 | End: 2021-12-10 | Stop reason: HOSPADM

## 2021-12-09 RX ADMIN — HYDROMORPHONE HYDROCHLORIDE 1 MG: 1 INJECTION, SOLUTION INTRAMUSCULAR; INTRAVENOUS; SUBCUTANEOUS at 00:37

## 2021-12-09 RX ADMIN — OXYCODONE HYDROCHLORIDE 20 MG: 20 TABLET, FILM COATED, EXTENDED RELEASE ORAL at 13:36

## 2021-12-09 RX ADMIN — OXYCODONE 10 MG: 5 TABLET ORAL at 07:48

## 2021-12-09 RX ADMIN — SODIUM CHLORIDE, PRESERVATIVE FREE 10 ML: 5 INJECTION INTRAVENOUS at 08:42

## 2021-12-09 RX ADMIN — TRAZODONE HYDROCHLORIDE 50 MG: 50 TABLET ORAL at 20:06

## 2021-12-09 RX ADMIN — FENTANYL CITRATE 25 MCG: 50 INJECTION, SOLUTION INTRAMUSCULAR; INTRAVENOUS at 15:51

## 2021-12-09 RX ADMIN — OXYCODONE 10 MG: 5 TABLET ORAL at 02:42

## 2021-12-09 RX ADMIN — FENTANYL CITRATE 50 MCG: 50 INJECTION, SOLUTION INTRAMUSCULAR; INTRAVENOUS at 15:48

## 2021-12-09 RX ADMIN — OXYCODONE 10 MG: 5 TABLET ORAL at 11:42

## 2021-12-09 RX ADMIN — LORAZEPAM 0.5 MG: 0.5 TABLET ORAL at 12:57

## 2021-12-09 RX ADMIN — FENTANYL CITRATE 25 MCG: 50 INJECTION, SOLUTION INTRAMUSCULAR; INTRAVENOUS at 15:53

## 2021-12-09 RX ADMIN — KETOROLAC TROMETHAMINE 30 MG: 30 INJECTION, SOLUTION INTRAMUSCULAR at 19:04

## 2021-12-09 RX ADMIN — OXYCODONE HYDROCHLORIDE AND ACETAMINOPHEN 2 TABLET: 5; 325 TABLET ORAL at 23:41

## 2021-12-09 RX ADMIN — LORAZEPAM 1 MG: 1 TABLET ORAL at 23:45

## 2021-12-09 RX ADMIN — HYDROMORPHONE HYDROCHLORIDE 1 MG: 1 INJECTION, SOLUTION INTRAMUSCULAR; INTRAVENOUS; SUBCUTANEOUS at 06:09

## 2021-12-09 RX ADMIN — SODIUM CHLORIDE, PRESERVATIVE FREE 10 ML: 5 INJECTION INTRAVENOUS at 20:06

## 2021-12-09 RX ADMIN — DOCUSATE SODIUM 50MG AND SENNOSIDES 8.6MG 2 TABLET: 8.6; 5 TABLET, FILM COATED ORAL at 16:58

## 2021-12-09 RX ADMIN — OXYCODONE HYDROCHLORIDE AND ACETAMINOPHEN 2 TABLET: 5; 325 TABLET ORAL at 17:02

## 2021-12-09 RX ADMIN — POLYETHYLENE GLYCOL 3350 17 G: 17 POWDER, FOR SOLUTION ORAL at 15:11

## 2021-12-09 RX ADMIN — KETOROLAC TROMETHAMINE 30 MG: 30 INJECTION, SOLUTION INTRAMUSCULAR at 12:57

## 2021-12-09 RX ADMIN — LISINOPRIL 5 MG: 5 TABLET ORAL at 19:04

## 2021-12-09 RX ADMIN — HYDROMORPHONE HYDROCHLORIDE 1 MG: 1 INJECTION, SOLUTION INTRAMUSCULAR; INTRAVENOUS; SUBCUTANEOUS at 10:20

## 2021-12-09 RX ADMIN — ENOXAPARIN SODIUM 40 MG: 100 INJECTION SUBCUTANEOUS at 08:42

## 2021-12-09 ASSESSMENT — PAIN SCALES - GENERAL
PAINLEVEL_OUTOF10: 9
PAINLEVEL_OUTOF10: 10
PAINLEVEL_OUTOF10: 9
PAINLEVEL_OUTOF10: 10
PAINLEVEL_OUTOF10: 10
PAINLEVEL_OUTOF10: 7
PAINLEVEL_OUTOF10: 10
PAINLEVEL_OUTOF10: 8
PAINLEVEL_OUTOF10: 4
PAINLEVEL_OUTOF10: 10
PAINLEVEL_OUTOF10: 10
PAINLEVEL_OUTOF10: 4
PAINLEVEL_OUTOF10: 6
PAINLEVEL_OUTOF10: 10
PAINLEVEL_OUTOF10: 8

## 2021-12-09 NOTE — PROGRESS NOTES
Physical Therapy        Physical Therapy Cancel Note      DATE: 2021    NAME: Rakel Menon  MRN: [id number]   : 1970      Patient not seen this date for Physical Therapy due to:    Patient Declined: Second attempt, pt still refusing due to c/o increased pain, will check back as able.       Electronically signed by Nita Meigs, PTA on 2021 at 2:28 PM

## 2021-12-09 NOTE — BRIEF OP NOTE
Brief Postoperative Note    Abad December  YOB: 1970  [id number]    Pre-operative Diagnosis: lesion of pelvic bone    Post-operative Diagnosis: Same    Procedure: Pelvic bone bx    Anesthesia: Local    Surgeons/Assistants: Ada Villavicencio MD    Estimated Blood Loss: less than 50     Complications: None    Specimens: Was Obtained:     Findings: Successful bx of pelvic bone lesion. 4 core samples obtained and placed in formalin.      Electronically signed by ROBERT Proctor on 12/9/2021 at 4:00 PM

## 2021-12-09 NOTE — PROGRESS NOTES
Physical Therapy        Physical Therapy Cancel Note      DATE: 2021    NAME: Basia Sanders  MRN: [id number]   : 1970      Patient not seen this date for Physical Therapy due to: Other: Pt refusing to participate in PT due to c/o increased pain, will check back this afternoon.       Electronically signed by Aiden Singleton PTA on 2021 at 11:38 AM

## 2021-12-09 NOTE — PLAN OF CARE
Problem: Pain:  Goal: Pain level will decrease  Description: Pain level will decrease  12/9/2021 1637 by Shelia Anthony RN  Outcome: Ongoing  12/9/2021 0309 by Angie Prado RN  Outcome: Ongoing  Goal: Control of acute pain  Description: Control of acute pain  12/9/2021 1637 by Shelia Anthony RN  Outcome: Ongoing  12/9/2021 0309 by Angie Prado RN  Outcome: Ongoing  Goal: Control of chronic pain  Description: Control of chronic pain  12/9/2021 1637 by Shelia Anthony RN  Outcome: Ongoing  12/9/2021 0309 by Angie Prado RN  Outcome: Ongoing     Problem: Falls - Risk of:  Goal: Will remain free from falls  Description: Will remain free from falls  12/9/2021 1637 by Shelia Anthony RN  Outcome: Ongoing  12/9/2021 0309 by Angie Prado RN  Outcome: Ongoing  Goal: Absence of physical injury  Description: Absence of physical injury  12/9/2021 1637 by Shelia Anthony RN  Outcome: Ongoing  12/9/2021 0309 by Angie Prado RN  Outcome: Ongoing     Problem: Musculor/Skeletal Functional Status  Goal: Highest potential functional level  12/9/2021 1637 by Shelia Anthony RN  Outcome: Ongoing  12/9/2021 0309 by Angie Prado RN  Outcome: Ongoing     Problem: Nutrition  Goal: Optimal nutrition therapy  12/9/2021 1637 by Shelia Anthony RN  Outcome: Ongoing  12/9/2021 0309 by Angie Prado RN  Outcome: Ongoing     Problem: Musculor/Skeletal Functional Status  Goal: Highest potential functional level  12/9/2021 1637 by Shelia Anthony RN  Outcome: Ongoing  12/9/2021 0309 by Angie Prado RN  Outcome: Ongoing  Goal: Absence of falls  12/9/2021 1637 by Shelia Anthony RN  Outcome: Ongoing  12/9/2021 0309 by Angie Prado RN  Outcome: Ongoing

## 2021-12-09 NOTE — SEDATION DOCUMENTATION
ACCESS OBTAINED  SPECIMENS COLLECTED AND PUT IN [facility]  ACCESS REMOVED

## 2021-12-09 NOTE — PROGRESS NOTES
[facility]  Office: [address], DO, Steven Thomas, DO, Alejandro Nation, DO, Matti Dayo Blood, DO, Imani Wagner MD, Demi Dominguez MD, Lexis Rosenberg MD, Arelis Cowart MD, Florian Garrido MD, Claudine Lees MD, Ashley Zamora MD, Ginger Phelps, DO, Genesis Cesar, DO, Shira Grover MD,  Lori Yoon, DO, Arely Cooper MD, Shreyas Jerry MD, Blanca Hamilton MD, Osmany Reza MD, Felecia Sullivan MD, Teresa Obrien MD, Madhuri James MD, [facility], [facility], CNP, Leonard Collins, CNP, Reji , CNS, Martha Reynoso, Herb Olivares, CNP, Kanika Gresham, CNP, Blane Moffett, CNP, Kailyn Newman, CNP, Emmie Parker PA-C, Ashley Reilly, [facility], Ryan Davila, CNP, Jabari Hull, CNP, Justice Still, CNP, Fatou Hudson, CNP, Zoran Higgins, CNP, Jazmyn Morton, CNP, Nathaly Harrison, [address] [place] [address]    Progress Note    12/9/2021    1:51 PM    Name:   Demetris Sims  MRN:     [id number]     Acct:      [de-identified]   Room:   [address] [place] [address] Day:  2  Admit Date:  12/7/2021 11:35 AM    PCP:   No primary care provider on file. Code Status:  Full Code    Subjective:     C/C:   Chief Complaint   Patient presents with    Leg Pain     Interval History Status: not changed. Patient seen and evaluated this morning. Patient continues to be in significant pain. He cannot sleep. Pain is somewhat better after pain meds were adjusted yesterday evening; however, still rates his pain at 8 out of 10 and cannot get comfortable. He complains that he is rather scared and anxious regarding his diagnosis and subsequent treatment. Otherwise denies any new complaints. Brief History:     Demetris Sims is a 46 y.o. L who initially presented to our hospital with complaints of left hip pain.   CT of his hip showed an expansile lytic lesion involving the majority of the left inferior pubic ramus extending into the posterior/inferior portion of the left acetabulum. Differential includes metastatic disease versus primary bone neoplasm. Patient was having significant pain making it difficult for him to ambulate. He says pain has been getting extensively worse over the last 2 weeks. Vitals on admission were stable. Labs also unremarkable. Patient denies any chest pain, nausea, vomiting. Admits to some weight loss. Review of Systems:     Constitutional:  negative for chills, fevers, sweats  Respiratory:  negative for cough, dyspnea on exertion, shortness of breath, wheezing  Cardiovascular:  negative for chest pain, chest pressure/discomfort, lower extremity edema, palpitations  Gastrointestinal:  negative for abdominal pain, constipation, diarrhea, nausea, vomiting  Neurological:  negative for dizziness, headache    Medications: Allergies: Allergies   Allergen Reactions    Morphine Itching       Current Meds:   Scheduled Meds:    oxyCODONE  20 mg Oral 2 times per day    influenza virus vaccine  0.5 mL IntraMUSCular Prior to discharge    traZODone  50 mg Oral Nightly    sodium chloride flush  5-40 mL IntraVENous 2 times per day    enoxaparin  40 mg SubCUTAneous Daily     Continuous Infusions:    sodium chloride       PRN Meds: ketorolac, melatonin, sodium chloride flush, sodium chloride, potassium chloride **OR** potassium alternative oral replacement **OR** potassium chloride, magnesium sulfate, ondansetron **OR** ondansetron, polyethylene glycol, acetaminophen **OR** acetaminophen, diphenhydrAMINE    Data:     Past Medical History:   has a past medical history of Abscess, Anxiety, Cellulitis due to MRSA, Chronic back pain, Depression, Hypertension, Multiple substance abuse ([address] and OCD (obsessive compulsive disorder). Social History:   reports that he has quit smoking. His smoking use included cigarettes. He has a 12.50 pack-year smoking history.  He has never used smokeless tobacco. He reports current drug use. Drug: Marijuana Yuli Iraheta). He reports that he does not drink alcohol. Family History:   Family History   Problem Relation Age of Onset    Diabetes Mother     Hearing Loss Father     High Blood Pressure Father        Vitals:  BP (!) 139/90   Pulse 86   Temp 97.2 °F (36.2 °C) (Oral)   Resp 20   Ht 5' 11\" (1.803 m)   Wt 217 lb 9.5 oz (98.7 kg)   SpO2 93%   BMI 30.35 kg/m²   Temp (24hrs), Av.7 °F (36.5 °C), Min:97.2 °F (36.2 °C), Max:98.1 °F (36.7 °C)    No results for input(s): POCGLU in the last 72 hours. I/O (24Hr): No intake or output data in the 24 hours ending 21 1351    Labs:  Hematology:  Recent Labs     21  1658   WBC 12.6*   RBC 4.26   HGB 12.7*   HCT 38.8*   MCV 91.1   MCH 29.8   MCHC 32.7   RDW 12.4   *   MPV 9.1     Chemistry:  Recent Labs     21  1658      K 4.1      CO2 24   GLUCOSE 86   BUN 15   CREATININE 0.99   ANIONGAP 12   LABGLOM >60   GFRAA >60   CALCIUM 11.1*     Recent Labs     21  0829   PROT 6.9     ABG:  Lab Results   Component Value Date    FIO2 ROOM AIR 2014     Lab Results   Component Value Date/Time    SPECIAL NOT REPORTED 2017 12:53 AM     Lab Results   Component Value Date/Time    CULTURE VIRIDANS STREPTOCOCCUS GROUP LIGHT GROWTH (A) 2017 12:53 AM    CULTURE NO ANAEROBIC ORGANISMS ISOLATED AT 5 DAYS (A) 2017 12:53 AM    CULTURE  2017 12:53 AM     [employer] [address] [address] [phone]       Radiology:  CT HIP LEFT WO CONTRAST    Result Date: 2021  1. Expansile lytic lesion involving the majority of the left inferior pubic ramus extending into the posterior/inferior portion of the left acetabulum. The differential includes metastatic disease versus primary bone neoplasm. Consider whole-body bone scan versus PET scan to evaluate for any additional lesions.   The degree of osseous replacement by the mass is significant which could result in a pathologic fracture. 2.  Additional 1.2 cm lytic lesion in the left iliac bone. Physical Examination:        General appearance:  alert, cooperative. Restless secondary to pain  Mental Status:  oriented to person, place and time and normal affect  Lungs: Faint expiratory wheezing bilateral lower to mid lung fields. Otherwise clear to auscultation bilaterally  Heart:  regular rate and rhythm, no murmur  Abdomen:  soft, nontender, nondistended, normal bowel sounds, no masses, hepatomegaly, splenomegaly  Extremities:  no edema, redness, tenderness in the calves  Skin:  no gross lesions, rashes, induration    Assessment:        Hospital Problems           Last Modified POA    * (Principal) Lesion of pelvic bone 12/9/2021 Yes    Multiple substance abuse ([facility] 12/7/2021 Yes    Overview Signed 3/9/2012  1:08 PM by Napolean Host     Cocaine, cannabinoids         Major depressive disorder, recurrent episode, severe ([facility] 12/8/2021 Yes    Cannot walk 12/7/2021 Yes    Lytic bone lesion of hip 12/7/2021 Yes    Hypercalcemia 12/9/2021 Yes          Plan:        Left pelvis/hip lytic lesion: Differential did include metastatic disease versus primary bone neoplasm. Will place patient on extended release OxyContin to obtain better around-the-clock pain control. Continue with as needed Roxicodone. Patient underwent nuclear medicine bone scan which confirmed lytic lesions described above. Patient scheduled for CT-guided bone biopsy. Continue PT OT. Hypercalcemia: Suspect secondary to lytic bone lesion. Continue to monitor. If calcium continues to remain elevated, will consider intranasal calcitonin administration    History of substance abuse: Patient has history of cocaine and marijuana use. Currently denies any use. Situational depression/anxiety: We will trial a dose of Ativan.     DVT prophylaxis as ordered     Dolly Montejo PA-C  12/9/2021  1:51 PM

## 2021-12-09 NOTE — PROGRESS NOTES
Today's Date: 12/9/2021  Patient Name: Fanny Brown  Date of admission: 12/7/2021 11:35 AM  Patient's age: 46 y.o., 1970  Admission Dx: Lytic bone lesion of hip [M89.8X5]  Lesion of pelvic bone [M89.9]    Reason for Consult: management recommendations  Requesting Physician: Chepe Olson MD    CHIEF COMPLAINT:  Leg pain     History Obtained From:  patient, electronic medical record    Interval Hx  Seen and examined   No acute issues overnight  Still In significant pain  Awaiting CT guided biopsy    HISTORY OF PRESENT ILLNESS:      The patient is a 46 y.o.  male him to the hospital with left-sided leg pain. CT scan was done in the ED which showed lytic lesions involving left inferior pubic ramus extending into the posterior inferior portion of the left acetabulum. And one of the differential was metastatic versus primary bone neoplasm. Heme-onc has been consulted  For possible malignancy and work-up. Patient hemodynamically stable. On  labs calcium is elevated. Past Medical History:   has a past medical history of Abscess, Anxiety, Cellulitis due to MRSA, Chronic back pain, Depression, Hypertension, Multiple substance abuse ([address] and OCD (obsessive compulsive disorder). Past Surgical History:   has a past surgical history that includes shoulder surgery (Right) and Eye surgery. Medications:    Reviewed in Epic     Allergies:  Morphine    Social History:   reports that he has quit smoking. His smoking use included cigarettes. He has a 12.50 pack-year smoking history. He has never used smokeless tobacco. He reports current drug use. Drug: Marijuana Stefan Jorge). He reports that he does not drink alcohol. Family History: family history includes Diabetes in his mother; Hearing Loss in his father; High Blood Pressure in his father. REVIEW OF SYSTEMS:    Constitutional: No fever or chills.  No night sweats, no weight loss   Eyes: No eye discharge, double vision, or eye pain   HEENT: negative for sore mouth, sore throat, hoarseness and voice change   Respiratory: negative for cough , sputum, dyspnea, wheezing, hemoptysis, chest pain   Cardiovascular: negative for chest pain, dyspnea, palpitations, orthopnea, PND   Gastrointestinal: negative for nausea, vomiting, diarrhea, constipation, abdominal pain, Dysphagia, hematemesis and hematochezia   Genitourinary: negative for frequency, dysuria, nocturia, urinary incontinence, and hematuria   Integument: negative for rash, skin lesions, bruises.    Hematologic/Lymphatic: negative for easy bruising, bleeding, lymphadenopathy, or petechiae   Endocrine: negative for heat or cold intolerance,weight changes, change in bowel habits and hair loss   Musculoskeletal: negative for myalgias, arthralgias, pain, joint swelling,and bone pain   Neurological: negative for headaches, dizziness, seizures, weakness, numbness    PHYSICAL EXAM:      BP (!) 139/90   Pulse 86   Temp 97.2 °F (36.2 °C) (Oral)   Resp 20   Ht 5' 11\" (1.803 m)   Wt 217 lb 9.5 oz (98.7 kg)   SpO2 93%   BMI 30.35 kg/m²    Temp (24hrs), Av.7 °F (36.5 °C), Min:97.2 °F (36.2 °C), Max:98.1 °F (36.7 °C)    General appearance - well appearing, no in pain or distress   Mental status - alert and cooperative   Eyes - pupils equal and reactive, extraocular eye movements intact   Ears - bilateral TM's and external ear canals normal   Mouth - mucous membranes moist, pharynx normal without lesions   Neck - supple, no significant adenopathy   Lymphatics - no palpable lymphadenopathy, no hepatosplenomegaly   Chest - clear to auscultation, no wheezes, rales or rhonchi, symmetric air entry   Heart - normal rate, regular rhythm, normal S1, S2, no murmurs  Abdomen - soft, nontender, nondistended, no masses or organomegaly   Neurological - alert, oriented, normal speech, no focal findings or movement disorder noted   Musculoskeletal - no joint tenderness, deformity or swelling   Extremities - peripheral pulses normal, no pedal edema, no clubbing or cyanosis   Skin - normal coloration and turgor, no rashes, no suspicious skin lesions noted ,    DATA:    Labs:   CBC:   Recent Labs     21  1658   WBC 12.6*   HGB 12.7*   HCT 38.8*   *     BMP:   Recent Labs     21  1658      K 4.1   CO2 24   BUN 15   CREATININE 0.99   LABGLOM >60   GLUCOSE 86     PT/INR: No results for input(s): PROTIME, INR in the last 72 hours. IMAGING DATA:      Primary Problem  Lesion of pelvic bone    Active Hospital Problems    Diagnosis Date Noted    Lesion of pelvic bone [M89.9] 2021    Cannot walk [R26.2] 2021    Lytic bone lesion of hip [M89.8X5] 2021    Major depressive disorder, recurrent episode, severe ([facility] [F33.2] 2015    Multiple substance abuse ([facility] [F19.10] 2012         IMPRESSION:   1. Lytic bone lesion   2. Substance abuse    RECOMMENDATIONS:  1. Labs and chart reviewed  2. Follow up on MM labs. 3. Awaiting Ct guided biopsy  4. Pain control      Lavonne Blankenship MD  PGY-3, Internal medicine resident  [facility], [place], [place]        Attending Physician Statement   I have discussed the care of this patient, including pertinent history and exam findings, with the resident. I have seen and examined the patient and the key elements of all parts of the encounter have been performed by me. I agree with the assessment, plan and orders as documented by the resident and with changes made to the note.     River Collazo MD  Hematology/Oncology    Cell: [phone]

## 2021-12-10 ENCOUNTER — APPOINTMENT (OUTPATIENT)
Dept: CT IMAGING | Age: 51
DRG: 343 | End: 2021-12-10
Payer: COMMERCIAL

## 2021-12-10 VITALS
HEART RATE: 79 BPM | SYSTOLIC BLOOD PRESSURE: 115 MMHG | RESPIRATION RATE: 18 BRPM | WEIGHT: 217.81 LBS | BODY MASS INDEX: 30.49 KG/M2 | TEMPERATURE: 97.6 F | OXYGEN SATURATION: 99 % | HEIGHT: 71 IN | DIASTOLIC BLOOD PRESSURE: 90 MMHG

## 2021-12-10 LAB
ALBUMIN (CALCULATED): 4 G/DL (ref 3.2–5.2)
ALBUMIN PERCENT: 58 % (ref 45–65)
ALPHA 1 PERCENT: 3 % (ref 3–6)
ALPHA 2 PERCENT: 12 % (ref 6–13)
ALPHA-1-GLOBULIN: 0.2 G/DL (ref 0.1–0.4)
ALPHA-2-GLOBULIN: 0.9 G/DL (ref 0.5–0.9)
ANION GAP SERPL CALCULATED.3IONS-SCNC: 10 MMOL/L (ref 9–17)
BETA GLOBULIN: 0.7 G/DL (ref 0.5–1.1)
BETA PERCENT: 10 % (ref 11–19)
BUN BLDV-MCNC: 25 MG/DL (ref 6–20)
BUN/CREAT BLD: ABNORMAL (ref 9–20)
CALCIUM SERPL-MCNC: 9.8 MG/DL (ref 8.6–10.4)
CHLORIDE BLD-SCNC: 103 MMOL/L (ref 98–107)
CO2: 21 MMOL/L (ref 20–31)
CREAT SERPL-MCNC: 0.78 MG/DL (ref 0.7–1.2)
GAMMA GLOBULIN %: 16 % (ref 9–20)
GAMMA GLOBULIN: 1.1 G/DL (ref 0.5–1.5)
GFR AFRICAN AMERICAN: >60 ML/MIN
GFR NON-AFRICAN AMERICAN: >60 ML/MIN
GFR SERPL CREATININE-BSD FRML MDRD: ABNORMAL ML/MIN/{1.73_M2}
GFR SERPL CREATININE-BSD FRML MDRD: ABNORMAL ML/MIN/{1.73_M2}
GLUCOSE BLD-MCNC: 97 MG/DL (ref 70–99)
PATHOLOGIST: ABNORMAL
PATHOLOGIST: NORMAL
POTASSIUM SERPL-SCNC: 4.4 MMOL/L (ref 3.7–5.3)
PROSTATE SPECIFIC ANTIGEN: 0.39 UG/L
PROTEIN ELECTROPHORESIS, SERUM: ABNORMAL
SERUM IFX INTERP: NORMAL
SODIUM BLD-SCNC: 134 MMOL/L (ref 135–144)
TOTAL PROT. SUM,%: 99 % (ref 98–102)
TOTAL PROT. SUM: 6.9 G/DL (ref 6.3–8.2)
TOTAL PROTEIN: 6.9 G/DL (ref 6.4–8.3)

## 2021-12-10 PROCEDURE — 71260 CT THORAX DX C+: CPT

## 2021-12-10 PROCEDURE — 97116 GAIT TRAINING THERAPY: CPT

## 2021-12-10 PROCEDURE — 6370000000 HC RX 637 (ALT 250 FOR IP): Performed by: INTERNAL MEDICINE

## 2021-12-10 PROCEDURE — 6360000002 HC RX W HCPCS: Performed by: INTERNAL MEDICINE

## 2021-12-10 PROCEDURE — 36415 COLL VENOUS BLD VENIPUNCTURE: CPT

## 2021-12-10 PROCEDURE — 2580000003 HC RX 258: Performed by: INTERNAL MEDICINE

## 2021-12-10 PROCEDURE — 6360000002 HC RX W HCPCS: Performed by: PHYSICIAN ASSISTANT

## 2021-12-10 PROCEDURE — 97110 THERAPEUTIC EXERCISES: CPT

## 2021-12-10 PROCEDURE — 6370000000 HC RX 637 (ALT 250 FOR IP): Performed by: PHYSICIAN ASSISTANT

## 2021-12-10 PROCEDURE — 80048 BASIC METABOLIC PNL TOTAL CA: CPT

## 2021-12-10 PROCEDURE — 6360000004 HC RX CONTRAST MEDICATION: Performed by: STUDENT IN AN ORGANIZED HEALTH CARE EDUCATION/TRAINING PROGRAM

## 2021-12-10 PROCEDURE — 99232 SBSQ HOSP IP/OBS MODERATE 35: CPT | Performed by: INTERNAL MEDICINE

## 2021-12-10 PROCEDURE — G0008 ADMIN INFLUENZA VIRUS VAC: HCPCS | Performed by: INTERNAL MEDICINE

## 2021-12-10 PROCEDURE — 90686 IIV4 VACC NO PRSV 0.5 ML IM: CPT | Performed by: INTERNAL MEDICINE

## 2021-12-10 RX ORDER — GABAPENTIN 100 MG/1
100 CAPSULE ORAL 3 TIMES DAILY
Qty: 90 CAPSULE | Refills: 0 | Status: ON HOLD | OUTPATIENT
Start: 2021-12-10 | End: 2022-01-10 | Stop reason: HOSPADM

## 2021-12-10 RX ORDER — OXYCODONE HCL 40 MG/1
40 TABLET, FILM COATED, EXTENDED RELEASE ORAL EVERY 12 HOURS SCHEDULED
Qty: 14 EACH | Refills: 0 | Status: ON HOLD | OUTPATIENT
Start: 2021-12-10 | End: 2021-12-23 | Stop reason: HOSPADM

## 2021-12-10 RX ORDER — GABAPENTIN 100 MG/1
100 CAPSULE ORAL 3 TIMES DAILY
Qty: 90 CAPSULE | Refills: 0 | Status: SHIPPED | OUTPATIENT
Start: 2021-12-10 | End: 2021-12-10

## 2021-12-10 RX ORDER — FENTANYL 25 UG/H
1 PATCH TRANSDERMAL
Status: DISCONTINUED | OUTPATIENT
Start: 2021-12-10 | End: 2021-12-10 | Stop reason: HOSPADM

## 2021-12-10 RX ORDER — LISINOPRIL 5 MG/1
5 TABLET ORAL DAILY
Qty: 30 TABLET | Refills: 1 | Status: ON HOLD | OUTPATIENT
Start: 2021-12-11 | End: 2022-01-10 | Stop reason: HOSPADM

## 2021-12-10 RX ORDER — GABAPENTIN 100 MG/1
100 CAPSULE ORAL 3 TIMES DAILY
Status: DISCONTINUED | OUTPATIENT
Start: 2021-12-10 | End: 2021-12-10 | Stop reason: HOSPADM

## 2021-12-10 RX ORDER — OXYCODONE HYDROCHLORIDE AND ACETAMINOPHEN 5; 325 MG/1; MG/1
2 TABLET ORAL EVERY 6 HOURS PRN
Qty: 30 TABLET | Refills: 0 | Status: SHIPPED | OUTPATIENT
Start: 2021-12-10 | End: 2021-12-13

## 2021-12-10 RX ORDER — KETOROLAC TROMETHAMINE 10 MG/1
10 TABLET, FILM COATED ORAL EVERY 6 HOURS PRN
Qty: 35 TABLET | Refills: 0 | Status: SHIPPED | OUTPATIENT
Start: 2021-12-10 | End: 2021-12-29

## 2021-12-10 RX ORDER — FENTANYL 25 UG/H
1 PATCH TRANSDERMAL
Qty: 7 PATCH | Refills: 0 | Status: SHIPPED | OUTPATIENT
Start: 2021-12-10 | End: 2021-12-10 | Stop reason: HOSPADM

## 2021-12-10 RX ORDER — OXYCODONE HCL 40 MG/1
40 TABLET, FILM COATED, EXTENDED RELEASE ORAL EVERY 12 HOURS SCHEDULED
Status: DISCONTINUED | OUTPATIENT
Start: 2021-12-10 | End: 2021-12-10 | Stop reason: HOSPADM

## 2021-12-10 RX ORDER — SENNA AND DOCUSATE SODIUM 50; 8.6 MG/1; MG/1
2 TABLET, FILM COATED ORAL DAILY
Qty: 60 TABLET | Refills: 1 | Status: ON HOLD | OUTPATIENT
Start: 2021-12-11 | End: 2021-12-16

## 2021-12-10 RX ADMIN — ENOXAPARIN SODIUM 40 MG: 100 INJECTION SUBCUTANEOUS at 07:52

## 2021-12-10 RX ADMIN — PANTOPRAZOLE SODIUM 40 MG: 40 TABLET, DELAYED RELEASE ORAL at 05:00

## 2021-12-10 RX ADMIN — IOPAMIDOL 75 ML: 755 INJECTION, SOLUTION INTRAVENOUS at 12:49

## 2021-12-10 RX ADMIN — DOCUSATE SODIUM 50MG AND SENNOSIDES 8.6MG 2 TABLET: 8.6; 5 TABLET, FILM COATED ORAL at 07:52

## 2021-12-10 RX ADMIN — POLYETHYLENE GLYCOL 3350 17 G: 17 POWDER, FOR SOLUTION ORAL at 07:52

## 2021-12-10 RX ADMIN — OXYCODONE HYDROCHLORIDE AND ACETAMINOPHEN 2 TABLET: 5; 325 TABLET ORAL at 17:22

## 2021-12-10 RX ADMIN — OXYCODONE HYDROCHLORIDE AND ACETAMINOPHEN 2 TABLET: 5; 325 TABLET ORAL at 11:05

## 2021-12-10 RX ADMIN — OXYCODONE HYDROCHLORIDE AND ACETAMINOPHEN 2 TABLET: 5; 325 TABLET ORAL at 05:00

## 2021-12-10 RX ADMIN — INFLUENZA A VIRUS A/VICTORIA/2570/2019 IVR-215 (H1N1) ANTIGEN (PROPIOLACTONE INACTIVATED), INFLUENZA A VIRUS A/CAMBODIA/E0826360/2020 IVR-224 (H3N2) ANTIGEN (PROPIOLACTONE INACTIVATED), INFLUENZA B VIRUS B/VICTORIA/705/2018 BVR-11 ANTIGEN (PROPIOLACTONE INACTIVATED), INFLUENZA B VIRUS B/PHUKET/3073/2013 BVR-1B ANTIGEN (PROPIOLACTONE INACTIVATED) 0.5 ML: 15; 15; 15; 15 INJECTION, SUSPENSION INTRAMUSCULAR at 17:26

## 2021-12-10 RX ADMIN — SODIUM CHLORIDE, PRESERVATIVE FREE 10 ML: 5 INJECTION INTRAVENOUS at 07:52

## 2021-12-10 RX ADMIN — KETOROLAC TROMETHAMINE 30 MG: 30 INJECTION, SOLUTION INTRAMUSCULAR at 07:52

## 2021-12-10 RX ADMIN — OXYCODONE HYDROCHLORIDE 20 MG: 20 TABLET, FILM COATED, EXTENDED RELEASE ORAL at 09:14

## 2021-12-10 RX ADMIN — LISINOPRIL 5 MG: 5 TABLET ORAL at 09:14

## 2021-12-10 RX ADMIN — KETOROLAC TROMETHAMINE 30 MG: 30 INJECTION, SOLUTION INTRAMUSCULAR at 01:22

## 2021-12-10 RX ADMIN — LORAZEPAM 1 MG: 1 TABLET ORAL at 07:52

## 2021-12-10 RX ADMIN — KETOROLAC TROMETHAMINE 30 MG: 30 INJECTION, SOLUTION INTRAMUSCULAR at 14:07

## 2021-12-10 RX ADMIN — GABAPENTIN 100 MG: 100 CAPSULE ORAL at 15:21

## 2021-12-10 ASSESSMENT — PAIN SCALES - GENERAL
PAINLEVEL_OUTOF10: 7
PAINLEVEL_OUTOF10: 6
PAINLEVEL_OUTOF10: 8
PAINLEVEL_OUTOF10: 7
PAINLEVEL_OUTOF10: 7
PAINLEVEL_OUTOF10: 8
PAINLEVEL_OUTOF10: 6

## 2021-12-10 NOTE — PROGRESS NOTES
Today's Date: 12/10/2021  Patient Name: Justin Yadav  Date of admission: 12/7/2021 11:35 AM  Patient's age: 46 y.o., 1970  Admission Dx: Lytic bone lesion of hip [M89.8X5]  Lesion of pelvic bone [M89.9]    Reason for Consult: management recommendations  Requesting Physician: Lee Manning MD    CHIEF COMPLAINT:  Leg pain     History Obtained From:  patient, electronic medical record    Interval Hx  Seen and examined   No acute issues overnight  Pain is getting better  Awaiting CT guided biopsy  Will get Ct chest to look for any mass, negative myeloma work up. HISTORY OF PRESENT ILLNESS:      The patient is a 46 y.o.  male him to the hospital with left-sided leg pain. CT scan was done in the ED which showed lytic lesions involving left inferior pubic ramus extending into the posterior inferior portion of the left acetabulum. And one of the differential was metastatic versus primary bone neoplasm. Heme-onc has been consulted  For possible malignancy and work-up. Patient hemodynamically stable. On  labs calcium is elevated. Past Medical History:   has a past medical history of Abscess, Anxiety, Cellulitis due to MRSA, Chronic back pain, Depression, Hypertension, Multiple substance abuse ([address] and OCD (obsessive compulsive disorder). Past Surgical History:   has a past surgical history that includes shoulder surgery (Right); Eye surgery; and CT BIOPSY SUPERFICIAL BONE PERCUTANEOUS (12/9/2021). Medications:    Reviewed in Epic     Allergies:  Morphine    Social History:   reports that he has quit smoking. His smoking use included cigarettes. He has a 12.50 pack-year smoking history. He has never used smokeless tobacco. He reports current drug use. Drug: Marijuana Jonathan Ras). He reports that he does not drink alcohol. Family History: family history includes Diabetes in his mother; Hearing Loss in his father; High Blood Pressure in his father.     REVIEW OF SYSTEMS:    Constitutional: No fever or chills. No night sweats, no weight loss   Eyes: No eye discharge, double vision, or eye pain   HEENT: negative for sore mouth, sore throat, hoarseness and voice change   Respiratory: negative for cough , sputum, dyspnea, wheezing, hemoptysis, chest pain   Cardiovascular: negative for chest pain, dyspnea, palpitations, orthopnea, PND   Gastrointestinal: negative for nausea, vomiting, diarrhea, constipation, abdominal pain, Dysphagia, hematemesis and hematochezia   Genitourinary: negative for frequency, dysuria, nocturia, urinary incontinence, and hematuria   Integument: negative for rash, skin lesions, bruises.    Hematologic/Lymphatic: negative for easy bruising, bleeding, lymphadenopathy, or petechiae   Endocrine: negative for heat or cold intolerance,weight changes, change in bowel habits and hair loss   Musculoskeletal: negative for myalgias, arthralgias, pain, joint swelling,and bone pain   Neurological: negative for headaches, dizziness, seizures, weakness, numbness    PHYSICAL EXAM:      BP (!) 115/90   Pulse 79   Temp 97.6 °F (36.4 °C) (Temporal)   Resp 18   Ht 5' 11\" (1.803 m)   Wt 217 lb 13 oz (98.8 kg)   SpO2 99%   BMI 30.38 kg/m²    Temp (24hrs), Av.9 °F (36.6 °C), Min:97.6 °F (36.4 °C), Max:98.1 °F (36.7 °C)    General appearance - well appearing, no in pain or distress   Mental status - alert and cooperative   Eyes - pupils equal and reactive, extraocular eye movements intact   Ears - bilateral TM's and external ear canals normal   Mouth - mucous membranes moist, pharynx normal without lesions   Neck - supple, no significant adenopathy   Lymphatics - no palpable lymphadenopathy, no hepatosplenomegaly   Chest - clear to auscultation, no wheezes, rales or rhonchi, symmetric air entry   Heart - normal rate, regular rhythm, normal S1, S2, no murmurs  Abdomen - soft, nontender, nondistended, no masses or organomegaly   Neurological - alert, oriented, normal speech, no focal findings or movement disorder noted   Musculoskeletal - no joint tenderness, deformity or swelling   Extremities - peripheral pulses normal, no pedal edema, no clubbing or cyanosis   Skin - normal coloration and turgor, no rashes, no suspicious skin lesions noted ,    DATA:    Labs:   CBC:   Recent Labs     12/07/21  1658 12/09/21  1419   WBC 12.6* 10.5   HGB 12.7* 12.2*   HCT 38.8* 36.2*   * 397     BMP:   Recent Labs     12/09/21  1419 12/10/21  0525   * 134*   K 3.5* 4.4   CO2 25 21   BUN 20 25*   CREATININE 0.73 0.78   LABGLOM >60 >60   GLUCOSE 111* 97     PT/INR: No results for input(s): PROTIME, INR in the last 72 hours. IMAGING DATA:      Primary Problem  Lesion of pelvic bone    Active Hospital Problems    Diagnosis Date Noted    Hypercalcemia [E83.52] 12/09/2021    Lesion of pelvic bone [M89.9] 12/07/2021    Cannot walk [R26.2] 12/07/2021    Lytic bone lesion of hip [M89.8X5] 12/07/2021    Anxiety [F41.9] 10/05/2015    Major depressive disorder, recurrent episode, severe ([facility] [address] [F33.2] 04/23/2015    Multiple substance abuse ([facility] [address] [F19.10] 03/09/2012         IMPRESSION:   1. Lytic bone lesion   2. Substance abuse    RECOMMENDATIONS:  1. Labs and chart reviewed  2. MM work up negative, will get CT chest to look for mass, although we need outpatient PET scan. 3. Awaiting Ct guided biopsy  4. Pain control      Benjamin Philip MD  PGY-3, Internal medicine resident  [facility], [facility], [address]        Attending Physician Statement   I have discussed the care of this patient, including pertinent history and exam findings, with the resident. I have seen and examined the patient and the key elements of all parts of the encounter have been performed by me. I agree with the assessment, plan and orders as documented by the resident and with changes made to the note.      follow with oncology as o/p    Christopher Collazo MD  Hematology/Oncology    Cell: [phone]

## 2021-12-10 NOTE — PROGRESS NOTES
[facility]  Office: [address], DO, Yasmeen Oliver, DO, Jose [facility], DO, Vinh Levin Blood, DO, Starr Champion MD, Loki Jackson MD, Trey Merritt MD, Yseica Pérez MD, Amaury Kelley MD, Jaqui Breen MD, Melonie Cisneros MD, Viral Figueroa, DO, Bethel Lackey, DO, Kavita Franz MD,  Brian Garrison DO, Charles Chavira MD, Modesta Dunham MD, Shana Toribio MD, Mali Parsons MD, Lori Pinzon MD, Charlotte Jhaveri MD, Gordy Gruber MD, Lawson Hernández, [facility], [facility] Lito, [facility], Jonnie Mariee, CNP, Rommel Jaimes, [facility], Veronica Allison, Lexy Trimble, CNP, Baron Lesch, CNP, Jessee Blackwell, [facility], Billy Pulido, CNP, Jaleesa Monahan, PA-C, Denver Parks, [facility], Simeon Brooks, CNP, Teresita Ozuna, CNP, Brandon Lopez, CNP, Charlotte Ventura, CNP, Phoenix Carranza, CNP, Maria Esther Benitez, CNP, Herb Stone, [address] [place] [address]    Progress Note    12/10/2021    8:43 AM    Name:   Glenroy Chin  MRN:     [id number]     Acct:      [de-identified]   Room:   [address] [place] [address] Day:  3  Admit Date:  12/7/2021 11:35 AM    PCP:   No primary care provider on file. Code Status:  Full Code    Subjective:     C/C:   Chief Complaint   Patient presents with    Leg Pain     Interval History Status: Improved     Patient's pain is more tolerable 6-7/10. He did cry in pain one time last night. The Oxycontin and Percocet/ Toradol is helping. He also had a BM and feels better. Anxiety is less today.   He had his CT guided bone biopsy yesterday, waiting for pathology    Brief History:     Per the PA:  \"Hubert Singer is a 46 y.o. L who initially presented to our hospital with complaints of left hip pain.  CT of his hip showed an expansile lytic lesion involving the majority of the left inferior pubic ramus extending into the posterior/inferior portion of the left acetabulum.  Differential includes metastatic disease versus primary bone neoplasm.  Patient never used smokeless tobacco. He reports current drug use. Drug: Marijuana Deleon Megan). He reports that he does not drink alcohol. Family History:   Family History   Problem Relation Age of Onset    Diabetes Mother     Hearing Loss Father     High Blood Pressure Father        Vitals:  /88   Pulse 75   Temp 98.1 °F (36.7 °C) (Oral)   Resp 20   Ht 5' 11\" (1.803 m)   Wt 217 lb 13 oz (98.8 kg)   SpO2 96%   BMI 30.38 kg/m²   Temp (24hrs), Av.1 °F (36.7 °C), Min:98.1 °F (36.7 °C), Max:98.1 °F (36.7 °C)    No results for input(s): POCGLU in the last 72 hours. I/O (24Hr): No intake or output data in the 24 hours ending 12/10/21 0843    Labs:  Hematology:  Recent Labs     21  1419   WBC 12.6* 10.5   RBC 4.26 4.01*   HGB 12.7* 12.2*   HCT 38.8* 36.2*   MCV 91.1 90.3   MCH 29.8 30.4   MCHC 32.7 33.7   RDW 12.4 12.2   * 397   MPV 9.1 9.1     Chemistry:  Recent Labs     21  1658 21  1419 12/10/21  0525    132* 134*   K 4.1 3.5* 4.4    98 103   CO2 24 25 21   GLUCOSE 86 111* 97   BUN 15 20 25*   CREATININE 0.99 0.73 0.78   MG  --  1.8  --    ANIONGAP 12 9 10   LABGLOM >60 >60 >60   GFRAA >60 >60 >60   CALCIUM 11.1* 10.0 9.8     Recent Labs     21  0829   PROT 6.9     ABG:  Lab Results   Component Value Date    FIO2 ROOM AIR 2014     Lab Results   Component Value Date/Time    SPECIAL NOT REPORTED 2017 12:53 AM     Lab Results   Component Value Date/Time    CULTURE VIRIDANS STREPTOCOCCUS GROUP LIGHT GROWTH (A) 2017 12:53 AM    CULTURE NO ANAEROBIC ORGANISMS ISOLATED AT 5 DAYS (A) 2017 12:53 AM    CULTURE  2017 12:53 AM     [employer] [id number] [facility], [address] [place] [address] [phone]       Radiology:  NM BONE SCAN 3 PHASE    Result Date: 2021  Three-phase positive uptake associated with the destructive lytic lesion in the left ischium and inferior pubic ramus.   The delayed phase uptake is primarily along the margin of the lesion, typical of predominantly lytic lesions. Other lytic lesions may not be apparent on bone scan. The overall appearance is highly concerning for malignancy. CT HIP LEFT WO CONTRAST    Result Date: 12/7/2021  1. Expansile lytic lesion involving the majority of the left inferior pubic ramus extending into the posterior/inferior portion of the left acetabulum. The differential includes metastatic disease versus primary bone neoplasm. Consider whole-body bone scan versus PET scan to evaluate for any additional lesions. The degree of osseous replacement by the mass is significant which could result in a pathologic fracture. 2.  Additional 1.2 cm lytic lesion in the left iliac bone. CT BIOPSY SUPERFICIAL BONE PERCUTANEOUS    Result Date: 12/9/2021  Successful CT-guided core bone biopsy. Physical Examination:        General appearance:  alert, cooperative and no distress  Mental Status:  oriented to person, place and time and normal affect  Lungs:  clear to auscultation bilaterally, normal effort  Heart:  regular rate and rhythm, no murmur  Abdomen:  soft, nontender, nondistended, normal bowel sounds, no masses, hepatomegaly, splenomegaly  Extremities:  no edema, redness, tenderness in the calves  Skin:  no gross lesions, rashes, induration    Assessment:        Hospital Problems           Last Modified POA    * (Principal) Lesion of pelvic bone 12/9/2021 Yes    Multiple substance abuse ([address] 12/7/2021 Yes    Overview Signed 3/9/2012  1:08 PM by Juan Carlos Carlton     Cocaine, cannabinoids         Major depressive disorder, recurrent episode, severe ([address] 12/8/2021 Yes    Anxiety 12/9/2021 Yes    Cannot walk 12/7/2021 Yes    Lytic bone lesion of hip 12/7/2021 Yes    Hypercalcemia 12/9/2021 Yes          Plan:        Assessment/ Plan  1. Left pelvic lytic lesion- increase OxyContin 40 mg BID, Toradol q 6hrs prn pain,  Percocet 2 tabs q 6 hrs prn pain.   Await Bone biopsy results, appreciate Hem/Onc eval, PSA normal, add Neurontin for neuropathic pain, maybe check a CT chest while here since he is a previous smoker  2. Hypercalcemia- Ca 11.1 yesterday, improved  3. Constipation- Senna-S daily, Miralax for stool regimen while on narcotics  4. Anxiety - Ativan 1mg po q 6hrs prn  5.   Gi/ DVT proph    DC planning home [address], MD  12/10/2021  8:43 AM

## 2021-12-10 NOTE — PROGRESS NOTES
Physical Therapy  Facility/Department: [facility] RENAL//MED SURG  Daily Treatment Note  NAME: Todd King  : 1970  MRN: [id number]    Date of Service: 12/10/2021    Discharge Recommendations:  Patient would benefit from continued therapy after discharge   PT Equipment Recommendations  Equipment Needed: Yes  Mobility Devices: Liat Inches: Rolling    Assessment   Assessment: Pt ambulated 150 ft. with CGA using a RW. Pt requires repeated verbal cues to keep COG within walker to increase JERAMIE. Pt demos a forward flexed posture during gait training and c/o increased pain with ambulation. Pt would benefit from continued PT following discharge to address functional deficits. Prognosis: Good  Decision Making: Medium Complexity  PT Education: Goals; PT Role; Plan of Care  REQUIRES PT FOLLOW UP: Yes  Activity Tolerance  Activity Tolerance: Patient limited by fatigue; Patient limited by pain     Patient Diagnosis(es): The encounter diagnosis was Lesion of pelvic bone.     has a past medical history of Abscess, Anxiety, Cellulitis due to MRSA, Chronic back pain, Depression, Hypertension, Multiple substance abuse ([address] and OCD (obsessive compulsive disorder). has a past surgical history that includes shoulder surgery (Right); Eye surgery; and CT BIOPSY SUPERFICIAL BONE PERCUTANEOUS (2021). Restrictions  Restrictions/Precautions  Restrictions/Precautions: General Precautions, Fall Risk  Required Braces or Orthoses?: No  Position Activity Restriction  Other position/activity restrictions: Up with Assist  Subjective   General  Chart Reviewed: Yes  Response To Previous Treatment: Patient with no complaints from previous session. Family / Caregiver Present: No  Subjective  Subjective: RN and pt agreeable to PT.           Orientation  Orientation  Overall Orientation Status: Within Functional Limits  Cognition   Cognition  Overall Cognitive Status: Exceptions  Arousal/Alertness: Delayed responses to stimuli  Following Commands: Follows one step commands with increased time  Attention Span: Difficulty dividing attention  Memory: Appears intact  Safety Judgement: Decreased awareness of need for safety; Decreased awareness of need for assistance  Problem Solving: Decreased awareness of errors  Insights: Decreased awareness of deficits  Initiation: Does not require cues  Sequencing: Does not require cues  Objective   Bed mobility  Supine to Sit: Contact guard assistance  Sit to Supine: Contact guard assistance  Scooting: Contact guard assistance  Transfers  Sit to Stand: Contact guard assistance  Stand to sit: Contact guard assistance  Ambulation  Ambulation?: Yes  More Ambulation?: No  Ambulation 1  Surface: level tile  Device: Rolling Walker  Assistance: Contact guard assistance  Distance: 150 ft. Comments: The pt required verbal cues for safe use of the walker. Balance  Posture: Good  Sitting - Static: Fair  Sitting - Dynamic: Poor  Standing - Static: Poor  Standing - Dynamic: Poor  Comments: Assessed with RW.   Exercises  Hip Flexion: x15 BLE  Hip Abduction: x15 BLE  Knee Long Arc Quad: x15 BLE  Ankle Pumps: x15 BLE                                                AM-PAC Score  AM-PAC Inpatient Mobility Raw Score : 17 (12/10/21 1300)  AM-PAC Inpatient T-Scale Score : 42.13 (12/10/21 1300)  Mobility Inpatient CMS 0-100% Score: 50.57 (12/10/21 1300)  Mobility Inpatient CMS G-Code Modifier : CK (12/10/21 1300)          Goals  Short term goals  Time Frame for Short term goals: 10 visits  Short term goal 1: transfers with SBA  Short term goal 2: amb 150 ft with a RW x SBA  Short term goal 3: ascend/descend 4 steps with SBA  Short term goal 4: 20 min exercise program x SBA  Patient Goals   Patient goals : Decrease L Hip/back pain    Plan    Plan  Times per week: 5-6x wk  Current Treatment Recommendations: Strengthening, Functional Mobility Training, Gait Training, Safety Education & Training, Endurance Training, Stair training, ROM, Pain Management  Safety Devices  Type of devices: Nurse notified, Left in bed, Call light within reach  Restraints  Initially in place: No     Therapy Time   Individual Concurrent Group Co-treatment   Time In 1105         Time Out 1130         Minutes 25         Timed Code Treatment Minutes: [address], [facility]

## 2021-12-13 ENCOUNTER — TELEPHONE (OUTPATIENT)
Dept: ONCOLOGY | Age: 51
End: 2021-12-13

## 2021-12-13 ENCOUNTER — CARE COORDINATION (OUTPATIENT)
Dept: CASE MANAGEMENT | Age: 51
End: 2021-12-13

## 2021-12-13 NOTE — CARE COORDINATION
[place] [facility] Transitions Initial Follow Up Call    Call within 2 business days of discharge: Yes    Patient: Patricia Navarro Patient : 1970   MRN: [id number]  Reason for Admission: Lesion of pelvic bone  Discharge Date: 12/10/21 RARS: Readmission Risk Score: 7 ( )      Last Discharge [facility]       Complaint Diagnosis Description Type Department Provider    21 Leg Pain Lesion of pelvic bone ED to Hosp-Admission (Discharged) (ADMITTED) [facility] 3B Alvin Shaffer MD; Miguel Graves,... First attempt at initial 24 hour CTN call     Spoke with:  Called to speak with patient for initial  transition of care. Left HIPPA compliant voice message with contact information [phone] for a call  Back with an update.       Facility: [place] [address]    Non-face-to-face services provided:  Obtained and reviewed discharge summary and/or continuity of care documents    Care Transitions 24 Hour Call    Care Transitions Interventions         Follow Up  Future Appointments   Date Time Provider Chana Simons   2021  8:00 AM STPRINCE SALINAS PET/CT ROOM [facility] SOY Swan LPN

## 2021-12-13 NOTE — TELEPHONE ENCOUNTER
RECEIVED PHONE MESSAGE FROM RODO THAT HE IS OUT OF PAIN MEDS? D/C FROM IN HOUSE 12/10/21 AND SCRIPTS WERE:    OXYCONTIN 40 MG Q 12 #14  FOR 7 DAY SUPPLY  PERCOCET 5/325 1 Q 6 PRN #30 FOR 7.5 DAY SUPPLY    PET SCAN SCHEDULED 12/16/21    MD FOLLOW UP 01/05/22    WHEN I RETURNED CALL RODO DID NOT RECALL LEAVING MESSAGE AND SAID HE IS ALL SET.   \"THEY ALREADY MADE THE APPT\"

## 2021-12-13 NOTE — TELEPHONE ENCOUNTER
RECEIVED A MESSAGE FROM DR Ronald Soliz STATING PT NEEDS A F/U IN 2 WEEKS.  DUE TO DR Delgado Home, PT IS SCHEDULED FOR 01/05/22 @11:30AM.

## 2021-12-14 ENCOUNTER — CARE COORDINATION (OUTPATIENT)
Dept: CASE MANAGEMENT | Age: 51
End: 2021-12-14

## 2021-12-14 LAB — SURGICAL PATHOLOGY REPORT: NORMAL

## 2021-12-14 NOTE — CARE COORDINATION
[place] [facility] Transitions Initial Follow Up Call    Call within 2 business days of discharge: Yes    Patient: Olga Macias Patient : 1970   MRN: [id number]  Reason for Admission:  Lesion of pelvic bone  Discharge Date: 12/10/21 RARS: Readmission Risk Score: 7 ( )      Last Discharge  [place] [address]       Complaint Diagnosis Description Type Department Provider    21 Leg Pain Lesion of pelvic bone ED to Hosp-Admission (Discharged) (ADMITTED) [facility] 3B Locke MD Ab; Ant Ravi,... Spoke with: Pillo Mccordes he is very anxious and is a lot of pain, he is abruptly changing stories about medication and dr calls. first stated he did not have medication then stated he did, When CTN asked about call to oncology office yesterday he staes he never told them he was okay, he states he doesnot have the pain medication d/t cost. Then he tells CTN his mom went and picked up prescription medication today and he is taking. He states he is in so much pain he can not sit or lay down. CTN suggested if he is in distress he should head back to the ED . He states he is doing well and will use the medication provided. He is eating little and drinking well. Normal bowel and bladder elimination. Is now taking medication as directed. Has PET scan schedule in 2 days CTN advised strongly he keep appointment. He has no other concerns at present time. Facility: [facility]    Non-face-to-face services provided:  Obtained and reviewed discharge summary and/or continuity of care documents  Education of patient/family/caregiver/guardian to support self-management-ice and heat therapy to affected area to help with pain, medications as directed and keeping pet scan appointment  Transitions of Care Initial Call    Was this an external facility discharge?  No     Challenges to be reviewed by the provider   Additional needs identified to be addressed with provider: No  none             Method of communication with provider : none      Advance Care Planning:   Does patient have an Advance Directive: reviewed and current, reviewed and needs to be updated, not on file; education provided, decision maker updated and referral to internal ACP facilitator. Was this a readmission? Yes and No  Patient stated reason for admission: PAIN in hip  Patients top risk factors for readmission: financial  functional physical ability  falls  lack of knowledge about disease  medication management  PCP relationship    Care Transition Nurse (CTN) contacted the patient by telephone to perform post hospital discharge assessment. Verified name and  with patient as identifiers. Provided introduction to self, and explanation of the CTN role. CTN reviewed discharge instructions, medical action plan and red flags with patient who verbalized understanding. Patient given an opportunity to ask questions and does not have any further questions or concerns at this time. Were discharge instructions available to patient? Yes. Reviewed appropriate site of care based on symptoms and resources available to patient including: PCP, Specialist and When to call 911. The patient agrees to contact the PCP office for questions related to their healthcare. Medication reconciliation was performed with patient, who verbalizes understanding of administration of home medications. Advised obtaining a 90-day supply of all daily and as-needed medications. Covid Risk Education     Educated patient about risk for severe COVID-19 due to risk factors according to CDC guidelines. LPN CC reviewed discharge instructions, medical action plan and red flag symptoms with the patient who verbalized understanding. Discussed COVID vaccination status: No. Education provided on COVID-19 vaccination as appropriate. Discussed exposure protocols and quarantine with CDC Guidelines.  Patient was given an opportunity to verbalize any questions and concerns and agrees to contact LPN CC or [facility] care provider for questions related to their healthcare. Reviewed and educated patient on any new and changed medications related to discharge diagnosis. Was patient discharged with a pulse oximeter? No Discussed and confirmed pulse oximeter discharge instructions and when to notify provider or seek emergency care. LPN CC provided contact information. Plan for follow-up call in 1-2 days based on severity of symptoms and risk factors.   Plan for next call: symptom management-PAIN      Care Transitions 24 Hour Call    Do you have any ongoing symptoms?: Yes  Patient-reported symptoms: Pain  Interventions for patient-reported symptoms: Notified PCP/Physician  Do you have a copy of your discharge instructions?: Yes  Do you have all of your prescriptions and are they filled?: Yes (Comment: picked up today)  [facility] you been contacted [facility]?: No  Have you scheduled your follow up appointment?: Yes  How are you going to get to your appointment?: Car - family or friend to transport  Were you discharged with any Home Care or Post Acute Services: No  Do you feel like you have everything you need to keep you well at home?: Yes  Care Transitions Interventions         Follow Up  Future Appointments   Date Time Provider Chana Simons   12/16/2021  8:00 AM STPRINCE SALINAS PET[facility]CT [facility]   1/5/2022 11:30 AM Elder Corea MD [facility] Martina Brown LPN

## 2021-12-15 ENCOUNTER — CARE COORDINATION (OUTPATIENT)
Dept: CASE MANAGEMENT | Age: 51
End: 2021-12-15

## 2021-12-15 NOTE — CARE COORDINATION
ability  lack of knowledge about disease  Interventions to address risk factors: Obtained and reviewed discharge summary and/or continuity of care documents          CTN provided contact information for future needs. Plan for follow-up call in 3-5 days based on severity of symptoms and risk factors. Plan for next call: symptom management-PAIN        Care Transitions Subsequent and Final Call    Subsequent and Final Calls  Do you have any ongoing symptoms?: Yes  Onset of Patient-reported symptoms: Today  Patient-reported symptoms: Pain  Interventions for patient-reported symptoms: Other  Have your medications changed?: No  Do you have any questions related to your medications?: No  Do you currently have any active services?: No  Do you have any needs or concerns that I can assist you with?: No  Identified Barriers: Other  Care Transitions Interventions  Other Interventions:            Follow Up  Future Appointments   Date Time Provider Chana Simons   12/23/2021  2:00 PM SOY SALINAS PET/CT ROOM [facility] SOY Oscar   1/5/2022 11:30 AM Vale Webber MD [facility] Swapna Prince LPN

## 2021-12-16 ENCOUNTER — HOSPITAL ENCOUNTER (INPATIENT)
Age: 51
LOS: 7 days | Discharge: HOME | DRG: 343 | End: 2021-12-23
Attending: EMERGENCY MEDICINE
Payer: COMMERCIAL

## 2021-12-16 ENCOUNTER — TELEPHONE (OUTPATIENT)
Dept: CASE MANAGEMENT | Age: 51
End: 2021-12-16

## 2021-12-16 ENCOUNTER — APPOINTMENT (OUTPATIENT)
Dept: GENERAL RADIOLOGY | Age: 51
DRG: 343 | End: 2021-12-16
Payer: COMMERCIAL

## 2021-12-16 DIAGNOSIS — D69.6 THROMBOCYTOPENIA (HCC): ICD-10-CM

## 2021-12-16 DIAGNOSIS — C79.51 METASTASIS TO BONE OF UNKNOWN PRIMARY (HCC): Primary | ICD-10-CM

## 2021-12-16 DIAGNOSIS — Z72.0 TOBACCO USE: ICD-10-CM

## 2021-12-16 DIAGNOSIS — M89.8X5 LYTIC BONE LESION OF HIP: ICD-10-CM

## 2021-12-16 DIAGNOSIS — C80.1 METASTASIS TO BONE OF UNKNOWN PRIMARY (HCC): Primary | ICD-10-CM

## 2021-12-16 DIAGNOSIS — F10.10 ALCOHOL ABUSE: ICD-10-CM

## 2021-12-16 DIAGNOSIS — F19.10 MULTIPLE SUBSTANCE ABUSE (HCC): ICD-10-CM

## 2021-12-16 DIAGNOSIS — C79.51 MALIGNANT NEOPLASM METASTATIC TO BONE (HCC): ICD-10-CM

## 2021-12-16 DIAGNOSIS — I10 PRIMARY HYPERTENSION: ICD-10-CM

## 2021-12-16 DIAGNOSIS — R52 INTRACTABLE PAIN: ICD-10-CM

## 2021-12-16 DIAGNOSIS — R26.2 CANNOT WALK: ICD-10-CM

## 2021-12-16 DIAGNOSIS — L03.211 FACIAL CELLULITIS: ICD-10-CM

## 2021-12-16 DIAGNOSIS — F41.9 ANXIETY: ICD-10-CM

## 2021-12-16 DIAGNOSIS — R07.81 RIB PAIN ON LEFT SIDE: ICD-10-CM

## 2021-12-16 DIAGNOSIS — F19.10 POLYSUBSTANCE ABUSE (HCC): ICD-10-CM

## 2021-12-16 DIAGNOSIS — E83.52 HYPERCALCEMIA OF MALIGNANCY: ICD-10-CM

## 2021-12-16 DIAGNOSIS — M89.8X5 LYTIC BONE LESION OF HIP: Primary | ICD-10-CM

## 2021-12-16 DIAGNOSIS — M89.9 LESION OF PELVIC BONE: ICD-10-CM

## 2021-12-16 DIAGNOSIS — R79.89 ELEVATED LFTS: ICD-10-CM

## 2021-12-16 DIAGNOSIS — C79.51 BONE METASTASES (HCC): ICD-10-CM

## 2021-12-16 DIAGNOSIS — F42.4 COMPULSIVE SKIN PICKING: ICD-10-CM

## 2021-12-16 LAB
ABSOLUTE EOS #: 0 K/UL (ref 0–0.4)
ABSOLUTE IMMATURE GRANULOCYTE: ABNORMAL K/UL (ref 0–0.3)
ABSOLUTE LYMPH #: 3.42 K/UL (ref 1–4.8)
ABSOLUTE MONO #: 1.63 K/UL (ref 0.1–1.2)
ANION GAP SERPL CALCULATED.3IONS-SCNC: 15 MMOL/L (ref 9–17)
BASOPHILS # BLD: 1 % (ref 0–2)
BASOPHILS ABSOLUTE: 0.16 K/UL (ref 0–0.2)
BUN BLDV-MCNC: 24 MG/DL (ref 6–20)
BUN/CREAT BLD: ABNORMAL (ref 9–20)
CALCIUM SERPL-MCNC: 11.3 MG/DL (ref 8.6–10.4)
CHLORIDE BLD-SCNC: 99 MMOL/L (ref 98–107)
CO2: 24 MMOL/L (ref 20–31)
CREAT SERPL-MCNC: 0.74 MG/DL (ref 0.7–1.2)
DIFFERENTIAL TYPE: ABNORMAL
EOSINOPHILS RELATIVE PERCENT: 0 % (ref 1–4)
GFR AFRICAN AMERICAN: >60 ML/MIN
GFR NON-AFRICAN AMERICAN: >60 ML/MIN
GFR SERPL CREATININE-BSD FRML MDRD: ABNORMAL ML/MIN/{1.73_M2}
GFR SERPL CREATININE-BSD FRML MDRD: ABNORMAL ML/MIN/{1.73_M2}
GLUCOSE BLD-MCNC: 109 MG/DL (ref 70–99)
HCT VFR BLD CALC: 39.6 % (ref 41–53)
HEMOGLOBIN: 13.3 G/DL (ref 13.5–17.5)
IMMATURE GRANULOCYTES: ABNORMAL %
LYMPHOCYTES # BLD: 21 % (ref 24–44)
MCH RBC QN AUTO: 29.3 PG (ref 26–34)
MCHC RBC AUTO-ENTMCNC: 33.7 G/DL (ref 31–37)
MCV RBC AUTO: 87.1 FL (ref 80–100)
MONOCYTES # BLD: 10 % (ref 2–11)
MORPHOLOGY: NORMAL
NRBC AUTOMATED: ABNORMAL PER 100 WBC
PDW BLD-RTO: 13.2 % (ref 12.5–15.4)
PLATELET # BLD: 750 K/UL (ref 140–450)
PLATELET ESTIMATE: ABNORMAL
PMV BLD AUTO: 7.3 FL (ref 6–12)
POTASSIUM SERPL-SCNC: 4.1 MMOL/L (ref 3.7–5.3)
RBC # BLD: 4.54 M/UL (ref 4.5–5.9)
RBC # BLD: ABNORMAL 10*6/UL
SARS-COV-2, RAPID: NOT DETECTED
SEG NEUTROPHILS: 68 % (ref 36–66)
SEGMENTED NEUTROPHILS ABSOLUTE COUNT: 11.09 K/UL (ref 1.8–7.7)
SODIUM BLD-SCNC: 138 MMOL/L (ref 135–144)
SPECIMEN DESCRIPTION: NORMAL
WBC # BLD: 16.3 K/UL (ref 3.5–11)
WBC # BLD: ABNORMAL 10*3/UL

## 2021-12-16 PROCEDURE — 1210000000 HC MED SURG R&B

## 2021-12-16 PROCEDURE — 6370000000 HC RX 637 (ALT 250 FOR IP): Performed by: STUDENT IN AN ORGANIZED HEALTH CARE EDUCATION/TRAINING PROGRAM

## 2021-12-16 PROCEDURE — 80048 BASIC METABOLIC PNL TOTAL CA: CPT

## 2021-12-16 PROCEDURE — 72170 X-RAY EXAM OF PELVIS: CPT

## 2021-12-16 PROCEDURE — 96374 THER/PROPH/DIAG INJ IV PUSH: CPT

## 2021-12-16 PROCEDURE — 96375 TX/PRO/DX INJ NEW DRUG ADDON: CPT

## 2021-12-16 PROCEDURE — 36415 COLL VENOUS BLD VENIPUNCTURE: CPT

## 2021-12-16 PROCEDURE — 87635 SARS-COV-2 COVID-19 AMP PRB: CPT

## 2021-12-16 PROCEDURE — 6360000002 HC RX W HCPCS: Performed by: STUDENT IN AN ORGANIZED HEALTH CARE EDUCATION/TRAINING PROGRAM

## 2021-12-16 PROCEDURE — 2580000003 HC RX 258: Performed by: EMERGENCY MEDICINE

## 2021-12-16 PROCEDURE — 6370000000 HC RX 637 (ALT 250 FOR IP): Performed by: NURSE PRACTITIONER

## 2021-12-16 PROCEDURE — 99255 IP/OBS CONSLTJ NEW/EST HI 80: CPT | Performed by: INTERNAL MEDICINE

## 2021-12-16 PROCEDURE — 85025 COMPLETE CBC W/AUTO DIFF WBC: CPT

## 2021-12-16 PROCEDURE — 99284 EMERGENCY DEPT VISIT MOD MDM: CPT

## 2021-12-16 PROCEDURE — 2580000003 HC RX 258: Performed by: STUDENT IN AN ORGANIZED HEALTH CARE EDUCATION/TRAINING PROGRAM

## 2021-12-16 PROCEDURE — 6360000002 HC RX W HCPCS: Performed by: EMERGENCY MEDICINE

## 2021-12-16 RX ORDER — LISINOPRIL 5 MG/1
5 TABLET ORAL DAILY
Status: DISCONTINUED | OUTPATIENT
Start: 2021-12-16 | End: 2021-12-23 | Stop reason: HOSPADM

## 2021-12-16 RX ORDER — OXYCODONE HYDROCHLORIDE 5 MG/1
5 TABLET ORAL EVERY 4 HOURS PRN
Status: DISCONTINUED | OUTPATIENT
Start: 2021-12-16 | End: 2021-12-17

## 2021-12-16 RX ORDER — GABAPENTIN 100 MG/1
100 CAPSULE ORAL 3 TIMES DAILY
Status: DISCONTINUED | OUTPATIENT
Start: 2021-12-16 | End: 2021-12-21

## 2021-12-16 RX ORDER — ONDANSETRON 4 MG/1
4 TABLET, ORALLY DISINTEGRATING ORAL EVERY 8 HOURS PRN
Status: DISCONTINUED | OUTPATIENT
Start: 2021-12-16 | End: 2021-12-23 | Stop reason: HOSPADM

## 2021-12-16 RX ORDER — KETOROLAC TROMETHAMINE 30 MG/ML
30 INJECTION, SOLUTION INTRAMUSCULAR; INTRAVENOUS ONCE
Status: COMPLETED | OUTPATIENT
Start: 2021-12-16 | End: 2021-12-16

## 2021-12-16 RX ORDER — OXYCODONE HYDROCHLORIDE AND ACETAMINOPHEN 5; 325 MG/1; MG/1
1 TABLET ORAL
COMMUNITY
Start: 2021-12-15 | End: 2022-01-05 | Stop reason: ALTCHOICE

## 2021-12-16 RX ORDER — 0.9 % SODIUM CHLORIDE 0.9 %
1000 INTRAVENOUS SOLUTION INTRAVENOUS ONCE
Status: COMPLETED | OUTPATIENT
Start: 2021-12-16 | End: 2021-12-16

## 2021-12-16 RX ORDER — ONDANSETRON 2 MG/ML
4 INJECTION INTRAMUSCULAR; INTRAVENOUS EVERY 6 HOURS PRN
Status: DISCONTINUED | OUTPATIENT
Start: 2021-12-16 | End: 2021-12-23 | Stop reason: HOSPADM

## 2021-12-16 RX ORDER — ACETAMINOPHEN 650 MG/1
650 SUPPOSITORY RECTAL EVERY 6 HOURS PRN
Status: DISCONTINUED | OUTPATIENT
Start: 2021-12-16 | End: 2021-12-23 | Stop reason: HOSPADM

## 2021-12-16 RX ORDER — POLYETHYLENE GLYCOL 3350 17 G/17G
17 POWDER, FOR SOLUTION ORAL DAILY PRN
Status: DISCONTINUED | OUTPATIENT
Start: 2021-12-16 | End: 2021-12-23 | Stop reason: HOSPADM

## 2021-12-16 RX ORDER — DEXAMETHASONE SODIUM PHOSPHATE 4 MG/ML
4 INJECTION, SOLUTION INTRA-ARTICULAR; INTRALESIONAL; INTRAMUSCULAR; INTRAVENOUS; SOFT TISSUE 3 TIMES DAILY
Status: DISCONTINUED | OUTPATIENT
Start: 2021-12-17 | End: 2021-12-23 | Stop reason: HOSPADM

## 2021-12-16 RX ORDER — ALPRAZOLAM 0.5 MG/1
0.5 TABLET ORAL NIGHTLY
COMMUNITY
Start: 2021-12-13 | End: 2021-12-29

## 2021-12-16 RX ORDER — LORAZEPAM 2 MG/ML
1 INJECTION INTRAMUSCULAR ONCE
Status: COMPLETED | OUTPATIENT
Start: 2021-12-16 | End: 2021-12-16

## 2021-12-16 RX ORDER — SODIUM CHLORIDE 0.9 % (FLUSH) 0.9 %
5-40 SYRINGE (ML) INJECTION EVERY 12 HOURS SCHEDULED
Status: DISCONTINUED | OUTPATIENT
Start: 2021-12-16 | End: 2021-12-23 | Stop reason: HOSPADM

## 2021-12-16 RX ORDER — ACETAMINOPHEN 325 MG/1
650 TABLET ORAL EVERY 6 HOURS PRN
Status: DISCONTINUED | OUTPATIENT
Start: 2021-12-16 | End: 2021-12-23 | Stop reason: HOSPADM

## 2021-12-16 RX ORDER — DEXAMETHASONE SODIUM PHOSPHATE 4 MG/ML
4 INJECTION, SOLUTION INTRA-ARTICULAR; INTRALESIONAL; INTRAMUSCULAR; INTRAVENOUS; SOFT TISSUE ONCE
Status: COMPLETED | OUTPATIENT
Start: 2021-12-16 | End: 2021-12-16

## 2021-12-16 RX ORDER — NICOTINE 21 MG/24HR
1 PATCH, TRANSDERMAL 24 HOURS TRANSDERMAL DAILY
Status: DISCONTINUED | OUTPATIENT
Start: 2021-12-16 | End: 2021-12-23 | Stop reason: HOSPADM

## 2021-12-16 RX ORDER — SODIUM CHLORIDE 0.9 % (FLUSH) 0.9 %
5-40 SYRINGE (ML) INJECTION PRN
Status: DISCONTINUED | OUTPATIENT
Start: 2021-12-16 | End: 2021-12-23 | Stop reason: HOSPADM

## 2021-12-16 RX ORDER — SODIUM CHLORIDE 9 MG/ML
25 INJECTION, SOLUTION INTRAVENOUS PRN
Status: DISCONTINUED | OUTPATIENT
Start: 2021-12-16 | End: 2021-12-23 | Stop reason: HOSPADM

## 2021-12-16 RX ORDER — ALPRAZOLAM 0.5 MG/1
0.5 TABLET ORAL ONCE
Status: COMPLETED | OUTPATIENT
Start: 2021-12-16 | End: 2021-12-16

## 2021-12-16 RX ORDER — FENTANYL CITRATE 50 UG/ML
50 INJECTION, SOLUTION INTRAMUSCULAR; INTRAVENOUS ONCE
Status: COMPLETED | OUTPATIENT
Start: 2021-12-16 | End: 2021-12-16

## 2021-12-16 RX ADMIN — LORAZEPAM 1 MG: 2 INJECTION INTRAMUSCULAR; INTRAVENOUS at 17:25

## 2021-12-16 RX ADMIN — SODIUM CHLORIDE, PRESERVATIVE FREE 10 ML: 5 INJECTION INTRAVENOUS at 20:54

## 2021-12-16 RX ADMIN — DEXAMETHASONE SODIUM PHOSPHATE 4 MG: 4 INJECTION, SOLUTION INTRAMUSCULAR; INTRAVENOUS at 19:02

## 2021-12-16 RX ADMIN — KETOROLAC TROMETHAMINE 30 MG: 30 INJECTION, SOLUTION INTRAMUSCULAR at 17:25

## 2021-12-16 RX ADMIN — HYDROMORPHONE HYDROCHLORIDE 1 MG: 1 INJECTION, SOLUTION INTRAMUSCULAR; INTRAVENOUS; SUBCUTANEOUS at 22:05

## 2021-12-16 RX ADMIN — HYDROMORPHONE HYDROCHLORIDE 1 MG: 1 INJECTION, SOLUTION INTRAMUSCULAR; INTRAVENOUS; SUBCUTANEOUS at 16:35

## 2021-12-16 RX ADMIN — FENTANYL CITRATE 50 MCG: 50 INJECTION, SOLUTION INTRAMUSCULAR; INTRAVENOUS at 18:22

## 2021-12-16 RX ADMIN — ALPRAZOLAM 0.5 MG: 0.5 TABLET ORAL at 20:52

## 2021-12-16 RX ADMIN — OXYCODONE HYDROCHLORIDE 5 MG: 5 TABLET ORAL at 20:30

## 2021-12-16 RX ADMIN — GABAPENTIN 100 MG: 100 CAPSULE ORAL at 20:30

## 2021-12-16 RX ADMIN — SODIUM CHLORIDE 1000 ML: 9 INJECTION, SOLUTION INTRAVENOUS at 16:34

## 2021-12-16 ASSESSMENT — ENCOUNTER SYMPTOMS
DIARRHEA: 0
COUGH: 0
BACK PAIN: 1
RHINORRHEA: 0
NAUSEA: 0
SORE THROAT: 0
CONSTIPATION: 0
ABDOMINAL PAIN: 0
PHOTOPHOBIA: 0
VOMITING: 0
SHORTNESS OF BREATH: 0

## 2021-12-16 ASSESSMENT — PAIN DESCRIPTION - LOCATION
LOCATION: HIP
LOCATION: HIP

## 2021-12-16 ASSESSMENT — PAIN SCALES - GENERAL
PAINLEVEL_OUTOF10: 5
PAINLEVEL_OUTOF10: 4
PAINLEVEL_OUTOF10: 10
PAINLEVEL_OUTOF10: 8
PAINLEVEL_OUTOF10: 6
PAINLEVEL_OUTOF10: 5
PAINLEVEL_OUTOF10: 7
PAINLEVEL_OUTOF10: 7
PAINLEVEL_OUTOF10: 8

## 2021-12-16 ASSESSMENT — PAIN DESCRIPTION - PAIN TYPE
TYPE: ACUTE PAIN
TYPE: ACUTE PAIN

## 2021-12-16 ASSESSMENT — PAIN DESCRIPTION - ORIENTATION
ORIENTATION: LEFT
ORIENTATION: LEFT

## 2021-12-16 NOTE — TELEPHONE ENCOUNTER
Name: Mark Dalton  : 1970  MRN: [id number]    Oncology Navigation Follow-Up Note  Navigator spoke with Dr. Fausto Berry in regards to speaking with pt. Post biopsy? Dr. Fausto Berry will reach out to pt. Navigator calling pts. Home , but could only leave a VM requesting a return call. Navigator moved F/U appt. And PET up. Okay to place referral for Rad Onc per Dr. Fausto Berry. Writer noting earlier today care coordination had spoken with pt. And pt. Having severe pain and wanting to to go ED. Fátima Berry.    Electronically signed by Cortney Robison RN on 2021 at 4:36 PM

## 2021-12-16 NOTE — ED PROVIDER NOTES
[id number] [facility] ED  [address] [address] [facility] [address]  Phone: [phone]  Fax: [phone]        Pt Name: Brinda Hernandez  MRN: [id number]  [place] 1970  Date of evaluation: 12/16/21      CHIEF COMPLAINT     Chief Complaint   Patient presents with    Hip Pain         HISTORY OF PRESENT ILLNESS  (Location/Symptom, Timing/Onset, Context/Setting, Quality, Duration, Modifying Factors, Severity.)    Brinda Hernandez is a 46 y.o. male who presents left hip pain. Patient has a known lytic lesion involving left inferior pubic ramus, which extends into the posterior/inferior portion of the left acetabulum. This is recent diagnosis, made during an admission to the hospital on December 7. Patient was evaluated by oncology at that time, and underwent a biopsy of liver lesion which revealed adenocarcinoma. Patient has pain medications to take at home including OxyContin, Percocet, Toradol. Patient has been taking the OxyContin, but did not take the Percocet or the Toradol at home. No new falls or any other injury. Patient states that he is just completely miserable, and unable to get comfortable. REVIEW OF SYSTEMS    (2-9 systems for level 4, 10 or more for level 5)     Review of Systems   Constitutional: Negative for chills and fever. HENT: Negative for congestion, rhinorrhea and sore throat. Eyes: Negative for photophobia and visual disturbance. Respiratory: Negative for cough and shortness of breath. Cardiovascular: Negative for chest pain and palpitations. Gastrointestinal: Negative for abdominal pain, constipation, diarrhea, nausea and vomiting. Genitourinary: Negative for dysuria, frequency and urgency. Musculoskeletal: Positive for arthralgias and back pain. Skin: Negative for rash and wound. Neurological: Negative for dizziness, light-headedness and headaches. Hematological: Negative for adenopathy. Does not bruise/bleed easily.        PAST MEDICAL HISTORY    has a past medical history of Abscess, Anxiety, Cellulitis due to MRSA, Chronic back pain, Depression, Hypertension, Multiple substance abuse ([address] and OCD (obsessive compulsive disorder). SURGICAL HISTORY      has a past surgical history that includes shoulder surgery (Right); Eye surgery; and CT BIOPSY SUPERFICIAL BONE PERCUTANEOUS (2021). CURRENTMEDICATIONS       Previous Medications    GABAPENTIN (NEURONTIN) 100 MG CAPSULE    Take 1 capsule by mouth 3 times daily for 30 days. KETOROLAC (TORADOL) 10 MG TABLET    Take 1 tablet by mouth every 6 hours as needed for Pain    LISINOPRIL (PRINIVIL;ZESTRIL) 5 MG TABLET    Take 1 tablet by mouth daily    MELATONIN (RA MELATONIN) 3 MG TABS TABLET    Take 1 tablet by mouth nightly as needed (insomnia)    OXYCODONE (OXYCONTIN) 40 MG EXTENDED RELEASE TABLET    Take 1 tablet by mouth every 12 hours for 7 days. SENNOSIDES-DOCUSATE SODIUM (SENOKOT-S) 8.6-50 MG TABLET    Take 2 tablets by mouth daily    TRAZODONE (DESYREL) 50 MG TABLET    Take 1 tablet by mouth nightly       ALLERGIES     is allergic to morphine. FAMILY HISTORY     He indicated that his mother is alive. He indicated that his father is alive. He indicated that his brother is alive. He indicated that his maternal grandmother is . He indicated that his maternal grandfather is . family history includes Diabetes in his mother; Hearing Loss in his father; High Blood Pressure in his father. SOCIAL HISTORY      reports that he has quit smoking. His smoking use included cigarettes. He has a 12.50 pack-year smoking history. He has never used smokeless tobacco. He reports current drug use. Drug: Marijuana Coleen Dre). He reports that he does not drink alcohol. PHYSICAL EXAM    (up to 7 for level 4, 8 or more for level 5)   INITIAL VITALS:  height is 5' 11\" (1.803 m) and weight is 99.8 kg (220 lb). His oral temperature is 98.7 °F (37.1 °C).  His blood pressure is 146/98 (abnormal) and his pulse is 105. His respiration is 16 and oxygen saturation is 98%. Physical Exam  Vitals and nursing note reviewed. Constitutional:       Appearance: He is ill-appearing. HENT:      Head: Normocephalic and atraumatic. Eyes:      Extraocular Movements: Extraocular movements intact. Pupils: Pupils are equal, round, and reactive to light. Cardiovascular:      Rate and Rhythm: Regular rhythm. Tachycardia present. Pulses: Normal pulses. Heart sounds: Normal heart sounds. No murmur heard. No friction rub. No gallop. Pulmonary:      Effort: Pulmonary effort is normal.      Breath sounds: Normal breath sounds. No wheezing, rhonchi or rales. Abdominal:      General: Abdomen is flat. Bowel sounds are normal.      Palpations: Abdomen is soft. Musculoskeletal:         General: Tenderness and signs of injury present. Cervical back: Normal range of motion and neck supple. Comments: Tender to palpation over the left buttock, left lateral hip. Range of motion is limited secondary to pain. Skin:     General: Skin is warm and dry. Capillary Refill: Capillary refill takes less than 2 seconds. Neurological:      Mental Status: He is alert and oriented to person, place, and time. Mental status is at baseline. Psychiatric:         Mood and Affect: Mood is anxious. DIFFERENTIAL DIAGNOSIS/ MDM:     71-year-old male here with intractable pain secondary to bone metastasis. DIAGNOSTIC RESULTS     EKG: All EKG's are interpreted by the Emergency Department Physician who either signs or Co-signs this chart in the absence of a cardiologist.    none    RADIOLOGY:        Interpretation per the Radiologist below, if available at the time of this note:    XR PELVIS (1-2 VIEWS)    Result Date: 12/16/2021  EXAMINATION: ONE XRAY VIEW OF THE PELVIS 12/16/2021 4:12 pm COMPARISON: 09/15/2021. HISTORY: ORDERING SYSTEM PROVIDED HISTORY: known lytic lesion to pelvis. worsening pain TECHNOLOGIST PROVIDED HISTORY: known lytic lesion to pelvis. worsening pain Reason for Exam: known lytic lesion to pelvis. worsening pain FINDINGS: A lytic expansile lesion involves all of the left inferior pubic ramus extending into the left acetabulum. It measured 8.2 x 6.7 cm with significant progression of from 09/15/2021. No left hip dislocation was noted. No pathological fracture was seen. Marked degenerative disc changes were noted in the lumbar spine. Lytic expansile lesion involves all the left inferior pubic ramus extending into the left acetabulum measuring 8.2 x 6.7 cm. Significant progression has occurred from 09/15/2021. At that time, it measured 2.9 cm in greatest diameter. No pathological fracture was seen.          LABS:  Results for orders placed or performed during the hospital encounter of 12/16/21   CBC Auto Differential   Result Value Ref Range    WBC 16.3 (H) 3.5 - 11.0 k/uL    RBC 4.54 4.5 - 5.9 m/uL    Hemoglobin 13.3 (L) 13.5 - 17.5 g/dL    Hematocrit 39.6 (L) 41 - 53 %    MCV 87.1 80 - 100 fL    MCH 29.3 26 - 34 pg    MCHC 33.7 31 - 37 g/dL    RDW 13.2 12.5 - 15.4 %    Platelets 110 (H) 339 - 450 k/uL    MPV 7.3 6.0 - 12.0 fL    NRBC Automated NOT REPORTED per 100 WBC    Differential Type NOT REPORTED     Immature Granulocytes NOT REPORTED 0 %    Absolute Immature Granulocyte NOT REPORTED 0.00 - 0.30 k/uL    WBC Morphology NOT REPORTED     RBC Morphology NOT REPORTED     Platelet Estimate NOT REPORTED     Seg Neutrophils 68 (H) 36 - 66 %    Lymphocytes 21 (L) 24 - 44 %    Monocytes 10 2 - 11 %    Eosinophils % 0 (L) 1 - 4 %    Basophils 1 0 - 2 %    Segs Absolute 11.09 (H) 1.8 - 7.7 k/uL    Absolute Lymph # 3.42 1.0 - 4.8 k/uL    Absolute Mono # 1.63 (H) 0.1 - 1.2 k/uL    Absolute Eos # 0.00 0.0 - 0.4 k/uL    Basophils Absolute 0.16 0.0 - 0.2 k/uL    Morphology Normal    Basic Metabolic Panel   Result Value Ref Range    Glucose 109 (H) 70 - 99 mg/dL    BUN 24 (H) 6 - 20 mg/dL    CREATININE 0.74 0.70 - 1.20 mg/dL    Bun/Cre Ratio NOT REPORTED 9 - 20    Calcium 11.3 (H) 8.6 - 10.4 mg/dL    Sodium 138 135 - 144 mmol/L    Potassium 4.1 3.7 - 5.3 mmol/L    Chloride 99 98 - 107 mmol/L    CO2 24 20 - 31 mmol/L    Anion Gap 15 9 - 17 mmol/L    GFR Non-African American >60 >60 mL/min    GFR African American >60 >60 mL/min    GFR Comment          GFR Staging NOT REPORTED        +leukocytosis, elevated BUN    EMERGENCY DEPARTMENT COURSE:   Vitals:    Vitals:    12/16/21 1540 12/16/21 1650   BP: (!) 146/98    Pulse: 120 105   Resp: 18 16   Temp: 98.7 °F (37.1 °C)    TempSrc: Oral    SpO2: 97% 98%   Weight: 99.8 kg (220 lb)    Height: 5' 11\" (1.803 m)      -------------------------  BP: (!) 146/98, Temp: 98.7 °F (37.1 °C), Pulse: 105, Resp: 16      RE-EVALUATION:  5:17 PM EST  Patient has continued pain. Appears very anxious. Toradol and ativan ordered. 6:08 PM EST  Pain not controlled despite dilaudid, ativan, toradol. Fentanyl ordered. The patient states he was told by Oncology that he is being admitted. Plan to discuss with Oncology. CONSULTS:  Oncology  I discussed the patient with Dr Michelet Del Castillo. He recommends pain control with opiates. Add decadron 4 mg TID. Possible radiation therapy. One of the team will evaluate him in the AM.     Internal Medicine  I discussed the patient with Dr Nayla Nava. He agrees to admit the patient. PROCEDURES:  None    FINAL IMPRESSION      1. 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 Intractable pain          DISPOSITION/PLAN   DISPOSITION        CONDITION ON DISPOSITION:   Stable     PATIENT REFERRED TO:  No follow-up provider specified.     DISCHARGE MEDICATIONS:  New Prescriptions    No medications on file       (Please note that portions of this note were completed with a voicerecognition program.  Efforts were made to edit the dictations but occasionally words are mis-transcribed.)    Lg Jose MD  Attending Emergency Medicine Physician        Tobias Sutton MD  12/16/21 7545

## 2021-12-16 NOTE — ED TRIAGE NOTES
Patient arrives with c/o left hip pain. Diagnosed Friday with cancer in the hip. Denies injury or trauma to the area.

## 2021-12-17 ENCOUNTER — HOSPITAL ENCOUNTER (OUTPATIENT)
Dept: RADIATION ONCOLOGY | Age: 51
Discharge: HOME | End: 2021-12-17
Attending: RADIOLOGY

## 2021-12-17 ENCOUNTER — HOSPITAL ENCOUNTER (OUTPATIENT)
Dept: RADIATION ONCOLOGY | Age: 51
Discharge: HOME | End: 2021-12-17
Payer: COMMERCIAL

## 2021-12-17 ENCOUNTER — TELEPHONE (OUTPATIENT)
Dept: CASE MANAGEMENT | Age: 51
End: 2021-12-17

## 2021-12-17 DIAGNOSIS — M89.9 LESION OF PELVIC BONE: Primary | ICD-10-CM

## 2021-12-17 PROBLEM — C79.9 METASTATIC CANCER (HCC): Status: ACTIVE | Noted: 2021-12-17

## 2021-12-17 LAB
ABSOLUTE EOS #: 0 K/UL (ref 0–0.4)
ABSOLUTE IMMATURE GRANULOCYTE: ABNORMAL K/UL (ref 0–0.3)
ABSOLUTE LYMPH #: 2.5 K/UL (ref 1–4.8)
ABSOLUTE MONO #: 1 K/UL (ref 0.1–1.2)
AMPHETAMINE SCREEN URINE: NEGATIVE
ANION GAP SERPL CALCULATED.3IONS-SCNC: 12 MMOL/L (ref 9–17)
BARBITURATE SCREEN URINE: NEGATIVE
BASOPHILS # BLD: 1 % (ref 0–2)
BASOPHILS ABSOLUTE: 0.1 K/UL (ref 0–0.2)
BENZODIAZEPINE SCREEN, URINE: POSITIVE
BUN BLDV-MCNC: 24 MG/DL (ref 6–20)
BUN/CREAT BLD: ABNORMAL (ref 9–20)
BUPRENORPHINE URINE: ABNORMAL
CALCIUM IONIZED: 1.39 MMOL/L (ref 1.13–1.33)
CALCIUM SERPL-MCNC: 10.3 MG/DL (ref 8.6–10.4)
CANNABINOID SCREEN URINE: POSITIVE
CHLORIDE BLD-SCNC: 105 MMOL/L (ref 98–107)
CO2: 22 MMOL/L (ref 20–31)
COCAINE METABOLITE, URINE: NEGATIVE
CREAT SERPL-MCNC: 0.54 MG/DL (ref 0.7–1.2)
DIFFERENTIAL TYPE: ABNORMAL
EOSINOPHILS RELATIVE PERCENT: 0 % (ref 1–4)
ETHANOL PERCENT: <0.01 %
ETHANOL: <10 MG/DL
GFR AFRICAN AMERICAN: >60 ML/MIN
GFR NON-AFRICAN AMERICAN: >60 ML/MIN
GFR SERPL CREATININE-BSD FRML MDRD: ABNORMAL ML/MIN/{1.73_M2}
GFR SERPL CREATININE-BSD FRML MDRD: ABNORMAL ML/MIN/{1.73_M2}
GLUCOSE BLD-MCNC: 115 MG/DL (ref 70–99)
HCT VFR BLD CALC: 37.2 % (ref 41–53)
HEMOGLOBIN: 12.6 G/DL (ref 13.5–17.5)
IMMATURE GRANULOCYTES: ABNORMAL %
LYMPHOCYTES # BLD: 21 % (ref 24–44)
MCH RBC QN AUTO: 29.7 PG (ref 26–34)
MCHC RBC AUTO-ENTMCNC: 33.9 G/DL (ref 31–37)
MCV RBC AUTO: 87.6 FL (ref 80–100)
MDMA URINE: ABNORMAL
METHADONE SCREEN, URINE: POSITIVE
METHAMPHETAMINE, URINE: ABNORMAL
MONOCYTES # BLD: 9 % (ref 2–11)
NRBC AUTOMATED: ABNORMAL PER 100 WBC
OPIATES, URINE: POSITIVE
OXYCODONE SCREEN URINE: POSITIVE
PDW BLD-RTO: 13.3 % (ref 12.5–15.4)
PHENCYCLIDINE, URINE: NEGATIVE
PLATELET # BLD: 699 K/UL (ref 140–450)
PLATELET ESTIMATE: ABNORMAL
PMV BLD AUTO: 7.4 FL (ref 6–12)
POTASSIUM SERPL-SCNC: 4.3 MMOL/L (ref 3.7–5.3)
PROPOXYPHENE, URINE: ABNORMAL
PTH INTACT: 27.65 PG/ML (ref 15–65)
RBC # BLD: 4.25 M/UL (ref 4.5–5.9)
RBC # BLD: ABNORMAL 10*6/UL
SEG NEUTROPHILS: 69 % (ref 36–66)
SEGMENTED NEUTROPHILS ABSOLUTE COUNT: 8.2 K/UL (ref 1.8–7.7)
SODIUM BLD-SCNC: 139 MMOL/L (ref 135–144)
TEST INFORMATION: ABNORMAL
TRICYCLIC ANTIDEPRESSANTS, UR: ABNORMAL
VITAMIN D 25-HYDROXY: 18.9 NG/ML (ref 30–100)
WBC # BLD: 11.9 K/UL (ref 3.5–11)
WBC # BLD: ABNORMAL 10*3/UL

## 2021-12-17 PROCEDURE — 6370000000 HC RX 637 (ALT 250 FOR IP): Performed by: INTERNAL MEDICINE

## 2021-12-17 PROCEDURE — 1210000000 HC MED SURG R&B

## 2021-12-17 PROCEDURE — DP081ZZ BEAM RADIATION OF PELVIC BONES USING PHOTONS 1 - 10 MEV: ICD-10-PCS | Performed by: RADIOLOGY

## 2021-12-17 PROCEDURE — 6370000000 HC RX 637 (ALT 250 FOR IP): Performed by: NURSE PRACTITIONER

## 2021-12-17 PROCEDURE — 77412 RADIATION TX DELIVERY LVL 3: CPT | Performed by: RADIOLOGY

## 2021-12-17 PROCEDURE — 6370000000 HC RX 637 (ALT 250 FOR IP): Performed by: STUDENT IN AN ORGANIZED HEALTH CARE EDUCATION/TRAINING PROGRAM

## 2021-12-17 PROCEDURE — 77263 THER RADIOLOGY TX PLNG CPLX: CPT | Performed by: RADIOLOGY

## 2021-12-17 PROCEDURE — 36415 COLL VENOUS BLD VENIPUNCTURE: CPT

## 2021-12-17 PROCEDURE — 80307 DRUG TEST PRSMV CHEM ANLYZR: CPT

## 2021-12-17 PROCEDURE — 82306 VITAMIN D 25 HYDROXY: CPT

## 2021-12-17 PROCEDURE — 6360000002 HC RX W HCPCS: Performed by: NURSE PRACTITIONER

## 2021-12-17 PROCEDURE — 83970 ASSAY OF PARATHORMONE: CPT

## 2021-12-17 PROCEDURE — 99222 1ST HOSP IP/OBS MODERATE 55: CPT | Performed by: STUDENT IN AN ORGANIZED HEALTH CARE EDUCATION/TRAINING PROGRAM

## 2021-12-17 PROCEDURE — 82330 ASSAY OF CALCIUM: CPT

## 2021-12-17 PROCEDURE — 80048 BASIC METABOLIC PNL TOTAL CA: CPT

## 2021-12-17 PROCEDURE — 6360000002 HC RX W HCPCS: Performed by: RADIOLOGY

## 2021-12-17 PROCEDURE — 77417 THER RADIOLOGY PORT IMAGE(S): CPT | Performed by: RADIOLOGY

## 2021-12-17 PROCEDURE — 77307 TELETHX ISODOSE PLAN CPLX: CPT | Performed by: RADIOLOGY

## 2021-12-17 PROCEDURE — 77290 THER RAD SIMULAJ FIELD CPLX: CPT | Performed by: RADIOLOGY

## 2021-12-17 PROCEDURE — G0480 DRUG TEST DEF 1-7 CLASSES: HCPCS

## 2021-12-17 PROCEDURE — 6360000002 HC RX W HCPCS: Performed by: INTERNAL MEDICINE

## 2021-12-17 PROCEDURE — 2580000003 HC RX 258: Performed by: STUDENT IN AN ORGANIZED HEALTH CARE EDUCATION/TRAINING PROGRAM

## 2021-12-17 PROCEDURE — 77334 RADIATION TREATMENT AID(S): CPT | Performed by: RADIOLOGY

## 2021-12-17 PROCEDURE — 85025 COMPLETE CBC W/AUTO DIFF WBC: CPT

## 2021-12-17 PROCEDURE — 6360000002 HC RX W HCPCS: Performed by: STUDENT IN AN ORGANIZED HEALTH CARE EDUCATION/TRAINING PROGRAM

## 2021-12-17 PROCEDURE — 99211 OFF/OP EST MAY X REQ PHY/QHP: CPT | Performed by: RADIOLOGY

## 2021-12-17 RX ORDER — ALPRAZOLAM 0.5 MG/1
0.5 TABLET ORAL 4 TIMES DAILY PRN
Status: DISCONTINUED | OUTPATIENT
Start: 2021-12-17 | End: 2021-12-17

## 2021-12-17 RX ORDER — ALPRAZOLAM 0.5 MG/1
1 TABLET ORAL 4 TIMES DAILY PRN
Status: DISCONTINUED | OUTPATIENT
Start: 2021-12-17 | End: 2021-12-23 | Stop reason: HOSPADM

## 2021-12-17 RX ORDER — LORAZEPAM 1 MG/1
1 TABLET ORAL EVERY 4 HOURS PRN
Status: DISCONTINUED | OUTPATIENT
Start: 2021-12-17 | End: 2021-12-17

## 2021-12-17 RX ORDER — HYDROMORPHONE HCL 110MG/55ML
2 PATIENT CONTROLLED ANALGESIA SYRINGE INTRAVENOUS ONCE
Status: DISCONTINUED | OUTPATIENT
Start: 2021-12-17 | End: 2021-12-17 | Stop reason: HOSPADM

## 2021-12-17 RX ORDER — LORAZEPAM 2 MG/ML
1 INJECTION INTRAMUSCULAR ONCE
Status: COMPLETED | OUTPATIENT
Start: 2021-12-17 | End: 2021-12-17

## 2021-12-17 RX ORDER — LORAZEPAM 0.5 MG/1
0.5 TABLET ORAL EVERY 4 HOURS PRN
Status: DISCONTINUED | OUTPATIENT
Start: 2021-12-17 | End: 2021-12-17

## 2021-12-17 RX ORDER — OXYCODONE HYDROCHLORIDE 5 MG/1
10 TABLET ORAL EVERY 4 HOURS PRN
Status: DISCONTINUED | OUTPATIENT
Start: 2021-12-17 | End: 2021-12-21

## 2021-12-17 RX ORDER — OXYCODONE HCL 10 MG/1
40 TABLET, FILM COATED, EXTENDED RELEASE ORAL EVERY 12 HOURS SCHEDULED
Status: DISCONTINUED | OUTPATIENT
Start: 2021-12-17 | End: 2021-12-18

## 2021-12-17 RX ADMIN — ALPRAZOLAM 0.5 MG: 0.5 TABLET ORAL at 11:53

## 2021-12-17 RX ADMIN — LORAZEPAM 1 MG: 1 TABLET ORAL at 08:58

## 2021-12-17 RX ADMIN — OXYCODONE HYDROCHLORIDE 5 MG: 5 TABLET ORAL at 00:18

## 2021-12-17 RX ADMIN — LISINOPRIL 5 MG: 5 TABLET ORAL at 08:58

## 2021-12-17 RX ADMIN — GABAPENTIN 100 MG: 100 CAPSULE ORAL at 08:58

## 2021-12-17 RX ADMIN — HYDROMORPHONE HYDROCHLORIDE 1 MG: 1 INJECTION, SOLUTION INTRAMUSCULAR; INTRAVENOUS; SUBCUTANEOUS at 11:52

## 2021-12-17 RX ADMIN — OXYCODONE HYDROCHLORIDE 10 MG: 5 TABLET ORAL at 15:38

## 2021-12-17 RX ADMIN — OXYCODONE HYDROCHLORIDE 40 MG: 10 TABLET, FILM COATED, EXTENDED RELEASE ORAL at 21:00

## 2021-12-17 RX ADMIN — DEXAMETHASONE SODIUM PHOSPHATE 4 MG: 4 INJECTION, SOLUTION INTRA-ARTICULAR; INTRALESIONAL; INTRAMUSCULAR; INTRAVENOUS; SOFT TISSUE at 20:12

## 2021-12-17 RX ADMIN — OXYCODONE HYDROCHLORIDE 10 MG: 5 TABLET ORAL at 11:30

## 2021-12-17 RX ADMIN — LORAZEPAM 1 MG: 2 INJECTION INTRAMUSCULAR; INTRAVENOUS at 04:54

## 2021-12-17 RX ADMIN — OXYCODONE HYDROCHLORIDE 10 MG: 5 TABLET ORAL at 22:44

## 2021-12-17 RX ADMIN — ACETAMINOPHEN 650 MG: 325 TABLET ORAL at 00:18

## 2021-12-17 RX ADMIN — ENOXAPARIN SODIUM 40 MG: 100 INJECTION SUBCUTANEOUS at 08:57

## 2021-12-17 RX ADMIN — GABAPENTIN 100 MG: 100 CAPSULE ORAL at 20:15

## 2021-12-17 RX ADMIN — HYDROMORPHONE HYDROCHLORIDE 1 MG: 1 INJECTION, SOLUTION INTRAMUSCULAR; INTRAVENOUS; SUBCUTANEOUS at 20:10

## 2021-12-17 RX ADMIN — HYDROMORPHONE HYDROCHLORIDE 1 MG: 1 INJECTION, SOLUTION INTRAMUSCULAR; INTRAVENOUS; SUBCUTANEOUS at 08:57

## 2021-12-17 RX ADMIN — SODIUM CHLORIDE, PRESERVATIVE FREE 10 ML: 5 INJECTION INTRAVENOUS at 08:59

## 2021-12-17 RX ADMIN — HYDROMORPHONE HYDROCHLORIDE 2 MG: 1 INJECTION, SOLUTION INTRAMUSCULAR; INTRAVENOUS; SUBCUTANEOUS at 13:01

## 2021-12-17 RX ADMIN — ALPRAZOLAM 1 MG: 0.5 TABLET ORAL at 16:12

## 2021-12-17 RX ADMIN — GABAPENTIN 100 MG: 100 CAPSULE ORAL at 13:01

## 2021-12-17 RX ADMIN — ALPRAZOLAM 1 MG: 0.5 TABLET ORAL at 21:03

## 2021-12-17 RX ADMIN — OXYCODONE HYDROCHLORIDE 5 MG: 5 TABLET ORAL at 07:30

## 2021-12-17 RX ADMIN — SODIUM CHLORIDE, PRESERVATIVE FREE 10 ML: 5 INJECTION INTRAVENOUS at 20:16

## 2021-12-17 RX ADMIN — HYDROMORPHONE HYDROCHLORIDE 1 MG: 1 INJECTION, SOLUTION INTRAMUSCULAR; INTRAVENOUS; SUBCUTANEOUS at 04:15

## 2021-12-17 RX ADMIN — HYDROMORPHONE HYDROCHLORIDE 1 MG: 1 INJECTION, SOLUTION INTRAMUSCULAR; INTRAVENOUS; SUBCUTANEOUS at 16:04

## 2021-12-17 RX ADMIN — DEXAMETHASONE SODIUM PHOSPHATE 4 MG: 4 INJECTION, SOLUTION INTRA-ARTICULAR; INTRALESIONAL; INTRAMUSCULAR; INTRAVENOUS; SOFT TISSUE at 13:01

## 2021-12-17 RX ADMIN — OXYCODONE HYDROCHLORIDE 40 MG: 10 TABLET, FILM COATED, EXTENDED RELEASE ORAL at 08:57

## 2021-12-17 RX ADMIN — DEXAMETHASONE SODIUM PHOSPHATE 4 MG: 4 INJECTION, SOLUTION INTRA-ARTICULAR; INTRALESIONAL; INTRAMUSCULAR; INTRAVENOUS; SOFT TISSUE at 08:58

## 2021-12-17 ASSESSMENT — PAIN DESCRIPTION - FREQUENCY
FREQUENCY: CONTINUOUS

## 2021-12-17 ASSESSMENT — PAIN DESCRIPTION - DESCRIPTORS
DESCRIPTORS: ACHING;STABBING

## 2021-12-17 ASSESSMENT — PAIN DESCRIPTION - ORIENTATION
ORIENTATION: LEFT

## 2021-12-17 ASSESSMENT — PAIN DESCRIPTION - PAIN TYPE
TYPE: ACUTE PAIN

## 2021-12-17 ASSESSMENT — PAIN SCALES - GENERAL
PAINLEVEL_OUTOF10: 4
PAINLEVEL_OUTOF10: 7
PAINLEVEL_OUTOF10: 4
PAINLEVEL_OUTOF10: 7
PAINLEVEL_OUTOF10: 10
PAINLEVEL_OUTOF10: 4
PAINLEVEL_OUTOF10: 10
PAINLEVEL_OUTOF10: 10
PAINLEVEL_OUTOF10: 9
PAINLEVEL_OUTOF10: 7
PAINLEVEL_OUTOF10: 8
PAINLEVEL_OUTOF10: 4
PAINLEVEL_OUTOF10: 7
PAINLEVEL_OUTOF10: 8
PAINLEVEL_OUTOF10: 0
PAINLEVEL_OUTOF10: 6
PAINLEVEL_OUTOF10: 7
PAINLEVEL_OUTOF10: 0

## 2021-12-17 ASSESSMENT — PAIN DESCRIPTION - PROGRESSION
CLINICAL_PROGRESSION: NOT CHANGED

## 2021-12-17 ASSESSMENT — PAIN DESCRIPTION - ONSET
ONSET: ON-GOING

## 2021-12-17 ASSESSMENT — PAIN - FUNCTIONAL ASSESSMENT

## 2021-12-17 ASSESSMENT — PAIN DESCRIPTION - LOCATION
LOCATION: HIP

## 2021-12-17 NOTE — CARE COORDINATION
, Francisca, from [facility] met with pt to discuss options and provided him information regarding hospice care and palliative care through [place]. Pt to think about options and speak with his parents but is emotionally struggling with terminal diagnosis. If pt decides on hospice care a nurse will need to complete evaluation but Francisca feels pt is appropriate for hospice and would be able to receive palliative radiation with hospice care. Pt plans to return home at discharge. Will follow and await patients decision regarding treatment vs. Hospice/pallaive care.

## 2021-12-17 NOTE — FLOWSHEET NOTE
[address] [facility]    PROGRESS NOTE    Shift date: 12/17/21  Shift day: Friday   Shift # 1    Room # 000/063-64   Name: Cheryl Page            Age: 46 y.o. Gender: male          Episcopalian: Anabaptist    Referral: Nurse  Admit Date & Time: 12/16/2021  3:33 PM    PATIENT/EVENT DESCRIPTION:  Cheryl Page is a 46 y.o. male to whom writer was referred for a visit from his nurse. Writer learned from Patient's nurse that Patient received a cancer diagnosis today and was in need of support. SPIRITUAL ASSESSMENT/INTERVENTION:  Mr. Ailin Kan was reclined in bed. He smiled and welcomed writer. He shared what he learned today and how he is coping. He stated his desire to [place] to [facility]. \" He talked about his Yarsanism background and his spiritual beliefs. He spoke of his father's lottie and his influence on pt. He shared about his family and some concerns he has about others. He was tearful at times, as he shared what is challenging. He was receptive to a prayer and voiced his prayer requests. He was open to writer contacting the local [place]  to respond to his request for Confession. He thanked writer for the visit. Writer provided supportive presence and empathy; inquired about Pt's sources of support and strength; affirmed Pt's strengths; offered comfort; prayed for Pt and family; contacted Fr. Nayana Heaton, the  of [employer], who agreed to come to visit Patient and respond to his request.         SPIRITUAL CARE FOLLOW-UP PLAN:  Rubén Lopez will attempt to visit Patient next week for follow up.      Electronically signed by Hardeep Price, on 12/17/2021 at 5:50 PM.  [address]  [phone]

## 2021-12-17 NOTE — CARE COORDINATION
, Francisca, from [facility] met with pt to discuss options and provided him information regarding hospice care and palliative care through [place]. Pt to think about options and speak with his parents but is emotionally struggling with terminal diagnosis. If pt decides on hospice care a nurse will need to complete evaluation but Francisca feels pt is appropriate for hospice and would be able to receive palliative radiation with hospice care. Will follow and await patients decision.

## 2021-12-17 NOTE — CARE COORDINATION
Case Management Initial Discharge Plan  Naomi Louis,             Met with:patient to discuss discharge plans. Information verified: address, contacts, phone number, , insurance Yes  Insurance Provider: [facility]    Emergency Contact/Next of Kin name & number: Parents Jyoti Ahumada  [phone]  Who are involved in patient's support system? Parents    PCP: BOBBY Marroquin  Date of last visit:       Discharge Planning    Living Arrangements:  Family Members , lives with parents    Home has 2 stories  2 stairs to climb to get into front door, 0 stairs to climb to reach second floor  Location of bedroom/bathroom in home-main floor    Patient able to perform ADL's:Independent    Current Services (outpatient & in home) none  DME equipment: would like walker at discharge   DME provider: does not state a preference on DME company used    Is patient receiving oral anticoagulation therapy? No    If indicated:   Physician managing anticoagulation treatment:   Where does patient obtain lab work for ATC treatment?        Potential Assistance Needed:Hospice/palliative care/home care  Patient agreeable to home care: Yes  Freedom of choice provided:  yes,no preference stated    Prior SNF/Rehab Placement and Facility: No  Agreeable to SNF/Rehab: No  [place] of choice provided: n/a     Evaluation: No    Expected Discharge date:  21    Patient expects to be discharged to:   Home with parents and hospice vs palliative care or home care    If home: is the family and/or caregiver wiling & able to provide support at home? yes  Who will be providing this support? parents    Follow Up Appointment: Best Day/ Time: Monday AM    Transportation provider: Parents  Transportation arrangements needed for discharge: No    Readmission Risk              Risk of Unplanned Readmission:  14             Does patient have a readmission risk score greater than 14?: No  If yes, follow-up appointment must be made within 7 days of discharge. Goals of Care:       Educated pt on transitional options, provided freedom of choice and are agreeable with plan      Discharge Plan: Hospice meeting with pt today at 9:30 am to discuss options.   Pt plans to return home to his parents and is agreeable to palliative care/home care          Electronically signed by MIGUEL Farley on 12/17/21 at 9:48 AM EST

## 2021-12-17 NOTE — PROGRESS NOTES
Comprehensive Nutrition Assessment    Type and Reason for Visit:  Initial, Positive Nutrition Screen    Nutrition Recommendations/Plan:   1. Continue current diet. 2. Ensure Enlive BID    Nutrition Assessment:  Pt off floor for radiation consult & SIM. Pt with recent diagnosis of Stage IV adenocarcinoma with bone mets with poor pain control. Hospice/palliative consult pending. Documented weight hx reveals weight is stable. Pt would benefit from oral nutrition supplementation to aid in meeting estimated increased energy needs. Malnutrition Assessment:  Malnutrition Status:  Insufficient data    Context:  Chronic Illness         Estimated Daily Nutrient Needs:  Energy (kcal):  2200-2400kcal/d; Weight Used for Energy Requirements:  Current     Protein (g):  120-140g/d; Weight Used for Protein Requirements:  Current (1.2-1.4g/kg)        Fluid (ml/day):  2200-2400mL/d; Method Used for Fluid Requirements:  1 ml/kcal      Current Nutrition Therapies:    ADULT DIET; Regular    Anthropometric Measures:  · Height: 5' 11\" (180.3 cm)  · Current Body Weight: 220 lb 0.3 oz (99.8 kg)   · Usual Body Weight: 220 lb (99.8 kg)     · Ideal Body Weight: 172 lbs;   · BMI: 30.7  · BMI Categories: Obese Class 1 (BMI 30.0-34. 9)       Nutrition Diagnosis:   · Increased nutrient needs related to catabolic illness as evidenced by  (stage IV lung ca undergoing radiation therapy)      Nutrition Interventions:   Food and/or Nutrient Delivery:  Continue Current Diet, Start Oral Nutrition Supplement  Nutrition Education/Counseling:  No recommendation at this time   Coordination of Nutrition Care:  Continue to monitor while inpatient, Interdisciplinary Rounds, Coordination of Community Care    Goals:  PO intakes to meet >75% estimated needs       Nutrition Monitoring and Evaluation:   Behavioral-Environmental Outcomes:      Food/Nutrient Intake Outcomes:  Food and Nutrient Intake, Supplement Intake, IVF Intake  Physical Signs/Symptoms Outcomes:  Biochemical Data, Nutrition Focused Physical Findings, Weight, Constipation     Discharge Planning:    Continue Oral Nutrition Supplement     Electronically signed by Julio Myrick RD, LD on 12/17/21 at 10:04 AM PAPA Gallegos, GERARDO, LD  Registered Dietitian  [facility]  [phone]

## 2021-12-17 NOTE — PLAN OF CARE
Nutrition Problem #1: Increased nutrient needs  Intervention: Food and/or Nutrient Delivery: Continue Current Diet, Start Oral Nutrition Supplement  Nutritional Goals: PO intakes to meet >75% estimated needs

## 2021-12-17 NOTE — CONSULTS
Today's Date: 12/16/2021  Patient Name: Monika Mackey  Date of admission: 12/16/2021  3:33 PM  Patient's age: 46 y.o., 1970  Admission Dx: Intractable pain [R52]  Metastasis to bone of unknown primary ([facility] [address] [C79.51, C80.1]    Reason for Consult: management recommendations  Requesting Physician: Nancy Clemens MD    CHIEF COMPLAINT: Intractable hip pain. History Obtained From:  patient, electronic medical record    HISTORY OF PRESENT ILLNESS:      The patient is a 46 y.o.  male who is admitted to the hospital for chief complaints of severe hip pain. Patient is taking pain medication at home which is not helping. States that he is simply miserable with the pain. Patient was recently admitted to the hospital with left-sided hip pain. CT scan done at that time showed a lytic lesion involving the left pubic ramus extending to the posterior portion of the left acetabulum. Patient was seen by the hematology oncology team.  Patient was also noted to have hypercalcemia. Patient underwent biopsy of the hip lesion which came back as metastatic adenocarcinoma immunohistochemistry was nonspecific. Patient also noted to have hypercalcemia with calcium of 11.3 at presentation. Patient WBC count was 16.3 hemoglobin 13.3 and platelet count 708. CT chest showed suprahilar lesion abutting the mediastinum with concern regarding invasion. There was also an indeterminate right adrenal lesion. Patient also has history of IV drug abuse with heroin. Patient is currently on methadone which she gets through this of center    Past Medical History:   has a past medical history of Abscess, Anxiety, Cellulitis due to MRSA, Chronic back pain, Depression, Hypertension, Multiple substance abuse ([facility] [address] and OCD (obsessive compulsive disorder). Past Surgical History:   has a past surgical history that includes shoulder surgery (Right); Eye surgery; and CT BIOPSY SUPERFICIAL BONE PERCUTANEOUS (12/9/2021). Medications:    Prior to Admission medications    Medication Sig Start Date End Date Taking? Authorizing Provider   ALPRAZolam Mandie Hyatt) 0.5 MG tablet Take 0.5 mg by mouth nightly. 12/13/21  Yes Historical Provider, MD   oxyCODONE-acetaminophen (PERCOCET) 5-325 MG per tablet Take 1 tablet by mouth every 4-6 hours as needed. 12/15/21  Yes Historical Provider, MD   lisinopril (PRINIVIL;ZESTRIL) 5 MG tablet Take 1 tablet by mouth daily 12/11/21  Yes Mohamud Frederick MD   oxyCODONE (OXYCONTIN) 40 MG extended release tablet Take 1 tablet by mouth every 12 hours for 7 days. 12/10/21 12/17/21 Yes Mohamud Frederick MD   ketorolac (TORADOL) 10 MG tablet Take 1 tablet by mouth every 6 hours as needed for Pain 12/10/21 12/24/21 Yes Mohamud Frederick MD   gabapentin (NEURONTIN) 100 MG capsule Take 1 capsule by mouth 3 times daily for 30 days.  12/10/21 1/9/22 Yes Mohamud Frederick MD   melatonin (RA MELATONIN) 3 MG TABS tablet Take 1 tablet by mouth nightly as needed (insomnia) 9/15/21   Marlene Hart DO   traZODone (DESYREL) 50 MG tablet Take 1 tablet by mouth nightly 3/31/20   Trudy Vines MD     Current Facility-Administered Medications   Medication Dose Route Frequency Provider Last Rate Last Admin    HYDROmorphone (DILAUDID) injection 1 mg  1 mg IntraVENous Q4H PRN Jojo Vo MD   1 mg at 12/16/21 2205    oxyCODONE (ROXICODONE) immediate release tablet 5 mg  5 mg Oral Q4H PRN Jojo Vo MD   5 mg at 12/16/21 2030    [START ON 12/17/2021] dexamethasone (DECADRON) injection 4 mg  4 mg IntraVENous TID Jojo Vo MD        gabapentin (NEURONTIN) capsule 100 mg  100 mg Oral TID Jojo Vo MD   100 mg at 12/16/21 2030    lisinopril (PRINIVIL;ZESTRIL) tablet 5 mg  5 mg Oral Daily Jojo Vo MD        sodium chloride flush 0.9 % injection 5-40 mL  5-40 mL IntraVENous 2 times per day Jojo Vo MD   10 mL at 12/16/21 2054    sodium chloride flush 0.9 % injection 5-40 mL  5-40 mL IntraVENous PRN Jojo Vo, MD        0.9 % sodium chloride infusion  25 mL IntraVENous PRN Veronica Thompson MD        enoxaparin (LOVENOX) injection 40 mg  40 mg SubCUTAneous Daily Veronica Thompson MD   40 mg at 12/16/21 2052    ondansetron (ZOFRAN-ODT) disintegrating tablet 4 mg  4 mg Oral Q8H PRN Veronica Thompson MD        Or    ondansetron [facility]) injection 4 mg  4 mg IntraVENous Q6H PRN Veronica Thompson MD        polyethylene glycol [facility]) packet 17 g  17 g Oral Daily PRN Veronica Thompson MD        acetaminophen (TYLENOL) tablet 650 mg  650 mg Oral Q6H PRN Veronica Thompson MD        Or    acetaminophen (TYLENOL) suppository 650 mg  650 mg Rectal Q6H PRN Veronica Thompson MD        nicotine (NICODERM CQ) 21 MG/24HR 1 patch  1 patch TransDERmal Daily Iva Reveal, APRN - CNP   1 patch at 12/16/21 2052       Allergies:  Morphine    Social History:   reports that he has quit smoking. His smoking use included cigarettes. He has a 12.50 pack-year smoking history. He has never used smokeless tobacco. He reports current drug use. Drug: Marijuana Rondi Baba). He reports that he does not drink alcohol. Family History: family history includes Diabetes in his mother; Hearing Loss in his father; High Blood Pressure in his father. REVIEW OF SYSTEMS:      Constitutional: No fever or chills. No night sweats, positive weight loss. Positive anxiety  Eyes: No eye discharge, double vision, or eye pain   HEENT: negative for sore mouth, sore throat, hoarseness and voice change   Respiratory: negative for cough , sputum, dyspnea, wheezing, hemoptysis, chest pain   Cardiovascular: negative for chest pain, dyspnea, palpitations, orthopnea, PND   Gastrointestinal: negative for nausea, vomiting, diarrhea, constipation, abdominal pain, Dysphagia, hematemesis and hematochezia   Genitourinary: negative for frequency, dysuria, nocturia, urinary incontinence, and hematuria   Integument: negative for rash, skin lesions, bruises.    Hematologic/Lymphatic: negative for easy bruising, bleeding, lymphadenopathy, or petechiae   Endocrine: negative for heat or cold intolerance,weight changes, change in bowel habits and hair loss   Musculoskeletal: Positive hip pain  Neurological: negative for headaches, dizziness, seizures, weakness, numbness    PHYSICAL EXAM:        BP (!) 164/109   Pulse 88   Temp 98.2 °F (36.8 °C) (Oral)   Resp 18   Ht 5' 11\" (1.803 m)   Wt 220 lb (99.8 kg)   SpO2 98%   BMI 30.68 kg/m²    Temp (24hrs), Av.5 °F (36.9 °C), Min:98.2 °F (36.8 °C), Max:98.7 °F (37.1 °C)      General appearance -patient distress due to hip pain  Mental status - alert and cooperative   Eyes - pupils equal and reactive, extraocular eye movements intact   Ears - bilateral TM's and external ear canals normal   Mouth - mucous membranes moist, pharynx normal without lesions   Neck - supple, no significant adenopathy   Lymphatics - no palpable lymphadenopathy, no hepatosplenomegaly   Chest -decreased breathing sounds  Heart - normal rate, regular rhythm, normal S1, S2, no murmurs  Abdomen - soft, nontender, nondistended, no masses or organomegaly   Neurological - alert, oriented, normal speech, no focal findings or movement disorder noted   Musculoskeletal -decreased range of motion of left lower extremity due to pain  Extremities - peripheral pulses normal, no pedal edema, no clubbing or cyanosis   Skin - normal coloration and turgor, no rashes, no suspicious skin lesions noted ,      DATA:      Labs:     Results for orders placed or performed during the hospital encounter of 21   COVID-19, Rapid    Specimen: Nasopharyngeal Swab   Result Value Ref Range    Specimen Description . NASOPHARYNGEAL SWAB     SARS-CoV-2, Rapid Not Detected Not Detected   CBC Auto Differential   Result Value Ref Range    WBC 16.3 (H) 3.5 - 11.0 k/uL    RBC 4.54 4.5 - 5.9 m/uL    Hemoglobin 13.3 (L) 13.5 - 17.5 g/dL    Hematocrit 39.6 (L) 41 - 53 %    MCV 87.1 80 - 100 fL    MCH 29.3 26 - 34 pg    MCHC 33.7 31 - 37 g/dL    RDW 13.2 12.5 - 15.4 %    Platelets 195 (H) 898 - 450 k/uL    MPV 7.3 6.0 - 12.0 fL    NRBC Automated NOT REPORTED per 100 WBC    Differential Type NOT REPORTED     Immature Granulocytes NOT REPORTED 0 %    Absolute Immature Granulocyte NOT REPORTED 0.00 - 0.30 k/uL    WBC Morphology NOT REPORTED     RBC Morphology NOT REPORTED     Platelet Estimate NOT REPORTED     Seg Neutrophils 68 (H) 36 - 66 %    Lymphocytes 21 (L) 24 - 44 %    Monocytes 10 2 - 11 %    Eosinophils % 0 (L) 1 - 4 %    Basophils 1 0 - 2 %    Segs Absolute 11.09 (H) 1.8 - 7.7 k/uL    Absolute Lymph # 3.42 1.0 - 4.8 k/uL    Absolute Mono # 1.63 (H) 0.1 - 1.2 k/uL    Absolute Eos # 0.00 0.0 - 0.4 k/uL    Basophils Absolute 0.16 0.0 - 0.2 k/uL    Morphology Normal    Basic Metabolic Panel   Result Value Ref Range    Glucose 109 (H) 70 - 99 mg/dL    BUN 24 (H) 6 - 20 mg/dL    CREATININE 0.74 0.70 - 1.20 mg/dL    Bun/Cre Ratio NOT REPORTED 9 - 20    Calcium 11.3 (H) 8.6 - 10.4 mg/dL    Sodium 138 135 - 144 mmol/L    Potassium 4.1 3.7 - 5.3 mmol/L    Chloride 99 98 - 107 mmol/L    CO2 24 20 - 31 mmol/L    Anion Gap 15 9 - 17 mmol/L    GFR Non-African American >60 >60 mL/min    GFR African American >60 >60 mL/min    GFR Comment          GFR Staging NOT REPORTED          IMAGING DATA:    XR PELVIS (1-2 VIEWS)    Result Date: 12/16/2021  EXAMINATION: ONE XRAY VIEW OF THE PELVIS 12/16/2021 4:12 pm COMPARISON: 09/15/2021. HISTORY: ORDERING SYSTEM PROVIDED HISTORY: known lytic lesion to pelvis. worsening pain TECHNOLOGIST PROVIDED HISTORY: known lytic lesion to pelvis. worsening pain Reason for Exam: known lytic lesion to pelvis. worsening pain FINDINGS: A lytic expansile lesion involves all of the left inferior pubic ramus extending into the left acetabulum. It measured 8.2 x 6.7 cm with significant progression of from 09/15/2021. No left hip dislocation was noted. No pathological fracture was seen.   Marked degenerative disc changes were noted in the lumbar spine. Lytic expansile lesion involves all the left inferior pubic ramus extending into the left acetabulum measuring 8.2 x 6.7 cm. Significant progression has occurred from 09/15/2021. At that time, it measured 2.9 cm in greatest diameter. No pathological fracture was seen. CT CHEST W CONTRAST    Result Date: 12/10/2021  EXAMINATION: CT OF THE CHEST WITH CONTRAST 12/10/2021 12:46 pm TECHNIQUE: CT of the chest was performed with the administration of intravenous contrast. Multiplanar reformatted images are provided for review. Dose modulation, iterative reconstruction, and/or weight based adjustment of the mA/kV was utilized to reduce the radiation dose to as low as reasonably achievable. COMPARISON: 02/21/2013 HISTORY: Reason for Exam: ? mass FINDINGS: Mediastinum: No evidence of mediastinal lymphadenopathy. Normal heart size. Normal caliber pulmonary trunk. Normal thoracic aorta. Lungs/pleura: 2.5 cm right suprahilar spiculated lesion which abuts the mediastinum with questionable invasion. No focal consolidation or pulmonary edema. No evidence of pleural effusion or pneumothorax. Upper Abdomen: 2 cm right adrenal lesion. Partially visualized partially exophytic cyst anterior upper pole left kidney which demonstrates interval enlargement now measuring approximately 4.3 cm. Likely hepatic fatty infiltration. Slightly contracted gallbladder with mild wall enhancement. Soft Tissues/Bones: No acute bone or soft tissue abnormality. Mild hypertrophic bony spurring in the mid to lower thoracic spine. Bilateral gynecomastia. *Right suprahilar specular lesion which abuts the mediastinum with questionable invasion as measured above concerning for malignancy, recommend further evaluation with tissue sampling and/or PET-CT. Indeterminate right adrenal lesion as measured above, consider further assessment with dedicated adrenal protocol CT or MRI.  *Likely hepatic fatty infiltration. Slightly contracted gallbladder with mild wall enhancement. Consider further assessment with right upper quadrant ultrasound. *Interval enlargement of partially visualized left renal cyst. The findings were sent to the Radiology Results [id number] at 1:18 pm on 12/10/2021to be communicated to a licensed caregiver. NM BONE SCAN 3 PHASE    Result Date: 12/8/2021  EXAMINATION: THREE PHASE BONE SCAN 12/8/2021 8:06 am TECHNIQUE: The patient was injected intravenously with 26 mCi of 99 mTc MDP. Initial blood flow and pool images of the pelvis were acquired. After 3 hours, delayed bone images were acquired. Whole-body images were obtained. Additional spot images of the pelvis and ribs are obtained. COMPARISON: CT scan of the left hip 12/07/2021 HISTORY: ORDERING SYSTEM PROVIDED HISTORY: Lytic bone lesion of hip FINDINGS: There is increased blood flow and blood pool activity in the left ischium and inferior pubic ramus as well as in the adjacent soft tissues. Delayed images show mild heterogeneous activity in the left ischium and inferior pubic ramus with increased uptake along the edge of the corresponding lesion. No other area of abnormal increased uptake. Degenerative uptake in the knees. Three-phase positive uptake associated with the destructive lytic lesion in the left ischium and inferior pubic ramus. The delayed phase uptake is primarily along the margin of the lesion, typical of predominantly lytic lesions. Other lytic lesions may not be apparent on bone scan. The overall appearance is highly concerning for malignancy. CT HIP LEFT WO CONTRAST    Result Date: 12/7/2021  EXAMINATION: CT OF THE LEFT HIP WITHOUT CONTRAST 12/7/2021 3:09 pm TECHNIQUE: CT of the left hip was performed without the administration of intravenous contrast.  Multiplanar reformatted images are provided for review.  Dose modulation, iterative reconstruction, and/or weight based adjustment of the mA/kV was utilized to reduce the radiation dose to as low as reasonably achievable. COMPARISON: CT abdomen/pelvis dated 15 September 2021 HISTORY ORDERING SYSTEM PROVIDED HISTORY: Worsening pain with possible metastatic disease, unable to walk or ambulate on left leg, severe left hip pain TECHNOLOGIST PROVIDED HISTORY: Worsening pain with possible metastatic disease, unable to walk or ambulate on left leg, severe left hip pain Reason for Exam: Worsening pain with possible metastatic disease, unable to walk or ambulate on left leg, severe left hip pain Acuity: Unknown Type of Exam: Unknown FINDINGS: Bones: There is an expansile, lytic lesion occupying the majority of the left inferior pubic ramus which extends into the posterior/inferior portion of the left acetabulum. There is an additional lytic lesion measuring 1.2 cm in the left iliac bone (series 2, image 58). Soft Tissue: There is an expansile soft tissue mass invading the left inferior pubic ramus extending into the left acetabulum. The visualized intrapelvic organs appear normal.     1.  Expansile lytic lesion involving the majority of the left inferior pubic ramus extending into the posterior/inferior portion of the left acetabulum. The differential includes metastatic disease versus primary bone neoplasm. Consider whole-body bone scan versus PET scan to evaluate for any additional lesions. The degree of osseous replacement by the mass is significant which could result in a pathologic fracture. 2.  Additional 1.2 cm lytic lesion in the left iliac bone.      CT BIOPSY SUPERFICIAL BONE PERCUTANEOUS    Result Date: 12/9/2021  PROCEDURE: CT BX BONE NEEDLE SUPERFCL MODERATE CONSCIOUS SEDATION 12/9/2021 HISTORY: ORDERING SYSTEM PROVIDED HISTORY: biopsy of left pelvic bone mass TECHNOLOGIST PROVIDED HISTORY: biopsy of left pelvic bone mass Reason for Exam: biopsy left pelvic bone mass TECHNIQUE: Dose modulation, iterative reconstruction, and/or weight based adjustment of the mA/kV was utilized to reduce the radiation dose to as low as reasonably achievable. CONTRAST: None SEDATION: 0versed and 100 mcg fentanyl were titrated intravenously for moderate sedation monitored under my direction. Total intraservice time of sedation was 15 minutes. The patient's vital signs were monitored throughout the procedure and recorded in the patient's medical record by the nurse. FLUOROSCOPY DOSE AND TYPE OR TIME AND EXPOSURES: None DESCRIPTION OF PROCEDURE: Informed consent was obtained after a detailed explanation of the procedure including risks, benefits, and alternatives. Universal protocol was observed. Patient is placed prone on the table. Pelvic region was prepped and draped sterile fashion. Left ischium was localized by CT scan. 1% lidocaine was infiltrated for local anesthesia. An 18 gauge coaxial needle was advanced to the edge of the expansile bone lesion. Multiple 18 gauge cores were obtained and submitted to pathology. Post biopsy scanning showed no immediate complication. Dressing was applied. Patient was returned to holding stable condition. FINDINGS: Needle confirmed within the center of the expansile lytic bone lesion in left ischium. Successful CT-guided core bone biopsy.          IMPRESSION:   Primary Problem  <principal problem not specified>    Active Hospital Problems    Diagnosis Date Noted    Intractable pain [R52] 12/16/2021       Metastatic adenocarcinoma with bone metastasis  Intractable pain  2.5 cm right suprahilar lesion  Hypercalcemia  History of IV drug abuse  Severe anxiety    RECOMMENDATIONS:  I personally reviewed results of lab work-up imaging studies and other relevant clinical data  Reviewed hospitalization record  Reviewed images from the CT scan  Discussed case with radiation oncology  Discussed case with lung cancer navigator  Discussed case with internal medicine team attending  Discussed results of pathology report which is consistent with metastatic adenocarcinoma. IHC is nonspecific. Given clinical presentation patient has a lung mass I suspect patient has a metastatic lung cancer. Discussed with patient that he very likely has a stage IV adenocarcinoma of the lung. Discussed natural history of lung cancer. We will work on rescheduling patient CT PET  We we will order work-up for hypercalcemia of malignancy. Patient on calcium is only mildly elevated. Will monitor and treat conservatively with fluid and hydration  Consult radiation oncology  Patient pain is not well controlled. I will restart patient on OxyContin 40 mg p.o. twice daily. Continue tolerated for breakthrough pain. I will increase dose of Ativan as well  We will follow    Discussed with patient and Nurse. Thank you for asking us to see this patient. Mari Coulter MD          This note is created with the assistance of a speech recognition program.  While intending to generate a document that actually reflects the content of the visit, the document can still have some errors including those of syntax and sound a like substitutions which may escape proof reading. It such instances, actual meaning can be extrapolated by contextual diversion.

## 2021-12-17 NOTE — H&P
[facility]  Office: [address], DO, Jarod Davis, DO, Tracie Malone, DO, Demetrius Hussein Roque, DO, Willy Sarmiento MD, Sanaz Price MD, Hayley Beltran MD, Fallon Austin MD, Rhonda Miles MD, Ariane Holman MD, Itzel Kothari MD, Mukund Rajput, DO, Jose Hercules, DO, Dar Desir MD,  Eric Hairston, DO, Romana Begin, MD, Sherry Castaneda MD, Michael Avitia MD, Jessica Cage MD, Matt Barnett MD, Melissa Barlow MD, Annabelle Juarez MD, Meghan Ro, [facility], [facility], CNP, Mariano [facility], CNP, Mónica Real, CNS, Grant Crespo, CNP, Carol Napoles, CNP, Otilia Stone, CNP, Silvio Mccray, CNP, Andrew Alonso, CNP, Sami Worrell PA-C, Nikki Rogers, [facility], Lilli Johnson, [facility], Kelley Muñoz, CNP, Volodymyr Kaplan, CNP, Mike Roche, CNP, Carmina Ramos, CNP, Clifford Hayes, CNP, Briana Carmichael, [facility]   [address] [facility]    HISTORY AND PHYSICAL EXAMINATION            Date:   12/17/2021  Patient name:  Luis De Los Santos  Date of admission:  12/16/2021  3:33 PM  MRN:   [id number]  Account:  [de-identified]  YOB: 1970  PCP:    BOBBY Atkinson  Room:   109/892-  Code Status:    Full Code    Chief Complaint:     Chief Complaint   Patient presents with    Hip Pain     History Obtained From:     patient    History of Present Illness:     Luis De Los Santos is a 46 y.o. male with a past medical history of recently diagnosed Stage IV adenocarcinoma of the lung who presented to the emergency department on 12/16/2021 complaining of intractable left leg pain. X-ray of the pelvis was obtained and was remarkable for a lytic expansile lesion involving the left inferior pubic ramus extending into the left acetabulum (8.2 x 6.7 cm in diameter). The patient is admitted to internal medicine for further management of intractable pain as well as radiation therapy.      Past Medical History:     Past Medical History:   Diagnosis Date    Abscess     rt ear    Anxiety 10/5/2015    Cellulitis due to MRSA     Chronic back pain     Depression     Hypertension     Metastatic cancer ([facility] [address] 12/17/2021    Multiple substance abuse ([facility] [address]     OCD (obsessive compulsive disorder)         Past Surgical History:     Past Surgical History:   Procedure Laterality Date    CT BIOPSY PERCUTANEOUS SUPERFICIAL BONE  12/9/2021    CT BIOPSY PERCUTANEOUS SUPERFICIAL BONE 12/9/2021 [facility] CT SCAN    EYE SURGERY      plate    SHOULDER SURGERY Right     [place], labial tear        Medications Prior to Admission:     Prior to Admission medications    Medication Sig Start Date End Date Taking? Authorizing Provider   ALPRAZolam Myrtle Smith) 0.5 MG tablet Take 0.5 mg by mouth nightly. 12/13/21  Yes Historical Provider, MD   oxyCODONE-acetaminophen (PERCOCET) 5-325 MG per tablet Take 1 tablet by mouth every 4-6 hours as needed. 12/15/21  Yes Historical Provider, MD   lisinopril (PRINIVIL;ZESTRIL) 5 MG tablet Take 1 tablet by mouth daily 12/11/21  Yes Roz Nicolas MD   oxyCODONE (OXYCONTIN) 40 MG extended release tablet Take 1 tablet by mouth every 12 hours for 7 days. 12/10/21 12/17/21 Yes Roz Nicolas MD   ketorolac (TORADOL) 10 MG tablet Take 1 tablet by mouth every 6 hours as needed for Pain 12/10/21 12/24/21 Yes Roz Nicolas MD   gabapentin (NEURONTIN) 100 MG capsule Take 1 capsule by mouth 3 times daily for 30 days. 12/10/21 1/9/22 Yes Roz Nicolas MD   melatonin (RA MELATONIN) 3 MG TABS tablet Take 1 tablet by mouth nightly as needed (insomnia) 9/15/21   Marlene Woodard DO   traZODone (DESYREL) 50 MG tablet Take 1 tablet by mouth nightly 3/31/20   Christy Hong MD        Allergies:     Morphine    Social History:     Tobacco:    reports that he has quit smoking. His smoking use included cigarettes. He has a 12.50 pack-year smoking history. He has never used smokeless tobacco.  Alcohol:      reports no history of alcohol use.   Drug Use:  reports current drug use. Drug: Marijuana Michael Meckel). Family History:     Family History   Problem Relation Age of Onset    Diabetes Mother     Hearing Loss Father     High Blood Pressure Father        Review of Systems:     Positive and Negative as described in HPI. CONSTITUTIONAL:  negative for fevers, chills, sweats, fatigue, weight loss  HEENT:  negative for vision, hearing changes, runny nose, throat pain  RESPIRATORY:  negative for shortness of breath, cough, congestion, wheezing  CARDIOVASCULAR:  negative for chest pain, palpitations  GASTROINTESTINAL:  negative for nausea, vomiting, diarrhea, constipation, change in bowel habits, abdominal pain   GENITOURINARY:  negative for difficulty of urination, burning with urination, frequency   INTEGUMENT:  negative for rash, skin lesions, easy bruising   HEMATOLOGIC/LYMPHATIC:  negative for swelling/edema   ALLERGIC/IMMUNOLOGIC:  negative for urticaria , itching  ENDOCRINE:  negative increase in drinking, increase in urination, hot or cold intolerance  MUSCULOSKELETAL:  Positive for severe left hip pain   NEUROLOGICAL:  negative for headaches, dizziness, lightheadedness, numbness, pain, tingling extremities  BEHAVIOR/PSYCH:  negative for depression, anxiety    Physical Exam:   BP (!) 142/76   Pulse 79   Temp 97.9 °F (36.6 °C) (Oral)   Resp 20   Ht 5' 11\" (1.803 m)   Wt 220 lb (99.8 kg)   SpO2 100%   BMI 30.68 kg/m²   Temp (24hrs), Av.3 °F (36.8 °C), Min:97.9 °F (36.6 °C), Max:98.7 °F (37.1 °C)    No results for input(s): POCGLU in the last 72 hours.   No intake or output data in the 24 hours ending 21 0852    General Appearance: Distressed and tearful   Mental status: oriented to person, place, and time  Head:  normocephalic, atraumatic  Eye: no icterus, redness, pupils equal and reactive, extraocular eye movements intact, conjunctiva clear  Ear: normal external ear, no discharge, hearing intact  Nose:  no drainage noted  Mouth: mucous membranes moist  Neck: supple, no carotid bruits, thyroid not palpable  Lungs: Bilateral equal air entry, clear to ausculation, no wheezing, rales or rhonchi, normal effort  Cardiovascular: normal rate, regular rhythm, no murmur, gallop, rub  Abdomen: Soft, nontender, nondistended, normal bowel sounds, no hepatomegaly or splenomegaly  Neurologic: There are no new focal motor or sensory deficits, normal muscle tone and bulk, no abnormal sensation, normal speech, cranial nerves II through XII grossly intact  Skin: No gross lesions, rashes, bruising or bleeding on exposed skin area  Extremities:  Reduced range of motion of left hip secondary to intractable pain   Psych: normal affect     Investigations:      Laboratory Testing:  Recent Results (from the past 24 hour(s))   CBC Auto Differential    Collection Time: 12/16/21  4:40 PM   Result Value Ref Range    WBC 16.3 (H) 3.5 - 11.0 k/uL    RBC 4.54 4.5 - 5.9 m/uL    Hemoglobin 13.3 (L) 13.5 - 17.5 g/dL    Hematocrit 39.6 (L) 41 - 53 %    MCV 87.1 80 - 100 fL    MCH 29.3 26 - 34 pg    MCHC 33.7 31 - 37 g/dL    RDW 13.2 12.5 - 15.4 %    Platelets 299 (H) 721 - 450 k/uL    MPV 7.3 6.0 - 12.0 fL    NRBC Automated NOT REPORTED per 100 WBC    Differential Type NOT REPORTED     Immature Granulocytes NOT REPORTED 0 %    Absolute Immature Granulocyte NOT REPORTED 0.00 - 0.30 k/uL    WBC Morphology NOT REPORTED     RBC Morphology NOT REPORTED     Platelet Estimate NOT REPORTED     Seg Neutrophils 68 (H) 36 - 66 %    Lymphocytes 21 (L) 24 - 44 %    Monocytes 10 2 - 11 %    Eosinophils % 0 (L) 1 - 4 %    Basophils 1 0 - 2 %    Segs Absolute 11.09 (H) 1.8 - 7.7 k/uL    Absolute Lymph # 3.42 1.0 - 4.8 k/uL    Absolute Mono # 1.63 (H) 0.1 - 1.2 k/uL    Absolute Eos # 0.00 0.0 - 0.4 k/uL    Basophils Absolute 0.16 0.0 - 0.2 k/uL    Morphology Normal    Basic Metabolic Panel    Collection Time: 12/16/21  4:40 PM   Result Value Ref Range    Glucose 109 (H) 70 - 99 mg/dL    BUN 24 (H) 6 - 20 mg/dL CREATININE 0.74 0.70 - 1.20 mg/dL    Bun/Cre Ratio NOT REPORTED 9 - 20    Calcium 11.3 (H) 8.6 - 10.4 mg/dL    Sodium 138 135 - 144 mmol/L    Potassium 4.1 3.7 - 5.3 mmol/L    Chloride 99 98 - 107 mmol/L    CO2 24 20 - 31 mmol/L    Anion Gap 15 9 - 17 mmol/L    GFR Non-African American >60 >60 mL/min    GFR African American >60 >60 mL/min    GFR Comment          GFR Staging NOT REPORTED    COVID-19, Rapid    Collection Time: 12/16/21  6:24 PM    Specimen: Nasopharyngeal Swab   Result Value Ref Range    Specimen Description . NASOPHARYNGEAL SWAB     SARS-CoV-2, Rapid Not Detected Not Detected   DRUG SCREEN MULTI URINE    Collection Time: 12/17/21  4:45 AM   Result Value Ref Range    Amphetamine Screen, Ur NEGATIVE NEGATIVE    Barbiturate Screen, Ur NEGATIVE NEGATIVE    Benzodiazepine Screen, Urine POSITIVE (A) NEGATIVE    Cocaine Metabolite, Urine NEGATIVE NEGATIVE    Methadone Screen, Urine POSITIVE (A) NEGATIVE    Opiates, Urine POSITIVE (A) NEGATIVE    Phencyclidine, Urine NEGATIVE NEGATIVE    Propoxyphene, Urine NOT REPORTED NEGATIVE    Cannabinoid Scrn, Ur POSITIVE (A) NEGATIVE    Oxycodone Screen, Ur POSITIVE (A) NEGATIVE    Methamphetamine, Urine NOT REPORTED NEGATIVE    Tricyclic Antidepressants, Urine NOT REPORTED NEGATIVE    MDMA, Urine NOT REPORTED NEGATIVE    Buprenorphine Urine NOT REPORTED NEGATIVE    Test Information       Assay provides medical screening only. The absence of expected drug(s) and/or metabolite(s) may indicate diluted or adulterated urine, limitations of testing or timing of collection.    Basic Metabolic Panel w/ Reflex to MG    Collection Time: 12/17/21  6:31 AM   Result Value Ref Range    Glucose 115 (H) 70 - 99 mg/dL    BUN 24 (H) 6 - 20 mg/dL    CREATININE 0.54 (L) 0.70 - 1.20 mg/dL    Bun/Cre Ratio NOT REPORTED 9 - 20    Calcium 10.3 8.6 - 10.4 mg/dL    Sodium 139 135 - 144 mmol/L    Potassium 4.3 3.7 - 5.3 mmol/L    Chloride 105 98 - 107 mmol/L    CO2 22 20 - 31 mmol/L    Anion Gap 12 9 - 17 mmol/L    GFR Non-African American >60 >60 mL/min    GFR African American >60 >60 mL/min    GFR Comment          GFR Staging NOT REPORTED    CBC auto differential    Collection Time: 12/17/21  6:31 AM   Result Value Ref Range    WBC 11.9 (H) 3.5 - 11.0 k/uL    RBC 4.25 (L) 4.5 - 5.9 m/uL    Hemoglobin 12.6 (L) 13.5 - 17.5 g/dL    Hematocrit 37.2 (L) 41 - 53 %    MCV 87.6 80 - 100 fL    MCH 29.7 26 - 34 pg    MCHC 33.9 31 - 37 g/dL    RDW 13.3 12.5 - 15.4 %    Platelets 567 (H) 277 - 450 k/uL    MPV 7.4 6.0 - 12.0 fL    NRBC Automated NOT REPORTED per 100 WBC    Differential Type NOT REPORTED     Immature Granulocytes NOT REPORTED 0 %    Absolute Immature Granulocyte NOT REPORTED 0.00 - 0.30 k/uL    WBC Morphology NOT REPORTED     RBC Morphology NOT REPORTED     Platelet Estimate NOT REPORTED     Seg Neutrophils 69 (H) 36 - 66 %    Lymphocytes 21 (L) 24 - 44 %    Monocytes 9 2 - 11 %    Eosinophils % 0 (L) 1 - 4 %    Basophils 1 0 - 2 %    Segs Absolute 8.20 (H) 1.8 - 7.7 k/uL    Absolute Lymph # 2.50 1.0 - 4.8 k/uL    Absolute Mono # 1.00 0.1 - 1.2 k/uL    Absolute Eos # 0.00 0.0 - 0.4 k/uL    Basophils Absolute 0.10 0.0 - 0.2 k/uL   ETHANOL    Collection Time: 12/17/21  6:31 AM   Result Value Ref Range    Ethanol <10 <10 mg/dL    Ethanol percent <0.010 <0.010 %       Imaging/Diagnostics:  XR PELVIS (1-2 VIEWS)    Result Date: 12/16/2021  Lytic expansile lesion involves all the left inferior pubic ramus extending into the left acetabulum measuring 8.2 x 6.7 cm. Significant progression has occurred from 09/15/2021. At that time, it measured 2.9 cm in greatest diameter. No pathological fracture was seen. CT CHEST W CONTRAST    Result Date: 12/10/2021  *Right suprahilar specular lesion which abuts the mediastinum with questionable invasion as measured above concerning for malignancy, recommend further evaluation with tissue sampling and/or PET-CT.   Indeterminate right adrenal lesion as measured above, consider further assessment with dedicated adrenal protocol CT or MRI. *Likely hepatic fatty infiltration. Slightly contracted gallbladder with mild wall enhancement. Consider further assessment with right upper quadrant ultrasound. *Interval enlargement of partially visualized left renal cyst. The findings were sent to the Radiology Results [address] at 1:18 pm on 12/10/2021to be communicated to a licensed caregiver. Assessment :      Hospital Problems           Last Modified POA    HTN (hypertension) 12/17/2021 Yes    Anxiety 12/17/2021 Yes    Intractable pain 12/16/2021 Yes    Metastatic cancer ([address] 12/17/2021 Yes          Plan:     Patient status inpatient in the Med/Surge    Metastatic lung cancer   -CT chest showing spiculated right suprahilar lesion concerning for primary malignancy   -X-ray of pelvis showing a lytic expansile lesion involving the left inferior pubic ramus measuring 8.2 x 6.7 cm   -Oncology following; recommend palliative radiation   -Consult radiation oncology; appreciate recommendations  -Patient states he would like to meet with Hospice     Intractable pain 2/2 bone metastases   -Continue dexamethasone 4 mg tid   -Continue gabapentin 100 mg tid   -OxyContin 40 mg q12h   -PRN Dilaudid   -PRN Roxicodone     HTN  -Continue home lisinopril 5 mg daily     Anxiety   -PRN lorazepam     Consultations:   IP CONSULT TO ONCOLOGY  IP CONSULT TO RADIATION ONCOLOGY  IP CONSULT TO RADIATION ONCOLOGY  IP CONSULT TO HOSPICE    Patient is admitted as inpatient status because of co-morbidities listed above, severity of signs and symptoms as outlined, requirement for current medical therapies and most importantly because of direct risk to patient if care not provided in a hospital setting. Expected length of stay > 48 hours.     Jojo Vo MD  12/17/2021  8:52 AM    Copy sent to Dr. Pietro Stoner, APRN

## 2021-12-17 NOTE — CONSULTS
[address]            Radiation Oncology          [address] [facility]          [place], [address]        Christal Morales: [phone]        F: [phone]       [employer]       Radiation Oncology   [address] [facility], [address] [facility]       Christal Vasquezel: [phone]       F: [phone]       [employer]      2021    Patient: Tha Adkins   YOB: 1970       Dear Dr Adames: Thank you for referring Tha Adkins to me for evaluation. Below are the relevant portions of my assessment and plan of care. If you have questions, please do not hesitate to call me. I look forward to following this patient along with you. Sincerely,      Electronically signed by Bogdan Moore MD on 21 at 9:40 AM EST      CC: Patient Care Team:  BOBBY Aguirre as PCP - General  Savage Balo, LPN as Care Transitions Nurse  Terrence Martinez RN as Nurse Navigator (Oncology)  ------------------------------------------------------------------------------------------------------------------------------------------------------------------------------------------      INPATIENT RADIATION ONCOLOGY NEW PATIENT NOTE:     Date of Service: 2021    Location:  [place] [address] Oncology,   [address] [facility], [address] Jose EmaritzaCaryn   [phone]    Patient ID:   Tha Adkins  : 1970   MRN: [id number]    Tha Adkins is being seen today for an oncologic opinion at the request of Dr. Adames. 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 male with a history of smoking for approximately 12 pack years who presented to the ED earlier in September with left hip pain.   Patient at that it was found to have a lytic lesion in the left ischial  Smokeless tobacco: Never Used   Substance and Sexual Activity    Alcohol use: No     Alcohol/week: 0.0 standard drinks     Comment: has not  drank in 2 years    Drug use: Yes     Types: Marijuana Stefan Wilkinson     Comment: hx of marijuana/ denies today    Sexual activity: Not on file   Other Topics Concern    Not on file   Social History Narrative    Not on file     Social Determinants of Health     Financial Resource Strain:     Difficulty of Paying Living Expenses: Not on file   Food Insecurity:     Worried About Running Out of Food in the Last Year: Not on file    Antonio of Food in the Last Year: Not on file   Transportation Needs:     Lack of Transportation (Medical): Not on file    Lack of Transportation (Non-Medical): Not on file   Physical Activity:     Days of Exercise per Week: Not on file    Minutes of Exercise per Session: Not on file   Stress:     Feeling of Stress : Not on file   Social Connections:     Frequency of Communication with Friends and Family: Not on file    Frequency of Social Gatherings with Friends and Family: Not on file    Attends Baptism Services: Not on file    Active Member of [address] [place] [address] or Organizations: Not on file    Attends Club or Organization Meetings: Not on file    Marital Status: Not on file   Intimate Partner Violence:     Fear of Current or Ex-Partner: Not on file    Emotionally Abused: Not on file    Physically Abused: Not on file    Sexually Abused: Not on file   Housing Stability:     Unable to Pay for Housing in the Last Year: Not on file    Number of [place] in the Last Year: Not on file    Unstable Housing in the Last Year: Not on file       FAMILY HISTORY:  Family History   Problem Relation Age of Onset    Diabetes Mother     Hearing Loss Father     High Blood Pressure Father        REVIEW OF SYSTEMS:    GENERAL/CONSTITUTIONAL: The patient denies fever, fatigue, weakness, weight gain or weight loss.   HEENT: The patient denies pain, redness, loss of vision, double or blurred vision. The patient denies ringing in the ears, loss of hearing, hoarseness, trouble swallowing, or painful swallowing. CARDIOVASCULAR: The patient denies chest pain, irregular heartbeats, sudden changes in heartbeat or palpitation. RESPIRATORY: The patient denies shortness of breath, cough, hemoptysis. GASTROINTESTINAL: The patient denies nausea, vomiting, diarrhea, constipation, blood in the stools. GENITOURINARY: The patient denies difficult urination, pain or burning with urination, blood in the urine. MUSCULOSKELETAL: (+) L Hip pain. The patient denies arm, buttock, thigh or calf cramps. No joint or muscle pain. SKIN: The patient denies easy bruising, skin redness, skin rash, hives. NEUROLOGIC: The patient denies headache, dizziness, fainting, muscle spasm, loss of consciousness. ENDOCRINE: The patient denies intolerance to hot or cold temperature, flushing. HEMATOLOGIC/LYMPHATIC: The patient denies anemia, bleeding tendency or clotting tendency. ALLERGIC/IMMUNOLOGIC: The patient denies rhinitis, asthma, skin sensitivity. PYSCHOLOGIC: The patient denies any depression, anxiety, or confusion. A full 14 point review of systems was performed and assessed and found to be negative except as noted above. PHYSICAL EXAMINATION:    CHAPERONE: Nurse/MA Present    ECO Symptomatic but completely ambulatory    VITAL SIGNS: There were no vitals taken for this visit. GENERAL:  General appearance is that of a well-nourished, well-developed in no apparent distress. HEENT: Normocephalic, atraumatic, EOMI, moist mucosa, no erythema. NECK:  No adenopathy or a palpable thyroid mass, trachea is midline. HEART:  Normal rate and regular rhythm, normal heart sounds. LUNGS:  Pulmonary effort normal. Normal breath sounds. ABDOMEN:  Soft, nontender, non distended. EXTREMITIES:  No edema. No calf tenderness. MSK: (+) L hip/pelvis tenderness.  Normal ROM.  NEUROLOGICAL: Alert and oriented. Strength and sensation intact bilaterally. No focal deficits. PSYCH: Mood normal, behavior normal.  SKIN: No erythema, no desquamation. LABS:  WBC   Date Value Ref Range Status   2021 11.9 (H) 3.5 - 11.0 k/uL Final     Segs Absolute   Date Value Ref Range Status   2021 8.20 (H) 1.8 - 7.7 k/uL Final     Hemoglobin   Date Value Ref Range Status   2021 12.6 (L) 13.5 - 17.5 g/dL Final     Platelet Count   Date Value Ref Range Status   2012 423 140 - 450 k/uL Final     Platelets   Date Value Ref Range Status   2021 699 (H) 140 - 450 k/uL Final     No results found for: , CEA  No results found for:   PSA   Date Value Ref Range Status   2021 0.39 <4.1 ug/L Final     Comment:     The Roche \"ECLIA\" assay is used. Results obtained with different assay methods cannot be   used interchangeably. IMAGIN/10/21 CT Chest     Impression   *Right suprahilar specular lesion which abuts the mediastinum with   questionable invasion as measured above concerning for malignancy, recommend   further evaluation with tissue sampling and/or PET-CT.  Indeterminate right   adrenal lesion as measured above, consider further assessment with dedicated   adrenal protocol CT or MRI. *Likely hepatic fatty infiltration.  Slightly contracted gallbladder with   mild wall enhancement.  Consider further assessment with right upper quadrant   ultrasound. *Interval enlargement of partially visualized left renal cyst.     21 CT L Hip  Impression   1.  Expansile lytic lesion involving the majority of the left inferior pubic   ramus extending into the posterior/inferior portion of the left acetabulum. The differential includes metastatic disease versus primary bone neoplasm.    Consider whole-body bone scan versus PET scan to evaluate for any additional   lesions.  The degree of osseous replacement by the mass is significant which   could result in a pathologic fracture.       2.  Additional 1.2 cm lytic lesion in the left iliac bone. PATHOLOGY:  12/9/21 L bone biopsy  -- Diagnosis --     Left pelvic bone, biopsy of mass:     -  Metastatic adenocarcinoma. Comment: Morphology and immunophenotype are nonspecific with respect   to primary.  Considerations include lung, breast, pancreaticobiliary,   GI, and others. ASSESSMENT AND PLAN:   Rakel Menon is a 46 y.o. male with a newly diagnosed stage IV lung adenocarcinoma with left pelvis bone metastases causing significant pain. We reviewed with the patient the testing that has been done so far, including imaging and pathology. We discussed the options of treatment, including surgery, chemotherapy, and radiation, and the rationale of why radiation therapy would be indicated to treat his bony lesion in the left pelvis. We reviewed the logistics of radiation treatment planning, including a CT Simulation session, as well as 5 daily treatments for approximately 1 week. With regards to radiation to the extremity, I discussed the possible short-term side effects discussed included skin irritation (causing redness, dryness, or peeling), tiredness, low blood counts (causing infection or bleeding) and swelling of the extremity. Possible long-term side effects discussed included hyperpigmentation of the skin, damage to the bone (causing fracture), damage to the nerves (causing numbness or weakness), and swelling of the extremity. Patient was advised on smoking cessation, as it can make toxicities worse during treatment and increase risk of other cancers. After ample time to review the patient's questions, an informed consent was obtained to proceed with a treatment planning session for radiation therapy today and will start RT this afternoon. Patient was in agreement with my recommendations. All questions were answered to his satisfaction.  Patient was advised to contact us anytime should they have any questions or concerns. Electronically signed by Kesha Rosenberg MD on 12/17/21 at 9:40 AM EST      Drugs Prescribed:  New Prescriptions    No medications on file       Orders Placed:   No orders of the defined types were placed in this encounter. CC:  Patient Care Team:  BOBBY Mann as PCP - General Mallorie Martinez LPN as Care Transitions Nurse  Jaya Alexander RN as Nurse Navigator (Oncology)     Total time spent on this case on the day of encounter is more than 110 minutes. This time includes combination of one or more of the following - review of necessary tests, review of pertinent medical records from the EMR, performing medically appropriate examination and evaluation, counseling and educating the patient/family/caregiver, ordering necessary medical tests, procedures etc., documenting the clinical information in the electronic medical record, care coordination, referring and communicating with other health care providers and interpretation of results independently. This note is created with the assistance of a speech recognition program.  While intending to generate a document that actually reflects the content of the visit, the document can still have some errors including those of syntax and sound a like substitutions which may escape proof reading. It such instances, actual meaning can be extrapolated by contextual diversion.

## 2021-12-18 LAB
ABSOLUTE EOS #: 0 K/UL (ref 0–0.4)
ABSOLUTE IMMATURE GRANULOCYTE: ABNORMAL K/UL (ref 0–0.3)
ABSOLUTE LYMPH #: 2.38 K/UL (ref 1–4.8)
ABSOLUTE MONO #: 1.35 K/UL (ref 0.1–1.2)
ANION GAP SERPL CALCULATED.3IONS-SCNC: 14 MMOL/L (ref 9–17)
BASOPHILS # BLD: 0 % (ref 0–2)
BASOPHILS ABSOLUTE: 0 K/UL (ref 0–0.2)
BUN BLDV-MCNC: 29 MG/DL (ref 6–20)
BUN/CREAT BLD: ABNORMAL (ref 9–20)
CALCIUM SERPL-MCNC: 10.2 MG/DL (ref 8.6–10.4)
CHLORIDE BLD-SCNC: 102 MMOL/L (ref 98–107)
CO2: 20 MMOL/L (ref 20–31)
CREAT SERPL-MCNC: 0.54 MG/DL (ref 0.7–1.2)
DIFFERENTIAL TYPE: ABNORMAL
EOSINOPHILS RELATIVE PERCENT: 0 % (ref 1–4)
GFR AFRICAN AMERICAN: >60 ML/MIN
GFR NON-AFRICAN AMERICAN: >60 ML/MIN
GFR SERPL CREATININE-BSD FRML MDRD: ABNORMAL ML/MIN/{1.73_M2}
GFR SERPL CREATININE-BSD FRML MDRD: ABNORMAL ML/MIN/{1.73_M2}
GLUCOSE BLD-MCNC: 111 MG/DL (ref 70–99)
HCT VFR BLD CALC: 37.9 % (ref 41–53)
HEMOGLOBIN: 12.3 G/DL (ref 13.5–17.5)
IMMATURE GRANULOCYTES: ABNORMAL %
LYMPHOCYTES # BLD: 16 % (ref 24–44)
MCH RBC QN AUTO: 29.2 PG (ref 26–34)
MCHC RBC AUTO-ENTMCNC: 32.5 G/DL (ref 31–37)
MCV RBC AUTO: 90 FL (ref 80–100)
MONOCYTES # BLD: 9 % (ref 2–11)
NRBC AUTOMATED: ABNORMAL PER 100 WBC
PDW BLD-RTO: 12.8 % (ref 12.5–15.4)
PLATELET # BLD: 688 K/UL (ref 140–450)
PLATELET ESTIMATE: ABNORMAL
PMV BLD AUTO: 9 FL (ref 8–14)
POTASSIUM SERPL-SCNC: 4.4 MMOL/L (ref 3.7–5.3)
RBC # BLD: 4.21 M/UL (ref 4.5–5.9)
RBC # BLD: ABNORMAL 10*6/UL
SEG NEUTROPHILS: 75 % (ref 36–66)
SEGMENTED NEUTROPHILS ABSOLUTE COUNT: 11.29 K/UL (ref 1.8–7.7)
SODIUM BLD-SCNC: 136 MMOL/L (ref 135–144)
WBC # BLD: 15 K/UL (ref 3.5–11)
WBC # BLD: ABNORMAL 10*3/UL

## 2021-12-18 PROCEDURE — 85025 COMPLETE CBC W/AUTO DIFF WBC: CPT

## 2021-12-18 PROCEDURE — 1210000000 HC MED SURG R&B

## 2021-12-18 PROCEDURE — 6370000000 HC RX 637 (ALT 250 FOR IP): Performed by: INTERNAL MEDICINE

## 2021-12-18 PROCEDURE — 6360000002 HC RX W HCPCS: Performed by: INTERNAL MEDICINE

## 2021-12-18 PROCEDURE — 6370000000 HC RX 637 (ALT 250 FOR IP): Performed by: NURSE PRACTITIONER

## 2021-12-18 PROCEDURE — 6370000000 HC RX 637 (ALT 250 FOR IP): Performed by: STUDENT IN AN ORGANIZED HEALTH CARE EDUCATION/TRAINING PROGRAM

## 2021-12-18 PROCEDURE — 80048 BASIC METABOLIC PNL TOTAL CA: CPT

## 2021-12-18 PROCEDURE — 99232 SBSQ HOSP IP/OBS MODERATE 35: CPT | Performed by: INTERNAL MEDICINE

## 2021-12-18 PROCEDURE — 99232 SBSQ HOSP IP/OBS MODERATE 35: CPT | Performed by: STUDENT IN AN ORGANIZED HEALTH CARE EDUCATION/TRAINING PROGRAM

## 2021-12-18 PROCEDURE — 6360000002 HC RX W HCPCS: Performed by: STUDENT IN AN ORGANIZED HEALTH CARE EDUCATION/TRAINING PROGRAM

## 2021-12-18 PROCEDURE — 2580000003 HC RX 258: Performed by: STUDENT IN AN ORGANIZED HEALTH CARE EDUCATION/TRAINING PROGRAM

## 2021-12-18 PROCEDURE — 36415 COLL VENOUS BLD VENIPUNCTURE: CPT

## 2021-12-18 RX ORDER — FENTANYL 12 UG/H
1 PATCH TRANSDERMAL
Status: DISCONTINUED | OUTPATIENT
Start: 2021-12-18 | End: 2021-12-18

## 2021-12-18 RX ORDER — OXYCODONE HCL 40 MG/1
40 TABLET, FILM COATED, EXTENDED RELEASE ORAL 3 TIMES DAILY
Status: DISCONTINUED | OUTPATIENT
Start: 2021-12-18 | End: 2021-12-19

## 2021-12-18 RX ADMIN — OXYCODONE HYDROCHLORIDE 10 MG: 5 TABLET ORAL at 13:21

## 2021-12-18 RX ADMIN — HYDROMORPHONE HYDROCHLORIDE 1 MG: 1 INJECTION, SOLUTION INTRAMUSCULAR; INTRAVENOUS; SUBCUTANEOUS at 19:52

## 2021-12-18 RX ADMIN — DEXAMETHASONE SODIUM PHOSPHATE 4 MG: 4 INJECTION, SOLUTION INTRA-ARTICULAR; INTRALESIONAL; INTRAMUSCULAR; INTRAVENOUS; SOFT TISSUE at 13:21

## 2021-12-18 RX ADMIN — HYDROMORPHONE HYDROCHLORIDE 1 MG: 1 INJECTION, SOLUTION INTRAMUSCULAR; INTRAVENOUS; SUBCUTANEOUS at 13:21

## 2021-12-18 RX ADMIN — OXYCODONE HYDROCHLORIDE 40 MG: 10 TABLET, FILM COATED, EXTENDED RELEASE ORAL at 08:14

## 2021-12-18 RX ADMIN — OXYCODONE HYDROCHLORIDE 10 MG: 5 TABLET ORAL at 04:16

## 2021-12-18 RX ADMIN — SODIUM CHLORIDE, PRESERVATIVE FREE 10 ML: 5 INJECTION INTRAVENOUS at 09:16

## 2021-12-18 RX ADMIN — DEXAMETHASONE SODIUM PHOSPHATE 4 MG: 4 INJECTION, SOLUTION INTRA-ARTICULAR; INTRALESIONAL; INTRAMUSCULAR; INTRAVENOUS; SOFT TISSUE at 21:08

## 2021-12-18 RX ADMIN — GABAPENTIN 100 MG: 100 CAPSULE ORAL at 09:16

## 2021-12-18 RX ADMIN — ALPRAZOLAM 1 MG: 0.5 TABLET ORAL at 04:16

## 2021-12-18 RX ADMIN — SODIUM CHLORIDE, PRESERVATIVE FREE 10 ML: 5 INJECTION INTRAVENOUS at 21:08

## 2021-12-18 RX ADMIN — GABAPENTIN 100 MG: 100 CAPSULE ORAL at 13:21

## 2021-12-18 RX ADMIN — HYDROMORPHONE HYDROCHLORIDE 1 MG: 1 INJECTION, SOLUTION INTRAMUSCULAR; INTRAVENOUS; SUBCUTANEOUS at 23:14

## 2021-12-18 RX ADMIN — OXYCODONE HYDROCHLORIDE 10 MG: 5 TABLET ORAL at 09:16

## 2021-12-18 RX ADMIN — HYDROMORPHONE HYDROCHLORIDE 1 MG: 1 INJECTION, SOLUTION INTRAMUSCULAR; INTRAVENOUS; SUBCUTANEOUS at 06:48

## 2021-12-18 RX ADMIN — OXYCODONE HYDROCHLORIDE 40 MG: 20 TABLET, FILM COATED, EXTENDED RELEASE ORAL at 22:08

## 2021-12-18 RX ADMIN — LISINOPRIL 5 MG: 5 TABLET ORAL at 09:17

## 2021-12-18 RX ADMIN — DEXAMETHASONE SODIUM PHOSPHATE 4 MG: 4 INJECTION, SOLUTION INTRA-ARTICULAR; INTRALESIONAL; INTRAMUSCULAR; INTRAVENOUS; SOFT TISSUE at 09:16

## 2021-12-18 RX ADMIN — SODIUM CHLORIDE, PRESERVATIVE FREE 10 ML: 5 INJECTION INTRAVENOUS at 13:20

## 2021-12-18 RX ADMIN — ALPRAZOLAM 1 MG: 0.5 TABLET ORAL at 21:08

## 2021-12-18 RX ADMIN — HYDROMORPHONE HYDROCHLORIDE 1 MG: 1 INJECTION, SOLUTION INTRAMUSCULAR; INTRAVENOUS; SUBCUTANEOUS at 16:23

## 2021-12-18 RX ADMIN — HYDROMORPHONE HYDROCHLORIDE 1 MG: 1 INJECTION, SOLUTION INTRAMUSCULAR; INTRAVENOUS; SUBCUTANEOUS at 02:39

## 2021-12-18 RX ADMIN — OXYCODONE HYDROCHLORIDE 10 MG: 5 TABLET ORAL at 17:37

## 2021-12-18 RX ADMIN — GABAPENTIN 100 MG: 100 CAPSULE ORAL at 21:08

## 2021-12-18 RX ADMIN — ALPRAZOLAM 1 MG: 0.5 TABLET ORAL at 16:01

## 2021-12-18 RX ADMIN — HYDROMORPHONE HYDROCHLORIDE 1 MG: 1 INJECTION, SOLUTION INTRAMUSCULAR; INTRAVENOUS; SUBCUTANEOUS at 10:04

## 2021-12-18 RX ADMIN — ALPRAZOLAM 1 MG: 0.5 TABLET ORAL at 10:04

## 2021-12-18 ASSESSMENT — PAIN SCALES - GENERAL
PAINLEVEL_OUTOF10: 5
PAINLEVEL_OUTOF10: 6
PAINLEVEL_OUTOF10: 9
PAINLEVEL_OUTOF10: 5
PAINLEVEL_OUTOF10: 5
PAINLEVEL_OUTOF10: 8
PAINLEVEL_OUTOF10: 6
PAINLEVEL_OUTOF10: 6
PAINLEVEL_OUTOF10: 7
PAINLEVEL_OUTOF10: 6
PAINLEVEL_OUTOF10: 5
PAINLEVEL_OUTOF10: 6
PAINLEVEL_OUTOF10: 9
PAINLEVEL_OUTOF10: 7
PAINLEVEL_OUTOF10: 7
PAINLEVEL_OUTOF10: 10
PAINLEVEL_OUTOF10: 6
PAINLEVEL_OUTOF10: 5
PAINLEVEL_OUTOF10: 9
PAINLEVEL_OUTOF10: 6
PAINLEVEL_OUTOF10: 9

## 2021-12-18 NOTE — PROGRESS NOTES
[facility]  Office: [address], DO, Soumya Sanders, DO, Daniel Monroy, DO, Kierra Miller Blood, DO, Nelida Scott MD, Dior Verdugo MD, Qian Rod MD, Sherita Serrano MD, Bridgett Quintanilla MD, Caleb Pabon MD, Maria Fernanda Cisneros MD, Toni Wilson, DO, Eugenio Kwon, DO, Barney Kelley MD,  Shireen Doherty DO, Bryan Carmichael MD, Maria Elena Grijalva MD, Mik Anglin MD, Deanna Weaver MD, Jonas Peralta MD, Julián Monge MD, Odilia Bolanos MD, Sagar Chacko [facility], [facility], CNP, Shane Arita, CNP, Watson Oro, CNS, Nettie Pugh, CNP, Sabrina Chinchilla, CNP, Lizzie Ingram, CNP, Bertha Millan, CNP, Anthony Rossi, CNP, Keli Fournier PA-C, Naseem Nowak, DNP, Hector Whittaker, DNP, Renetta Barber, CNP, Binu Ferrari, CNP, Lauryn Rothman, CNP, Trever Mcginnis, CNP, Claudette David, CNP, Jayro Wil[address]    Progress Note    12/18/2021    11:49 AM    Name:   Evelina Spurling  MRN:     [id number]     [place]:      [de-identified]   Room:   [address] [place] [address] Day:  2  Admit Date:  12/16/2021  3:33 PM    PCP:   BOBBY Solis  Code Status:  DNR-CCA    Subjective:     C/C:   Chief Complaint   Patient presents with    Hip Pain     Interval History Status: not changed. Patient was seen and examined at bedside this AM. He continues to complain of severe pain in his left leg. After discussing code status this morning, the patient has decided that he would like to be a DNR-CCA. Plan to continue radiation x 4 additional treatments per radiation oncology. Brief History:     Evelina Spurling is a 46 y.o. male with a past medical history of recently diagnosed Stage IV adenocarcinoma of the lung who presented to the emergency department on 12/16/2021 complaining of intractable left leg pain.  X-ray of the pelvis was obtained and was remarkable for a lytic expansile lesion involving the left inferior pubic ramus extending into the left acetabulum (8.2 x 6.7 cm in diameter). The patient is admitted to internal medicine for further management of intractable pain as well as radiation therapy. Review of Systems:     Constitutional:  Positive for severe pain. Respiratory:  negative for cough, dyspnea on exertion, shortness of breath, wheezing  Cardiovascular:  negative for chest pain, chest pressure/discomfort, lower extremity edema, palpitations  Gastrointestinal:  negative for abdominal pain, constipation, diarrhea, nausea, vomiting  Neurological:  negative for dizziness, headache    Medications: Allergies: Allergies   Allergen Reactions    Morphine Itching       Current Meds:   Scheduled Meds:    fentaNYL  1 patch TransDERmal Q72H    oxyCODONE  40 mg Oral 2 times per day    HYDROmorphone  1 mg IntraVENous Once    dexamethasone  4 mg IntraVENous TID    gabapentin  100 mg Oral TID    lisinopril  5 mg Oral Daily    sodium chloride flush  5-40 mL IntraVENous 2 times per day    enoxaparin  40 mg SubCUTAneous Daily    nicotine  1 patch TransDERmal Daily     Continuous Infusions:    sodium chloride       PRN Meds: oxyCODONE, HYDROmorphone, ALPRAZolam, sodium chloride flush, sodium chloride, ondansetron **OR** ondansetron, polyethylene glycol, acetaminophen **OR** acetaminophen    Data:     Past Medical History:   has a past medical history of Abscess, Anxiety, Cellulitis due to MRSA, Chronic back pain, Depression, Hypertension, Metastatic cancer ([facility] [address] Multiple substance abuse ([facility] [address] and OCD (obsessive compulsive disorder). Social History:   reports that he has quit smoking. His smoking use included cigarettes. He has a 12.50 pack-year smoking history. He has never used smokeless tobacco. He reports current drug use. Drug[facility] He reports that he does not drink alcohol.      Family History:   Family History   Problem Relation Age of Onset    Diabetes Mother     Hearing Loss Father     High Blood Pressure Father        Vitals:  BP (!) 140/98   Pulse 90   Temp 98.2 °F (36.8 °C) (Oral)   Resp 18   Ht 5' 11\" (1.803 m)   Wt 220 lb (99.8 kg)   SpO2 98%   BMI 30.68 kg/m²   Temp (24hrs), Av.2 °F (36.8 °C), Min:97.9 °F (36.6 °C), Max:98.4 °F (36.9 °C)    No results for input(s): POCGLU in the last 72 hours. I/O (24Hr): Intake/Output Summary (Last 24 hours) at 2021 1149  Last data filed at 2021 1752  Gross per 24 hour   Intake 400 ml   Output    Net 400 ml       Labs:  Hematology:  Recent Labs     21  1640 21  0631 21  0545   WBC 16.3* 11.9* 15.0*   RBC 4.54 4.25* 4.21*   HGB 13.3* 12.6* 12.3*   HCT 39.6* 37.2* 37.9*   MCV 87.1 87.6 90.0   MCH 29.3 29.7 29.2   MCHC 33.7 33.9 32.5   RDW 13.2 13.3 12.8   * 699* 688*   MPV 7.3 7.4 9.0     Chemistry:  Recent Labs     21  1640 21  0631 21  0545    139 136   K 4.1 4.3 4.4   CL 99 105 102   CO2 24 22 20   GLUCOSE 109* 115* 111*   BUN 24* 24* 29*   CREATININE 0.74 0.54* 0.54*   ANIONGAP 15 12 14   LABGLOM >60 >60 >60   GFRAA >60 >60 >60   CALCIUM 11.3* 10.3 10.2   CAION  --  1.39*  --    No results for input(s): PROT, LABALBU, LABA1C, I9MTZOT, Y7ZSWDU, FT4, TSH, AST, ALT, LDH, GGT, ALKPHOS, LABGGT, BILITOT, BILIDIR, AMMONIA, AMYLASE, LIPASE, LACTATE, CHOL, HDL, LDLCHOLESTEROL, CHOLHDLRATIO, TRIG, VLDL, JRT57YZ, PHENYTOIN, PHENYF, URICACID, POCGLU in the last 72 hours.   ABG:  Lab Results   Component Value Date    FIO2 ROOM AIR 2014     Lab Results   Component Value Date/Time    SPECIAL NOT REPORTED 2017 12:53 AM     Lab Results   Component Value Date/Time    CULTURE VIRIDANS STREPTOCOCCUS GROUP LIGHT GROWTH (A) 2017 12:53 AM    CULTURE NO ANAEROBIC ORGANISMS ISOLATED AT 5 DAYS (A) 2017 12:53 AM    CULTURE  2017 12:53 AM     [facility] [id number] [place] [address] [phone]       Radiology:  XR PELVIS (1-2 VIEWS)    Result Date: 2021  Lytic expansile lesion

## 2021-12-18 NOTE — PROGRESS NOTES
MD   1 mg at 12/18/21 1601    dexamethasone (DECADRON) injection 4 mg  4 mg IntraVENous TID Fracisco Mcintosh MD   4 mg at 12/18/21 1321    gabapentin (NEURONTIN) capsule 100 mg  100 mg Oral TID Fracisco Mcintosh MD   100 mg at 12/18/21 1321    lisinopril (PRINIVIL;ZESTRIL) tablet 5 mg  5 mg Oral Daily Fracisco Mcintosh MD   5 mg at 12/18/21 0917    sodium chloride flush 0.9 % injection 5-40 mL  5-40 mL IntraVENous 2 times per day Fracisco Mcintosh MD   10 mL at 12/18/21 0916    sodium chloride flush 0.9 % injection 5-40 mL  5-40 mL IntraVENous PRN Fracisco Mcintosh MD   10 mL at 12/18/21 1320    0.9 % sodium chloride infusion  25 mL IntraVENous PRN Fracisco Mcintosh MD        enoxaparin (LOVENOX) injection 40 mg  40 mg SubCUTAneous Daily Fracisco Mcintosh MD   40 mg at 12/17/21 0857    ondansetron (ZOFRAN-ODT) disintegrating tablet 4 mg  4 mg Oral Q8H PRN Fracisco Mcintosh MD        Or    [facility] injection 4 mg  4 mg IntraVENous Q6H PRN Fracisco Mcintosh MD        polyethylene glycol [facility]) packet 17 g  17 g Oral Daily PRN Fracisco Mcintosh MD        acetaminophen (TYLENOL) tablet 650 mg  650 mg Oral Q6H PRN Fracisco Mcintosh MD   650 mg at 12/17/21 0018    Or    acetaminophen (TYLENOL) suppository 650 mg  650 mg Rectal Q6H PRN Fracisco Mcintosh MD        nicotine (NICODERM CQ) 21 MG/24HR 1 patch  1 patch TransDERmal Daily BOBBY Troncoso - CNP   1 patch at 12/18/21 5692       Allergies:  Morphine    Social History:   reports that he has quit smoking. His smoking use included cigarettes. He has a 12.50 pack-year smoking history. He has never used smokeless tobacco. He reports current drug use. Drug: Marijuana Michael Budge). He reports that he does not drink alcohol. Family History: family history includes Diabetes in his mother; Hearing Loss in his father; High Blood Pressure in his father. REVIEW OF SYSTEMS:      Constitutional: No fever or chills. No night sweats, positive weight loss.   Positive anxiety  Eyes: No eye discharge, double vision, or eye pain   HEENT: negative for sore mouth, sore throat, hoarseness and voice change   Respiratory: negative for cough , sputum, dyspnea, wheezing, hemoptysis, chest pain   Cardiovascular: negative for chest pain, dyspnea, palpitations, orthopnea, PND   Gastrointestinal: negative for nausea, vomiting, diarrhea, constipation, abdominal pain, Dysphagia, hematemesis and hematochezia   Genitourinary: negative for frequency, dysuria, nocturia, urinary incontinence, and hematuria   Integument: negative for rash, skin lesions, bruises.    Hematologic/Lymphatic: negative for easy bruising, bleeding, lymphadenopathy, or petechiae   Endocrine: negative for heat or cold intolerance,weight changes, change in bowel habits and hair loss   Musculoskeletal: Positive hip pain  Neurological: negative for headaches, dizziness, seizures, weakness, numbness    PHYSICAL EXAM:        BP (!) 147/92   Pulse 87   Temp 98.6 °F (37 °C) (Oral)   Resp 18   Ht 5' 11\" (1.803 m)   Wt 220 lb (99.8 kg)   SpO2 96%   BMI 30.68 kg/m²    Temp (24hrs), Av.2 °F (36.8 °C), Min:97.9 °F (36.6 °C), Max:98.6 °F (37 °C)      General appearance -patient distress due to hip pain  Mental status - alert and cooperative   Eyes - pupils equal and reactive, extraocular eye movements intact   Ears - bilateral TM's and external ear canals normal   Mouth - mucous membranes moist, pharynx normal without lesions   Neck - supple, no significant adenopathy   Lymphatics - no palpable lymphadenopathy, no hepatosplenomegaly   Chest -decreased breathing sounds  Heart - normal rate, regular rhythm, normal S1, S2, no murmurs  Abdomen - soft, nontender, nondistended, no masses or organomegaly   Neurological - alert, oriented, normal speech, no focal findings or movement disorder noted   Musculoskeletal -decreased range of motion of left lower extremity due to pain  Extremities - peripheral pulses normal, no pedal edema, no clubbing or cyanosis Skin - normal coloration and turgor, no rashes, no suspicious skin lesions noted ,      DATA:      Labs:     Results for orders placed or performed during the hospital encounter of 12/16/21   COVID-19, Rapid    Specimen: Nasopharyngeal Swab   Result Value Ref Range    Specimen Description . NASOPHARYNGEAL SWAB     SARS-CoV-2, Rapid Not Detected Not Detected   CBC Auto Differential   Result Value Ref Range    WBC 16.3 (H) 3.5 - 11.0 k/uL    RBC 4.54 4.5 - 5.9 m/uL    Hemoglobin 13.3 (L) 13.5 - 17.5 g/dL    Hematocrit 39.6 (L) 41 - 53 %    MCV 87.1 80 - 100 fL    MCH 29.3 26 - 34 pg    MCHC 33.7 31 - 37 g/dL    RDW 13.2 12.5 - 15.4 %    Platelets 425 (H) 876 - 450 k/uL    MPV 7.3 6.0 - 12.0 fL    NRBC Automated NOT REPORTED per 100 WBC    Differential Type NOT REPORTED     Immature Granulocytes NOT REPORTED 0 %    Absolute Immature Granulocyte NOT REPORTED 0.00 - 0.30 k/uL    WBC Morphology NOT REPORTED     RBC Morphology NOT REPORTED     Platelet Estimate NOT REPORTED     Seg Neutrophils 68 (H) 36 - 66 %    Lymphocytes 21 (L) 24 - 44 %    Monocytes 10 2 - 11 %    Eosinophils % 0 (L) 1 - 4 %    Basophils 1 0 - 2 %    Segs Absolute 11.09 (H) 1.8 - 7.7 k/uL    Absolute Lymph # 3.42 1.0 - 4.8 k/uL    Absolute Mono # 1.63 (H) 0.1 - 1.2 k/uL    Absolute Eos # 0.00 0.0 - 0.4 k/uL    Basophils Absolute 0.16 0.0 - 0.2 k/uL    Morphology Normal    Basic Metabolic Panel   Result Value Ref Range    Glucose 109 (H) 70 - 99 mg/dL    BUN 24 (H) 6 - 20 mg/dL    CREATININE 0.74 0.70 - 1.20 mg/dL    Bun/Cre Ratio NOT REPORTED 9 - 20    Calcium 11.3 (H) 8.6 - 10.4 mg/dL    Sodium 138 135 - 144 mmol/L    Potassium 4.1 3.7 - 5.3 mmol/L    Chloride 99 98 - 107 mmol/L    CO2 24 20 - 31 mmol/L    Anion Gap 15 9 - 17 mmol/L    GFR Non-African American >60 >60 mL/min    GFR African American >60 >60 mL/min    GFR Comment          GFR Staging NOT REPORTED    Basic Metabolic Panel w/ Reflex to MG   Result Value Ref Range    Glucose 115 (H) 70 - 99 mg/dL    BUN 24 (H) 6 - 20 mg/dL    CREATININE 0.54 (L) 0.70 - 1.20 mg/dL    Bun/Cre Ratio NOT REPORTED 9 - 20    Calcium 10.3 8.6 - 10.4 mg/dL    Sodium 139 135 - 144 mmol/L    Potassium 4.3 3.7 - 5.3 mmol/L    Chloride 105 98 - 107 mmol/L    CO2 22 20 - 31 mmol/L    Anion Gap 12 9 - 17 mmol/L    GFR Non-African American >60 >60 mL/min    GFR African American >60 >60 mL/min    GFR Comment          GFR Staging NOT REPORTED    CBC auto differential   Result Value Ref Range    WBC 11.9 (H) 3.5 - 11.0 k/uL    RBC 4.25 (L) 4.5 - 5.9 m/uL    Hemoglobin 12.6 (L) 13.5 - 17.5 g/dL    Hematocrit 37.2 (L) 41 - 53 %    MCV 87.6 80 - 100 fL    MCH 29.7 26 - 34 pg    MCHC 33.9 31 - 37 g/dL    RDW 13.3 12.5 - 15.4 %    Platelets 789 (H) 207 - 450 k/uL    MPV 7.4 6.0 - 12.0 fL    NRBC Automated NOT REPORTED per 100 WBC    Differential Type NOT REPORTED     Immature Granulocytes NOT REPORTED 0 %    Absolute Immature Granulocyte NOT REPORTED 0.00 - 0.30 k/uL    WBC Morphology NOT REPORTED     RBC Morphology NOT REPORTED     Platelet Estimate NOT REPORTED     Seg Neutrophils 69 (H) 36 - 66 %    Lymphocytes 21 (L) 24 - 44 %    Monocytes 9 2 - 11 %    Eosinophils % 0 (L) 1 - 4 %    Basophils 1 0 - 2 %    Segs Absolute 8.20 (H) 1.8 - 7.7 k/uL    Absolute Lymph # 2.50 1.0 - 4.8 k/uL    Absolute Mono # 1.00 0.1 - 1.2 k/uL    Absolute Eos # 0.00 0.0 - 0.4 k/uL    Basophils Absolute 0.10 0.0 - 0.2 k/uL   PTH, INTACT WITH IONIZED CALCIUM   Result Value Ref Range    Pth Intact 27.65 15.0 - 65.0 pg/mL    Calcium, Ion 1.39 (H) 1.13 - 1.33 mmol/L   VITAMIN D 25 HYDROXY   Result Value Ref Range    Vit D, 25-Hydroxy 18.9 (L) 30.0 - 100.0 ng/mL   ETHANOL   Result Value Ref Range    Ethanol <10 <10 mg/dL    Ethanol percent <0.010 <0.010 %   DRUG SCREEN MULTI URINE   Result Value Ref Range    Amphetamine Screen, Ur NEGATIVE NEGATIVE    Barbiturate Screen, Ur NEGATIVE NEGATIVE    Benzodiazepine Screen, Urine POSITIVE (A) NEGATIVE    Cocaine Metabolite, Urine NEGATIVE NEGATIVE    Methadone Screen, Urine POSITIVE (A) NEGATIVE    Opiates, Urine POSITIVE (A) NEGATIVE    Phencyclidine, Urine NEGATIVE NEGATIVE    Propoxyphene, Urine NOT REPORTED NEGATIVE    Cannabinoid Scrn, Ur POSITIVE (A) NEGATIVE    Oxycodone Screen, Ur POSITIVE (A) NEGATIVE    Methamphetamine, Urine NOT REPORTED NEGATIVE    Tricyclic Antidepressants, Urine NOT REPORTED NEGATIVE    MDMA, Urine NOT REPORTED NEGATIVE    Buprenorphine Urine NOT REPORTED NEGATIVE    Test Information       Assay provides medical screening only. The absence of expected drug(s) and/or metabolite(s) may indicate diluted or adulterated urine, limitations of testing or timing of collection.    Basic Metabolic Panel w/ Reflex to MG   Result Value Ref Range    Glucose 111 (H) 70 - 99 mg/dL    BUN 29 (H) 6 - 20 mg/dL    CREATININE 0.54 (L) 0.70 - 1.20 mg/dL    Bun/Cre Ratio NOT REPORTED 9 - 20    Calcium 10.2 8.6 - 10.4 mg/dL    Sodium 136 135 - 144 mmol/L    Potassium 4.4 3.7 - 5.3 mmol/L    Chloride 102 98 - 107 mmol/L    CO2 20 20 - 31 mmol/L    Anion Gap 14 9 - 17 mmol/L    GFR Non-African American >60 >60 mL/min    GFR African American >60 >60 mL/min    GFR Comment          GFR Staging NOT REPORTED    CBC auto differential   Result Value Ref Range    WBC 15.0 (H) 3.5 - 11.0 k/uL    RBC 4.21 (L) 4.5 - 5.9 m/uL    Hemoglobin 12.3 (L) 13.5 - 17.5 g/dL    Hematocrit 37.9 (L) 41 - 53 %    MCV 90.0 80 - 100 fL    MCH 29.2 26 - 34 pg    MCHC 32.5 31 - 37 g/dL    RDW 12.8 12.5 - 15.4 %    Platelets 073 (H) 523 - 450 k/uL    MPV 9.0 8.0 - 14.0 fL    NRBC Automated NOT REPORTED per 100 WBC    Differential Type NOT REPORTED     Immature Granulocytes NOT REPORTED 0 %    Absolute Immature Granulocyte NOT REPORTED 0.00 - 0.30 k/uL    WBC Morphology NOT REPORTED     RBC Morphology NOT REPORTED     Platelet Estimate NOT REPORTED     Seg Neutrophils 75 (H) 36 - 66 %    Lymphocytes 16 (L) 24 - 44 %    Monocytes 9 2 - 11 % Eosinophils % 0 (L) 1 - 4 %    Basophils 0 0 - 2 %    Segs Absolute 11.29 (H) 1.8 - 7.7 k/uL    Absolute Lymph # 2.38 1.0 - 4.8 k/uL    Absolute Mono # 1.35 (H) 0.1 - 1.2 k/uL    Absolute Eos # 0.00 0.0 - 0.4 k/uL    Basophils Absolute 0.00 0.0 - 0.2 k/uL         IMAGING DATA:    XR PELVIS (1-2 VIEWS)    Result Date: 12/16/2021  EXAMINATION: ONE XRAY VIEW OF THE PELVIS 12/16/2021 4:12 pm COMPARISON: 09/15/2021. HISTORY: ORDERING SYSTEM PROVIDED HISTORY: known lytic lesion to pelvis. worsening pain TECHNOLOGIST PROVIDED HISTORY: known lytic lesion to pelvis. worsening pain Reason for Exam: known lytic lesion to pelvis. worsening pain FINDINGS: A lytic expansile lesion involves all of the left inferior pubic ramus extending into the left acetabulum. It measured 8.2 x 6.7 cm with significant progression of from 09/15/2021. No left hip dislocation was noted. No pathological fracture was seen. Marked degenerative disc changes were noted in the lumbar spine. Lytic expansile lesion involves all the left inferior pubic ramus extending into the left acetabulum measuring 8.2 x 6.7 cm. Significant progression has occurred from 09/15/2021. At that time, it measured 2.9 cm in greatest diameter. No pathological fracture was seen. CT CHEST W CONTRAST    Result Date: 12/10/2021  EXAMINATION: CT OF THE CHEST WITH CONTRAST 12/10/2021 12:46 pm TECHNIQUE: CT of the chest was performed with the administration of intravenous contrast. Multiplanar reformatted images are provided for review. Dose modulation, iterative reconstruction, and/or weight based adjustment of the mA/kV was utilized to reduce the radiation dose to as low as reasonably achievable. COMPARISON: 02/21/2013 HISTORY: Reason for Exam: ? mass FINDINGS: Mediastinum: No evidence of mediastinal lymphadenopathy. Normal heart size. Normal caliber pulmonary trunk. Normal thoracic aorta.  Lungs/pleura: 2.5 cm right suprahilar spiculated lesion which abuts the mediastinum with questionable invasion. No focal consolidation or pulmonary edema. No evidence of pleural effusion or pneumothorax. Upper Abdomen: 2 cm right adrenal lesion. Partially visualized partially exophytic cyst anterior upper pole left kidney which demonstrates interval enlargement now measuring approximately 4.3 cm. Likely hepatic fatty infiltration. Slightly contracted gallbladder with mild wall enhancement. Soft Tissues/Bones: No acute bone or soft tissue abnormality. Mild hypertrophic bony spurring in the mid to lower thoracic spine. Bilateral gynecomastia. *Right suprahilar specular lesion which abuts the mediastinum with questionable invasion as measured above concerning for malignancy, recommend further evaluation with tissue sampling and/or PET-CT. Indeterminate right adrenal lesion as measured above, consider further assessment with dedicated adrenal protocol CT or MRI. *Likely hepatic fatty infiltration. Slightly contracted gallbladder with mild wall enhancement. Consider further assessment with right upper quadrant ultrasound. *Interval enlargement of partially visualized left renal cyst. The findings were sent to the Radiology Results [address] at 1:18 pm on 12/10/2021to be communicated to a licensed caregiver. NM BONE SCAN 3 PHASE    Result Date: 12/8/2021  EXAMINATION: THREE PHASE BONE SCAN 12/8/2021 8:06 am TECHNIQUE: The patient was injected intravenously with 26 mCi of 99 mTc MDP. Initial blood flow and pool images of the pelvis were acquired. After 3 hours, delayed bone images were acquired. Whole-body images were obtained. Additional spot images of the pelvis and ribs are obtained. COMPARISON: CT scan of the left hip 12/07/2021 HISTORY: ORDERING SYSTEM PROVIDED HISTORY: Lytic bone lesion of hip FINDINGS: There is increased blood flow and blood pool activity in the left ischium and inferior pubic ramus as well as in the adjacent soft tissues.  Delayed images show mild heterogeneous activity in the left ischium and inferior pubic ramus with increased uptake along the edge of the corresponding lesion. No other area of abnormal increased uptake. Degenerative uptake in the knees. Three-phase positive uptake associated with the destructive lytic lesion in the left ischium and inferior pubic ramus. The delayed phase uptake is primarily along the margin of the lesion, typical of predominantly lytic lesions. Other lytic lesions may not be apparent on bone scan. The overall appearance is highly concerning for malignancy. CT HIP LEFT WO CONTRAST    Result Date: 12/7/2021  EXAMINATION: CT OF THE LEFT HIP WITHOUT CONTRAST 12/7/2021 3:09 pm TECHNIQUE: CT of the left hip was performed without the administration of intravenous contrast.  Multiplanar reformatted images are provided for review. Dose modulation, iterative reconstruction, and/or weight based adjustment of the mA/kV was utilized to reduce the radiation dose to as low as reasonably achievable. COMPARISON: CT abdomen/pelvis dated 15 September 2021 HISTORY ORDERING SYSTEM PROVIDED HISTORY: Worsening pain with possible metastatic disease, unable to walk or ambulate on left leg, severe left hip pain TECHNOLOGIST PROVIDED HISTORY: Worsening pain with possible metastatic disease, unable to walk or ambulate on left leg, severe left hip pain Reason for Exam: Worsening pain with possible metastatic disease, unable to walk or ambulate on left leg, severe left hip pain Acuity: Unknown Type of Exam: Unknown FINDINGS: Bones: There is an expansile, lytic lesion occupying the majority of the left inferior pubic ramus which extends into the posterior/inferior portion of the left acetabulum. There is an additional lytic lesion measuring 1.2 cm in the left iliac bone (series 2, image 58). Soft Tissue: There is an expansile soft tissue mass invading the left inferior pubic ramus extending into the left acetabulum.   The visualized intrapelvic organs appear normal.     1.  Expansile lytic lesion involving the majority of the left inferior pubic ramus extending into the posterior/inferior portion of the left acetabulum. The differential includes metastatic disease versus primary bone neoplasm. Consider whole-body bone scan versus PET scan to evaluate for any additional lesions. The degree of osseous replacement by the mass is significant which could result in a pathologic fracture. 2.  Additional 1.2 cm lytic lesion in the left iliac bone. CT BIOPSY SUPERFICIAL BONE PERCUTANEOUS    Result Date: 12/9/2021  PROCEDURE: CT BX BONE NEEDLE SUPERFCL MODERATE CONSCIOUS SEDATION 12/9/2021 HISTORY: ORDERING SYSTEM PROVIDED HISTORY: biopsy of left pelvic bone mass TECHNOLOGIST PROVIDED HISTORY: biopsy of left pelvic bone mass Reason for Exam: biopsy left pelvic bone mass TECHNIQUE: Dose modulation, iterative reconstruction, and/or weight based adjustment of the mA/kV was utilized to reduce the radiation dose to as low as reasonably achievable. CONTRAST: None SEDATION: 0versed and 100 mcg fentanyl were titrated intravenously for moderate sedation monitored under my direction. Total intraservice time of sedation was 15 minutes. The patient's vital signs were monitored throughout the procedure and recorded in the patient's medical record by the nurse. FLUOROSCOPY DOSE AND TYPE OR TIME AND EXPOSURES: None DESCRIPTION OF PROCEDURE: Informed consent was obtained after a detailed explanation of the procedure including risks, benefits, and alternatives. Universal protocol was observed. Patient is placed prone on the table. Pelvic region was prepped and draped sterile fashion. Left ischium was localized by CT scan. 1% lidocaine was infiltrated for local anesthesia. An 18 gauge coaxial needle was advanced to the edge of the expansile bone lesion. Multiple 18 gauge cores were obtained and submitted to pathology.   Post biopsy scanning showed no immediate complication. Dressing was applied. Patient was returned to holding stable condition. FINDINGS: Needle confirmed within the center of the expansile lytic bone lesion in left ischium. Successful CT-guided core bone biopsy. IMPRESSION:   Primary Problem  <principal problem not specified>    Active Hospital Problems    Diagnosis Date Noted    Metastatic cancer ([facility] [address] [C79.9] 12/17/2021    Intractable pain [R52] 12/16/2021    Anxiety [F41.9] 10/05/2015    HTN (hypertension) [I10] 08/21/2014       Metastatic adenocarcinoma with bone metastasis  Intractable pain  2.5 cm right suprahilar lesion  Hypercalcemia  History of IV drug abuse  Severe anxiety    RECOMMENDATIONS:  I personally reviewed results of lab work-up imaging studies and other relevant clinical data  I will increase OxyContin ER to 40 mg 3 times daily. DC fentanyl. At this point recommend keeping one type of long-acting pain medication  Patient seen by radiation oncology anticipating palliative radiation to the bone metastasis  Patient likely has metastatic lung cancer. Plan for outpatient CT PET  Patient yesterday requested to be seen by hospice. Reviewed natural history of lung cancer and prognostic data. After discussing data he is more open to pursuing further systemic therapy  Hypercalcemia improved with hydration  Consult radiation oncology  We will follow    Discussed with patient and Nurse. Thank you for asking us to see this patient. Eliana Coulter MD          This note is created with the assistance of a speech recognition program.  While intending to generate a document that actually reflects the content of the visit, the document can still have some errors including those of syntax and sound a like substitutions which may escape proof reading. It such instances, actual meaning can be extrapolated by contextual diversion.

## 2021-12-19 LAB
ABSOLUTE EOS #: 0 K/UL (ref 0–0.4)
ABSOLUTE IMMATURE GRANULOCYTE: ABNORMAL K/UL (ref 0–0.3)
ABSOLUTE LYMPH #: 2.24 K/UL (ref 1–4.8)
ABSOLUTE MONO #: 0.86 K/UL (ref 0.1–0.8)
ANION GAP SERPL CALCULATED.3IONS-SCNC: 14 MMOL/L (ref 9–17)
BASOPHILS # BLD: 0 % (ref 0–2)
BASOPHILS ABSOLUTE: 0 K/UL (ref 0–0.2)
BUN BLDV-MCNC: 30 MG/DL (ref 6–20)
BUN/CREAT BLD: ABNORMAL (ref 9–20)
CALCIUM SERPL-MCNC: 10.1 MG/DL (ref 8.6–10.4)
CHLORIDE BLD-SCNC: 102 MMOL/L (ref 98–107)
CO2: 20 MMOL/L (ref 20–31)
CREAT SERPL-MCNC: 0.57 MG/DL (ref 0.7–1.2)
DIFFERENTIAL TYPE: ABNORMAL
EOSINOPHILS RELATIVE PERCENT: 0 % (ref 1–4)
GFR AFRICAN AMERICAN: >60 ML/MIN
GFR NON-AFRICAN AMERICAN: >60 ML/MIN
GFR SERPL CREATININE-BSD FRML MDRD: ABNORMAL ML/MIN/{1.73_M2}
GFR SERPL CREATININE-BSD FRML MDRD: ABNORMAL ML/MIN/{1.73_M2}
GLUCOSE BLD-MCNC: 109 MG/DL (ref 70–99)
HCT VFR BLD CALC: 34.7 % (ref 41–53)
HEMOGLOBIN: 12 G/DL (ref 13.5–17.5)
IMMATURE GRANULOCYTES: ABNORMAL %
LYMPHOCYTES # BLD: 13 % (ref 24–44)
MCH RBC QN AUTO: 30.6 PG (ref 26–34)
MCHC RBC AUTO-ENTMCNC: 34.6 G/DL (ref 31–37)
MCV RBC AUTO: 88.3 FL (ref 80–100)
MONOCYTES # BLD: 5 % (ref 1–7)
MORPHOLOGY: NORMAL
NRBC AUTOMATED: ABNORMAL PER 100 WBC
PDW BLD-RTO: 13.3 % (ref 12.5–15.4)
PLATELET # BLD: 596 K/UL (ref 140–450)
PLATELET ESTIMATE: ABNORMAL
PMV BLD AUTO: 7.4 FL (ref 6–12)
POTASSIUM SERPL-SCNC: 4.5 MMOL/L (ref 3.7–5.3)
RBC # BLD: 3.93 M/UL (ref 4.5–5.9)
RBC # BLD: ABNORMAL 10*6/UL
SEG NEUTROPHILS: 82 % (ref 36–66)
SEGMENTED NEUTROPHILS ABSOLUTE COUNT: 14.1 K/UL (ref 1.8–7.7)
SODIUM BLD-SCNC: 136 MMOL/L (ref 135–144)
WBC # BLD: 17.2 K/UL (ref 3.5–11)
WBC # BLD: ABNORMAL 10*3/UL

## 2021-12-19 PROCEDURE — 36415 COLL VENOUS BLD VENIPUNCTURE: CPT

## 2021-12-19 PROCEDURE — 6370000000 HC RX 637 (ALT 250 FOR IP): Performed by: INTERNAL MEDICINE

## 2021-12-19 PROCEDURE — 99232 SBSQ HOSP IP/OBS MODERATE 35: CPT | Performed by: INTERNAL MEDICINE

## 2021-12-19 PROCEDURE — 85025 COMPLETE CBC W/AUTO DIFF WBC: CPT

## 2021-12-19 PROCEDURE — 6360000002 HC RX W HCPCS: Performed by: INTERNAL MEDICINE

## 2021-12-19 PROCEDURE — 80048 BASIC METABOLIC PNL TOTAL CA: CPT

## 2021-12-19 PROCEDURE — 6360000002 HC RX W HCPCS: Performed by: STUDENT IN AN ORGANIZED HEALTH CARE EDUCATION/TRAINING PROGRAM

## 2021-12-19 PROCEDURE — 1210000000 HC MED SURG R&B

## 2021-12-19 PROCEDURE — 99232 SBSQ HOSP IP/OBS MODERATE 35: CPT | Performed by: STUDENT IN AN ORGANIZED HEALTH CARE EDUCATION/TRAINING PROGRAM

## 2021-12-19 PROCEDURE — 6370000000 HC RX 637 (ALT 250 FOR IP): Performed by: STUDENT IN AN ORGANIZED HEALTH CARE EDUCATION/TRAINING PROGRAM

## 2021-12-19 PROCEDURE — 6370000000 HC RX 637 (ALT 250 FOR IP): Performed by: NURSE PRACTITIONER

## 2021-12-19 PROCEDURE — 2580000003 HC RX 258: Performed by: STUDENT IN AN ORGANIZED HEALTH CARE EDUCATION/TRAINING PROGRAM

## 2021-12-19 RX ORDER — KETOROLAC TROMETHAMINE 30 MG/ML
30 INJECTION, SOLUTION INTRAMUSCULAR; INTRAVENOUS EVERY 6 HOURS PRN
Status: DISCONTINUED | OUTPATIENT
Start: 2021-12-19 | End: 2021-12-21

## 2021-12-19 RX ORDER — CITALOPRAM 20 MG/1
20 TABLET ORAL DAILY
Status: DISCONTINUED | OUTPATIENT
Start: 2021-12-19 | End: 2021-12-23 | Stop reason: HOSPADM

## 2021-12-19 RX ORDER — OXYCODONE HCL 40 MG/1
80 TABLET, FILM COATED, EXTENDED RELEASE ORAL EVERY 12 HOURS SCHEDULED
Status: DISCONTINUED | OUTPATIENT
Start: 2021-12-19 | End: 2021-12-23 | Stop reason: HOSPADM

## 2021-12-19 RX ADMIN — OXYCODONE HYDROCHLORIDE 10 MG: 5 TABLET ORAL at 05:13

## 2021-12-19 RX ADMIN — SODIUM CHLORIDE, PRESERVATIVE FREE 10 ML: 5 INJECTION INTRAVENOUS at 20:54

## 2021-12-19 RX ADMIN — LISINOPRIL 5 MG: 5 TABLET ORAL at 07:58

## 2021-12-19 RX ADMIN — OXYCODONE HYDROCHLORIDE 10 MG: 5 TABLET ORAL at 00:56

## 2021-12-19 RX ADMIN — HYDROMORPHONE HYDROCHLORIDE 1 MG: 1 INJECTION, SOLUTION INTRAMUSCULAR; INTRAVENOUS; SUBCUTANEOUS at 15:09

## 2021-12-19 RX ADMIN — DEXAMETHASONE SODIUM PHOSPHATE 4 MG: 4 INJECTION, SOLUTION INTRA-ARTICULAR; INTRALESIONAL; INTRAMUSCULAR; INTRAVENOUS; SOFT TISSUE at 15:07

## 2021-12-19 RX ADMIN — OXYCODONE HYDROCHLORIDE 10 MG: 5 TABLET ORAL at 13:13

## 2021-12-19 RX ADMIN — DEXAMETHASONE SODIUM PHOSPHATE 4 MG: 4 INJECTION, SOLUTION INTRA-ARTICULAR; INTRALESIONAL; INTRAMUSCULAR; INTRAVENOUS; SOFT TISSUE at 20:54

## 2021-12-19 RX ADMIN — OXYCODONE HYDROCHLORIDE 10 MG: 5 TABLET ORAL at 23:33

## 2021-12-19 RX ADMIN — OXYCODONE HYDROCHLORIDE 40 MG: 20 TABLET, FILM COATED, EXTENDED RELEASE ORAL at 06:26

## 2021-12-19 RX ADMIN — GABAPENTIN 100 MG: 100 CAPSULE ORAL at 15:06

## 2021-12-19 RX ADMIN — OXYCODONE HYDROCHLORIDE 80 MG: 40 TABLET, FILM COATED, EXTENDED RELEASE ORAL at 20:54

## 2021-12-19 RX ADMIN — CITALOPRAM HYDROBROMIDE 20 MG: 20 TABLET, FILM COATED ORAL at 15:06

## 2021-12-19 RX ADMIN — ALPRAZOLAM 1 MG: 0.5 TABLET ORAL at 07:58

## 2021-12-19 RX ADMIN — ALPRAZOLAM 1 MG: 0.5 TABLET ORAL at 22:11

## 2021-12-19 RX ADMIN — HYDROMORPHONE HYDROCHLORIDE 1 MG: 1 INJECTION, SOLUTION INTRAMUSCULAR; INTRAVENOUS; SUBCUTANEOUS at 11:47

## 2021-12-19 RX ADMIN — OXYCODONE HYDROCHLORIDE 10 MG: 5 TABLET ORAL at 17:50

## 2021-12-19 RX ADMIN — OXYCODONE HYDROCHLORIDE 10 MG: 5 TABLET ORAL at 09:39

## 2021-12-19 RX ADMIN — HYDROMORPHONE HYDROCHLORIDE 1 MG: 1 INJECTION, SOLUTION INTRAMUSCULAR; INTRAVENOUS; SUBCUTANEOUS at 08:23

## 2021-12-19 RX ADMIN — HYDROMORPHONE HYDROCHLORIDE 1 MG: 1 INJECTION, SOLUTION INTRAMUSCULAR; INTRAVENOUS; SUBCUTANEOUS at 19:39

## 2021-12-19 RX ADMIN — SODIUM CHLORIDE, PRESERVATIVE FREE 10 ML: 5 INJECTION INTRAVENOUS at 07:58

## 2021-12-19 RX ADMIN — DEXAMETHASONE SODIUM PHOSPHATE 4 MG: 4 INJECTION, SOLUTION INTRA-ARTICULAR; INTRALESIONAL; INTRAMUSCULAR; INTRAVENOUS; SOFT TISSUE at 07:59

## 2021-12-19 RX ADMIN — KETOROLAC TROMETHAMINE 30 MG: 30 INJECTION, SOLUTION INTRAMUSCULAR; INTRAVENOUS at 15:07

## 2021-12-19 RX ADMIN — GABAPENTIN 100 MG: 100 CAPSULE ORAL at 20:54

## 2021-12-19 RX ADMIN — ALPRAZOLAM 1 MG: 0.5 TABLET ORAL at 15:06

## 2021-12-19 RX ADMIN — HYDROMORPHONE HYDROCHLORIDE 1 MG: 1 INJECTION, SOLUTION INTRAMUSCULAR; INTRAVENOUS; SUBCUTANEOUS at 02:17

## 2021-12-19 RX ADMIN — GABAPENTIN 100 MG: 100 CAPSULE ORAL at 07:58

## 2021-12-19 RX ADMIN — POLYETHYLENE GLYCOL 3350 17 G: 17 POWDER, FOR SOLUTION ORAL at 21:16

## 2021-12-19 ASSESSMENT — PAIN SCALES - GENERAL
PAINLEVEL_OUTOF10: 6
PAINLEVEL_OUTOF10: 5
PAINLEVEL_OUTOF10: 10
PAINLEVEL_OUTOF10: 8
PAINLEVEL_OUTOF10: 6
PAINLEVEL_OUTOF10: 6
PAINLEVEL_OUTOF10: 5
PAINLEVEL_OUTOF10: 0
PAINLEVEL_OUTOF10: 9
PAINLEVEL_OUTOF10: 9
PAINLEVEL_OUTOF10: 4
PAINLEVEL_OUTOF10: 5
PAINLEVEL_OUTOF10: 5
PAINLEVEL_OUTOF10: 7
PAINLEVEL_OUTOF10: 9
PAINLEVEL_OUTOF10: 10
PAINLEVEL_OUTOF10: 10
PAINLEVEL_OUTOF10: 7
PAINLEVEL_OUTOF10: 6
PAINLEVEL_OUTOF10: 10
PAINLEVEL_OUTOF10: 9
PAINLEVEL_OUTOF10: 7

## 2021-12-19 NOTE — PROGRESS NOTES
Today's Date: 12/19/2021  Patient Name: Jessica Rodriguez  Date of admission: 12/16/2021  3:33 PM  Patient's age: 46 y.o., 1970  Admission Dx: Intractable pain [R52]  Metast[facility] bone [facility] unknown [facility] ([facility] [C79.51, C80.1]    Reason for Consult: management recommendations  Requesting Physician: Lan Naik MD    CHIEF COMPLAINT: Intractable hip pain. History Obtained From:  patient, electronic medical record    Interval history:    Patient seen and examined  Continues to have significant pain  Still awaiting radiation oncology  Pain is poorly controlled,   he is very anxious  The pain is localized to the left hip and the inner thigh. HISTORY OF PRESENT ILLNESS:      The patient is a 46 y.o.  male who is admitted to the hospital for chief complaints of severe hip pain. Patient is taking pain medication at home which is not helping. States that he is simply miserable with the pain. Patient was recently admitted to the hospital with left-sided hip pain. CT scan done at that time showed a lytic lesion involving the left pubic ramus extending to the posterior portion of the left acetabulum. Patient was seen by the hematology oncology team.  Patient was also noted to have hypercalcemia. Patient underwent biopsy of the hip lesion which came back as metastatic adenocarcinoma immunohistochemistry was nonspecific. Patient also noted to have hypercalcemia with calcium of 11.3 at presentation. Patient WBC count was 16.3 hemoglobin 13.3 and platelet count 866. CT chest showed suprahilar lesion abutting the mediastinum with concern regarding invasion. There was also an indeterminate right adrenal lesion. Patient also has history of IV drug abuse with heroin.   Patient is currently on methadone which she gets through this  center    Past Medical History:   has a past medical history of Abscess, Anxiety, Cellulitis due to MRSA, Chronic back pain, Depression, Hypertension, Metastatic cancer [facility]), Multiple substance abuse ([facility] [address] and OCD (obsessive compulsive disorder). Past Surgical History:   has a past surgical history that includes shoulder surgery (Right); Eye surgery; and CT BIOPSY SUPERFICIAL BONE PERCUTANEOUS (12/9/2021). Medications:    Prior to Admission medications    Medication Sig Start Date End Date Taking? Authorizing Provider   ALPJAHAIRAZofelix Tena) 0.5 MG tablet Take 0.5 mg by mouth nightly. 12/13/21  Yes Historical Provider, MD   oxyCODONE-acetaminophen (PERCOCET) 5-325 MG per tablet Take 1 tablet by mouth every 4-6 hours as needed. 12/15/21  Yes Historical Provider, MD   lisinopril (PRINIVIL;ZESTRIL) 5 MG tablet Take 1 tablet by mouth daily 12/11/21  Yes Oanh Coyle MD   ketorolac (TORADOL) 10 MG tablet Take 1 tablet by mouth every 6 hours as needed for Pain 12/10/21 12/24/21 Yes Oanh Coyle MD   gabapentin (NEURONTIN) 100 MG capsule Take 1 capsule by mouth 3 times daily for 30 days.  12/10/21 1/9/22 Yes Oanh Coyle MD   melatonin (RA MELATONIN) 3 MG TABS tablet Take 1 tablet by mouth nightly as needed (insomnia) 9/15/21   Angeline Gery Osler, DO   traZODone (DESYREL) 50 MG tablet Take 1 tablet by mouth nightly 3/31/20   Ronnie Hernandez MD     Current Facility-Administered Medications   Medication Dose Route Frequency Provider Last Rate Last Admin    oxyCODONE (OXYCONTIN) extended release tablet 40 mg  40 mg Oral TID Raina Rae MD   40 mg at 12/19/21 3668    oxyCODONE (ROXICODONE) immediate release tablet 10 mg  10 mg Oral Q4H PRN Diana Pride MD   10 mg at 12/19/21 0939    HYDROmorphone (DILAUDID) injection 1 mg  1 mg IntraVENous Q3H PRN Raina Rae MD   1 mg at 12/19/21 1147    HYDROmorphone (DILAUDID) injection 1 mg  1 mg IntraVENous Once Diana Pride MD        ALPRAZolam Jolane Distel) tablet 1 mg  1 mg Oral 4x Daily PRN Diana Pride MD   1 mg at 12/19/21 0758    dexamethasone (DECADRON) injection 4 mg  4 mg IntraVENous TID Diana Pride MD   4 mg at 12/19/21 0759    gabapentin (NEURONTIN) capsule 100 mg  100 mg Oral TID Fracisco Mcintosh MD   100 mg at 12/19/21 0758    lisinopril (PRINIVIL;ZESTRIL) tablet 5 mg  5 mg Oral Daily Fracisco Mcintosh MD   5 mg at 12/19/21 0758    sodium chloride flush 0.9 % injection 5-40 mL  5-40 mL IntraVENous 2 times per day Fracisco Mcintosh MD   10 mL at 12/19/21 0758    sodium chloride flush 0.9 % injection 5-40 mL  5-40 mL IntraVENous PRN Fracisco Mcintosh MD   10 mL at 12/18/21 1320    0.9 % sodium chloride infusion  25 mL IntraVENous PRN Fracisco Mcintosh MD        enoxaparin (LOVENOX) injection 40 mg  40 mg SubCUTAneous Daily Fracisco Mcintosh MD   40 mg at 12/17/21 0857    ondansetron (ZOFRAN-ODT) disintegrating tablet 4 mg  4 mg Oral Q8H PRN Fracisco Mcintosh MD        Or    ondansetron [facility]) injection 4 mg  4 mg IntraVENous Q6H PRN Fracisco Mcintosh MD        polyethylene glycol [facility]) packet 17 g  17 g Oral Daily PRN Fracisco Mcintosh MD        acetaminophen (TYLENOL) tablet 650 mg  650 mg Oral Q6H PRN Fracisco Mcintosh MD   650 mg at 12/17/21 0018    Or    acetaminophen (TYLENOL) suppository 650 mg  650 mg Rectal Q6H PRN Fracisco Mcintosh MD        nicotine (NICODERM CQ) 21 MG/24HR 1 patch  1 patch TransDERmal Daily BOBBY Troncoso - CNP   1 patch at 12/19/21 7401       Allergies:  Morphine    Social History:   reports that he has quit smoking. His smoking use included cigarettes. He has a 12.50 pack-year smoking history. He has never used smokeless tobacco. He reports current drug use. Drug: Marijuana Michael Budge). He reports that he does not drink alcohol. Family History: family history includes Diabetes in his mother; Hearing Loss in his father; High Blood Pressure in his father. REVIEW OF SYSTEMS:      Constitutional: No fever or chills. No night sweats, positive weight loss.   Positive anxiety  Eyes: No eye discharge, double vision, or eye pain   HEENT: negative for sore mouth, sore throat, hoarseness and voice change Respiratory: negative for cough , sputum, dyspnea, wheezing, hemoptysis, chest pain   Cardiovascular: negative for chest pain, dyspnea, palpitations, orthopnea, PND   Gastrointestinal: negative for nausea, vomiting, diarrhea, constipation, abdominal pain, Dysphagia, hematemesis and hematochezia   Genitourinary: negative for frequency, dysuria, nocturia, urinary incontinence, and hematuria   Integument: negative for rash, skin lesions, bruises.    Hematologic/Lymphatic: negative for easy bruising, bleeding, lymphadenopathy, or petechiae   Endocrine: negative for heat or cold intolerance,weight changes, change in bowel habits and hair loss   Musculoskeletal: Positive hip pain  Neurological: negative for headaches, dizziness, seizures, weakness, numbness    PHYSICAL EXAM:        BP (!) 140/82   Pulse 74   Temp 98.2 °F (36.8 °C) (Oral)   Resp 17   Ht 5' 11\" (1.803 m)   Wt 215 lb (97.5 kg)   SpO2 94%   BMI 29.99 kg/m²    Temp (24hrs), Av.1 °F (36.7 °C), Min:97.5 °F (36.4 °C), Max:98.6 °F (37 °C)      General appearance -patient distress due to hip pain  Mental status - alert and cooperative   Eyes - pupils equal and reactive, extraocular eye movements intact   Ears - bilateral TM's and external ear canals normal   Mouth - mucous membranes moist, pharynx normal without lesions   Neck - supple, no significant adenopathy   Lymphatics - no palpable lymphadenopathy, no hepatosplenomegaly   Chest -decreased breathing sounds  Heart - normal rate, regular rhythm, normal S1, S2, no murmurs  Abdomen - soft, nontender, nondistended, no masses or organomegaly   Neurological - alert, oriented, normal speech, no focal findings or movement disorder noted   Musculoskeletal -decreased range of motion of left lower extremity due to pain  Extremities - peripheral pulses normal, no pedal edema, no clubbing or cyanosis   Skin - normal coloration and turgor, no rashes, no suspicious skin lesions noted ,      DATA:      Labs: Results for orders placed or performed during the hospital encounter of 12/16/21   COVID-19, Rapid    Specimen: Nasopharyngeal Swab   Result Value Ref Range    Specimen Description . NASOPHARYNGEAL SWAB     SARS-CoV-2, Rapid Not Detected Not Detected   CBC Auto Differential   Result Value Ref Range    WBC 16.3 (H) 3.5 - 11.0 k/uL    RBC 4.54 4.5 - 5.9 m/uL    Hemoglobin 13.3 (L) 13.5 - 17.5 g/dL    Hematocrit 39.6 (L) 41 - 53 %    MCV 87.1 80 - 100 fL    MCH 29.3 26 - 34 pg    MCHC 33.7 31 - 37 g/dL    RDW 13.2 12.5 - 15.4 %    Platelets 870 (H) 783 - 450 k/uL    MPV 7.3 6.0 - 12.0 fL    NRBC Automated NOT REPORTED per 100 WBC    Differential Type NOT REPORTED     Immature Granulocytes NOT REPORTED 0 %    Absolute Immature Granulocyte NOT REPORTED 0.00 - 0.30 k/uL    WBC Morphology NOT REPORTED     RBC Morphology NOT REPORTED     Platelet Estimate NOT REPORTED     Seg Neutrophils 68 (H) 36 - 66 %    Lymphocytes 21 (L) 24 - 44 %    Monocytes 10 2 - 11 %    Eosinophils % 0 (L) 1 - 4 %    Basophils 1 0 - 2 %    Segs Absolute 11.09 (H) 1.8 - 7.7 k/uL    Absolute Lymph # 3.42 1.0 - 4.8 k/uL    Absolute Mono # 1.63 (H) 0.1 - 1.2 k/uL    Absolute Eos # 0.00 0.0 - 0.4 k/uL    Basophils Absolute 0.16 0.0 - 0.2 k/uL    Morphology Normal    Basic Metabolic Panel   Result Value Ref Range    Glucose 109 (H) 70 - 99 mg/dL    BUN 24 (H) 6 - 20 mg/dL    CREATININE 0.74 0.70 - 1.20 mg/dL    Bun/Cre Ratio NOT REPORTED 9 - 20    Calcium 11.3 (H) 8.6 - 10.4 mg/dL    Sodium 138 135 - 144 mmol/L    Potassium 4.1 3.7 - 5.3 mmol/L    Chloride 99 98 - 107 mmol/L    CO2 24 20 - 31 mmol/L    Anion Gap 15 9 - 17 mmol/L    GFR Non-African American >60 >60 mL/min    GFR African American >60 >60 mL/min    GFR Comment          GFR Staging NOT REPORTED    Basic Metabolic Panel w/ Reflex to MG   Result Value Ref Range    Glucose 115 (H) 70 - 99 mg/dL    BUN 24 (H) 6 - 20 mg/dL    CREATININE 0.54 (L) 0.70 - 1.20 mg/dL    Bun/Cre Ratio NOT REPORTED 9 - 20    Calcium 10.3 8.6 - 10.4 mg/dL    Sodium 139 135 - 144 mmol/L    Potassium 4.3 3.7 - 5.3 mmol/L    Chloride 105 98 - 107 mmol/L    CO2 22 20 - 31 mmol/L    Anion Gap 12 9 - 17 mmol/L    GFR Non-African American >60 >60 mL/min    GFR African American >60 >60 mL/min    GFR Comment          GFR Staging NOT REPORTED    CBC auto differential   Result Value Ref Range    WBC 11.9 (H) 3.5 - 11.0 k/uL    RBC 4.25 (L) 4.5 - 5.9 m/uL    Hemoglobin 12.6 (L) 13.5 - 17.5 g/dL    Hematocrit 37.2 (L) 41 - 53 %    MCV 87.6 80 - 100 fL    MCH 29.7 26 - 34 pg    MCHC 33.9 31 - 37 g/dL    RDW 13.3 12.5 - 15.4 %    Platelets 030 (H) 622 - 450 k/uL    MPV 7.4 6.0 - 12.0 fL    NRBC Automated NOT REPORTED per 100 WBC    Differential Type NOT REPORTED     Immature Granulocytes NOT REPORTED 0 %    Absolute Immature Granulocyte NOT REPORTED 0.00 - 0.30 k/uL    WBC Morphology NOT REPORTED     RBC Morphology NOT REPORTED     Platelet Estimate NOT REPORTED     Seg Neutrophils 69 (H) 36 - 66 %    Lymphocytes 21 (L) 24 - 44 %    Monocytes 9 2 - 11 %    Eosinophils % 0 (L) 1 - 4 %    Basophils 1 0 - 2 %    Segs Absolute 8.20 (H) 1.8 - 7.7 k/uL    Absolute Lymph # 2.50 1.0 - 4.8 k/uL    Absolute Mono # 1.00 0.1 - 1.2 k/uL    Absolute Eos # 0.00 0.0 - 0.4 k/uL    Basophils Absolute 0.10 0.0 - 0.2 k/uL   PTH, INTACT WITH IONIZED CALCIUM   Result Value Ref Range    Pth Intact 27.65 15.0 - 65.0 pg/mL    Calcium, Ion 1.39 (H) 1.13 - 1.33 mmol/L   VITAMIN D 25 HYDROXY   Result Value Ref Range    Vit D, 25-Hydroxy 18.9 (L) 30.0 - 100.0 ng/mL   ETHANOL   Result Value Ref Range    Ethanol <10 <10 mg/dL    Ethanol percent <0.010 <0.010 %   DRUG SCREEN MULTI URINE   Result Value Ref Range    Amphetamine Screen, Ur NEGATIVE NEGATIVE    Barbiturate Screen, Ur NEGATIVE NEGATIVE    Benzodiazepine Screen, Urine POSITIVE (A) NEGATIVE    Cocaine Metabolite, Urine NEGATIVE NEGATIVE    Methadone Screen, Urine POSITIVE (A) NEGATIVE    Opiates, Urine POSITIVE (A) NEGATIVE    Phencyclidine, Urine NEGATIVE NEGATIVE    Propoxyphene, Urine NOT REPORTED NEGATIVE    Cannabinoid Scrn, Ur POSITIVE (A) NEGATIVE    Oxycodone Screen, Ur POSITIVE (A) NEGATIVE    Methamphetamine, Urine NOT REPORTED NEGATIVE    Tricyclic Antidepressants, Urine NOT REPORTED NEGATIVE    MDMA, Urine NOT REPORTED NEGATIVE    Buprenorphine Urine NOT REPORTED NEGATIVE    Test Information       Assay provides medical screening only. The absence of expected drug(s) and/or metabolite(s) may indicate diluted or adulterated urine, limitations of testing or timing of collection.    Basic Metabolic Panel w/ Reflex to MG   Result Value Ref Range    Glucose 111 (H) 70 - 99 mg/dL    BUN 29 (H) 6 - 20 mg/dL    CREATININE 0.54 (L) 0.70 - 1.20 mg/dL    Bun/Cre Ratio NOT REPORTED 9 - 20    Calcium 10.2 8.6 - 10.4 mg/dL    Sodium 136 135 - 144 mmol/L    Potassium 4.4 3.7 - 5.3 mmol/L    Chloride 102 98 - 107 mmol/L    CO2 20 20 - 31 mmol/L    Anion Gap 14 9 - 17 mmol/L    GFR Non-African American >60 >60 mL/min    GFR African American >60 >60 mL/min    GFR Comment          GFR Staging NOT REPORTED    CBC auto differential   Result Value Ref Range    WBC 15.0 (H) 3.5 - 11.0 k/uL    RBC 4.21 (L) 4.5 - 5.9 m/uL    Hemoglobin 12.3 (L) 13.5 - 17.5 g/dL    Hematocrit 37.9 (L) 41 - 53 %    MCV 90.0 80 - 100 fL    MCH 29.2 26 - 34 pg    MCHC 32.5 31 - 37 g/dL    RDW 12.8 12.5 - 15.4 %    Platelets 045 (H) 221 - 450 k/uL    MPV 9.0 8.0 - 14.0 fL    NRBC Automated NOT REPORTED per 100 WBC    Differential Type NOT REPORTED     Immature Granulocytes NOT REPORTED 0 %    Absolute Immature Granulocyte NOT REPORTED 0.00 - 0.30 k/uL    WBC Morphology NOT REPORTED     RBC Morphology NOT REPORTED     Platelet Estimate NOT REPORTED     Seg Neutrophils 75 (H) 36 - 66 %    Lymphocytes 16 (L) 24 - 44 %    Monocytes 9 2 - 11 %    Eosinophils % 0 (L) 1 - 4 %    Basophils 0 0 - 2 %    Segs Absolute 11.29 (H) 1.8 - 7.7 k/uL    Absolute Lymph # 2.38 1.0 - 4.8 k/uL    Absolute Mono # 1.35 (H) 0.1 - 1.2 k/uL    Absolute Eos # 0.00 0.0 - 0.4 k/uL    Basophils Absolute 0.00 0.0 - 0.2 k/uL   Basic Metabolic Panel w/ Reflex to MG   Result Value Ref Range    Glucose 109 (H) 70 - 99 mg/dL    BUN 30 (H) 6 - 20 mg/dL    CREATININE 0.57 (L) 0.70 - 1.20 mg/dL    Bun/Cre Ratio NOT REPORTED 9 - 20    Calcium 10.1 8.6 - 10.4 mg/dL    Sodium 136 135 - 144 mmol/L    Potassium 4.5 3.7 - 5.3 mmol/L    Chloride 102 98 - 107 mmol/L    CO2 20 20 - 31 mmol/L    Anion Gap 14 9 - 17 mmol/L    GFR Non-African American >60 >60 mL/min    GFR African American >60 >60 mL/min    GFR Comment          GFR Staging NOT REPORTED    CBC auto differential   Result Value Ref Range    WBC 17.2 (H) 3.5 - 11.0 k/uL    RBC 3.93 (L) 4.5 - 5.9 m/uL    Hemoglobin 12.0 (L) 13.5 - 17.5 g/dL    Hematocrit 34.7 (L) 41 - 53 %    MCV 88.3 80 - 100 fL    MCH 30.6 26 - 34 pg    MCHC 34.6 31 - 37 g/dL    RDW 13.3 12.5 - 15.4 %    Platelets 006 (H) 832 - 450 k/uL    MPV 7.4 6.0 - 12.0 fL    NRBC Automated NOT REPORTED per 100 WBC    Differential Type NOT REPORTED     Immature Granulocytes NOT REPORTED 0 %    Absolute Immature Granulocyte NOT REPORTED 0.00 - 0.30 k/uL    WBC Morphology NOT REPORTED     RBC Morphology NOT REPORTED     Platelet Estimate NOT REPORTED     Seg Neutrophils 82 (H) 36 - 66 %    Lymphocytes 13 (L) 24 - 44 %    Monocytes 5 1 - 7 %    Eosinophils % 0 (L) 1 - 4 %    Basophils 0 0 - 2 %    Segs Absolute 14.10 (H) 1.8 - 7.7 k/uL    Absolute Lymph # 2.24 1.0 - 4.8 k/uL    Absolute Mono # 0.86 (H) 0.1 - 0.8 k/uL    Absolute Eos # 0.00 0.0 - 0.4 k/uL    Basophils Absolute 0.00 0.0 - 0.2 k/uL    Morphology Normal          IMAGING DATA:    XR PELVIS (1-2 VIEWS)    Result Date: 12/16/2021  EXAMINATION: ONE XRAY VIEW OF THE PELVIS 12/16/2021 4:12 pm COMPARISON: 09/15/2021. HISTORY: ORDERING SYSTEM PROVIDED HISTORY: known lytic lesion to pelvis.  worsening pain TECHNOLOGIST PROVIDED HISTORY: known lytic lesion to pelvis. worsening pain Reason for Exam: known lytic lesion to pelvis. worsening pain FINDINGS: A lytic expansile lesion involves all of the left inferior pubic ramus extending into the left acetabulum. It measured 8.2 x 6.7 cm with significant progression of from 09/15/2021. No left hip dislocation was noted. No pathological fracture was seen. Marked degenerative disc changes were noted in the lumbar spine. Lytic expansile lesion involves all the left inferior pubic ramus extending into the left acetabulum measuring 8.2 x 6.7 cm. Significant progression has occurred from 09/15/2021. At that time, it measured 2.9 cm in greatest diameter. No pathological fracture was seen. CT CHEST W CONTRAST    Result Date: 12/10/2021  EXAMINATION: CT OF THE CHEST WITH CONTRAST 12/10/2021 12:46 pm TECHNIQUE: CT of the chest was performed with the administration of intravenous contrast. Multiplanar reformatted images are provided for review. Dose modulation, iterative reconstruction, and/or weight based adjustment of the mA/kV was utilized to reduce the radiation dose to as low as reasonably achievable. COMPARISON: 02/21/2013 HISTORY: Reason for Exam: ? mass FINDINGS: Mediastinum: No evidence of mediastinal lymphadenopathy. Normal heart size. Normal caliber pulmonary trunk. Normal thoracic aorta. Lungs/pleura: 2.5 cm right suprahilar spiculated lesion which abuts the mediastinum with questionable invasion. No focal consolidation or pulmonary edema. No evidence of pleural effusion or pneumothorax. Upper Abdomen: 2 cm right adrenal lesion. Partially visualized partially exophytic cyst anterior upper pole left kidney which demonstrates interval enlargement now measuring approximately 4.3 cm. Likely hepatic fatty infiltration. Slightly contracted gallbladder with mild wall enhancement. Soft Tissues/Bones: No acute bone or soft tissue abnormality.   Mild hypertrophic bony spurring in the mid to lower thoracic spine.  Bilateral gynecomastia. *Right suprahilar specular lesion which abuts the mediastinum with questionable invasion as measured above concerning for malignancy, recommend further evaluation with tissue sampling and/or PET-CT. Indeterminate right adrenal lesion as measured above, consider further assessment with dedicated adrenal protocol CT or MRI. *Likely hepatic fatty infiltration. Slightly contracted gallbladder with mild wall enhancement. Consider further assessment with right upper quadrant ultrasound. *Interval enlargement of partially visualized left renal cyst. The findings were sent to the Radiology Results [id number] at 1:18 pm on 12/10/2021to be communicated to a licensed caregiver. NM BONE SCAN 3 PHASE    Result Date: 12/8/2021  EXAMINATION: THREE PHASE BONE SCAN 12/8/2021 8:06 am TECHNIQUE: The patient was injected intravenously with 26 mCi of 99 mTc MDP. Initial blood flow and pool images of the pelvis were acquired. After 3 hours, delayed bone images were acquired. Whole-body images were obtained. Additional spot images of the pelvis and ribs are obtained. COMPARISON: CT scan of the left hip 12/07/2021 HISTORY: ORDERING SYSTEM PROVIDED HISTORY: Lytic bone lesion of hip FINDINGS: There is increased blood flow and blood pool activity in the left ischium and inferior pubic ramus as well as in the adjacent soft tissues. Delayed images show mild heterogeneous activity in the left ischium and inferior pubic ramus with increased uptake along the edge of the corresponding lesion. No other area of abnormal increased uptake. Degenerative uptake in the knees. Three-phase positive uptake associated with the destructive lytic lesion in the left ischium and inferior pubic ramus. The delayed phase uptake is primarily along the margin of the lesion, typical of predominantly lytic lesions. Other lytic lesions may not be apparent on bone scan.   The overall appearance is highly concerning for malignancy. CT HIP LEFT WO CONTRAST    Result Date: 12/7/2021  EXAMINATION: CT OF THE LEFT HIP WITHOUT CONTRAST 12/7/2021 3:09 pm TECHNIQUE: CT of the left hip was performed without the administration of intravenous contrast.  Multiplanar reformatted images are provided for review. Dose modulation, iterative reconstruction, and/or weight based adjustment of the mA/kV was utilized to reduce the radiation dose to as low as reasonably achievable. COMPARISON: CT abdomen/pelvis dated 15 September 2021 HISTORY ORDERING SYSTEM PROVIDED HISTORY: Worsening pain with possible metastatic disease, unable to walk or ambulate on left leg, severe left hip pain TECHNOLOGIST PROVIDED HISTORY: Worsening pain with possible metastatic disease, unable to walk or ambulate on left leg, severe left hip pain Reason for Exam: Worsening pain with possible metastatic disease, unable to walk or ambulate on left leg, severe left hip pain Acuity: Unknown Type of Exam: Unknown FINDINGS: Bones: There is an expansile, lytic lesion occupying the majority of the left inferior pubic ramus which extends into the posterior/inferior portion of the left acetabulum. There is an additional lytic lesion measuring 1.2 cm in the left iliac bone (series 2, image 58). Soft Tissue: There is an expansile soft tissue mass invading the left inferior pubic ramus extending into the left acetabulum. The visualized intrapelvic organs appear normal.     1.  Expansile lytic lesion involving the majority of the left inferior pubic ramus extending into the posterior/inferior portion of the left acetabulum. The differential includes metastatic disease versus primary bone neoplasm. Consider whole-body bone scan versus PET scan to evaluate for any additional lesions. The degree of osseous replacement by the mass is significant which could result in a pathologic fracture. 2.  Additional 1.2 cm lytic lesion in the left iliac bone.      CT BIOPSY SUPERFICIAL BONE PERCUTANEOUS    Result Date: 12/9/2021  PROCEDURE: CT BX BONE NEEDLE SUPERFCL MODERATE CONSCIOUS SEDATION 12/9/2021 HISTORY: ORDERING SYSTEM PROVIDED HISTORY: biopsy of left pelvic bone mass TECHNOLOGIST PROVIDED HISTORY: biopsy of left pelvic bone mass Reason for Exam: biopsy left pelvic bone mass TECHNIQUE: Dose modulation, iterative reconstruction, and/or weight based adjustment of the mA/kV was utilized to reduce the radiation dose to as low as reasonably achievable. CONTRAST: None SEDATION: 0versed and 100 mcg fentanyl were titrated intravenously for moderate sedation monitored under my direction. Total intraservice time of sedation was 15 minutes. The patient's vital signs were monitored throughout the procedure and recorded in the patient's medical record by the nurse. FLUOROSCOPY DOSE AND TYPE OR TIME AND EXPOSURES: None DESCRIPTION OF PROCEDURE: Informed consent was obtained after a detailed explanation of the procedure including risks, benefits, and alternatives. Universal protocol was observed. Patient is placed prone on the table. Pelvic region was prepped and draped sterile fashion. Left ischium was localized by CT scan. 1% lidocaine was infiltrated for local anesthesia. An 18 gauge coaxial needle was advanced to the edge of the expansile bone lesion. Multiple 18 gauge cores were obtained and submitted to pathology. Post biopsy scanning showed no immediate complication. Dressing was applied. Patient was returned to holding stable condition. FINDINGS: Needle confirmed within the center of the expansile lytic bone lesion in left ischium. Successful CT-guided core bone biopsy.          IMPRESSION:   Primary Problem  <principal problem not specified>    Active Hospital Problems    Diagnosis Date Noted    Metastatic cancer ([facility] [C79.9] 12/17/2021    Intractable pain [R52] 12/16/2021    Hypercalcemia of malignancy [E83.52] 12/09/2021    Anxiety [F41.9] 10/05/2015    HTN (hypertension) [I10] 08/21/2014       Metastatic adenocarcinoma with bone metastasis  Intractable pain  2.5 cm right suprahilar lesion  Hypercalcemia  History of IV drug abuse  Severe anxiety    RECOMMENDATIONS:  I personally reviewed results of lab work-up imaging studies and other relevant clinical data will adjust OxyContin to 80 mg twice daily   Stop the fentanyl  Continue oxycodone for breakthrough  Continue Xanax  See if a short course of Toradol might help  Plan outpatient PET CT scan  Plan systemic therapy as the patient is interested more treatment  The patient is already met with hospice, he was initially considering his options but he seems to be leaning towards more treatment. We are okay with palliative care if he decides to do that  I explained to him that he has stage IV disease and it is incurable. Expected survival is somewhere between 1 and 2 years with best treatment. Discussed with patient and Nurse. Thank you for asking us to see this patient. Beryle Dross Al-Nsour,MD  Hematologist/Medical [id number] [facility] hematology oncology physicians                This note is created with the assistance of a speech recognition program.  While intending to generate a document that actually reflects the content of the visit, the document can still have some errors including those of syntax and sound a like substitutions which may escape proof reading. It such instances, actual meaning can be extrapolated by contextual diversion.

## 2021-12-19 NOTE — PROGRESS NOTES
[facility]  Office: [address], DO, Davietimo Jacobo, DO, Anupluz elena Vargas, DO, George Roque, DO, Emani Adair MD, Ericka Tompkins MD, Alpa Lemus MD, Adan Gonzalez MD, Daren Klinefelter, MD, William Caruso MD, Kira Mosher MD, Cher Collet, DO, Ricke Bosworth, DO, Pedro Herrera MD,  Emma Cortez DO, Eddie Espinosa MD, Mao Lay MD, Koby Eddy MD, Jojo Vo MD, Nilda Casillas MD, Trino Henriquez MD, Lexus Snow MD, Yonatan Montero, [employer], [facility], CNP, Asuncion Iglesias, CNP, Jaswinder Deleon, CNS, Latosha Martino, CNP, Chris Parker, CNP, Kaelyn Martin, CNP, Ann Mc, CNP, Jany Dillard, CNP, Jennifer Eddy PA-C, Digna Martini, [facility], Hope Ballard, [facility], Amanda Tai, CNP, Tahir Sahu, CNP, Samuel Ramirez, CNP, Austen Rider, CNP, Darion Brantley, CNP, Jayro Collins 0422    Progress Note    12/19/2021    9:33 AM    Name:   Negrita Bowman  MRN:     [id number]     [place]:      [de-identified]   Room:   [address] [place] [address] Day:  3  Admit Date:  12/16/2021  3:33 PM    PCP:   BOBBY Patel  Code Status:  DNR-CCA    Subjective:     C/C:   Chief Complaint   Patient presents with    Hip Pain     Interval History Status: not changed. Patient was seen and examined at bedside this AM. He continues to complain of intractable pain. Plan to resume radiation treatments tomorrow (day 2/5). Brief History:     Negrita Bowman is a 46 y.o. male with a past medical history of recently diagnosed Stage IV adenocarcinoma of the lung who presented to the emergency department on 12/16/2021 complaining of intractable left leg pain. X-ray of the pelvis was obtained and was remarkable for a lytic expansile lesion involving the left inferior pubic ramus extending into the left acetabulum (8.2 x 6.7 cm in diameter).  The patient is admitted to internal medicine for further management of intractable pain as well as radiation therapy. Review of Systems:     Constitutional:  Positive for severe pain. Respiratory:  negative for cough, dyspnea on exertion, shortness of breath, wheezing  Cardiovascular:  negative for chest pain, chest pressure/discomfort, lower extremity edema, palpitations  Gastrointestinal:  negative for abdominal pain, constipation, diarrhea, nausea, vomiting  Neurological:  negative for dizziness, headache    Medications: Allergies: Allergies   Allergen Reactions    Morphine Itching       Current Meds:   Scheduled Meds:    oxyCODONE  40 mg Oral TID    HYDROmorphone  1 mg IntraVENous Once    dexamethasone  4 mg IntraVENous TID    gabapentin  100 mg Oral TID    lisinopril  5 mg Oral Daily    sodium chloride flush  5-40 mL IntraVENous 2 times per day    enoxaparin  40 mg SubCUTAneous Daily    nicotine  1 patch TransDERmal Daily     Continuous Infusions:    sodium chloride       PRN Meds: oxyCODONE, HYDROmorphone, ALPRAZolam, sodium chloride flush, sodium chloride, ondansetron **OR** ondansetron, polyethylene glycol, acetaminophen **OR** acetaminophen    Data:     Past Medical History:   has a past medical history of Abscess, Anxiety, Cellulitis due to MRSA, Chronic back pain, Depression, Hypertension, Metastatic cancer ([facility] [address] [facility] and OCD (obsessive compulsive disorder). Social History:   reports that he has quit smoking. His smoking use included cigarettes. He has a 12.50 pack-year smoking history. He has never used smokeless tobacco. He reports current drug use. Drug: Marijuana Michael Yoo). He reports that he does not drink alcohol.      Family History:   Family History   Problem Relation Age of Onset    Diabetes Mother     Hearing Loss Father     High Blood Pressure Father        Vitals:  BP (!) 163/101   Pulse 79   Temp 97.9 °F (36.6 °C) (Oral)   Resp 18   Ht 5' 11\" (1.803 m)   Wt 215 lb (97.5 kg)   SpO2 98%   BMI 29.99 kg/m²   Temp (24hrs), Av °F (36.7 °C), Min:97.5 °F (36.4 °C), Max:98.6 °F (37 °C)    No results for input(s): POCGLU in the last 72 hours. I/O (24Hr): No intake or output data in the 24 hours ending 21 0933    Labs:  Hematology:  Recent Labs     21  0631 21  0545 21  0553   WBC 11.9* 15.0* 17.2*   RBC 4.25* 4.21* 3.93*   HGB 12.6* 12.3* 12.0*   HCT 37.2* 37.9* 34.7*   MCV 87.6 90.0 88.3   MCH 29.7 29.2 30.6   MCHC 33.9 32.5 34.6   RDW 13.3 12.8 13.3   * 688* 596*   MPV 7.4 9.0 7.4     Chemistry:  Recent Labs     21  0631 21  0545 21  0553    136 136   K 4.3 4.4 4.5    102 102   CO2 22 20 20   GLUCOSE 115* 111* 109*   BUN 24* 29* 30*   CREATININE 0.54* 0.54* 0.57*   ANIONGAP 12 14 14   LABGLOM >60 >60 >60   GFRAA >60 >60 >60   CALCIUM 10.3 10.2 10.1   CAION 1.39*  --   --    No results for input(s): PROT, LABALBU, LABA1C, Q5LHDAZ, Q4GDBDC, FT4, TSH, AST, ALT, LDH, GGT, ALKPHOS, LABGGT, BILITOT, BILIDIR, AMMONIA, AMYLASE, LIPASE, LACTATE, CHOL, HDL, LDLCHOLESTEROL, CHOLHDLRATIO, TRIG, VLDL, YVE72MJ, PHENYTOIN, PHENYF, URICACID, POCGLU in the last 72 hours. ABG:  Lab Results   Component Value Date    FIO2 ROOM AIR 2014     Lab Results   Component Value Date/Time    SPECIAL NOT REPORTED 2017 12:53 AM     Lab Results   Component Value Date/Time    CULTURE VIRIDANS STREPTOCOCCUS GROUP LIGHT GROWTH (A) 2017 12:53 AM    CULTURE NO ANAEROBIC ORGANISMS ISOLATED AT 5 DAYS (A) 2017 12:53 AM    CULTURE  2017 12:53 AM     [employer] [address] [facility], [address] [place] [address] [phone]       Radiology:  XR PELVIS (1-2 VIEWS)    Result Date: 2021  Lytic expansile lesion involves all the left inferior pubic ramus extending into the left acetabulum measuring 8.2 x 6.7 cm. Significant progression has occurred from 09/15/2021. At that time, it measured 2.9 cm in greatest diameter. No pathological fracture was seen.        Physical Examination: General appearance:  Distressed and complaining of severe pain. Mental Status:  oriented to person, place and time and normal affect  Lungs:  clear to auscultation bilaterally, normal effort  Heart:  regular rate and rhythm, no murmur  Abdomen:  soft, nontender, nondistended, normal bowel sounds, no masses, hepatomegaly, splenomegaly  Extremities:  Decreased range of motion in right leg and hip.    Skin:  no gross lesions, rashes, induration    Assessment:     Hospital Problems           Last Modified POA    HTN (hypertension) 12/17/2021 Yes    Anxiety 12/17/2021 Yes    Hypercalcemia of malignancy 12/18/2021 Yes    Intractable pain 12/16/2021 Yes    Metastatic cancer ([address] 12/17/2021 Yes          Plan:     Metastatic lung cancer   -CT chest showing spiculated right suprahilar lesion concerning for primary malignancy   -X-ray of pelvis showing a lytic expansile lesion involving the left inferior pubic ramus measuring 8.2 x 6.7 cm   -Oncology following; recommend palliative radiation   -Patient met with Hospice yesterday and is considering home Hospice services   -Code status has been changed to [facility] per patient request   -Radiation oncology following; plan for four additional inpatient treatments      Intractable pain 2/2 bone metastases   -Continue dexamethasone 4 mg tid   -Continue gabapentin 100 mg tid   -OxyContin 40 mg tid  -PRN Dilaudid   -PRN Roxicodone      HTN  -Continue home lisinopril 5 mg daily      Anxiety   -PRN lorazepam     Kerry Calabrese MD  12/19/2021  9:33 AM

## 2021-12-20 ENCOUNTER — HOSPITAL ENCOUNTER (OUTPATIENT)
Dept: RADIATION ONCOLOGY | Age: 51
Discharge: HOME | End: 2021-12-20
Attending: RADIOLOGY
Payer: COMMERCIAL

## 2021-12-20 DIAGNOSIS — M89.8X5 LYTIC BONE LESION OF HIP: Primary | ICD-10-CM

## 2021-12-20 LAB
ABSOLUTE EOS #: 0.37 K/UL (ref 0–0.4)
ABSOLUTE IMMATURE GRANULOCYTE: ABNORMAL K/UL (ref 0–0.3)
ABSOLUTE LYMPH #: 1.66 K/UL (ref 1–4.8)
ABSOLUTE MONO #: 1.1 K/UL (ref 0.1–0.8)
ANION GAP SERPL CALCULATED.3IONS-SCNC: 8 MMOL/L (ref 9–17)
BASOPHILS # BLD: 0 % (ref 0–2)
BASOPHILS ABSOLUTE: 0 K/UL (ref 0–0.2)
BUN BLDV-MCNC: 24 MG/DL (ref 6–20)
BUN/CREAT BLD: ABNORMAL (ref 9–20)
CALCIUM SERPL-MCNC: 10.1 MG/DL (ref 8.6–10.4)
CHLORIDE BLD-SCNC: 102 MMOL/L (ref 98–107)
CO2: 25 MMOL/L (ref 20–31)
CREAT SERPL-MCNC: 0.59 MG/DL (ref 0.7–1.2)
DIFFERENTIAL TYPE: ABNORMAL
EOSINOPHILS RELATIVE PERCENT: 2 % (ref 1–4)
GFR AFRICAN AMERICAN: >60 ML/MIN
GFR NON-AFRICAN AMERICAN: >60 ML/MIN
GFR SERPL CREATININE-BSD FRML MDRD: ABNORMAL ML/MIN/{1.73_M2}
GFR SERPL CREATININE-BSD FRML MDRD: ABNORMAL ML/MIN/{1.73_M2}
GLUCOSE BLD-MCNC: 118 MG/DL (ref 70–99)
HCT VFR BLD CALC: 37.6 % (ref 41–53)
HEMOGLOBIN: 12.6 G/DL (ref 13.5–17.5)
IMMATURE GRANULOCYTES: ABNORMAL %
LYMPHOCYTES # BLD: 9 % (ref 24–44)
MCH RBC QN AUTO: 29.5 PG (ref 26–34)
MCHC RBC AUTO-ENTMCNC: 33.5 G/DL (ref 31–37)
MCV RBC AUTO: 88 FL (ref 80–100)
MONOCYTES # BLD: 6 % (ref 1–7)
MORPHOLOGY: NORMAL
NRBC AUTOMATED: ABNORMAL PER 100 WBC
PDW BLD-RTO: 13.4 % (ref 12.5–15.4)
PLATELET # BLD: 759 K/UL (ref 140–450)
PLATELET ESTIMATE: ABNORMAL
PMV BLD AUTO: 7.2 FL (ref 6–12)
POTASSIUM SERPL-SCNC: 4.2 MMOL/L (ref 3.7–5.3)
RBC # BLD: 4.28 M/UL (ref 4.5–5.9)
RBC # BLD: ABNORMAL 10*6/UL
SEG NEUTROPHILS: 83 % (ref 36–66)
SEGMENTED NEUTROPHILS ABSOLUTE COUNT: 15.27 K/UL (ref 1.8–7.7)
SODIUM BLD-SCNC: 135 MMOL/L (ref 135–144)
WBC # BLD: 18.4 K/UL (ref 3.5–11)
WBC # BLD: ABNORMAL 10*3/UL

## 2021-12-20 PROCEDURE — 2580000003 HC RX 258: Performed by: STUDENT IN AN ORGANIZED HEALTH CARE EDUCATION/TRAINING PROGRAM

## 2021-12-20 PROCEDURE — 77387 GUIDANCE FOR RADJ TX DLVR: CPT | Performed by: RADIOLOGY

## 2021-12-20 PROCEDURE — 6360000002 HC RX W HCPCS: Performed by: INTERNAL MEDICINE

## 2021-12-20 PROCEDURE — 1210000000 HC MED SURG R&B

## 2021-12-20 PROCEDURE — 85025 COMPLETE CBC W/AUTO DIFF WBC: CPT

## 2021-12-20 PROCEDURE — 36415 COLL VENOUS BLD VENIPUNCTURE: CPT

## 2021-12-20 PROCEDURE — 6370000000 HC RX 637 (ALT 250 FOR IP): Performed by: NURSE PRACTITIONER

## 2021-12-20 PROCEDURE — 6360000002 HC RX W HCPCS: Performed by: STUDENT IN AN ORGANIZED HEALTH CARE EDUCATION/TRAINING PROGRAM

## 2021-12-20 PROCEDURE — 80048 BASIC METABOLIC PNL TOTAL CA: CPT

## 2021-12-20 PROCEDURE — 6370000000 HC RX 637 (ALT 250 FOR IP): Performed by: INTERNAL MEDICINE

## 2021-12-20 PROCEDURE — 6370000000 HC RX 637 (ALT 250 FOR IP): Performed by: STUDENT IN AN ORGANIZED HEALTH CARE EDUCATION/TRAINING PROGRAM

## 2021-12-20 PROCEDURE — 99232 SBSQ HOSP IP/OBS MODERATE 35: CPT | Performed by: STUDENT IN AN ORGANIZED HEALTH CARE EDUCATION/TRAINING PROGRAM

## 2021-12-20 PROCEDURE — 77412 RADIATION TX DELIVERY LVL 3: CPT | Performed by: RADIOLOGY

## 2021-12-20 RX ADMIN — LISINOPRIL 5 MG: 5 TABLET ORAL at 08:18

## 2021-12-20 RX ADMIN — ALPRAZOLAM 1 MG: 0.5 TABLET ORAL at 15:09

## 2021-12-20 RX ADMIN — ALPRAZOLAM 1 MG: 0.5 TABLET ORAL at 04:25

## 2021-12-20 RX ADMIN — DEXAMETHASONE SODIUM PHOSPHATE 4 MG: 4 INJECTION, SOLUTION INTRA-ARTICULAR; INTRALESIONAL; INTRAMUSCULAR; INTRAVENOUS; SOFT TISSUE at 20:51

## 2021-12-20 RX ADMIN — OXYCODONE HYDROCHLORIDE 10 MG: 5 TABLET ORAL at 17:01

## 2021-12-20 RX ADMIN — ENOXAPARIN SODIUM 40 MG: 100 INJECTION SUBCUTANEOUS at 08:18

## 2021-12-20 RX ADMIN — OXYCODONE HYDROCHLORIDE 80 MG: 40 TABLET, FILM COATED, EXTENDED RELEASE ORAL at 22:11

## 2021-12-20 RX ADMIN — OXYCODONE HYDROCHLORIDE 10 MG: 5 TABLET ORAL at 08:18

## 2021-12-20 RX ADMIN — DEXAMETHASONE SODIUM PHOSPHATE 4 MG: 4 INJECTION, SOLUTION INTRA-ARTICULAR; INTRALESIONAL; INTRAMUSCULAR; INTRAVENOUS; SOFT TISSUE at 14:53

## 2021-12-20 RX ADMIN — ALPRAZOLAM 1 MG: 0.5 TABLET ORAL at 20:51

## 2021-12-20 RX ADMIN — GABAPENTIN 100 MG: 100 CAPSULE ORAL at 08:18

## 2021-12-20 RX ADMIN — HYDROMORPHONE HYDROCHLORIDE 1 MG: 1 INJECTION, SOLUTION INTRAMUSCULAR; INTRAVENOUS; SUBCUTANEOUS at 01:32

## 2021-12-20 RX ADMIN — OXYCODONE HYDROCHLORIDE 80 MG: 40 TABLET, FILM COATED, EXTENDED RELEASE ORAL at 09:21

## 2021-12-20 RX ADMIN — SODIUM CHLORIDE, PRESERVATIVE FREE 10 ML: 5 INJECTION INTRAVENOUS at 08:19

## 2021-12-20 RX ADMIN — HYDROMORPHONE HYDROCHLORIDE 1 MG: 1 INJECTION, SOLUTION INTRAMUSCULAR; INTRAVENOUS; SUBCUTANEOUS at 06:29

## 2021-12-20 RX ADMIN — POLYETHYLENE GLYCOL 3350 17 G: 17 POWDER, FOR SOLUTION ORAL at 23:46

## 2021-12-20 RX ADMIN — SODIUM CHLORIDE, PRESERVATIVE FREE 10 ML: 5 INJECTION INTRAVENOUS at 20:51

## 2021-12-20 RX ADMIN — HYDROMORPHONE HYDROCHLORIDE 1 MG: 1 INJECTION, SOLUTION INTRAMUSCULAR; INTRAVENOUS; SUBCUTANEOUS at 13:14

## 2021-12-20 RX ADMIN — DEXAMETHASONE SODIUM PHOSPHATE 4 MG: 4 INJECTION, SOLUTION INTRA-ARTICULAR; INTRALESIONAL; INTRAMUSCULAR; INTRAVENOUS; SOFT TISSUE at 08:18

## 2021-12-20 RX ADMIN — GABAPENTIN 100 MG: 100 CAPSULE ORAL at 14:53

## 2021-12-20 RX ADMIN — HYDROMORPHONE HYDROCHLORIDE 1 MG: 1 INJECTION, SOLUTION INTRAMUSCULAR; INTRAVENOUS; SUBCUTANEOUS at 18:11

## 2021-12-20 RX ADMIN — KETOROLAC TROMETHAMINE 30 MG: 30 INJECTION, SOLUTION INTRAMUSCULAR; INTRAVENOUS at 05:35

## 2021-12-20 RX ADMIN — KETOROLAC TROMETHAMINE 30 MG: 30 INJECTION, SOLUTION INTRAMUSCULAR; INTRAVENOUS at 14:53

## 2021-12-20 RX ADMIN — CITALOPRAM HYDROBROMIDE 20 MG: 20 TABLET, FILM COATED ORAL at 08:18

## 2021-12-20 ASSESSMENT — PAIN SCALES - GENERAL
PAINLEVEL_OUTOF10: 7
PAINLEVEL_OUTOF10: 4
PAINLEVEL_OUTOF10: 9
PAINLEVEL_OUTOF10: 6
PAINLEVEL_OUTOF10: 7
PAINLEVEL_OUTOF10: 0
PAINLEVEL_OUTOF10: 7
PAINLEVEL_OUTOF10: 6
PAINLEVEL_OUTOF10: 6
PAINLEVEL_OUTOF10: 8
PAINLEVEL_OUTOF10: 7
PAINLEVEL_OUTOF10: 6
PAINLEVEL_OUTOF10: 5

## 2021-12-20 ASSESSMENT — PAIN DESCRIPTION - ORIENTATION: ORIENTATION: LEFT

## 2021-12-20 ASSESSMENT — PAIN DESCRIPTION - LOCATION: LOCATION: HIP

## 2021-12-20 ASSESSMENT — PAIN DESCRIPTION - PAIN TYPE: TYPE: ACUTE PAIN

## 2021-12-20 ASSESSMENT — PAIN DESCRIPTION - DIRECTION: RADIATING_TOWARDS: GROIN

## 2021-12-20 NOTE — PLAN OF CARE
Nutrition Problem #1: Increased nutrient needs  Intervention: Food and/or Nutrient Delivery: Continue Current Diet,Start Oral Nutrition Supplement  Nutritional Goals: PO intakes to meet >75% estimated needs

## 2021-12-20 NOTE — PROGRESS NOTES
[facility]  Office: [address], DO, Prakash Viera, DO, Marcelina Gasca, DO, Toy Roque, DO, Myrtle Crespo MD, Toro Shaver MD, Mark Diaz MD, Kyleigh Sena MD, Fabi Hunter MD, Julio Davidson MD, Agustina Palacios MD, Jayson Valdivia, DO, Claude Pete DO, Tana Barrett MD,  Violet Duke DO, Clover Matute MD, Juma Victoria MD, Lauren Aguirre MD, Grecia De La Rosa MD, Mehnaz Carl MD, Eli Rucker MD, Adelso Delong MD, Cash Bass, [facility], [facility] [place] CNP, Denisse Ball, CNP, Óscar Ivey, CNS, Arjun Wu, CNP, Manuela Mccormick, CNP, Faviola Vegas, CNP, Christiana Cole, CNP, Lina River, CNP, Satish Espinoza PA-C, Mechelle Velazquez, [facility], Argenis Coronado, [facility], Haroon Lockwood, CNP, Alessandra Zhong, CNP, Emily Mcgee, CNP, Levi Heath, CNP, Elroy Oates, CNP, Jayro Wilks 1732    Progress Note    12/20/2021    9:44 AM    Name:   Crystal Garcia  MRN:     [id number]     [place]:      [de-identified]   Room:   [address] [place] [address] Day:  4  Admit Date:  12/16/2021  3:33 PM    PCP:   BOBBY Cornelius  Code Status:  DNR-CCA    Subjective:     C/C:   Chief Complaint   Patient presents with    Hip Pain     Interval History Status: not changed. Patient was seen and examined at bedside this AM. He continues to complain of intractable pain. Plan to resume radiation treatments today. Brief History:     Crystal Garcai is a 46 y.o. male with a past medical history of recently diagnosed Stage IV adenocarcinoma of the lung who presented to the emergency department on 12/16/2021 complaining of intractable left leg pain. X-ray of the pelvis was obtained and was remarkable for a lytic expansile lesion involving the left inferior pubic ramus extending into the left acetabulum (8.2 x 6.7 cm in diameter).  The patient is admitted to internal medicine for further management of intractable pain as well as radiation therapy. Review of Systems:     Constitutional:  Positive for severe pain. Respiratory:  negative for cough, dyspnea on exertion, shortness of breath, wheezing  Cardiovascular:  negative for chest pain, chest pressure/discomfort, lower extremity edema, palpitations  Gastrointestinal:  negative for abdominal pain, constipation, diarrhea, nausea, vomiting  Neurological:  negative for dizziness, headache    Medications: Allergies: Allergies   Allergen Reactions    Morphine Itching       Current Meds:   Scheduled Meds:    oxyCODONE  80 mg Oral 2 times per day    citalopram  20 mg Oral Daily    HYDROmorphone  1 mg IntraVENous Once    dexamethasone  4 mg IntraVENous TID    gabapentin  100 mg Oral TID    lisinopril  5 mg Oral Daily    sodium chloride flush  5-40 mL IntraVENous 2 times per day    enoxaparin  40 mg SubCUTAneous Daily    nicotine  1 patch TransDERmal Daily     Continuous Infusions:    sodium chloride       PRN Meds: ketorolac, oxyCODONE, HYDROmorphone, ALPRAZolam, sodium chloride flush, sodium chloride, ondansetron **OR** ondansetron, polyethylene glycol, acetaminophen **OR** acetaminophen    Data:     Past Medical History:   has a past medical history of Abscess, Anxiety, Cellulitis due to MRSA, Chronic back pain, Depression, Hypertension, Metastatic cancer ([facility] [address] Multiple substance abuse ([facility] and OCD (obsessive compulsive disorder). Social History:   reports that he has quit smoking. His smoking use included cigarettes. He has a 12.50 pack-year smoking history. He has never used smokeless tobacco. He reports current drug use. Drug: Marijuana Veldon Bunting). He reports that he does not drink alcohol.      Family History:   Family History   Problem Relation Age of Onset    Diabetes Mother     Hearing Loss Father     High Blood Pressure Father        Vitals:  BP (!) 142/91   Pulse 72   Temp 97.7 °F (36.5 °C) (Oral)   Resp 18   Ht 5' 11\" (1.803 m)   Wt 215 lb (97.5 kg) SpO2 97%   BMI 29.99 kg/m²   Temp (24hrs), Av.9 °F (36.6 °C), Min:97.7 °F (36.5 °C), Max:98.2 °F (36.8 °C)    No results for input(s): POCGLU in the last 72 hours. I/O (24Hr): Intake/Output Summary (Last 24 hours) at 2021 0944  Last data filed at 2021 3651  Gross per 24 hour   Intake    Output 750 ml   Net -750 ml       Labs:  Hematology:  Recent Labs     21  0545 21  0553 21  0753   WBC 15.0* 17.2* 18.4*   RBC 4.21* 3.93* 4.28*   HGB 12.3* 12.0* 12.6*   HCT 37.9* 34.7* 37.6*   MCV 90.0 88.3 88.0   MCH 29.2 30.6 29.5   MCHC 32.5 34.6 33.5   RDW 12.8 13.3 13.4   * 596* 759*   MPV 9.0 7.4 7.2     Chemistry:  Recent Labs     21  0545 21  0553 21  0753    136 135   K 4.4 4.5 4.2    102 102   CO2 20 20 25   GLUCOSE 111* 109* 118*   BUN 29* 30* 24*   CREATININE 0.54* 0.57* 0.59*   ANIONGAP 14 14 8*   LABGLOM >60 >60 >60   GFRAA >60 >60 >60   CALCIUM 10.2 10.1 10.1   No results for input(s): PROT, LABALBU, LABA1C, H7TKZOJ, Y8YIDRV, FT4, TSH, AST, ALT, LDH, GGT, ALKPHOS, LABGGT, BILITOT, BILIDIR, AMMONIA, AMYLASE, LIPASE, LACTATE, CHOL, HDL, LDLCHOLESTEROL, CHOLHDLRATIO, TRIG, VLDL, XAP47TT, PHENYTOIN, PHENYF, URICACID, POCGLU in the last 72 hours. ABG:  Lab Results   Component Value Date    FIO2 ROOM AIR 2014     Lab Results   Component Value Date/Time    SPECIAL NOT REPORTED 2017 12:53 AM     Lab Results   Component Value Date/Time    CULTURE VIRIDANS STREPTOCOCCUS GROUP LIGHT GROWTH (A) 2017 12:53 AM    CULTURE NO ANAEROBIC ORGANISMS ISOLATED AT 5 DAYS (A) 2017 12:53 AM    CULTURE  2017 12:53 AM     [facility] [address] [address] [phone]       Radiology:  XR PELVIS (1-2 VIEWS)    Result Date: 2021  Lytic expansile lesion involves all the left inferior pubic ramus extending into the left acetabulum measuring 8.2 x 6.7 cm. Significant progression has occurred from 09/15/2021.   At that time, it measured 2.9 cm in greatest diameter. No pathological fracture was seen. Physical Examination:     General appearance: Jittery and uncomfortable appearing. Mental Status:  oriented to person, place and time and normal affect  Lungs:  clear to auscultation bilaterally, normal effort  Heart:  regular rate and rhythm, no murmur  Abdomen:  soft, nontender, nondistended, normal bowel sounds, no masses, hepatomegaly, splenomegaly  Extremities:  Decreased range of motion in right leg and hip.    Skin:  no gross lesions, rashes, induration    Assessment:     Hospital Problems           Last Modified POA    HTN (hypertension) 12/17/2021 Yes    Anxiety 12/17/2021 Yes    Hypercalcemia of malignancy 12/18/2021 Yes    Intractable pain 12/16/2021 Yes    Metastatic cancer ([address] 12/17/2021 Yes          Plan:     Metastatic lung cancer   -CT chest showing spiculated right suprahilar lesion concerning for primary malignancy   -X-ray of pelvis showing a lytic expansile lesion involving the left inferior pubic ramus measuring 8.2 x 6.7 cm   -Oncology following; recommend palliative radiation   -Patient met with Hospice yesterday and is considering home Hospice services   -Code status has been changed to [facility] per patient request   -Radiation oncology following; plan for three additional inpatient treatments prior to discharge      Intractable pain 2/2 bone metastases   -Continue dexamethasone 4 mg tid   -Continue gabapentin 100 mg tid   -OxyContin 40 mg tid  -PRN Dilaudid   -PRN Roxicodone      HTN  -Continue home lisinopril 5 mg daily      Anxiety   -PRN lorazepam     Patrizia Barnes MD  12/20/2021  9:44 AM

## 2021-12-20 NOTE — CARE COORDINATION
[facility] ICU Quality Flow/Interdisciplinary Rounds Progress Note    Quality Flow Rounds held on December 20, 2021 at [address] Attending:  Bedside Nurse, , Physical Therapy and Occupational Therapy    Anticipated Discharge Date:  Expected Discharge Date: 12/20/21    Anticipated Discharge Disposition: home     Readmission Risk              Risk of Unplanned Readmission:  15         Discussed patient goal for the day, patient clinical progression, and barriers to discharge.   The following Goal(s) of the Day/Commitment(s) have been identified:  Pain Management  palliative radiation 2/5 today      Bety Marrero RN  December 20, 2021

## 2021-12-20 NOTE — PROGRESS NOTES
Patient found by Ernesto Lebron stress lab RN on the back elevator. He states he \"was out walking\" writer asked patient to please stay on the unit and walk. RN reminded patient that a new nicotine patch was placed this morning and he cannot smoke with that on, also we are a nonsmoking campus. Patient voices understanding. Will monitor.

## 2021-12-20 NOTE — PLAN OF CARE
Problem: Falls - Risk of:  Goal: Will remain free from falls  Description: Will remain free from falls  12/20/2021 1028 by Yeison Brown RN  Outcome: Ongoing  12/20/2021 0058 by Negrita Solis RN  Outcome: Ongoing  Goal: Absence of physical injury  Description: Absence of physical injury  12/20/2021 1028 by Yeison Brown RN  Outcome: Ongoing  12/20/2021 0058 by Negrita Solis RN  Outcome: Ongoing     Problem: Pain:  Description: Pain management should include both nonpharmacologic and pharmacologic interventions.   Goal: Pain level will decrease  Description: Pain level will decrease  12/20/2021 1028 by Yeison Brown RN  Outcome: Ongoing  12/20/2021 0058 by Negrita Solis RN  Outcome: Ongoing  Goal: Control of acute pain  Description: Control of acute pain  12/20/2021 1028 by Yeison Brown RN  Outcome: Ongoing  12/20/2021 0058 by Negrita Solis RN  Outcome: Ongoing  Goal: Control of chronic pain  Description: Control of chronic pain  12/20/2021 1028 by Yeison Brown RN  Outcome: Ongoing  12/20/2021 0058 by Negrita Solis RN  Outcome: Ongoing     Problem: Nutrition  Goal: Optimal nutrition therapy  12/20/2021 1028 by Yeison Brown RN  Outcome: Ongoing  12/20/2021 0058 by Negrita Solis RN  Outcome: Ongoing     Problem: Skin Integrity:  Goal: Will show no infection signs and symptoms  Description: Will show no infection signs and symptoms  12/20/2021 1028 by Yeison Brown RN  Outcome: Ongoing  12/20/2021 0058 by Negrita Solis RN  Outcome: Ongoing  Goal: Absence of new skin breakdown  Description: Absence of new skin breakdown  12/20/2021 1028 by Yeison Brown RN  Outcome: Ongoing  12/20/2021 0058 by Negrita Solis RN  Outcome: Ongoing

## 2021-12-20 NOTE — FLOWSHEET NOTE
SPIRITUAL CARE PROGRESS NOTE    Spiritual Assessment: MrPedro Stokes was sitting on the couch in his room, and his Father was sitting next to him. Patient shared how he was doing. He voiced concerns and issues he was experiencing. He expressed gratitude for his doctor and his care. He processed his thoughts and feelings about his condition. He reflected on choices he made in the past. He acknowledged his coping mechanisms and how he is affected. He voiced his hopes and preferences for his health. Father voiced his hopes for Pt, including to be able to \"enjoy this time of his life. \" Pt and Father were receptive to prayer. Intervention: Writer offered words of support and encouragement; listened with empathy; inquired about Pt's coping, needs, and sources of support and strength; affirmed Pt's strengths; prayed for Pt. Outcome: Patient and Father thanked writer for the visit. Patient stated his intention to \"reset. \"     Plan: Paradise Hanna will be available to provide emotional and spiritual support as needed. 12/20/21 1631   Encounter Summary   Services provided to: Patient and family together   Referral/Consult From: [facility] Family members;Parent   Continue Visiting   (12/20/21)   Complexity of Encounter Moderate   Length of Encounter 30 minutes   Spiritual Assessment Completed Yes   Routine   Type Follow up   Spiritual/Adventism   Type Spiritual support   Assessment Approachable   Intervention Active listening;Explored feelings, thoughts, concerns;Explored coping resources;Sustaining presence/ Ministry of presence; Discussed illness/injury and it's impact;Prayer;Discussed belief system/Confucianism practices/lottie;Discussed meaning/purpose;Discussed death   Outcome Receptive; Hopeful;Encouraged;Coping;Expressed feelings/needs/concerns;Engaged in conversation;Expressed gratitude     Electronically signed by Mike Courtney, Oncology Outpatient [address] [facility] Joseph Fischer [facility], [address] [facility] Radiation Oncology  12/20/2021  4:35 PM

## 2021-12-20 NOTE — PROGRESS NOTES
Comprehensive Nutrition Assessment    Type and Reason for Visit:  Reassess    Nutrition Recommendations/Plan: continue current diet. Provide Ensure Enlive BID    Nutrition Assessment:  Pt reports appetite has improved with improved pain control. Pt states he is feeling well and denies n/v/diarrhea, constipation. Pt requesting Ensure to aid in weight maintenance. Pt reports prior to admission, he had lost approx 18# in 2 weeks r/t poor pain control. Malnutrition Assessment:  Malnutrition Status: At risk for malnutrition (Comment)    Context:  Chronic Illness     Findings of the 6 clinical characteristics of malnutrition:  Energy Intake:  Mild decrease in energy intake (Comment) (~50% estimated needs x 2 weeks prior to admission)  Weight Loss:  7 - 5% over 1 month     Body Fat Loss:  No significant body fat loss     Muscle Mass Loss:  No significant muscle mass loss    Fluid Accumulation:  No significant fluid accumulation     Strength:  Not Performed    Estimated Daily Nutrient Needs:  Energy (kcal):  2200-2400kcal/d; Weight Used for Energy Requirements:  Current     Protein (g):  120-140g/d; Weight Used for Protein Requirements:  Current (1.2-1.4g/kg)        Fluid (ml/day):  2200-2400mL/d; Method Used for Fluid Requirements:  1 ml/kcal      Current Nutrition Therapies:    ADULT DIET; Regular    Anthropometric Measures:  · Height: 5' 11\" (180.3 cm)  · Current Body Weight: 215 lb (97.5 kg)   · Admission Body Weight: 220 lb (99.8 kg)    · Usual Body Weight: 220 lb (99.8 kg)     · Ideal Body Weight: 172 lbs;   · BMI: 30  · BMI Categories: Overweight (BMI 25.0-29. 9)       Nutrition Diagnosis:   · Increased nutrient needs related to catabolic illness as evidenced by  (stage IV lung ca undergoing radiation therapy)      Nutrition Interventions:   Food and/or Nutrient Delivery:  Continue Current Diet,Start Oral Nutrition Supplement  Nutrition Education/Counseling:  No recommendation at this time   Coordination of Nutrition Care:  Continue to monitor while inpatient,Interdisciplinary Rounds,Coordination of Community Care    Goals:  PO intakes to meet >75% estimated needs       Nutrition Monitoring and Evaluation:   Behavioral-Environmental Outcomes:      Food/Nutrient Intake Outcomes:  Food and Nutrient Intake,Supplement Intake,IVF Intake  Physical Signs/Symptoms Outcomes:  Biochemical Data,Nutrition Focused Physical Findings,Weight,Constipation     Discharge Planning:    Continue Oral Nutrition Supplement     Electronically signed by Saúl Petersen RD, LD on 12/20/21 at 10:45 AM PAPA Smith RD, LD  Registered Dietitian  Chana Marcos  [phone]

## 2021-12-21 ENCOUNTER — HOSPITAL ENCOUNTER (OUTPATIENT)
Dept: RADIATION ONCOLOGY | Age: 51
Discharge: HOME | End: 2021-12-21
Attending: RADIOLOGY
Payer: COMMERCIAL

## 2021-12-21 ENCOUNTER — APPOINTMENT (OUTPATIENT)
Dept: GENERAL RADIOLOGY | Age: 51
DRG: 343 | End: 2021-12-21
Payer: COMMERCIAL

## 2021-12-21 ENCOUNTER — TELEPHONE (OUTPATIENT)
Dept: CASE MANAGEMENT | Age: 51
End: 2021-12-21

## 2021-12-21 PROBLEM — Z72.0 TOBACCO USE: Status: ACTIVE | Noted: 2021-12-21

## 2021-12-21 PROBLEM — G89.3 CANCER RELATED PAIN: Status: ACTIVE | Noted: 2021-12-16

## 2021-12-21 PROBLEM — C79.51 BONE METASTASES (HCC): Status: ACTIVE | Noted: 2021-12-21

## 2021-12-21 LAB
ABSOLUTE EOS #: 0 K/UL (ref 0–0.4)
ABSOLUTE IMMATURE GRANULOCYTE: ABNORMAL K/UL (ref 0–0.3)
ABSOLUTE LYMPH #: 2.28 K/UL (ref 1–4.8)
ABSOLUTE MONO #: 0.7 K/UL (ref 0.1–0.8)
ANION GAP SERPL CALCULATED.3IONS-SCNC: 10 MMOL/L (ref 9–17)
BASOPHILS # BLD: 0 % (ref 0–2)
BASOPHILS ABSOLUTE: 0 K/UL (ref 0–0.2)
BUN BLDV-MCNC: 26 MG/DL (ref 6–20)
BUN/CREAT BLD: ABNORMAL (ref 9–20)
CALCIUM SERPL-MCNC: 10 MG/DL (ref 8.6–10.4)
CHLORIDE BLD-SCNC: 100 MMOL/L (ref 98–107)
CO2: 25 MMOL/L (ref 20–31)
CREAT SERPL-MCNC: 0.52 MG/DL (ref 0.7–1.2)
DIFFERENTIAL TYPE: ABNORMAL
EOSINOPHILS RELATIVE PERCENT: 0 % (ref 1–4)
GFR AFRICAN AMERICAN: >60 ML/MIN
GFR NON-AFRICAN AMERICAN: >60 ML/MIN
GFR SERPL CREATININE-BSD FRML MDRD: ABNORMAL ML/MIN/{1.73_M2}
GFR SERPL CREATININE-BSD FRML MDRD: ABNORMAL ML/MIN/{1.73_M2}
GLUCOSE BLD-MCNC: 107 MG/DL (ref 70–99)
HCT VFR BLD CALC: 36.2 % (ref 41–53)
HEMOGLOBIN: 12.3 G/DL (ref 13.5–17.5)
IMMATURE GRANULOCYTES: ABNORMAL %
LYMPHOCYTES # BLD: 13 % (ref 24–44)
MCH RBC QN AUTO: 29.8 PG (ref 26–34)
MCHC RBC AUTO-ENTMCNC: 34 G/DL (ref 31–37)
MCV RBC AUTO: 87.9 FL (ref 80–100)
MONOCYTES # BLD: 4 % (ref 1–7)
MORPHOLOGY: NORMAL
NRBC AUTOMATED: ABNORMAL PER 100 WBC
PDW BLD-RTO: 13.4 % (ref 12.5–15.4)
PLATELET # BLD: 830 K/UL (ref 140–450)
PLATELET ESTIMATE: ABNORMAL
PMV BLD AUTO: 7.2 FL (ref 6–12)
POTASSIUM SERPL-SCNC: 4.5 MMOL/L (ref 3.7–5.3)
RBC # BLD: 4.11 M/UL (ref 4.5–5.9)
RBC # BLD: ABNORMAL 10*6/UL
SEG NEUTROPHILS: 83 % (ref 36–66)
SEGMENTED NEUTROPHILS ABSOLUTE COUNT: 14.52 K/UL (ref 1.8–7.7)
SODIUM BLD-SCNC: 135 MMOL/L (ref 135–144)
WBC # BLD: 17.5 K/UL (ref 3.5–11)
WBC # BLD: ABNORMAL 10*3/UL

## 2021-12-21 PROCEDURE — 80048 BASIC METABOLIC PNL TOTAL CA: CPT

## 2021-12-21 PROCEDURE — 6370000000 HC RX 637 (ALT 250 FOR IP): Performed by: INTERNAL MEDICINE

## 2021-12-21 PROCEDURE — 99232 SBSQ HOSP IP/OBS MODERATE 35: CPT | Performed by: INTERNAL MEDICINE

## 2021-12-21 PROCEDURE — 6370000000 HC RX 637 (ALT 250 FOR IP): Performed by: NURSE PRACTITIONER

## 2021-12-21 PROCEDURE — 6360000002 HC RX W HCPCS: Performed by: STUDENT IN AN ORGANIZED HEALTH CARE EDUCATION/TRAINING PROGRAM

## 2021-12-21 PROCEDURE — 99222 1ST HOSP IP/OBS MODERATE 55: CPT | Performed by: NURSE PRACTITIONER

## 2021-12-21 PROCEDURE — 36415 COLL VENOUS BLD VENIPUNCTURE: CPT

## 2021-12-21 PROCEDURE — 73502 X-RAY EXAM HIP UNI 2-3 VIEWS: CPT

## 2021-12-21 PROCEDURE — 77387 GUIDANCE FOR RADJ TX DLVR: CPT | Performed by: RADIOLOGY

## 2021-12-21 PROCEDURE — 77412 RADIATION TX DELIVERY LVL 3: CPT | Performed by: RADIOLOGY

## 2021-12-21 PROCEDURE — 1210000000 HC MED SURG R&B

## 2021-12-21 PROCEDURE — 6360000002 HC RX W HCPCS: Performed by: INTERNAL MEDICINE

## 2021-12-21 PROCEDURE — 2580000003 HC RX 258: Performed by: STUDENT IN AN ORGANIZED HEALTH CARE EDUCATION/TRAINING PROGRAM

## 2021-12-21 PROCEDURE — 6370000000 HC RX 637 (ALT 250 FOR IP): Performed by: STUDENT IN AN ORGANIZED HEALTH CARE EDUCATION/TRAINING PROGRAM

## 2021-12-21 PROCEDURE — 85025 COMPLETE CBC W/AUTO DIFF WBC: CPT

## 2021-12-21 RX ORDER — TRAZODONE HYDROCHLORIDE 50 MG/1
50 TABLET ORAL NIGHTLY
Status: DISCONTINUED | OUTPATIENT
Start: 2021-12-21 | End: 2021-12-23 | Stop reason: HOSPADM

## 2021-12-21 RX ORDER — GABAPENTIN 300 MG/1
300 CAPSULE ORAL 3 TIMES DAILY
Status: DISCONTINUED | OUTPATIENT
Start: 2021-12-21 | End: 2021-12-23 | Stop reason: HOSPADM

## 2021-12-21 RX ORDER — LANOLIN ALCOHOL/MO/W.PET/CERES
3 CREAM (GRAM) TOPICAL NIGHTLY PRN
Status: DISCONTINUED | OUTPATIENT
Start: 2021-12-21 | End: 2021-12-23 | Stop reason: HOSPADM

## 2021-12-21 RX ORDER — OXYCODONE HYDROCHLORIDE 5 MG/1
15 TABLET ORAL EVERY 4 HOURS PRN
Status: DISCONTINUED | OUTPATIENT
Start: 2021-12-21 | End: 2021-12-23 | Stop reason: HOSPADM

## 2021-12-21 RX ORDER — ALPRAZOLAM 0.5 MG/1
2 TABLET ORAL NIGHTLY PRN
Status: DISCONTINUED | OUTPATIENT
Start: 2021-12-21 | End: 2021-12-23 | Stop reason: HOSPADM

## 2021-12-21 RX ORDER — OXYCODONE HYDROCHLORIDE 5 MG/1
10 TABLET ORAL EVERY 4 HOURS PRN
Status: DISCONTINUED | OUTPATIENT
Start: 2021-12-21 | End: 2021-12-23 | Stop reason: HOSPADM

## 2021-12-21 RX ADMIN — HYDROMORPHONE HYDROCHLORIDE 1 MG: 1 INJECTION, SOLUTION INTRAMUSCULAR; INTRAVENOUS; SUBCUTANEOUS at 06:27

## 2021-12-21 RX ADMIN — HYDROMORPHONE HYDROCHLORIDE 1 MG: 1 INJECTION, SOLUTION INTRAMUSCULAR; INTRAVENOUS; SUBCUTANEOUS at 13:03

## 2021-12-21 RX ADMIN — HYDROMORPHONE HYDROCHLORIDE 1 MG: 1 INJECTION, SOLUTION INTRAMUSCULAR; INTRAVENOUS; SUBCUTANEOUS at 01:19

## 2021-12-21 RX ADMIN — SODIUM CHLORIDE, PRESERVATIVE FREE 10 ML: 5 INJECTION INTRAVENOUS at 08:26

## 2021-12-21 RX ADMIN — GABAPENTIN 300 MG: 300 CAPSULE ORAL at 20:13

## 2021-12-21 RX ADMIN — DEXAMETHASONE SODIUM PHOSPHATE 4 MG: 4 INJECTION, SOLUTION INTRA-ARTICULAR; INTRALESIONAL; INTRAMUSCULAR; INTRAVENOUS; SOFT TISSUE at 20:13

## 2021-12-21 RX ADMIN — OXYCODONE HYDROCHLORIDE 15 MG: 5 TABLET ORAL at 12:54

## 2021-12-21 RX ADMIN — OXYCODONE HYDROCHLORIDE 80 MG: 40 TABLET, FILM COATED, EXTENDED RELEASE ORAL at 20:13

## 2021-12-21 RX ADMIN — GABAPENTIN 100 MG: 100 CAPSULE ORAL at 08:27

## 2021-12-21 RX ADMIN — OXYCODONE HYDROCHLORIDE 10 MG: 5 TABLET ORAL at 03:54

## 2021-12-21 RX ADMIN — POLYETHYLENE GLYCOL 3350 17 G: 17 POWDER, FOR SOLUTION ORAL at 20:12

## 2021-12-21 RX ADMIN — DEXAMETHASONE SODIUM PHOSPHATE 4 MG: 4 INJECTION, SOLUTION INTRA-ARTICULAR; INTRALESIONAL; INTRAMUSCULAR; INTRAVENOUS; SOFT TISSUE at 13:04

## 2021-12-21 RX ADMIN — LISINOPRIL 5 MG: 5 TABLET ORAL at 08:27

## 2021-12-21 RX ADMIN — OXYCODONE HYDROCHLORIDE 15 MG: 5 TABLET ORAL at 16:48

## 2021-12-21 RX ADMIN — ALPRAZOLAM 1 MG: 0.5 TABLET ORAL at 03:54

## 2021-12-21 RX ADMIN — GABAPENTIN 100 MG: 100 CAPSULE ORAL at 13:04

## 2021-12-21 RX ADMIN — DEXAMETHASONE SODIUM PHOSPHATE 4 MG: 4 INJECTION, SOLUTION INTRA-ARTICULAR; INTRALESIONAL; INTRAMUSCULAR; INTRAVENOUS; SOFT TISSUE at 08:26

## 2021-12-21 RX ADMIN — ENOXAPARIN SODIUM 40 MG: 100 INJECTION SUBCUTANEOUS at 08:26

## 2021-12-21 RX ADMIN — OXYCODONE HYDROCHLORIDE 80 MG: 40 TABLET, FILM COATED, EXTENDED RELEASE ORAL at 08:26

## 2021-12-21 RX ADMIN — SODIUM CHLORIDE, PRESERVATIVE FREE 10 ML: 5 INJECTION INTRAVENOUS at 20:15

## 2021-12-21 RX ADMIN — OXYCODONE HYDROCHLORIDE 10 MG: 5 TABLET ORAL at 08:27

## 2021-12-21 RX ADMIN — CITALOPRAM HYDROBROMIDE 20 MG: 20 TABLET, FILM COATED ORAL at 08:27

## 2021-12-21 RX ADMIN — ALPRAZOLAM 2 MG: 0.5 TABLET ORAL at 21:20

## 2021-12-21 RX ADMIN — ALPRAZOLAM 1 MG: 0.5 TABLET ORAL at 09:34

## 2021-12-21 ASSESSMENT — PAIN SCALES - GENERAL
PAINLEVEL_OUTOF10: 5
PAINLEVEL_OUTOF10: 8
PAINLEVEL_OUTOF10: 8
PAINLEVEL_OUTOF10: 0
PAINLEVEL_OUTOF10: 6
PAINLEVEL_OUTOF10: 0
PAINLEVEL_OUTOF10: 9
PAINLEVEL_OUTOF10: 10
PAINLEVEL_OUTOF10: 8
PAINLEVEL_OUTOF10: 10
PAINLEVEL_OUTOF10: 9
PAINLEVEL_OUTOF10: 9
PAINLEVEL_OUTOF10: 10

## 2021-12-21 ASSESSMENT — PAIN DESCRIPTION - PAIN TYPE
TYPE: ACUTE PAIN
TYPE: ACUTE PAIN

## 2021-12-21 ASSESSMENT — PAIN DESCRIPTION - ORIENTATION
ORIENTATION: LEFT

## 2021-12-21 ASSESSMENT — PAIN DESCRIPTION - LOCATION
LOCATION: HIP

## 2021-12-21 ASSESSMENT — PAIN DESCRIPTION - ONSET
ONSET: ON-GOING

## 2021-12-21 ASSESSMENT — PAIN DESCRIPTION - DIRECTION
RADIATING_TOWARDS: HIP
RADIATING_TOWARDS: HIP

## 2021-12-21 ASSESSMENT — PAIN - FUNCTIONAL ASSESSMENT
PAIN_FUNCTIONAL_ASSESSMENT: PREVENTS OR INTERFERES SOME ACTIVE ACTIVITIES AND ADLS
PAIN_FUNCTIONAL_ASSESSMENT: PREVENTS OR INTERFERES WITH MANY ACTIVE NOT PASSIVE ACTIVITIES

## 2021-12-21 ASSESSMENT — PAIN DESCRIPTION - DESCRIPTORS
DESCRIPTORS: ACHING;SHARP;SHOOTING
DESCRIPTORS: ACHING;SHARP;SHOOTING
DESCRIPTORS: ACHING;SHARP
DESCRIPTORS: ACHING;SHARP

## 2021-12-21 ASSESSMENT — PAIN DESCRIPTION - PROGRESSION
CLINICAL_PROGRESSION: NOT CHANGED

## 2021-12-21 ASSESSMENT — PAIN DESCRIPTION - FREQUENCY
FREQUENCY: CONTINUOUS

## 2021-12-21 NOTE — CARE COORDINATION
[facility] ICU Quality Flow/Interdisciplinary Rounds Progress Note    Quality Flow Rounds held on December 21, 2021 at [address] Attending:  Bedside Nurse, , Nursing Unit Leadership, Respiratory Therapy, Physical Therapy and Occupational Therapy    Anticipated Discharge Date:  Expected Discharge Date: 12/20/21    Anticipated Discharge Disposition: home - palliative care - [facility] OP    Readmission Risk              Risk of Unplanned Readmission:  16           Discussed patient goal for the day, patient clinical progression, and barriers to discharge.   The following Goal(s) of the Day/Commitment(s) have been identified:  Medications - Obtain Order to Convert IV to Oral and Pain Management  rad/ond 3/5 today, anticipate prior auth needed for pain meds at d/c      [facility], RN  December 21, 2021

## 2021-12-21 NOTE — PROGRESS NOTES
[facility]  Office: [address], DO, Ivan Ache, DO, Donovan Hasten, DO, Raji Plascencia Blood, DO, Camden Hudson MD, Supriya Coughlin MD, Denisse Cordoba MD, Fer Powers MD, Nhi Vernon MD, Faviola Rucker MD, Jeaneth Trujillo MD, Jerry Ascencio, DO, Filippo Ly DO, Danny Viera MD,  Jr Barger, DO, Johnathan Camarillo MD, Wiliam Austin MD, Sam Mederos MD, Christian Keen MD, Kinjal Franco MD, Harmony Wynne MD, Kay Craig MD, Scout Diallo, [facility], [facility], CNP, Stormy Mazariegos, CNP, Inocencio Batista, CNS, [facility], CNP, Johanna Villalpando, CNP, Aileen Donnelly, CNP, Mukund Ocean, CNP, Jay Cantor, CNP, Jaziel Etienne PA-C, Suzie Calles, DNP, Nallely Michelle, DNP, Leslie Siegel, CNP, Dewey Austin, CNP, Brittany Colón, CNP, Lee Crowder, CNP, Lalo Ramirez, CNP, Jayro Hurley [address]    Progress Note    12/21/2021    9:13 AM    Name:   Braulio Bey  MRN:     [id number]     [place]:      [de-identified]   Room:   [address] [place] [address] Day:  5  Admit Date:  12/16/2021  3:33 PM    PCP:   BOBBY Aleman  Code Status:  DNR-CCA    Subjective:     C/C:   Chief Complaint   Patient presents with    Hip Pain     Interval History Status: not changed. Patient with continued multiple complaints of pain. Reports worsening hip pain when rolling over in bed this morning, believes he felt a pop in the hip. Denies chest pain, shortness of breath, nausea or vomiting. Brief History:     Per prior chart documentation  \"Hubert Singer is a 46 y. o. male with a past medical history of recently diagnosed Stage IV adenocarcinoma of the lung who presented to the emergency department on 12/16/2021 complaining of intractable left leg pain. X-ray of the pelvis was obtained and was remarkable for a lytic expansile lesion involving the left inferior pubic ramus extending into the left acetabulum (8.2 x 6.7 cm in diameter). The patient is admitted to internal medicine for further management of intractable pain as well as radiation therapy. \"    Review of Systems:     Constitutional:  negative for chills, fevers, sweats  Respiratory:  negative for cough, dyspnea on exertion, shortness of breath, wheezing  Cardiovascular:  negative for chest pain, chest pressure/discomfort, lower extremity edema, palpitations  Gastrointestinal:  negative for abdominal pain, constipation, diarrhea, nausea, vomiting  Neurological:  negative for dizziness, headache    Medications: Allergies: Allergies   Allergen Reactions    Morphine Itching       Current Meds:   Scheduled Meds:    oxyCODONE  80 mg Oral 2 times per day    citalopram  20 mg Oral Daily    HYDROmorphone  1 mg IntraVENous Once    dexamethasone  4 mg IntraVENous TID    gabapentin  100 mg Oral TID    lisinopril  5 mg Oral Daily    sodium chloride flush  5-40 mL IntraVENous 2 times per day    enoxaparin  40 mg SubCUTAneous Daily    nicotine  1 patch TransDERmal Daily     Continuous Infusions:    sodium chloride       PRN Meds: ketorolac, oxyCODONE, HYDROmorphone, ALPRAZolam, sodium chloride flush, sodium chloride, ondansetron **OR** ondansetron, polyethylene glycol, acetaminophen **OR** acetaminophen    Data:     Past Medical History:   has a past medical history of Abscess, Anxiety, Cellulitis due to MRSA, Chronic back pain, Depression, Hypertension, Met[facility]atic [facility] ([facility] [address] [facility] [facility] and OCD (obsessive compulsive disorder). Social History:   reports that he has quit smoking. His smoking use included cigarettes. He has a 12.50 pack-year smoking history. He has never used smokeless tobacco. He reports current drug use. Drug: Marijuana Fredick Copping). He reports that he does not drink alcohol.      Family History:   Family History   Problem Relation Age of Onset    Diabetes Mother     Hearing Loss Father     High Blood Pressure Father        Vitals:  BP (!) 166/99   Pulse 62   Temp 98.1 °F (36.7 °C) (Oral)   Resp 18   Ht 5' 11\" (1.803 m)   Wt 214 lb 15.2 oz (97.5 kg)   SpO2 99%   BMI 29.98 kg/m²   Temp (24hrs), Av.9 °F (36.6 °C), Min:97.5 °F (36.4 °C), Max:98.1 °F (36.7 °C)    No results for input(s): POCGLU in the last 72 hours. I/O (24Hr): No intake or output data in the 24 hours ending 21 0913    Labs:  Hematology:  Recent Labs     21  0553 21  0603   WBC 17.2* 18.4* 17.5*   RBC 3.93* 4.28* 4.11*   HGB 12.0* 12.6* 12.3*   HCT 34.7* 37.6* 36.2*   MCV 88.3 88.0 87.9   MCH 30.6 29.5 29.8   MCHC 34.6 33.5 34.0   RDW 13.3 13.4 13.4   * 759* 830*   MPV 7.4 7.2 7.2     Chemistry:  Recent Labs     21  0553 21  0753 21  0603    135 135   K 4.5 4.2 4.5    102 100   CO2 20 25 25   GLUCOSE 109* 118* 107*   BUN 30* 24* 26*   CREATININE 0.57* 0.59* 0.52*   ANIONGAP 14 8* 10   LABGLOM >60 >60 >60   GFRAA >60 >60 >60   CALCIUM 10.1 10.1 10.0   No results for input(s): PROT, LABALBU, LABA1C, D4BIQIU, F5NYBRC, FT4, TSH, AST, ALT, LDH, GGT, ALKPHOS, LABGGT, BILITOT, BILIDIR, AMMONIA, AMYLASE, LIPASE, LACTATE, CHOL, HDL, LDLCHOLESTEROL, CHOLHDLRATIO, TRIG, VLDL, VQY06YY, PHENYTOIN, PHENYF, URICACID, POCGLU in the last 72 hours. ABG:  Lab Results   Component Value Date    FIO2 ROOM AIR 2014     Lab Results   Component Value Date/Time    SPECIAL NOT REPORTED 2017 12:53 AM     Lab Results   Component Value Date/Time    CULTURE VIRIDANS STREPTOCOCCUS GROUP LIGHT GROWTH (A) 2017 12:53 AM    CULTURE NO ANAEROBIC ORGANISMS ISOLATED AT 5 DAYS (A) 2017 12:53 AM    CULTURE  2017 12:53 AM     [employer] [address] [place] [address] [phone]       Radiology:  XR PELVIS (1-2 VIEWS)    Result Date: 2021  Lytic expansile lesion involves all the left inferior pubic ramus extending into the left acetabulum measuring 8.2 x 6.7 cm.   Significant progression has occurred from 09/15/2021. At that time, it measured 2.9 cm in greatest diameter. No pathological fracture was seen.        Physical Examination:       General appearance:  alert, cooperative and no distress  Mental Status:  oriented to person, place and time and normal affect  Lungs:  clear to auscultation bilaterally, normal effort  Heart:  regular rate and rhythm, no murmur  Abdomen:  soft, nontender, nondistended, normal bowel sounds, no masses, hepatomegaly, splenomegaly  Extremities:  no edema, redness, tenderness in the calves  Skin:  no gross lesions, rashes, induration    Assessment:     Hospital Problems           Last Modified POA    * (Principal) [facility] 12/21/2021 Yes    Primary hypertension 12/21/2021 Yes    Anxiety 12/17/2021 Yes    Hypercalcemia of malignancy 12/18/2021 Yes    Cancer related pain 12/21/2021 Yes    Met[facility] 12/17/2021 Yes    Tobacco use 12/21/2021 Yes          Plan:     Continue efforts for pain control, increase Roxicodone make efforts to wean IV medication  X-ray left hip  GI and DVT prophylaxis  Monitor and control blood pressure  Radiation therapy  PT and OT, activity as tolerated  PET scan as ordered  See orders for details    Epi Wilkins DO  12/21/2021  9:13 AM

## 2021-12-21 NOTE — ACP (ADVANCE CARE PLANNING)
Advance Care Planning     Advance Care Planning (ACP) Physician/NP/PA Conversation    Date of Conversation: 12/16/2021  Conducted with: Patient with Decision Making Capacity    Healthcare Decision Maker:  Patient has daughter who is Next of Kin. He would like his mother to be HCPOA. Spiritual care consulted to assist.       Click here to complete Healthcare Decision Makers including selection of the Healthcare Decision Maker Relationship (ie \"Primary\")  Today we referred to ACP Clinical Specialist for assistance. Care Preferences:    Hospitalization: \"If your health worsens and it becomes clear that your chance of recovery is unlikely, what would be your preference regarding hospitalization? \"  The patient would prefer hospitalization. Ventilation: \"If you were unable to breath on your own and your chance of recovery was unlikely, what would be your preference about the use of a ventilator (breathing machine) if it was available to you? \"  The patient would NOT desire the use of a ventilator. Resuscitation: \"In the event your heart stopped as a result of an underlying serious health condition, would you want attempts made to restart your heart, or would you prefer a natural death? \"  No, do NOT attempt to resuscitate.     treatment goals, benefit/burden of treatment options, ventilation preferences, hospitalization preferences, resuscitation preferences and hospice care    Conversation Outcomes / Follow-Up Plan:  ACP incomplete - refer to ACP Clinical Specialist  Reviewed DNR/DNI and patient confirms current DNR status - completed forms on file (place new order if needed)    Length of Voluntary ACP Conversation in minutes:  16 minutes    BOBBY Qiu - CNP

## 2021-12-21 NOTE — PROGRESS NOTES
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 CHIEF COMPLAINT: Intractable hip pain. History Obtained From:  patient, electronic medical record    Interval history:    Patient seen and examined  Pain is somewhat better controlled. We discussed options for pain control. He prefers to be on MS Contin and oxycodone. He does not want to go back to methadone. The pain is localized to the left hip and the inner thigh. He underwen radiation today and this is fraction 3 out of planned 5 fractions. Whole chest pain, no nausea or vomiting. He continues to be very anxious and wants more time especially at night. HISTORY OF PRESENT ILLNESS:      The patient is a 46 y.o.  male who is admitted to the hospital for chief complaints of severe hip pain. Patient is taking pain medication at home which is not helping. States that he is simply miserable with the pain. Patient was recently admitted to the hospital with left-sided hip pain. CT scan done at that time showed a lytic lesion involving the left pubic ramus extending to the posterior portion of the left acetabulum. Patient was seen by the hematology oncology team.  Patient was also noted to have hypercalcemia. Patient underwent biopsy of the hip lesion which came back as metastatic adenocarcinoma immunohistochemistry was nonspecific. Patient also noted to have hypercalcemia with calcium of 11.3 at presentation. Patient WBC count was 16.3 hemoglobin 13.3 and platelet count 175. CT chest showed suprahilar lesion abutting the mediastinum with concern regarding invasion. There was also an indeterminate right adrenal lesion.     Patient also has history of IV drug abuse with heroin. Patient is currently on methadone which she gets through [facility]    Past Medical History:   has a past medical history of Abscess, Anxiety, Cellulitis due to MRSA, Chronic back pain, Depression, Hypertension, Metastatic cancer ([facility] Multiple substance abuse ([facility] and OCD (obsessive compulsive disorder). Past Surgical History:   has a past surgical history that includes shoulder surgery (Right); Eye surgery; and CT BIOPSY SUPERFICIAL BONE PERCUTANEOUS (12/9/2021). Medications:    Prior to Admission medications    Medication Sig Start Date End Date Taking? Authorizing Provider   ALPRAZolam Mike Hooks) 0.5 MG tablet Take 0.5 mg by mouth nightly. 12/13/21  Yes Historical Provider, MD   oxyCODONE-acetaminophen (PERCOCET) 5-325 MG per tablet Take 1 tablet by mouth every 4-6 hours as needed. 12/15/21  Yes Historical Provider, MD   lisinopril (PRINIVIL;ZESTRIL) 5 MG tablet Take 1 tablet by mouth daily 12/11/21  Yes Tere Maynard MD   ketorolac (TORADOL) 10 MG tablet Take 1 tablet by mouth every 6 hours as needed for Pain 12/10/21 12/24/21 Yes Tere Maynard MD   gabapentin (NEURONTIN) 100 MG capsule Take 1 capsule by mouth 3 times daily for 30 days.  12/10/21 1/9/22 Yes Tere Maynard MD   melatonin (RA MELATONIN) 3 MG TABS tablet Take 1 tablet by mouth nightly as needed (insomnia) 9/15/21   Marlene Bella DO   traZODone (DESYREL) 50 MG tablet Take 1 tablet by mouth nightly 3/31/20   Sam Lopes MD     Current Facility-Administered Medications   Medication Dose Route Frequency Provider Last Rate Last Admin    oxyCODONE (ROXICODONE) immediate release tablet 10 mg  10 mg Oral Q4H PRN Dallas Cleaves Orlop, DO        Or    oxyCODONE (ROXICODONE) immediate release tablet 15 mg  15 mg Oral Q4H PRN [place] Cleaves Orlop, DO   15 mg at 12/21/21 1254    traZODone (DESYREL) tablet 50 mg  50 mg Oral Nightly BOBBY Blake - CNP        melatonin tablet 3 mg  3 mg Oral Nightly PRN Roula Gan, APRN - CNP        ketorolac (TORADOL) injection 30 mg  30 mg IntraVENous Q6H PRN Edgar Hutchison MD   30 mg at 12/20/21 1453    oxyCODONE (OXYCONTIN) extended release tablet 80 mg  80 mg Oral 2 times per day Saumya Myers MD   80 mg at 12/21/21 0826    citalopram (CELEXA) tablet 20 mg  20 mg Oral Daily Edgar Hutchison MD   20 mg at 12/21/21 0827    HYDROmorphone (DILAUDID) injection 1 mg  1 mg IntraVENous Q3H PRN Amna Duran MD   1 mg at 12/21/21 1303    HYDROmorphone (DILAUDID) injection 1 mg  1 mg IntraVENous Once Rehan Nava MD        ALPRAZolam Donzella Sans) tablet 1 mg  1 mg Oral 4x Daily PRN Rehan Nava MD   1 mg at 12/21/21 0934    dexamethasone (DECADRON) injection 4 mg  4 mg IntraVENous TID Rehan Nava MD   4 mg at 12/21/21 1304    gabapentin (NEURONTIN) capsule 100 mg  100 mg Oral TID Rehan Nava MD   100 mg at 12/21/21 1304    lisinopril (PRINIVIL;ZESTRIL) tablet 5 mg  5 mg Oral Daily Rehan Nava MD   5 mg at 12/21/21 0827    sodium chloride flush 0.9 % injection 5-40 mL  5-40 mL IntraVENous 2 times per day Rehan Nava MD   10 mL at 12/21/21 0826    sodium chloride flush 0.9 % injection 5-40 mL  5-40 mL IntraVENous PRN Rehan Nava MD   10 mL at 12/18/21 1320    0.9 % sodium chloride infusion  25 mL IntraVENous PRN Rehan Nava MD        enoxaparin (LOVENOX) injection 40 mg  40 mg SubCUTAneous Daily Rehan Nava MD   40 mg at 12/21/21 0826    ondansetron (ZOFRAN-ODT) disintegrating tablet 4 mg  4 mg Oral Q8H PRN Rehan Nava MD        Or    [facility] injection 4 mg  4 mg IntraVENous Q6H PRN Rehan Nava MD        polyethylene glycol [facility]) packet 17 g  17 g Oral Daily PRN Rehan Nava MD   17 g at 12/20/21 2066    acetaminophen (TYLENOL) tablet 650 mg  650 mg Oral Q6H PRN Rehan Nava MD   650 mg at 12/17/21 0018    Or    acetaminophen (TYLENOL) suppository 650 mg  650 mg Rectal Q6H PRN Rehan Nava MD        nicotine (NICODERM CQ) 21 MG/24HR 1 patch  1 patch TransDERmal Daily BOBBY Underwood - CNP   1 patch at 21 [id number]       Allergies:  Morphine    Social History:   reports that he has quit smoking. His smoking use included cigarettes. He has a 12.50 pack-year smoking history. He has never used smokeless tobacco. He reports current drug use. Drug: Marijuana Alpheus Iva). He reports that he does not drink alcohol. Family History: family history includes Diabetes in his mother; Hearing Loss in his father; High Blood Pressure in his father. REVIEW OF SYSTEMS:      Constitutional: No fever or chills. No night sweats, positive weight loss. Positive anxiety  Eyes: No eye discharge, double vision, or eye pain   HEENT: negative for sore mouth, sore throat, hoarseness and voice change   Respiratory: negative for cough , sputum, dyspnea, wheezing, hemoptysis, chest pain   Cardiovascular: negative for chest pain, dyspnea, palpitations, orthopnea, PND   Gastrointestinal: negative for nausea, vomiting, diarrhea, constipation, abdominal pain, Dysphagia, hematemesis and hematochezia   Genitourinary: negative for frequency, dysuria, nocturia, urinary incontinence, and hematuria   Integument: negative for rash, skin lesions, bruises.    Hematologic/Lymphatic: negative for easy bruising, bleeding, lymphadenopathy, or petechiae   Endocrine: negative for heat or cold intolerance,weight changes, change in bowel habits and hair loss   Musculoskeletal: Positive hip pain  Neurological: negative for headaches, dizziness, seizures, weakness, numbness    PHYSICAL EXAM:        BP (!) 149/102   Pulse 75   Temp 98.1 °F (36.7 °C) (Oral)   Resp 18   Ht 5' 11\" (1.803 m)   Wt 214 lb 15.2 oz (97.5 kg)   SpO2 96%   BMI 29.98 kg/m²    Temp (24hrs), Av °F (36.7 °C), Min:97.5 °F (36.4 °C), Max:98.1 °F (36.7 °C)      General appearance -patient distress due to hip pain  Mental status - alert and cooperative   Eyes - pupils equal and k/uL    Absolute Eos # 0.00 0.0 - 0.4 k/uL    Basophils Absolute 0.16 0.0 - 0.2 k/uL    Morphology Normal    Basic Metabolic Panel   Result Value Ref Range    Glucose 109 (H) 70 - 99 mg/dL    BUN 24 (H) 6 - 20 mg/dL    CREATININE 0.74 0.70 - 1.20 mg/dL    Bun/Cre Ratio NOT REPORTED 9 - 20    Calcium 11.3 (H) 8.6 - 10.4 mg/dL    Sodium 138 135 - 144 mmol/L    Potassium 4.1 3.7 - 5.3 mmol/L    Chloride 99 98 - 107 mmol/L    CO2 24 20 - 31 mmol/L    Anion Gap 15 9 - 17 mmol/L    GFR Non-African American >60 >60 mL/min    GFR African American >60 >60 mL/min    GFR Comment          GFR Staging NOT REPORTED    Basic Metabolic Panel w/ Reflex to MG   Result Value Ref Range    Glucose 115 (H) 70 - 99 mg/dL    BUN 24 (H) 6 - 20 mg/dL    CREATININE 0.54 (L) 0.70 - 1.20 mg/dL    Bun/Cre Ratio NOT REPORTED 9 - 20    Calcium 10.3 8.6 - 10.4 mg/dL    Sodium 139 135 - 144 mmol/L    Potassium 4.3 3.7 - 5.3 mmol/L    Chloride 105 98 - 107 mmol/L    CO2 22 20 - 31 mmol/L    Anion Gap 12 9 - 17 mmol/L    GFR Non-African American >60 >60 mL/min    GFR African American >60 >60 mL/min    GFR Comment          GFR Staging NOT REPORTED    CBC auto differential   Result Value Ref Range    WBC 11.9 (H) 3.5 - 11.0 k/uL    RBC 4.25 (L) 4.5 - 5.9 m/uL    Hemoglobin 12.6 (L) 13.5 - 17.5 g/dL    Hematocrit 37.2 (L) 41 - 53 %    MCV 87.6 80 - 100 fL    MCH 29.7 26 - 34 pg    MCHC 33.9 31 - 37 g/dL    RDW 13.3 12.5 - 15.4 %    Platelets 765 (H) 756 - 450 k/uL    MPV 7.4 6.0 - 12.0 fL    NRBC Automated NOT REPORTED per 100 WBC    Differential Type NOT REPORTED     Immature Granulocytes NOT REPORTED 0 %    Absolute Immature Granulocyte NOT REPORTED 0.00 - 0.30 k/uL    WBC Morphology NOT REPORTED     RBC Morphology NOT REPORTED     Platelet Estimate NOT REPORTED     Seg Neutrophils 69 (H) 36 - 66 %    Lymphocytes 21 (L) 24 - 44 %    Monocytes 9 2 - 11 %    Eosinophils % 0 (L) 1 - 4 %    Basophils 1 0 - 2 %    Segs Absolute 8.20 (H) 1.8 - 7.7 k/uL Absolute Lymph # 2.50 1.0 - 4.8 k/uL    Absolute Mono # 1.00 0.1 - 1.2 k/uL    Absolute Eos # 0.00 0.0 - 0.4 k/uL    Basophils Absolute 0.10 0.0 - 0.2 k/uL   PTH, INTACT WITH IONIZED CALCIUM   Result Value Ref Range    Pth Intact 27.65 15.0 - 65.0 pg/mL    Calcium, Ion 1.39 (H) 1.13 - 1.33 mmol/L   VITAMIN D 25 HYDROXY   Result Value Ref Range    Vit D, 25-Hydroxy 18.9 (L) 30.0 - 100.0 ng/mL   ETHANOL   Result Value Ref Range    Ethanol <10 <10 mg/dL    Ethanol percent <0.010 <0.010 %   DRUG SCREEN MULTI URINE   Result Value Ref Range    Amphetamine Screen, Ur NEGATIVE NEGATIVE    Barbiturate Screen, Ur NEGATIVE NEGATIVE    Benzodiazepine Screen, Urine POSITIVE (A) NEGATIVE    Cocaine Metabolite, Urine NEGATIVE NEGATIVE    Methadone Screen, Urine POSITIVE (A) NEGATIVE    Opiates, Urine POSITIVE (A) NEGATIVE    Phencyclidine, Urine NEGATIVE NEGATIVE    Propoxyphene, Urine NOT REPORTED NEGATIVE    Cannabinoid Scrn, Ur POSITIVE (A) NEGATIVE    Oxycodone Screen, Ur POSITIVE (A) NEGATIVE    Methamphetamine, Urine NOT REPORTED NEGATIVE    Tricyclic Antidepressants, Urine NOT REPORTED NEGATIVE    MDMA, Urine NOT REPORTED NEGATIVE    Buprenorphine Urine NOT REPORTED NEGATIVE    Test Information       Assay provides medical screening only. The absence of expected drug(s) and/or metabolite(s) may indicate diluted or adulterated urine, limitations of testing or timing of collection.    Basic Metabolic Panel w/ Reflex to MG   Result Value Ref Range    Glucose 111 (H) 70 - 99 mg/dL    BUN 29 (H) 6 - 20 mg/dL    CREATININE 0.54 (L) 0.70 - 1.20 mg/dL    Bun/Cre Ratio NOT REPORTED 9 - 20    Calcium 10.2 8.6 - 10.4 mg/dL    Sodium 136 135 - 144 mmol/L    Potassium 4.4 3.7 - 5.3 mmol/L    Chloride 102 98 - 107 mmol/L    CO2 20 20 - 31 mmol/L    Anion Gap 14 9 - 17 mmol/L    GFR Non-African American >60 >60 mL/min    GFR African American >60 >60 mL/min    GFR Comment          GFR Staging NOT REPORTED    CBC auto differential   Result Value Ref Range    WBC 15.0 (H) 3.5 - 11.0 k/uL    RBC 4.21 (L) 4.5 - 5.9 m/uL    Hemoglobin 12.3 (L) 13.5 - 17.5 g/dL    Hematocrit 37.9 (L) 41 - 53 %    MCV 90.0 80 - 100 fL    MCH 29.2 26 - 34 pg    MCHC 32.5 31 - 37 g/dL    RDW 12.8 12.5 - 15.4 %    Platelets 522 (H) 238 - 450 k/uL    MPV 9.0 8.0 - 14.0 fL    NRBC Automated NOT REPORTED per 100 WBC    Differential Type NOT REPORTED     Immature Granulocytes NOT REPORTED 0 %    Absolute Immature Granulocyte NOT REPORTED 0.00 - 0.30 k/uL    WBC Morphology NOT REPORTED     RBC Morphology NOT REPORTED     Platelet Estimate NOT REPORTED     Seg Neutrophils 75 (H) 36 - 66 %    Lymphocytes 16 (L) 24 - 44 %    Monocytes 9 2 - 11 %    Eosinophils % 0 (L) 1 - 4 %    Basophils 0 0 - 2 %    Segs Absolute 11.29 (H) 1.8 - 7.7 k/uL    Absolute Lymph # 2.38 1.0 - 4.8 k/uL    Absolute Mono # 1.35 (H) 0.1 - 1.2 k/uL    Absolute Eos # 0.00 0.0 - 0.4 k/uL    Basophils Absolute 0.00 0.0 - 0.2 k/uL   Basic Metabolic Panel w/ Reflex to MG   Result Value Ref Range    Glucose 109 (H) 70 - 99 mg/dL    BUN 30 (H) 6 - 20 mg/dL    CREATININE 0.57 (L) 0.70 - 1.20 mg/dL    Bun/Cre Ratio NOT REPORTED 9 - 20    Calcium 10.1 8.6 - 10.4 mg/dL    Sodium 136 135 - 144 mmol/L    Potassium 4.5 3.7 - 5.3 mmol/L    Chloride 102 98 - 107 mmol/L    CO2 20 20 - 31 mmol/L    Anion Gap 14 9 - 17 mmol/L    GFR Non-African American >60 >60 mL/min    GFR African American >60 >60 mL/min    GFR Comment          GFR Staging NOT REPORTED    CBC auto differential   Result Value Ref Range    WBC 17.2 (H) 3.5 - 11.0 k/uL    RBC 3.93 (L) 4.5 - 5.9 m/uL    Hemoglobin 12.0 (L) 13.5 - 17.5 g/dL    Hematocrit 34.7 (L) 41 - 53 %    MCV 88.3 80 - 100 fL    MCH 30.6 26 - 34 pg    MCHC 34.6 31 - 37 g/dL    RDW 13.3 12.5 - 15.4 %    Platelets 374 (H) 906 - 450 k/uL    MPV 7.4 6.0 - 12.0 fL    NRBC Automated NOT REPORTED per 100 WBC    Differential Type NOT REPORTED     Immature Granulocytes NOT REPORTED 0 %    Absolute Immature Granulocyte NOT REPORTED 0.00 - 0.30 k/uL    WBC Morphology NOT REPORTED     RBC Morphology NOT REPORTED     Platelet Estimate NOT REPORTED     Seg Neutrophils 82 (H) 36 - 66 %    Lymphocytes 13 (L) 24 - 44 %    Monocytes 5 1 - 7 %    Eosinophils % 0 (L) 1 - 4 %    Basophils 0 0 - 2 %    Segs Absolute 14.10 (H) 1.8 - 7.7 k/uL    Absolute Lymph # 2.24 1.0 - 4.8 k/uL    Absolute Mono # 0.86 (H) 0.1 - 0.8 k/uL    Absolute Eos # 0.00 0.0 - 0.4 k/uL    Basophils Absolute 0.00 0.0 - 0.2 k/uL    Morphology Normal    Basic Metabolic Panel w/ Reflex to MG   Result Value Ref Range    Glucose 118 (H) 70 - 99 mg/dL    BUN 24 (H) 6 - 20 mg/dL    CREATININE 0.59 (L) 0.70 - 1.20 mg/dL    Bun/Cre Ratio NOT REPORTED 9 - 20    Calcium 10.1 8.6 - 10.4 mg/dL    Sodium 135 135 - 144 mmol/L    Potassium 4.2 3.7 - 5.3 mmol/L    Chloride 102 98 - 107 mmol/L    CO2 25 20 - 31 mmol/L    Anion Gap 8 (L) 9 - 17 mmol/L    GFR Non-African American >60 >60 mL/min    GFR African American >60 >60 mL/min    GFR Comment          GFR Staging NOT REPORTED    CBC auto differential   Result Value Ref Range    WBC 18.4 (H) 3.5 - 11.0 k/uL    RBC 4.28 (L) 4.5 - 5.9 m/uL    Hemoglobin 12.6 (L) 13.5 - 17.5 g/dL    Hematocrit 37.6 (L) 41 - 53 %    MCV 88.0 80 - 100 fL    MCH 29.5 26 - 34 pg    MCHC 33.5 31 - 37 g/dL    RDW 13.4 12.5 - 15.4 %    Platelets 294 (H) 902 - 450 k/uL    MPV 7.2 6.0 - 12.0 fL    NRBC Automated NOT REPORTED per 100 WBC    Differential Type NOT REPORTED     Immature Granulocytes NOT REPORTED 0 %    Absolute Immature Granulocyte NOT REPORTED 0.00 - 0.30 k/uL    WBC Morphology NOT REPORTED     RBC Morphology NOT REPORTED     Platelet Estimate NOT REPORTED     Seg Neutrophils 83 (H) 36 - 66 %    Lymphocytes 9 (L) 24 - 44 %    Monocytes 6 1 - 7 %    Eosinophils % 2 1 - 4 %    Basophils 0 0 - 2 %    Segs Absolute 15.27 (H) 1.8 - 7.7 k/uL    Absolute Lymph # 1.66 1.0 - 4.8 k/uL    Absolute Mono # 1.10 (H) 0.1 - 0.8 k/uL    Absolute Eos # 0.37 0.0 - 0.4 k/uL    Basophils Absolute 0.00 0.0 - 0.2 k/uL    Morphology Normal    Basic Metabolic Panel w/ Reflex to MG   Result Value Ref Range    Glucose 107 (H) 70 - 99 mg/dL    BUN 26 (H) 6 - 20 mg/dL    CREATININE 0.52 (L) 0.70 - 1.20 mg/dL    Bun/Cre Ratio NOT REPORTED 9 - 20    Calcium 10.0 8.6 - 10.4 mg/dL    Sodium 135 135 - 144 mmol/L    Potassium 4.5 3.7 - 5.3 mmol/L    Chloride 100 98 - 107 mmol/L    CO2 25 20 - 31 mmol/L    Anion Gap 10 9 - 17 mmol/L    GFR Non-African American >60 >60 mL/min    GFR African American >60 >60 mL/min    GFR Comment          GFR Staging NOT REPORTED    CBC auto differential   Result Value Ref Range    WBC 17.5 (H) 3.5 - 11.0 k/uL    RBC 4.11 (L) 4.5 - 5.9 m/uL    Hemoglobin 12.3 (L) 13.5 - 17.5 g/dL    Hematocrit 36.2 (L) 41 - 53 %    MCV 87.9 80 - 100 fL    MCH 29.8 26 - 34 pg    MCHC 34.0 31 - 37 g/dL    RDW 13.4 12.5 - 15.4 %    Platelets 442 (H) 136 - 450 k/uL    MPV 7.2 6.0 - 12.0 fL    NRBC Automated NOT REPORTED per 100 WBC    Differential Type NOT REPORTED     Immature Granulocytes NOT REPORTED 0 %    Absolute Immature Granulocyte NOT REPORTED 0.00 - 0.30 k/uL    WBC Morphology NOT REPORTED     RBC Morphology NOT REPORTED     Platelet Estimate NOT REPORTED     Seg Neutrophils 83 (H) 36 - 66 %    Lymphocytes 13 (L) 24 - 44 %    Monocytes 4 1 - 7 %    Eosinophils % 0 (L) 1 - 4 %    Basophils 0 0 - 2 %    Segs Absolute 14.52 (H) 1.8 - 7.7 k/uL    Absolute Lymph # 2.28 1.0 - 4.8 k/uL    Absolute Mono # 0.70 0.1 - 0.8 k/uL    Absolute Eos # 0.00 0.0 - 0.4 k/uL    Basophils Absolute 0.00 0.0 - 0.2 k/uL    Morphology Normal          IMAGING DATA:    XR PELVIS (1-2 VIEWS)    Result Date: 12/16/2021  EXAMINATION: ONE XRAY VIEW OF THE PELVIS 12/16/2021 4:12 pm COMPARISON: 09/15/2021. HISTORY: ORDERING SYSTEM PROVIDED HISTORY: known lytic lesion to pelvis. worsening pain TECHNOLOGIST PROVIDED HISTORY: known lytic lesion to pelvis.  worsening pain Reason for Exam: known lytic lesion to pelvis. worsening pain FINDINGS: A lytic expansile lesion involves all of the left inferior pubic ramus extending into the left acetabulum. It measured 8.2 x 6.7 cm with significant progression of from 09/15/2021. No left hip dislocation was noted. No pathological fracture was seen. Marked degenerative disc changes were noted in the lumbar spine. Lytic expansile lesion involves all the left inferior pubic ramus extending into the left acetabulum measuring 8.2 x 6.7 cm. Significant progression has occurred from 09/15/2021. At that time, it measured 2.9 cm in greatest diameter. No pathological fracture was seen. CT CHEST W CONTRAST    Result Date: 12/10/2021  EXAMINATION: CT OF THE CHEST WITH CONTRAST 12/10/2021 12:46 pm TECHNIQUE: CT of the chest was performed with the administration of intravenous contrast. Multiplanar reformatted images are provided for review. Dose modulation, iterative reconstruction, and/or weight based adjustment of the mA/kV was utilized to reduce the radiation dose to as low as reasonably achievable. COMPARISON: 02/21/2013 HISTORY: Reason for Exam: ? mass FINDINGS: Mediastinum: No evidence of mediastinal lymphadenopathy. Normal heart size. Normal caliber pulmonary trunk. Normal thoracic aorta. Lungs/pleura: 2.5 cm right suprahilar spiculated lesion which abuts the mediastinum with questionable invasion. No focal consolidation or pulmonary edema. No evidence of pleural effusion or pneumothorax. Upper Abdomen: 2 cm right adrenal lesion. Partially visualized partially exophytic cyst anterior upper pole left kidney which demonstrates interval enlargement now measuring approximately 4.3 cm. Likely hepatic fatty infiltration. Slightly contracted gallbladder with mild wall enhancement. Soft Tissues/Bones: No acute bone or soft tissue abnormality. Mild hypertrophic bony spurring in the mid to lower thoracic spine. Bilateral gynecomastia.      *Right suprahilar specular lesion which abuts the mediastinum with questionable invasion as measured above concerning for malignancy, recommend further evaluation with tissue sampling and/or PET-CT. Indeterminate right adrenal lesion as measured above, consider further assessment with dedicated adrenal protocol CT or MRI. *Likely hepatic fatty infiltration. Slightly contracted gallbladder with mild wall enhancement. Consider further assessment with right upper quadrant ultrasound. *Interval enlargement of partially visualized left renal cyst. The findings were sent to the Radiology Results [id number] at 1:18 pm on 12/10/2021to be communicated to a licensed caregiver. NM BONE SCAN 3 PHASE    Result Date: 12/8/2021  EXAMINATION: THREE PHASE BONE SCAN 12/8/2021 8:06 am TECHNIQUE: The patient was injected intravenously with 26 mCi of 99 mTc MDP. Initial blood flow and pool images of the pelvis were acquired. After 3 hours, delayed bone images were acquired. Whole-body images were obtained. Additional spot images of the pelvis and ribs are obtained. COMPARISON: CT scan of the left hip 12/07/2021 HISTORY: ORDERING SYSTEM PROVIDED HISTORY: Lytic bone lesion of hip FINDINGS: There is increased blood flow and blood pool activity in the left ischium and inferior pubic ramus as well as in the adjacent soft tissues. Delayed images show mild heterogeneous activity in the left ischium and inferior pubic ramus with increased uptake along the edge of the corresponding lesion. No other area of abnormal increased uptake. Degenerative uptake in the knees. Three-phase positive uptake associated with the destructive lytic lesion in the left ischium and inferior pubic ramus. The delayed phase uptake is primarily along the margin of the lesion, typical of predominantly lytic lesions. Other lytic lesions may not be apparent on bone scan. The overall appearance is highly concerning for malignancy.      CT HIP LEFT WO CONTRAST    Result Date: 12/7/2021  EXAMINATION: CT OF THE LEFT HIP WITHOUT CONTRAST 12/7/2021 3:09 pm TECHNIQUE: CT of the left hip was performed without the administration of intravenous contrast.  Multiplanar reformatted images are provided for review. Dose modulation, iterative reconstruction, and/or weight based adjustment of the mA/kV was utilized to reduce the radiation dose to as low as reasonably achievable. COMPARISON: CT abdomen/pelvis dated 15 September 2021 HISTORY ORDERING SYSTEM PROVIDED HISTORY: Worsening pain with possible metastatic disease, unable to walk or ambulate on left leg, severe left hip pain TECHNOLOGIST PROVIDED HISTORY: Worsening pain with possible metastatic disease, unable to walk or ambulate on left leg, severe left hip pain Reason for Exam: Worsening pain with possible metastatic disease, unable to walk or ambulate on left leg, severe left hip pain Acuity: Unknown Type of Exam: Unknown FINDINGS: Bones: There is an expansile, lytic lesion occupying the majority of the left inferior pubic ramus which extends into the posterior/inferior portion of the left acetabulum. There is an additional lytic lesion measuring 1.2 cm in the left iliac bone (series 2, image 58). Soft Tissue: There is an expansile soft tissue mass invading the left inferior pubic ramus extending into the left acetabulum. The visualized intrapelvic organs appear normal.     1.  Expansile lytic lesion involving the majority of the left inferior pubic ramus extending into the posterior/inferior portion of the left acetabulum. The differential includes metastatic disease versus primary bone neoplasm. Consider whole-body bone scan versus PET scan to evaluate for any additional lesions. The degree of osseous replacement by the mass is significant which could result in a pathologic fracture. 2.  Additional 1.2 cm lytic lesion in the left iliac bone.      CT BIOPSY SUPERFICIAL BONE PERCUTANEOUS    Result Date: 12/9/2021  PROCEDURE: CT BX BONE NEEDLE SUPERFCL MODERATE CONSCIOUS SEDATION 12/9/2021 HISTORY: ORDERING SYSTEM PROVIDED HISTORY: biopsy of left pelvic bone mass TECHNOLOGIST PROVIDED HISTORY: biopsy of left pelvic bone mass Reason for Exam: biopsy left pelvic bone mass TECHNIQUE: Dose modulation, iterative reconstruction, and/or weight based adjustment of the mA/kV was utilized to reduce the radiation dose to as low as reasonably achievable. CONTRAST: None SEDATION: 0versed and 100 mcg fentanyl were titrated intravenously for moderate sedation monitored under my direction. Total intraservice time of sedation was 15 minutes. The patient's vital signs were monitored throughout the procedure and recorded in the patient's medical record by the nurse. FLUOROSCOPY DOSE AND TYPE OR TIME AND EXPOSURES: None DESCRIPTION OF PROCEDURE: Informed consent was obtained after a detailed explanation of the procedure including risks, benefits, and alternatives. Universal protocol was observed. Patient is placed prone on the table. Pelvic region was prepped and draped sterile fashion. Left ischium was localized by CT scan. 1% lidocaine was infiltrated for local anesthesia. An 18 gauge coaxial needle was advanced to the edge of the expansile bone lesion. Multiple 18 gauge cores were obtained and submitted to pathology. Post biopsy scanning showed no immediate complication. Dressing was applied. Patient was returned to holding stable condition. FINDINGS: Needle confirmed within the center of the expansile lytic bone lesion in left ischium. Successful CT-guided core bone biopsy.          IMPRESSION:   Primary Problem  Bone metastases [facility])    Active Hospital Problems    Diagnosis Date Noted    Bone metastases ([facility] [C79.51] 12/21/2021    Tobacco use [Z72.0] 12/21/2021    Metastatic cancer ([facility] [C79.9] 12/17/2021    Cancer related pain [G89.3] 12/16/2021    Hypercalcemia of malignancy [E83.52] 12/09/2021    Anxiety [F41.9] 10/05/2015    Primary hypertension [I10] 08/21/2014       Metastatic adenocarcinoma with bone metastasis  Intractable pain  2.5 cm right suprahilar lesion  Hypercalcemia  History of IV drug abuse  Severe anxiety    RECOMMENDATIONS:  I personally reviewed results of lab work-up imaging studies and other relevant clinical data   Continue with OxyContin 80 mg twice daily and oxycodone 15 mg for breakthrough. We will discontinue IV Dilaudid and we will use oxycodone w for breakthrough and also prior to radiation   Continue Xanax, will adjust the dose to help with him his night anxiety. We will discontinue Toradol. Plan outpatient PET CT scan  Plan systemic therapy as the patient is interested more treatment  Plan to go home with his parents and then follow-up with us as an outpatient to discuss treatment plan       Discussed with patient and Nurse. Thank you for asking us to see this patient. Yue Hutchison MD  Hematologist/Medical [id number] [facility] hematology oncology physicians                This note is created with the assistance of a speech recognition program.  While intending to generate a document that actually reflects the content of the visit, the document can still have some errors including those of syntax and sound a like substitutions which may escape proof reading. It such instances, actual meaning can be extrapolated by contextual diversion.

## 2021-12-21 NOTE — PROGRESS NOTES
Called to Patients bedside to listen to concerns that the patient wanted to express to me. Pt states that he felt like he was being accused of smoking marijuana in his room. Writer explained to the patient that he himself was not being accused, that the staff smelled what they believed to be marijuana in the hallway. I explained to the patient that the smell was present throughout the entire hospital and was most likely coming in through the ventilation system. The patient asked writer to go through his personal belongings to verify he did not have any marijuana products in his belongings. The patient was very anxious and stated that he was in a lot of pain which he rated 9/10. Treatment plan was discussed with the patient which he was agreeable to.

## 2021-12-21 NOTE — CONSULTS
without insurance approval. He denied any injuries, but reported that he has been miserable and unable to get comfortable or sleep. Patient was given dilaudid, ativan, Toradol, and Fentanyl in ED without relief. Patient was started on Decadron 4mg TID and Oncology/Radiation Oncology consulted. Patient was previously on Methadone and reports that dose was at 45 mg. He reports history of heroin use, and has been clean x1+ years. He no longer uses alcohol, and reports occasional marijuana use. He does smoke cigarettes and reports X many years. He reports being down to 5-15 a day. Calcium treated conservatively with fluids. Oncology planning outpatient PET scan. Radiation was started on the 17th, and 5 sessions are planned. Patient had hospice meeting with [facility]. He is a DNRCC-A code status. Palliative care consulted for review of goals, code status, symptom management, and support. 12/21 pertinent labs included; Bun 26, Wbc 17.5, Hgb 12.3, and plts 830. Left hip Xray today revealed   Impression   Similar to previously with large mass in the region of left inferior pubic   ramus apparently causing osseous destruction.        Active Hospital Problems    Diagnosis Date Noted    Bone metastases ([address] [C79.51] 12/21/2021    Tobacco use [Z72.0] 12/21/2021    Metastatic cancer ([address] [C79.9] 12/17/2021    Cancer related pain [G89.3] 12/16/2021    Hypercalcemia of malignancy [E83.52] 12/09/2021    Anxiety [F41.9] 10/05/2015    Primary hypertension [I10] 08/21/2014       PAST MEDICAL HISTORY      Diagnosis Date    Abscess     rt ear    Anxiety 10/5/2015    Cellulitis due to MRSA     Chronic back pain     Depression     Hypertension     Metastatic cancer ([address] 12/17/2021    Multiple substance abuse ([address]     OCD (obsessive compulsive disorder)        PAST SURGICAL HISTORY  Past Surgical History:   Procedure Laterality Date    CT BIOPSY PERCUTANEOUS SUPERFICIAL BONE  12/9/2021    CT BIOPSY MELATONIN) 3 MG TABS tablet Take 1 tablet by mouth nightly as needed (insomnia) 30 tablet 0    traZODone (DESYREL) 50 MG tablet Take 1 tablet by mouth nightly 30 tablet 0       Data         BP (!) 166/99   Pulse 62   Temp 98.1 °F (36.7 °C) (Oral)   Resp 18   Ht 5' 11\" (1.803 m)   Wt 214 lb 15.2 oz (97.5 kg)   SpO2 99%   BMI 29.98 kg/m²     Wt Readings from Last 3 Encounters:   12/21/21 214 lb 15.2 oz (97.5 kg)   12/10/21 217 lb 13 oz (98.8 kg)   09/15/21 220 lb (99.8 kg)        Code Status: DNR-CCA     ADVANCED CARE PLANNING:  Patient has capacity for medical decisions: yes  Health Care Power of : no - has adult daughter as Next of Kin but wants mother to be HCPOA- spiritual care consulted to assist.   Living Will: not asked     Personal, Social, and Family History  Marital Status: single  Living situation:with family:  mother and father  Importance of lottie/Gnosticist/spiritual beliefs: [] Very [] Somewhat [] Not   Psychological Distress: moderate  Does patient understand diagnosis/treatment? yes  Does caregiver understand diagnosis/treatment? not asked      Assessment        REVIEW OF SYSTEMS  Constitutional: no fever, no chills +weight loss/insomnia  Eyes: no eye pain or blurred vision  ENT: no hearing loss, congestion, or difficulty swallowing   Respiratory: no wheezing, chest tightness, or shortness of breath   Cardiovascular: no chest pain or pressure, no palpitations, no diaphoresis   Gastrointestinal: no nausea, vomiting,abdominal pain, diarrhea or constipation, no melena   Genitourinary: no dysuria, frequency,hematuria , or nocturia   Musculoskeletal: no myalgias or arthralgias, no back pain +left hip severe pain- radiates to groin and buttocks. Skin: no rashes or sores   Neurological: no focal weakness, numbness, tingling, or headache, no seizures    PHYSICAL ASSESSMENT:  Constitutional: Alert and oriented to person, place, and time. +anxious  Head: Normocephalic and atraumatic.    Eyes: EOM are normal. Pupils are equal, round   Neck: Normal range of motion. Neck supple. No tracheal deviation present. Cardiovascular: Normal rate and regular rhythm, S1, S2, no murmur   Pulmonary/Chest: Effort normal and breath sounds normal. No rales or wheezes. Abdomen: Soft. No tenderness, not distended, no ascites, no organomegaly   Musculoskeletal: Normal range of motion. No edema lower ext. Neurological: CN II-XII grossly intact, no focal neurological deficits   Skin: Normal turgor, no bleeding, no bruising     Palliative Performance Scale:  ___60%  Ambulation reduced; Significant disease; Can't do hobbies/housework; intake normal or reduced; occasional assist; LOC full/confusion  _x__50%  Mainly sit/lie; Extensive disease; Can't do any work; Considerable assist; intake normal or reduced; LOC full/confusion  ___40%  Mainly in bed; Extensive disease; Mainly assist; intake normal or reduced; LOC full/confusion   ___30%  Bed Bound; Extensive disease; Total care; intake reduced; LOCfull/confusion  ___20%  Bed Bound; Extensive disease; Total care; intake minimal; Drowsy/coma  ___10%  Bed Bound; Extensive disease; Total care; Mouth care only; Drowsy/coma  ___0       Death      Plan      Palliative Interaction: I went to see patient, and he was trying to get into bed but is expressing pain. He is on all 4's and can't seem to lie down without severe pain. I introduced myself to patient, and the palliative role. I discussed patients new Dx of stage 4 cancer likely lung with him. He understands that he needs PET scan. He is on day #3 of radiation of 5. He reports previously being on Methadone, but reports being down to 45mg. He reports using heroin over a year ago last. He reports no longer using alcohol, and occasionally uses Marijuana. He reports that he still smokes, and is trying the Nicotine patch while in the hospital. Patient reports now taking OxyContin ER, and dose increased with some relief.  He uses Oxycodone and Dilaudid PRN. He reports crying daily, and feeling very anxious. He reports Xanax being helpful but recently asked for dose increase. He reports losing 20 pounds, but has been working on nutrition. He reports not being able to sleep. It appears that he previously took Trazodone and Melatonin so will continue these to see if this helps. He was previously seeing a psychiatrist and was on Celexa. It appears that Celexa was started again this hospital stay. Patient has history of OCD too. I discussed stage IV cancer with patient and how treatment is palliative in nature. We discussed goals, and the differences in palliative care and hospice care. Patient reports that if he could prolong life greater than a year than he would want treatment. He was encouraged to talk with Oncologist about this once PET scan is completed. I discussed palliative options for symptom control outpatient, and he would like to have referral to [facility] clinic so this was added. I discussed patients code status, and he reports not wanting CPR or intubation. Patient lives with parents. He has 32year old daughter who is Next of Kin. He would like his mother to be HCPOA so I consulted Spiritual care to assist with this. I offered patient much emotional support, and he thanked me for my visit today. We will see how patient does with increased doses of Oxy and Xanax.      Education/support to staff  Education/support to patient  Discharge planning/helping to coordinate care  Communications with primary service  Non-pain symptom management Trazadone and Melatonin continued from home- patient is not sleeping  Providing support for coping/adaptation/distress of patient  Managing depression/anxiety  Discussing meaning/purpose   Decision making regarding life prolonging treatment  Decisional capacity assessed  Continue with current plan of care  Clarification of medical condition to patient and family  Code status clarified: Full Code  Code status clarified: [facility]  Code status clarified: [facility]  Palliative care orders introduced  Provided information about hospice  Validating patient/family distress  Recognizing, reflecting, and empathizing with the patient's anticipatory grief  Patient is having severe anxiety and pain. His meds have been escalated several times, and he is starting to get some relief. He met with hospice care but is wanting to see what Oncology can offer him to prolong life/prognosis. He was clear that if prognosis is less than a year than he would not want to purse treatment. He needs PET Scan for further information. He is agreeable to palliative care for symtpom management- referral sent to [facility] clinic. Principle Problem/Diagnosis:  Bone metastases ([facility]    Additional Assessments:   Principal Problem:    Bone metastases ([facility] [facility]  Active Problems:    Primary hypertension    Anxiety    Hypercalcemia of malignancy    Cancer related pain    Metastatic cancer ([facility]    Tobacco use  Resolved Problems:    * No resolved hospital problems. *    1- Symptom management/ pain control     Pain Assessment:  Pain is not well controlled. Medication(s) being used: narcotic analgesics including OxyContin 80mg BID, Oxycodone 15mg NE, and Dilaudid PRN. Anxiety:  difficulty concentrating, fatigue, insomnia, irritable, racing thoughts                          Dyspnea:  none                          Fatigue:  exercise intolerance    Other: Has lost 20 pounds- encouraged supplements    We feel the patient symptoms are being controlled. his current regimen is reviewed by myself and discussed with the staff.      2- Goals of care evaluation   The patient goals of care are live longer, improve or maintain function/quality of life and support for family/caregiver   Goals of care discussed with:    [x] Patient independently    [] Patient and Family    [] Family or Healthcare DPOA independently    [] Unable to discuss with patient, family/DPOA not present    3- Code Status  DNR-CCA- patient reported that he would not want intubation. 4- Other recommendations   - We will continue to provide comfort and support to the patient and the family  Please call with any palliative questions or concerns. Palliative Care Team is available via perfect serve or via phone. Palliative Care will continue to follow Mr. Singer's care as needed. Thank you for allowing Palliative Care to participate in the care of Mr. Rubia Rodríguez . This note has been dictated by dragon, typing errors may be a possibility. The total time I spent in seeing the patient, discussing goals of care, advanced directives, code status and other major issues was more than 70 minutes      Electronically signed by   BOBBY Qiu - [facility]  Palliative Care Team  on 12/21/2021 at 9:34 AM    Palliative Care can be reached via perfect [employer].

## 2021-12-21 NOTE — TELEPHONE ENCOUNTER
Name: Todd King  : 1970  MRN: [id number]    Oncology Navigation Follow-Up Note  Navigator spoke with nurse on floor today and she had already spoke with PET cancelling PET for today. Writer will plan to reschedule. Writer suggesting palliative care get on board for pain control.    Electronically signed by Kelly Rasheed RN on 2021 at 9:19 AM

## 2021-12-22 ENCOUNTER — HOSPITAL ENCOUNTER (OUTPATIENT)
Dept: RADIATION ONCOLOGY | Age: 51
Discharge: HOME | End: 2021-12-22
Attending: RADIOLOGY
Payer: COMMERCIAL

## 2021-12-22 ENCOUNTER — HOSPITAL ENCOUNTER (OUTPATIENT)
Dept: RADIATION ONCOLOGY | Age: 51
Discharge: HOME | End: 2021-12-22

## 2021-12-22 VITALS
OXYGEN SATURATION: 99 % | DIASTOLIC BLOOD PRESSURE: 82 MMHG | RESPIRATION RATE: 18 BRPM | BODY MASS INDEX: 29.29 KG/M2 | TEMPERATURE: 98.6 F | SYSTOLIC BLOOD PRESSURE: 128 MMHG | HEART RATE: 93 BPM | WEIGHT: 210 LBS

## 2021-12-22 DIAGNOSIS — C80.1 ADENOCARCINOMA (HCC): ICD-10-CM

## 2021-12-22 DIAGNOSIS — M89.8X5 LYTIC BONE LESION OF HIP: Primary | ICD-10-CM

## 2021-12-22 LAB
ABSOLUTE EOS #: 0 K/UL (ref 0–0.4)
ABSOLUTE IMMATURE GRANULOCYTE: ABNORMAL K/UL (ref 0–0.3)
ABSOLUTE LYMPH #: 5.67 K/UL (ref 1–4.8)
ABSOLUTE MONO #: 2.27 K/UL (ref 0.1–0.8)
ANION GAP SERPL CALCULATED.3IONS-SCNC: 10 MMOL/L (ref 9–17)
BASOPHILS # BLD: 0 % (ref 0–2)
BASOPHILS ABSOLUTE: 0 K/UL (ref 0–0.2)
BUN BLDV-MCNC: 23 MG/DL (ref 6–20)
BUN/CREAT BLD: ABNORMAL (ref 9–20)
CALCIUM SERPL-MCNC: 10.3 MG/DL (ref 8.6–10.4)
CHLORIDE BLD-SCNC: 100 MMOL/L (ref 98–107)
CO2: 26 MMOL/L (ref 20–31)
CREAT SERPL-MCNC: 0.49 MG/DL (ref 0.7–1.2)
DIFFERENTIAL TYPE: ABNORMAL
EOSINOPHILS RELATIVE PERCENT: 0 % (ref 1–4)
GFR AFRICAN AMERICAN: >60 ML/MIN
GFR NON-AFRICAN AMERICAN: >60 ML/MIN
GFR SERPL CREATININE-BSD FRML MDRD: ABNORMAL ML/MIN/{1.73_M2}
GFR SERPL CREATININE-BSD FRML MDRD: ABNORMAL ML/MIN/{1.73_M2}
GLUCOSE BLD-MCNC: 106 MG/DL (ref 70–99)
HCT VFR BLD CALC: 38.3 % (ref 41–53)
HEMOGLOBIN: 13 G/DL (ref 13.5–17.5)
IMMATURE GRANULOCYTES: ABNORMAL %
LYMPHOCYTES # BLD: 30 % (ref 24–44)
MCH RBC QN AUTO: 29.8 PG (ref 26–34)
MCHC RBC AUTO-ENTMCNC: 33.8 G/DL (ref 31–37)
MCV RBC AUTO: 88 FL (ref 80–100)
MONOCYTES # BLD: 12 % (ref 1–7)
MORPHOLOGY: NORMAL
NRBC AUTOMATED: ABNORMAL PER 100 WBC
PDW BLD-RTO: 13.5 % (ref 12.5–15.4)
PLATELET # BLD: 825 K/UL (ref 140–450)
PLATELET ESTIMATE: ABNORMAL
PMV BLD AUTO: 7.2 FL (ref 6–12)
POTASSIUM SERPL-SCNC: 4.8 MMOL/L (ref 3.7–5.3)
RBC # BLD: 4.36 M/UL (ref 4.5–5.9)
RBC # BLD: ABNORMAL 10*6/UL
SEG NEUTROPHILS: 58 % (ref 36–66)
SEGMENTED NEUTROPHILS ABSOLUTE COUNT: 10.96 K/UL (ref 1.8–7.7)
SODIUM BLD-SCNC: 136 MMOL/L (ref 135–144)
WBC # BLD: 18.9 K/UL (ref 3.5–11)
WBC # BLD: ABNORMAL 10*3/UL

## 2021-12-22 PROCEDURE — 77412 RADIATION TX DELIVERY LVL 3: CPT | Performed by: RADIOLOGY

## 2021-12-22 PROCEDURE — 6370000000 HC RX 637 (ALT 250 FOR IP): Performed by: STUDENT IN AN ORGANIZED HEALTH CARE EDUCATION/TRAINING PROGRAM

## 2021-12-22 PROCEDURE — 1210000000 HC MED SURG R&B

## 2021-12-22 PROCEDURE — 77387 GUIDANCE FOR RADJ TX DLVR: CPT | Performed by: RADIOLOGY

## 2021-12-22 PROCEDURE — 6370000000 HC RX 637 (ALT 250 FOR IP): Performed by: INTERNAL MEDICINE

## 2021-12-22 PROCEDURE — 80048 BASIC METABOLIC PNL TOTAL CA: CPT

## 2021-12-22 PROCEDURE — 99232 SBSQ HOSP IP/OBS MODERATE 35: CPT | Performed by: INTERNAL MEDICINE

## 2021-12-22 PROCEDURE — 2580000003 HC RX 258: Performed by: STUDENT IN AN ORGANIZED HEALTH CARE EDUCATION/TRAINING PROGRAM

## 2021-12-22 PROCEDURE — 36415 COLL VENOUS BLD VENIPUNCTURE: CPT

## 2021-12-22 PROCEDURE — 6370000000 HC RX 637 (ALT 250 FOR IP): Performed by: NURSE PRACTITIONER

## 2021-12-22 PROCEDURE — 6360000002 HC RX W HCPCS: Performed by: STUDENT IN AN ORGANIZED HEALTH CARE EDUCATION/TRAINING PROGRAM

## 2021-12-22 PROCEDURE — 85025 COMPLETE CBC W/AUTO DIFF WBC: CPT

## 2021-12-22 PROCEDURE — 77336 RADIATION PHYSICS CONSULT: CPT | Performed by: RADIOLOGY

## 2021-12-22 RX ORDER — OXYCODONE HYDROCHLORIDE 80 MG/1
80 TABLET, FILM COATED, EXTENDED RELEASE ORAL EVERY 12 HOURS
Qty: 30 EACH | Refills: 0 | Status: ON HOLD | OUTPATIENT
Start: 2021-12-22 | End: 2022-01-10

## 2021-12-22 RX ORDER — ALPRAZOLAM 1 MG/1
TABLET ORAL
Qty: 60 TABLET | Refills: 0 | Status: SHIPPED | OUTPATIENT
Start: 2021-12-22 | End: 2022-01-03

## 2021-12-22 RX ORDER — OXYCODONE HYDROCHLORIDE 15 MG/1
15 TABLET ORAL EVERY 4 HOURS PRN
Qty: 90 TABLET | Refills: 0 | Status: ON HOLD | OUTPATIENT
Start: 2021-12-22 | End: 2022-01-10 | Stop reason: HOSPADM

## 2021-12-22 RX ADMIN — OXYCODONE HYDROCHLORIDE 15 MG: 5 TABLET ORAL at 00:48

## 2021-12-22 RX ADMIN — OXYCODONE HYDROCHLORIDE 80 MG: 40 TABLET, FILM COATED, EXTENDED RELEASE ORAL at 09:04

## 2021-12-22 RX ADMIN — LISINOPRIL 5 MG: 5 TABLET ORAL at 09:05

## 2021-12-22 RX ADMIN — GABAPENTIN 300 MG: 300 CAPSULE ORAL at 20:50

## 2021-12-22 RX ADMIN — OXYCODONE HYDROCHLORIDE 80 MG: 40 TABLET, FILM COATED, EXTENDED RELEASE ORAL at 22:32

## 2021-12-22 RX ADMIN — DEXAMETHASONE SODIUM PHOSPHATE 4 MG: 4 INJECTION, SOLUTION INTRA-ARTICULAR; INTRALESIONAL; INTRAMUSCULAR; INTRAVENOUS; SOFT TISSUE at 20:50

## 2021-12-22 RX ADMIN — OXYCODONE HYDROCHLORIDE 15 MG: 5 TABLET ORAL at 07:08

## 2021-12-22 RX ADMIN — OXYCODONE HYDROCHLORIDE 15 MG: 5 TABLET ORAL at 12:19

## 2021-12-22 RX ADMIN — GABAPENTIN 300 MG: 300 CAPSULE ORAL at 09:04

## 2021-12-22 RX ADMIN — ALPRAZOLAM 2 MG: 0.5 TABLET ORAL at 20:50

## 2021-12-22 RX ADMIN — ALPRAZOLAM 1 MG: 0.5 TABLET ORAL at 11:16

## 2021-12-22 RX ADMIN — DEXAMETHASONE SODIUM PHOSPHATE 4 MG: 4 INJECTION, SOLUTION INTRA-ARTICULAR; INTRALESIONAL; INTRAMUSCULAR; INTRAVENOUS; SOFT TISSUE at 14:51

## 2021-12-22 RX ADMIN — OXYCODONE HYDROCHLORIDE 15 MG: 5 TABLET ORAL at 16:31

## 2021-12-22 RX ADMIN — SODIUM CHLORIDE, PRESERVATIVE FREE 10 ML: 5 INJECTION INTRAVENOUS at 09:05

## 2021-12-22 RX ADMIN — GABAPENTIN 300 MG: 300 CAPSULE ORAL at 14:30

## 2021-12-22 RX ADMIN — DEXAMETHASONE SODIUM PHOSPHATE 4 MG: 4 INJECTION, SOLUTION INTRA-ARTICULAR; INTRALESIONAL; INTRAMUSCULAR; INTRAVENOUS; SOFT TISSUE at 09:05

## 2021-12-22 RX ADMIN — SODIUM CHLORIDE, PRESERVATIVE FREE 10 ML: 5 INJECTION INTRAVENOUS at 20:51

## 2021-12-22 RX ADMIN — ENOXAPARIN SODIUM 40 MG: 100 INJECTION SUBCUTANEOUS at 09:05

## 2021-12-22 RX ADMIN — CITALOPRAM HYDROBROMIDE 20 MG: 20 TABLET, FILM COATED ORAL at 09:04

## 2021-12-22 RX ADMIN — OXYCODONE HYDROCHLORIDE 15 MG: 5 TABLET ORAL at 20:49

## 2021-12-22 RX ADMIN — ALPRAZOLAM 1 MG: 0.5 TABLET ORAL at 03:16

## 2021-12-22 ASSESSMENT — PAIN - FUNCTIONAL ASSESSMENT: PAIN_FUNCTIONAL_ASSESSMENT: PREVENTS OR INTERFERES SOME ACTIVE ACTIVITIES AND ADLS

## 2021-12-22 ASSESSMENT — PAIN SCALES - GENERAL
PAINLEVEL_OUTOF10: 7
PAINLEVEL_OUTOF10: 5
PAINLEVEL_OUTOF10: 9
PAINLEVEL_OUTOF10: 10
PAINLEVEL_OUTOF10: 5
PAINLEVEL_OUTOF10: 9
PAINLEVEL_OUTOF10: 9
PAINLEVEL_OUTOF10: 7
PAINLEVEL_OUTOF10: 8
PAINLEVEL_OUTOF10: 9
PAINLEVEL_OUTOF10: 10
PAINLEVEL_OUTOF10: 10
PAINLEVEL_OUTOF10: 8
PAINLEVEL_OUTOF10: 0
PAINLEVEL_OUTOF10: 0

## 2021-12-22 ASSESSMENT — PAIN DESCRIPTION - DESCRIPTORS
DESCRIPTORS: ACHING;SHARP
DESCRIPTORS: ACHING
DESCRIPTORS: ACHING;SHARP;SHOOTING

## 2021-12-22 ASSESSMENT — PAIN DESCRIPTION - LOCATION
LOCATION: HIP
LOCATION: BUTTOCKS
LOCATION: HIP;BUTTOCKS;LEG

## 2021-12-22 ASSESSMENT — PAIN DESCRIPTION - ONSET
ONSET: ON-GOING
ONSET: PROGRESSIVE
ONSET: ON-GOING

## 2021-12-22 ASSESSMENT — PAIN DESCRIPTION - ORIENTATION
ORIENTATION: LEFT
ORIENTATION: LEFT

## 2021-12-22 ASSESSMENT — PAIN DESCRIPTION - FREQUENCY
FREQUENCY: CONTINUOUS

## 2021-12-22 ASSESSMENT — PAIN DESCRIPTION - PROGRESSION
CLINICAL_PROGRESSION: NOT CHANGED
CLINICAL_PROGRESSION: NOT CHANGED

## 2021-12-22 ASSESSMENT — PAIN DESCRIPTION - PAIN TYPE
TYPE: ACUTE PAIN

## 2021-12-22 NOTE — ACP (ADVANCE CARE PLANNING)
Advance Care Planning     Advance Care Planning Inpatient Note  [facility] Care Department    Today's Date: 12/22/2021  Unit: [facility]    Received request from HealthCare Provider. Upon review of chart and communication with care team, patient's decision making abilities are not in question. . Patient was/were present in the room during visit. Goals of ACP Conversation:  Discuss advance care planning documents    Health Care Decision Makers:       Primary Decision Maker: Dennis Gray Parent - [phone]    Secondary Decision Maker: Ofelia Castro - Parent - [phone]    Summary:  Completed [facility]      Advance Care Planning Documents (Patient Wishes):  Healthcare Power of /Advance Directive Appointment of [address]     Assessment:  Writer received referral from Jenniefr Harrington, Palliative Care Nurse Practitioner, to assist Patient with completing his advance directives. Ms. Dirk Chan discussed with Patient his preference to appoint his parents as his health care agents and not default to his next of kin, his daughter. Patient appointed his father to be his primary agent and his mother to be his secondary agent. Patient is aware that a discussion about his code preferences needs to happen with his physician. Patient shared that he trusts his parents to make decisions on his behalf. Interventions:  Provided education on documents for clarity and greater understanding  Assisted in the completion of documents according to patient's wishes at this time    Care Preferences Communicated:   No    Outcomes/Plan:  New advance directive completed. Returned original document(s) to patient, as well as copies for distribution to appointed agents  Copy of advance directive given to staff to scan into medical record. Routed ACP note to attending provider or other IDT member.     Electronically signed by Nora Lama [facility] on 12/22/2021 at 6:19 PM

## 2021-12-22 NOTE — FLOWSHEET NOTE
[address]  PROGRESS NOTE    Shift date: 12/22/21  Shift day: Wednesday   Shift # 1    Room # 334/334-01   Name: Tello Du            Age: 46 y.o. Gender: male            Referral: Routine Visit    Admit Date & Time: 12/16/2021  3:33 PM    PATIENT/EVENT DESCRIPTION:  Tello Du is a 46 y.o. male with whom writer provided a follow up visit today. SPIRITUAL ASSESSMENT/INTERVENTION:  Mr. Emma [address] was lying in bed. He welcomed writer's visit. He shared how he was doing, noting that he had been emotional today. He spoke of concerns, including noting his regrets and guilt. He shared the insight that the  who visited him offered and how it helped. He voiced his hopes. He was receptive to learning a prayer that could help him accept what he cannot change. He acknowledged his concerns about the impact of his illness on his parents and his worries about them. Patient's Mother and Brother arrived. Writer greeted and introduced herself to them and then exited the room. Writer provided supportive presence and empathy; actively listened as Pt shared his concerns and feelings; affirmed Pt's strengths; offered words of encouragement; shared a prayer with Pt. SPIRITUAL CARE FOLLOW-UP PLAN:  Chaplains will remain available to offer spiritual and emotional support as needed. 12/22/21 1810   Encounter Summary   Services provided to: Patient   Referral/Consult From: [address] Family members;Parent   Continue Visiting   (12/22/21)   Complexity of Encounter Moderate   Length of Encounter 15 minutes   Spiritual Assessment Completed Yes   Routine   Type Follow up   Spiritual/Adventism   Type Spiritual support   Assessment Calm; Approachable;Coping; Hopeful   Intervention Active listening;Explored feelings, thoughts, concerns;Explored coping resources;Sustaining presence/ Ministry of presence; Discussed illness/injury and it's impact; Discussed

## 2021-12-22 NOTE — PROGRESS NOTES
[facility]  Office: [address], DO, Maricel Cervantes, DO, Randa Weaver, DO, Kristen Parsons Blood, DO, Estrellita Mitchell MD, Sohan Gomes MD, Kelly Shrestha MD, Shelia Shafer MD, Bakari Franco MD, Jad Silverio MD, Lisa George MD, Maricruz Ramirez, DO, Marilyn Wilde, DO, Francheska Washington MD,  Silvio Thao, DO, Tommy Gary MD, Jane Mccray MD, Svetlana Guzman MD, Lo Brooks MD, Brionna Kahn MD, Maggie Andrew MD, Sylvia Norton MD, Zoe Robles, [facility], [facility], CNP, Manju Mills, CNP, Edouard Stewart, CNS, Tata Ballesteros, CNP, Alexandrea Myles, CNP, Nohemi Stockton, CNP, Dami Quezada, CNP, Julieta Esparza, CNP, Fidel Ibarra PA-C, Felipa Ambrocio, [facility], Zeke Fisher, [facility], Andrea Brewer, CNP, Harris Crowe, CNP, Jeremy Murdock, CNP, Carmen Deras, CNP, Yolie Dawkins, CNP, Jayro Hernandez [id number]    Progress Note    12/22/2021    7:16 AM    Name:   Jassi Reed  MRN:     [id number]     [place]:      [de-identified]   Room:   [address] [place] [address] Day:  6  Admit Date:  12/16/2021  3:33 PM    PCP:   BOBBY Johnson  Code Status:  DNR-CCA    Subjective:     C/C:   Chief Complaint   Patient presents with    Hip Pain     Interval History Status: improved. Patient has improved pain control today. Denies any chest pain, shortness of breath, nausea vomiting, fevers or chills or acute complaints. Brief History:     Per prior chart documentation  \"Hubert Singer is a 46 y. o. male with a past medical history of recently diagnosed Stage IV adenocarcinoma of the lung who presented to the emergency department on 12/16/2021 complaining of intractable left leg pain. X-ray of the pelvis was obtained and was remarkable for a lytic expansile lesion involving the left inferior pubic ramus extending into the left acetabulum (8.2 x 6.7 cm in diameter).  The patient is admitted to internal medicine for further management of intractable pain as well as radiation therapy. \"    Review of Systems:     Constitutional:  negative for chills, fevers, sweats  Respiratory:  negative for cough, dyspnea on exertion, shortness of breath, wheezing  Cardiovascular:  negative for chest pain, chest pressure/discomfort, lower extremity edema, palpitations  Gastrointestinal:  negative for abdominal pain, constipation, diarrhea, nausea, vomiting  Neurological:  negative for dizziness, headache    Medications: Allergies: Allergies   Allergen Reactions    Morphine Itching       Current Meds:   Scheduled Meds:    traZODone  50 mg Oral Nightly    gabapentin  300 mg Oral TID    oxyCODONE  80 mg Oral 2 times per day    citalopram  20 mg Oral Daily    dexamethasone  4 mg IntraVENous TID    lisinopril  5 mg Oral Daily    sodium chloride flush  5-40 mL IntraVENous 2 times per day    enoxaparin  40 mg SubCUTAneous Daily    nicotine  1 patch TransDERmal Daily     Continuous Infusions:    sodium chloride       PRN Meds: oxyCODONE **OR** oxyCODONE, melatonin, ALPRAZolam, ALPRAZolam, sodium chloride flush, sodium chloride, ondansetron **OR** ondansetron, polyethylene glycol, acetaminophen **OR** acetaminophen    Data:     Past Medical History:   has a past medical history of Abscess, Anxiety, Cellulitis due to MRSA, Chronic back pain, Depression, Hypertension, Metastatic cancer ([facility] [address] [facility] and OCD (obsessive compulsive disorder). Social History:   reports that he has quit smoking. His smoking use included cigarettes. He has a 12.50 pack-year smoking history. He has never used smokeless tobacco. He reports current drug use. Drug: Marijuana Gelene Chasten). He reports that he does not drink alcohol.      Family History:   Family History   Problem Relation Age of Onset    Diabetes Mother     Hearing Loss Father     High Blood Pressure Father        Vitals:  BP (!) 159/85   Pulse 91   Temp 98.1 °F (36.7 °C) (Oral)   Resp 18   Ht Significant progression has occurred from 09/15/2021. At that time, it measured 2.9 cm in greatest diameter. No pathological fracture was seen. XR HIP LEFT (2-3 VIEWS)    Result Date: 12/21/2021  Similar to previously with large mass in the region of left inferior pubic ramus apparently causing osseous destruction. Physical Examination:       General appearance:  alert, cooperative and no distress  Mental Status:  oriented to person, place and time and normal affect  Lungs:  clear to auscultation bilaterally, normal effort  Heart:  regular rate and rhythm, no murmur  Abdomen:  soft, nontender, nondistended, normal bowel sounds, no masses, hepatomegaly, splenomegaly  Extremities:  no edema, redness, tenderness in the calves  Skin:  no gross lesions, rashes, induration    Assessment:     Hospital Problems           Last Modified POA    * (Principal) Bone metastases ([address] 12/21/2021 Yes    Primary hypertension 12/21/2021 Yes    Anxiety 12/17/2021 Yes    Hypercalcemia of malignancy 12/18/2021 Yes    Cancer related pain 12/21/2021 Yes    Metastatic cancer ([address] 12/17/2021 Yes    Tobacco use 12/21/2021 Yes          Plan:     Continue present analgesics  Radiation therapy as ordered  Monitor and control blood pressure  Xanax as ordered  Follow labs  Discharge planning 24 hours pending progress with radiation therapy.     Nancy Bergeron DO  12/22/2021  7:16 AM

## 2021-12-22 NOTE — PROGRESS NOTES
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 CHIEF COMPLAINT: Intractable hip pain. History Obtained From:  patient, electronic medical record    Interval history:    Patient seen and examined  Pain is better controlled but patient continues to be very anxious  Denies fever chills  T-max 99 overnight. Hoping to go home tomorrow    HISTORY OF PRESENT ILLNESS:      The patient is a 46 y.o.  male who is admitted to the hospital for chief complaints of severe hip pain. Patient is taking pain medication at home which is not helping. States that he is simply miserable with the pain. Patient was recently admitted to the hospital with left-sided hip pain. CT scan done at that time showed a lytic lesion involving the left pubic ramus extending to the posterior portion of the left acetabulum. Patient was seen by the hematology oncology team.  Patient was also noted to have hypercalcemia. Patient underwent biopsy of the hip lesion which came back as metastatic adenocarcinoma immunohistochemistry was nonspecific. Patient also noted to have hypercalcemia with calcium of 11.3 at presentation. Patient WBC count was 16.3 hemoglobin 13.3 and platelet count 120. CT chest showed suprahilar lesion abutting the mediastinum with concern regarding invasion. There was also an indeterminate right adrenal lesion. Patient also has history of IV drug abuse with heroin.   Patient is currently on methadone which she gets through this  center    Past Medical History:   has a past medical history of Abscess, Anxiety, Cellulitis due to MRSA, Chronic back pain, Depression, Hypertension, Metastatic cancer ([facility] [address] Multiple substance abuse ([facility] [address] and OCD (obsessive compulsive disorder). Past Surgical History:   has a past surgical history that includes shoulder surgery (Right); Eye surgery; and CT BIOPSY SUPERFICIAL BONE PERCUTANEOUS (12/9/2021). Medications:    Prior to Admission medications    Medication Sig Start Date End Date Taking? Authorizing Provider   ALPRAZolam [place]) 0.5 MG tablet Take 0.5 mg by mouth nightly. 12/13/21  Yes Historical Provider, MD   oxyCODONE-acetaminophen (PERCOCET) 5-325 MG per tablet Take 1 tablet by mouth every 4-6 hours as needed. 12/15/21  Yes Historical Provider, MD   lisinopril (PRINIVIL;ZESTRIL) 5 MG tablet Take 1 tablet by mouth daily 12/11/21  Yes Nolvia Driscoll MD   ketorolac (TORADOL) 10 MG tablet Take 1 tablet by mouth every 6 hours as needed for Pain 12/10/21 12/24/21 Yes Nolvia Driscoll MD   gabapentin (NEURONTIN) 100 MG capsule Take 1 capsule by mouth 3 times daily for 30 days.  12/10/21 1/9/22 Yes Nolvia Driscoll MD   melatonin (RA MELATONIN) 3 MG TABS tablet Take 1 tablet by mouth nightly as needed (insomnia) 9/15/21   Marlene Garcia DO   traZODone (DESYREL) 50 MG tablet Take 1 tablet by mouth nightly 3/31/20   Ella Redding MD     Current Facility-Administered Medications   Medication Dose Route Frequency Provider Last Rate Last Admin    oxyCODONE (ROXICODONE) immediate release tablet 10 mg  10 mg Oral Q4H PRN [place] Bare Orlop, DO        Or    oxyCODONE (ROXICODONE) immediate release tablet 15 mg  15 mg Oral Q4H PRN [place] Bare Orlop, DO   15 mg at 12/22/21 0708    traZODone (DESYREL) tablet 50 mg  50 mg Oral Nightly BOBBY Dempsey - CNP        melatonin tablet 3 mg  3 mg Oral Nightly PRN BOBBY Dempsey - CNP        gabapentin (NEURONTIN) capsule 300 mg  300 mg Oral TID Desmond Hutchison MD   300 mg at 12/22/21 4326    ALPRAZolam (XANAX) tablet 2 mg  2 mg Oral Nightly PRN [place] Gumaro Hutchison MD   2 mg at 12/21/21 2120    oxyCODONE (OXYCONTIN) extended release tablet 80 mg  80 mg Oral 2 times per day Micheal Hutchison MD   80 mg at 12/22/21 0904    citalopram (CELEXA) tablet 20 mg  20 mg Oral Daily Edgar Hutchison MD   20 mg at 12/22/21 0904    ALPRAZolam (XANAX) tablet 1 mg  1 mg Oral 4x Daily PRN Lanie Smith MD   1 mg at 12/22/21 1116    dexamethasone (DECADRON) injection 4 mg  4 mg IntraVENous TID Lanie Smith MD   4 mg at 12/22/21 0905    lisinopril (PRINIVIL;ZESTRIL) tablet 5 mg  5 mg Oral Daily Lanie Smith MD   5 mg at 12/22/21 0905    sodium chloride flush 0.9 % injection 5-40 mL  5-40 mL IntraVENous 2 times per day Lanie Smith MD   10 mL at 12/22/21 0905    sodium chloride flush 0.9 % injection 5-40 mL  5-40 mL IntraVENous PRN Lanie Smith MD   10 mL at 12/18/21 1320    0.9 % sodium chloride infusion  25 mL IntraVENous PRN Lanie Smith MD        enoxaparin (LOVENOX) injection 40 mg  40 mg SubCUTAneous Daily Lanie Smith MD   40 mg at 12/22/21 0905    ondansetron (ZOFRAN-ODT) disintegrating tablet 4 mg  4 mg Oral Q8H PRN Lanie Smith MD        Or    ondansetron [facility]) injection 4 mg  4 mg IntraVENous Q6H PRN Lanie Smith MD        polyethylene glycol [facility]) packet 17 g  17 g Oral Daily PRN Lanie Smith MD   17 g at 12/21/21 2012    acetaminophen (TYLENOL) tablet 650 mg  650 mg Oral Q6H PRN Lanie Smith MD   650 mg at 12/17/21 0018    Or    acetaminophen (TYLENOL) suppository 650 mg  650 mg Rectal Q6H PRN Lanie Smith MD        nicotine (NICODERM CQ) 21 MG/24HR 1 patch  1 patch TransDERmal Daily BOBBY Bo - CNP   1 patch at 12/22/21 1030       Allergies:  Morphine    Social History:   reports that he has quit smoking. His smoking use included cigarettes. He has a 12.50 pack-year smoking history. He has never used smokeless tobacco. He reports current drug use. Drug: Marijuana Dolph [place]). He reports that he does not drink alcohol.      Family History: family history includes Diabetes in his mother; Hearing Loss in his father; High Blood Pressure in his father. REVIEW OF SYSTEMS:      Constitutional: No fever or chills. No night sweats, positive weight loss. Positive anxiety  Eyes: No eye discharge, double vision, or eye pain   HEENT: negative for sore mouth, sore throat, hoarseness and voice change   Respiratory: negative for cough , sputum, dyspnea, wheezing, hemoptysis, chest pain   Cardiovascular: negative for chest pain, dyspnea, palpitations, orthopnea, PND   Gastrointestinal: negative for nausea, vomiting, diarrhea, constipation, abdominal pain, Dysphagia, hematemesis and hematochezia   Genitourinary: negative for frequency, dysuria, nocturia, urinary incontinence, and hematuria   Integument: negative for rash, skin lesions, bruises.    Hematologic/Lymphatic: negative for easy bruising, bleeding, lymphadenopathy, or petechiae   Endocrine: negative for heat or cold intolerance,weight changes, change in bowel habits and hair loss   Musculoskeletal: Positive hip pain  Neurological: negative for headaches, dizziness, seizures, weakness, numbness    PHYSICAL EXAM:        BP (!) 129/94   Pulse 97   Temp 98.1 °F (36.7 °C) (Oral)   Resp 18   Ht 5' 11\" (1.803 m)   Wt 214 lb 15.2 oz (97.5 kg)   SpO2 98%   BMI 29.98 kg/m²    Temp (24hrs), Av.3 °F (36.8 °C), Min:98.1 °F (36.7 °C), Max:99 °F (37.2 °C)      General appearance -patient distress due to hip pain  Mental status - alert and cooperative   Eyes - pupils equal and reactive, extraocular eye movements intact   Ears - bilateral TM's and external ear canals normal   Mouth - mucous membranes moist, pharynx normal without lesions   Neck - supple, no significant adenopathy   Lymphatics - no palpable lymphadenopathy, no hepatosplenomegaly   Chest -decreased breathing sounds  Heart - normal rate, regular rhythm, normal S1, S2, no murmurs  Abdomen - soft, nontender, nondistended, no masses or organomegaly   Neurological - alert, oriented, normal speech, Non-African American >60 >60 mL/min    GFR African American >60 >60 mL/min    GFR Comment          GFR Staging NOT REPORTED    Basic Metabolic Panel w/ Reflex to MG   Result Value Ref Range    Glucose 115 (H) 70 - 99 mg/dL    BUN 24 (H) 6 - 20 mg/dL    CREATININE 0.54 (L) 0.70 - 1.20 mg/dL    Bun/Cre Ratio NOT REPORTED 9 - 20    Calcium 10.3 8.6 - 10.4 mg/dL    Sodium 139 135 - 144 mmol/L    Potassium 4.3 3.7 - 5.3 mmol/L    Chloride 105 98 - 107 mmol/L    CO2 22 20 - 31 mmol/L    Anion Gap 12 9 - 17 mmol/L    GFR Non-African American >60 >60 mL/min    GFR African American >60 >60 mL/min    GFR Comment          GFR Staging NOT REPORTED    CBC auto differential   Result Value Ref Range    WBC 11.9 (H) 3.5 - 11.0 k/uL    RBC 4.25 (L) 4.5 - 5.9 m/uL    Hemoglobin 12.6 (L) 13.5 - 17.5 g/dL    Hematocrit 37.2 (L) 41 - 53 %    MCV 87.6 80 - 100 fL    MCH 29.7 26 - 34 pg    MCHC 33.9 31 - 37 g/dL    RDW 13.3 12.5 - 15.4 %    Platelets 913 (H) 976 - 450 k/uL    MPV 7.4 6.0 - 12.0 fL    NRBC Automated NOT REPORTED per 100 WBC    Differential Type NOT REPORTED     Immature Granulocytes NOT REPORTED 0 %    Absolute Immature Granulocyte NOT REPORTED 0.00 - 0.30 k/uL    WBC Morphology NOT REPORTED     RBC Morphology NOT REPORTED     Platelet Estimate NOT REPORTED     Seg Neutrophils 69 (H) 36 - 66 %    Lymphocytes 21 (L) 24 - 44 %    Monocytes 9 2 - 11 %    Eosinophils % 0 (L) 1 - 4 %    Basophils 1 0 - 2 %    Segs Absolute 8.20 (H) 1.8 - 7.7 k/uL    Absolute Lymph # 2.50 1.0 - 4.8 k/uL    Absolute Mono # 1.00 0.1 - 1.2 k/uL    Absolute Eos # 0.00 0.0 - 0.4 k/uL    Basophils Absolute 0.10 0.0 - 0.2 k/uL   PTH, INTACT WITH IONIZED CALCIUM   Result Value Ref Range    Pth Intact 27.65 15.0 - 65.0 pg/mL    Calcium, Ion 1.39 (H) 1.13 - 1.33 mmol/L   VITAMIN D 25 HYDROXY   Result Value Ref Range    Vit D, 25-Hydroxy 18.9 (L) 30.0 - 100.0 ng/mL   ETHANOL   Result Value Ref Range    Ethanol <10 <10 mg/dL    Ethanol percent <0.010 <0.010 %   DRUG SCREEN MULTI URINE   Result Value Ref Range    Amphetamine Screen, Ur NEGATIVE NEGATIVE    Barbiturate Screen, Ur NEGATIVE NEGATIVE    Benzodiazepine Screen, Urine POSITIVE (A) NEGATIVE    Cocaine Metabolite, Urine NEGATIVE NEGATIVE    Methadone Screen, Urine POSITIVE (A) NEGATIVE    Opiates, Urine POSITIVE (A) NEGATIVE    Phencyclidine, Urine NEGATIVE NEGATIVE    Propoxyphene, Urine NOT REPORTED NEGATIVE    Cannabinoid Scrn, Ur POSITIVE (A) NEGATIVE    Oxycodone Screen, Ur POSITIVE (A) NEGATIVE    Methamphetamine, Urine NOT REPORTED NEGATIVE    Tricyclic Antidepressants, Urine NOT REPORTED NEGATIVE    MDMA, Urine NOT REPORTED NEGATIVE    Buprenorphine Urine NOT REPORTED NEGATIVE    Test Information       Assay provides medical screening only. The absence of expected drug(s) and/or metabolite(s) may indicate diluted or adulterated urine, limitations of testing or timing of collection.    Basic Metabolic Panel w/ Reflex to MG   Result Value Ref Range    Glucose 111 (H) 70 - 99 mg/dL    BUN 29 (H) 6 - 20 mg/dL    CREATININE 0.54 (L) 0.70 - 1.20 mg/dL    Bun/Cre Ratio NOT REPORTED 9 - 20    Calcium 10.2 8.6 - 10.4 mg/dL    Sodium 136 135 - 144 mmol/L    Potassium 4.4 3.7 - 5.3 mmol/L    Chloride 102 98 - 107 mmol/L    CO2 20 20 - 31 mmol/L    Anion Gap 14 9 - 17 mmol/L    GFR Non-African American >60 >60 mL/min    GFR African American >60 >60 mL/min    GFR Comment          GFR Staging NOT REPORTED    CBC auto differential   Result Value Ref Range    WBC 15.0 (H) 3.5 - 11.0 k/uL    RBC 4.21 (L) 4.5 - 5.9 m/uL    Hemoglobin 12.3 (L) 13.5 - 17.5 g/dL    Hematocrit 37.9 (L) 41 - 53 %    MCV 90.0 80 - 100 fL    MCH 29.2 26 - 34 pg    MCHC 32.5 31 - 37 g/dL    RDW 12.8 12.5 - 15.4 %    Platelets 390 (H) 946 - 450 k/uL    MPV 9.0 8.0 - 14.0 fL    NRBC Automated NOT REPORTED per 100 WBC    Differential Type NOT REPORTED     Immature Granulocytes NOT REPORTED 0 %    Absolute Immature Granulocyte NOT REPORTED 0.00 - 0.30 k/uL    WBC Morphology NOT REPORTED     RBC Morphology NOT REPORTED     Platelet Estimate NOT REPORTED     Seg Neutrophils 75 (H) 36 - 66 %    Lymphocytes 16 (L) 24 - 44 %    Monocytes 9 2 - 11 %    Eosinophils % 0 (L) 1 - 4 %    Basophils 0 0 - 2 %    Segs Absolute 11.29 (H) 1.8 - 7.7 k/uL    Absolute Lymph # 2.38 1.0 - 4.8 k/uL    Absolute Mono # 1.35 (H) 0.1 - 1.2 k/uL    Absolute Eos # 0.00 0.0 - 0.4 k/uL    Basophils Absolute 0.00 0.0 - 0.2 k/uL   Basic Metabolic Panel w/ Reflex to MG   Result Value Ref Range    Glucose 109 (H) 70 - 99 mg/dL    BUN 30 (H) 6 - 20 mg/dL    CREATININE 0.57 (L) 0.70 - 1.20 mg/dL    Bun/Cre Ratio NOT REPORTED 9 - 20    Calcium 10.1 8.6 - 10.4 mg/dL    Sodium 136 135 - 144 mmol/L    Potassium 4.5 3.7 - 5.3 mmol/L    Chloride 102 98 - 107 mmol/L    CO2 20 20 - 31 mmol/L    Anion Gap 14 9 - 17 mmol/L    GFR Non-African American >60 >60 mL/min    GFR African American >60 >60 mL/min    GFR Comment          GFR Staging NOT REPORTED    CBC auto differential   Result Value Ref Range    WBC 17.2 (H) 3.5 - 11.0 k/uL    RBC 3.93 (L) 4.5 - 5.9 m/uL    Hemoglobin 12.0 (L) 13.5 - 17.5 g/dL    Hematocrit 34.7 (L) 41 - 53 %    MCV 88.3 80 - 100 fL    MCH 30.6 26 - 34 pg    MCHC 34.6 31 - 37 g/dL    RDW 13.3 12.5 - 15.4 %    Platelets 377 (H) 690 - 450 k/uL    MPV 7.4 6.0 - 12.0 fL    NRBC Automated NOT REPORTED per 100 WBC    Differential Type NOT REPORTED     Immature Granulocytes NOT REPORTED 0 %    Absolute Immature Granulocyte NOT REPORTED 0.00 - 0.30 k/uL    WBC Morphology NOT REPORTED     RBC Morphology NOT REPORTED     Platelet Estimate NOT REPORTED     Seg Neutrophils 82 (H) 36 - 66 %    Lymphocytes 13 (L) 24 - 44 %    Monocytes 5 1 - 7 %    Eosinophils % 0 (L) 1 - 4 %    Basophils 0 0 - 2 %    Segs Absolute 14.10 (H) 1.8 - 7.7 k/uL    Absolute Lymph # 2.24 1.0 - 4.8 k/uL    Absolute Mono # 0.86 (H) 0.1 - 0.8 k/uL    Absolute Eos # 0.00 0.0 - 0.4 k/uL    Basophils Absolute 0.00 0.0 - 0.2 k/uL    Morphology Normal    Basic Metabolic Panel w/ Reflex to MG   Result Value Ref Range    Glucose 118 (H) 70 - 99 mg/dL    BUN 24 (H) 6 - 20 mg/dL    CREATININE 0.59 (L) 0.70 - 1.20 mg/dL    Bun/Cre Ratio NOT REPORTED 9 - 20    Calcium 10.1 8.6 - 10.4 mg/dL    Sodium 135 135 - 144 mmol/L    Potassium 4.2 3.7 - 5.3 mmol/L    Chloride 102 98 - 107 mmol/L    CO2 25 20 - 31 mmol/L    Anion Gap 8 (L) 9 - 17 mmol/L    GFR Non-African American >60 >60 mL/min    GFR African American >60 >60 mL/min    GFR Comment          GFR Staging NOT REPORTED    CBC auto differential   Result Value Ref Range    WBC 18.4 (H) 3.5 - 11.0 k/uL    RBC 4.28 (L) 4.5 - 5.9 m/uL    Hemoglobin 12.6 (L) 13.5 - 17.5 g/dL    Hematocrit 37.6 (L) 41 - 53 %    MCV 88.0 80 - 100 fL    MCH 29.5 26 - 34 pg    MCHC 33.5 31 - 37 g/dL    RDW 13.4 12.5 - 15.4 %    Platelets 497 (H) 990 - 450 k/uL    MPV 7.2 6.0 - 12.0 fL    NRBC Automated NOT REPORTED per 100 WBC    Differential Type NOT REPORTED     Immature Granulocytes NOT REPORTED 0 %    Absolute Immature Granulocyte NOT REPORTED 0.00 - 0.30 k/uL    WBC Morphology NOT REPORTED     RBC Morphology NOT REPORTED     Platelet Estimate NOT REPORTED     Seg Neutrophils 83 (H) 36 - 66 %    Lymphocytes 9 (L) 24 - 44 %    Monocytes 6 1 - 7 %    Eosinophils % 2 1 - 4 %    Basophils 0 0 - 2 %    Segs Absolute 15.27 (H) 1.8 - 7.7 k/uL    Absolute Lymph # 1.66 1.0 - 4.8 k/uL    Absolute Mono # 1.10 (H) 0.1 - 0.8 k/uL    Absolute Eos # 0.37 0.0 - 0.4 k/uL    Basophils Absolute 0.00 0.0 - 0.2 k/uL    Morphology Normal    Basic Metabolic Panel w/ Reflex to MG   Result Value Ref Range    Glucose 107 (H) 70 - 99 mg/dL    BUN 26 (H) 6 - 20 mg/dL    CREATININE 0.52 (L) 0.70 - 1.20 mg/dL    Bun/Cre Ratio NOT REPORTED 9 - 20    Calcium 10.0 8.6 - 10.4 mg/dL    Sodium 135 135 - 144 mmol/L    Potassium 4.5 3.7 - 5.3 mmol/L    Chloride 100 98 - 107 mmol/L    CO2 25 20 - 31 mmol/L    Anion Gap 10 9 - 17 mmol/L    GFR Non-African American >60 >60 mL/min    GFR African American >60 >60 mL/min    GFR Comment          GFR Staging NOT REPORTED    CBC auto differential   Result Value Ref Range    WBC 17.5 (H) 3.5 - 11.0 k/uL    RBC 4.11 (L) 4.5 - 5.9 m/uL    Hemoglobin 12.3 (L) 13.5 - 17.5 g/dL    Hematocrit 36.2 (L) 41 - 53 %    MCV 87.9 80 - 100 fL    MCH 29.8 26 - 34 pg    MCHC 34.0 31 - 37 g/dL    RDW 13.4 12.5 - 15.4 %    Platelets 789 (H) 998 - 450 k/uL    MPV 7.2 6.0 - 12.0 fL    NRBC Automated NOT REPORTED per 100 WBC    Differential Type NOT REPORTED     Immature Granulocytes NOT REPORTED 0 %    Absolute Immature Granulocyte NOT REPORTED 0.00 - 0.30 k/uL    WBC Morphology NOT REPORTED     RBC Morphology NOT REPORTED     Platelet Estimate NOT REPORTED     Seg Neutrophils 83 (H) 36 - 66 %    Lymphocytes 13 (L) 24 - 44 %    Monocytes 4 1 - 7 %    Eosinophils % 0 (L) 1 - 4 %    Basophils 0 0 - 2 %    Segs Absolute 14.52 (H) 1.8 - 7.7 k/uL    Absolute Lymph # 2.28 1.0 - 4.8 k/uL    Absolute Mono # 0.70 0.1 - 0.8 k/uL    Absolute Eos # 0.00 0.0 - 0.4 k/uL    Basophils Absolute 0.00 0.0 - 0.2 k/uL    Morphology Normal    Basic Metabolic Panel w/ Reflex to MG   Result Value Ref Range    Glucose 106 (H) 70 - 99 mg/dL    BUN 23 (H) 6 - 20 mg/dL    CREATININE 0.49 (L) 0.70 - 1.20 mg/dL    Bun/Cre Ratio NOT REPORTED 9 - 20    Calcium 10.3 8.6 - 10.4 mg/dL    Sodium 136 135 - 144 mmol/L    Potassium 4.8 3.7 - 5.3 mmol/L    Chloride 100 98 - 107 mmol/L    CO2 26 20 - 31 mmol/L    Anion Gap 10 9 - 17 mmol/L    GFR Non-African American >60 >60 mL/min    GFR African American >60 >60 mL/min    GFR Comment          GFR Staging NOT REPORTED    CBC auto differential   Result Value Ref Range    WBC 18.9 (H) 3.5 - 11.0 k/uL    RBC 4.36 (L) 4.5 - 5.9 m/uL    Hemoglobin 13.0 (L) 13.5 - 17.5 g/dL    Hematocrit 38.3 (L) 41 - 53 %    MCV 88.0 80 - 100 fL    MCH 29.8 26 - 34 pg    MCHC 33.8 31 - 37 g/dL    RDW 13.5 12.5 - 15.4 %    Platelets 365 (H) 386 - 450 k/uL    MPV 7.2 6.0 - 12.0 fL    NRBC Automated NOT REPORTED per 100 WBC    Differential Type NOT REPORTED     Immature Granulocytes NOT REPORTED 0 %    Absolute Immature Granulocyte NOT REPORTED 0.00 - 0.30 k/uL    WBC Morphology NOT REPORTED     RBC Morphology NOT REPORTED     Platelet Estimate NOT REPORTED     Seg Neutrophils 58 36 - 66 %    Lymphocytes 30 24 - 44 %    Monocytes 12 (H) 1 - 7 %    Eosinophils % 0 (L) 1 - 4 %    Basophils 0 0 - 2 %    Segs Absolute 10.96 (H) 1.8 - 7.7 k/uL    Absolute Lymph # 5.67 (H) 1.0 - 4.8 k/uL    Absolute Mono # 2.27 (H) 0.1 - 0.8 k/uL    Absolute Eos # 0.00 0.0 - 0.4 k/uL    Basophils Absolute 0.00 0.0 - 0.2 k/uL    Morphology Normal          IMAGING DATA:    XR PELVIS (1-2 VIEWS)    Result Date: 12/16/2021  EXAMINATION: ONE XRAY VIEW OF THE PELVIS 12/16/2021 4:12 pm COMPARISON: 09/15/2021. HISTORY: ORDERING SYSTEM PROVIDED HISTORY: known lytic lesion to pelvis. worsening pain TECHNOLOGIST PROVIDED HISTORY: known lytic lesion to pelvis. worsening pain Reason for Exam: known lytic lesion to pelvis. worsening pain FINDINGS: A lytic expansile lesion involves all of the left inferior pubic ramus extending into the left acetabulum. It measured 8.2 x 6.7 cm with significant progression of from 09/15/2021. No left hip dislocation was noted. No pathological fracture was seen. Marked degenerative disc changes were noted in the lumbar spine. Lytic expansile lesion involves all the left inferior pubic ramus extending into the left acetabulum measuring 8.2 x 6.7 cm. Significant progression has occurred from 09/15/2021. At that time, it measured 2.9 cm in greatest diameter. No pathological fracture was seen. CT CHEST W CONTRAST    Result Date: 12/10/2021  EXAMINATION: CT OF THE CHEST WITH CONTRAST 12/10/2021 12:46 pm TECHNIQUE: CT of the chest was performed with the administration of intravenous contrast. Multiplanar reformatted images are provided for review.  Dose modulation, iterative reconstruction, and/or weight based adjustment of the mA/kV was utilized to reduce the radiation dose to as low as reasonably achievable. COMPARISON: 02/21/2013 HISTORY: Reason for Exam: ? mass FINDINGS: Mediastinum: No evidence of mediastinal lymphadenopathy. Normal heart size. Normal caliber pulmonary trunk. Normal thoracic aorta. Lungs/pleura: 2.5 cm right suprahilar spiculated lesion which abuts the mediastinum with questionable invasion. No focal consolidation or pulmonary edema. No evidence of pleural effusion or pneumothorax. Upper Abdomen: 2 cm right adrenal lesion. Partially visualized partially exophytic cyst anterior upper pole left kidney which demonstrates interval enlargement now measuring approximately 4.3 cm. Likely hepatic fatty infiltration. Slightly contracted gallbladder with mild wall enhancement. Soft Tissues/Bones: No acute bone or soft tissue abnormality. Mild hypertrophic bony spurring in the mid to lower thoracic spine. Bilateral gynecomastia. *Right suprahilar specular lesion which abuts the mediastinum with questionable invasion as measured above concerning for malignancy, recommend further evaluation with tissue sampling and/or PET-CT. Indeterminate right adrenal lesion as measured above, consider further assessment with dedicated adrenal protocol CT or MRI. *Likely hepatic fatty infiltration. Slightly contracted gallbladder with mild wall enhancement. Consider further assessment with right upper quadrant ultrasound. *Interval enlargement of partially visualized left renal cyst. The findings were sent to the Radiology Results [address] at 1:18 pm on 12/10/2021to be communicated to a licensed caregiver. NM BONE SCAN 3 PHASE    Result Date: 12/8/2021  EXAMINATION: THREE PHASE BONE SCAN 12/8/2021 8:06 am TECHNIQUE: The patient was injected intravenously with 26 mCi of 99 mTc MDP.   Initial blood flow and pool images of the pelvis were acquired. After 3 hours, delayed bone images were acquired. Whole-body images were obtained. Additional spot images of the pelvis and ribs are obtained. COMPARISON: CT scan of the left hip 12/07/2021 HISTORY: ORDERING SYSTEM PROVIDED HISTORY: Lytic bone lesion of hip FINDINGS: There is increased blood flow and blood pool activity in the left ischium and inferior pubic ramus as well as in the adjacent soft tissues. Delayed images show mild heterogeneous activity in the left ischium and inferior pubic ramus with increased uptake along the edge of the corresponding lesion. No other area of abnormal increased uptake. Degenerative uptake in the knees. Three-phase positive uptake associated with the destructive lytic lesion in the left ischium and inferior pubic ramus. The delayed phase uptake is primarily along the margin of the lesion, typical of predominantly lytic lesions. Other lytic lesions may not be apparent on bone scan. The overall appearance is highly concerning for malignancy. CT HIP LEFT WO CONTRAST    Result Date: 12/7/2021  EXAMINATION: CT OF THE LEFT HIP WITHOUT CONTRAST 12/7/2021 3:09 pm TECHNIQUE: CT of the left hip was performed without the administration of intravenous contrast.  Multiplanar reformatted images are provided for review. Dose modulation, iterative reconstruction, and/or weight based adjustment of the mA/kV was utilized to reduce the radiation dose to as low as reasonably achievable.  COMPARISON: CT abdomen/pelvis dated 15 September 2021 HISTORY ORDERING SYSTEM PROVIDED HISTORY: Worsening pain with possible metastatic disease, unable to walk or ambulate on left leg, severe left hip pain TECHNOLOGIST PROVIDED HISTORY: Worsening pain with possible metastatic disease, unable to walk or ambulate on left leg, severe left hip pain Reason for Exam: Worsening pain with possible metastatic disease, unable to walk or ambulate on left leg, severe left hip pain Acuity: Unknown Type of Exam: Unknown FINDINGS: Bones: There is an expansile, lytic lesion occupying the majority of the left inferior pubic ramus which extends into the posterior/inferior portion of the left acetabulum. There is an additional lytic lesion measuring 1.2 cm in the left iliac bone (series 2, image 58). Soft Tissue: There is an expansile soft tissue mass invading the left inferior pubic ramus extending into the left acetabulum. The visualized intrapelvic organs appear normal.     1.  Expansile lytic lesion involving the majority of the left inferior pubic ramus extending into the posterior/inferior portion of the left acetabulum. The differential includes metastatic disease versus primary bone neoplasm. Consider whole-body bone scan versus PET scan to evaluate for any additional lesions. The degree of osseous replacement by the mass is significant which could result in a pathologic fracture. 2.  Additional 1.2 cm lytic lesion in the left iliac bone. CT BIOPSY SUPERFICIAL BONE PERCUTANEOUS    Result Date: 12/9/2021  PROCEDURE: CT BX BONE NEEDLE SUPERFCL MODERATE CONSCIOUS SEDATION 12/9/2021 HISTORY: ORDERING SYSTEM PROVIDED HISTORY: biopsy of left pelvic bone mass TECHNOLOGIST PROVIDED HISTORY: biopsy of left pelvic bone mass Reason for Exam: biopsy left pelvic bone mass TECHNIQUE: Dose modulation, iterative reconstruction, and/or weight based adjustment of the mA/kV was utilized to reduce the radiation dose to as low as reasonably achievable. CONTRAST: None SEDATION: 0versed and 100 mcg fentanyl were titrated intravenously for moderate sedation monitored under my direction. Total intraservice time of sedation was 15 minutes. The patient's vital signs were monitored throughout the procedure and recorded in the patient's medical record by the nurse.  FLUOROSCOPY DOSE AND TYPE OR TIME AND EXPOSURES: None DESCRIPTION OF PROCEDURE: Informed consent was obtained after a detailed explanation of the procedure including risks, benefits, and alternatives. Universal protocol was observed. Patient is placed prone on the table. Pelvic region was prepped and draped sterile fashion. Left ischium was localized by CT scan. 1% lidocaine was infiltrated for local anesthesia. An 18 gauge coaxial needle was advanced to the edge of the expansile bone lesion. Multiple 18 gauge cores were obtained and submitted to pathology. Post biopsy scanning showed no immediate complication. Dressing was applied. Patient was returned to holding stable condition. FINDINGS: Needle confirmed within the center of the expansile lytic bone lesion in left ischium. Successful CT-guided core bone biopsy. IMPRESSION:   Primary Problem  Bone metastases [facility])    Active Hospital Problems    Diagnosis Date Noted    Bone metastases ([facility] [address] [C79.51] 12/21/2021    Tobacco use [Z72.0] 12/21/2021    Metastatic cancer ([facility] [address] [C79.9] 12/17/2021    Cancer related pain [G89.3] 12/16/2021    Hypercalcemia of malignancy [E83.52] 12/09/2021    Anxiety [F41.9] 10/05/2015    Primary hypertension [I10] 08/21/2014       Metastatic adenocarcinoma with bone metastasis  Intractable pain  2.5 cm right suprahilar lesion  Hypercalcemia  History of IV drug abuse  Severe anxiety    RECOMMENDATIONS:  I personally reviewed results of lab work-up imaging studies and other relevant clinical data  Reviewed goals and expectations. Reviewed pain management plan. Prescriptions called into patient's pharmacy for OxyContin 80 mg twice daily, oxycodone 15 mg every 4 hours as needed and Xanax. Complete course of radiation  Outpatient follow-up with Dr. Anette Denton CT scan  Plan systemic therapy as the patient is interested more treatment  Plan to go home with his parents and then follow-up with us as an outpatient to discuss treatment plan       Discussed with patient and Nurse. Thank you for asking us to see this patient.       Joanne Muñoz MD                  This note is created with the assistance of a speech recognition program.  While intending to generate a document that actually reflects the content of the visit, the document can still have some errors including those of syntax and sound a like substitutions which may escape proof reading. It such instances, actual meaning can be extrapolated by contextual diversion.

## 2021-12-22 NOTE — PROGRESS NOTES
[address]       Radiation Oncology          [address] [facility]          [place]        Nestor [phone]        F: [phone]       [employer] [address] [place] [address]       Radiation Oncology   [address] [facility], [address] [facility]       Nestor [phone]       F: [phone]       [email or web address]          RADIATION ONCOLOGY WEEKLY PROGRESS NOTE  Patient ID:   Abad December  : 1970   MRN: [id number]    DIAGNOSIS:    Stage IV metastatic lung adenocarcinoma with bone metastasis     TREATMENT DETAILS:  Treatment Site: L pelvic lesion  Actual Dose: 1600cGy  Total Planned Dose: 2000cGy  Treatment Technique: 3D-CRT  Fraction Technique: Daily  Therapy imaging monitoring: KV match daily with MV ports  Concurrent Chemotherapy: NA    SUBJECTIVE:   Patient seen for their weekly on treatment evaluation today. His pain in his hip is improving though he still feels a pain deeper under the skin. He has constipation from his pain meds. He plans to be discharged tomorrow. OBJECTIVE:   CHAPERONE: Not Required    ECO Symptomatic, <50% in bed during the day    VITAL SIGNS: /82   Pulse 93   Temp 98.6 °F (37 °C) (Oral)   Resp 18   Wt 210 lb (95.3 kg)   SpO2 99%   BMI 29.29 kg/m²   Wt Readings from Last 5 Encounters:   21 214 lb 15.2 oz (97.5 kg)   21 210 lb (95.3 kg)   12/10/21 217 lb 13 oz (98.8 kg)   09/15/21 220 lb (99.8 kg)   19 200 lb (90.7 kg)     GENERAL:  General appearance is that of a well-nourished, well-developed in no apparent distress. EXTREMITIES:  No edema. No calf tenderness. MSK:  (+) L pelvis pain. PSYCH: Mood normal, behavior normal.  SKIN: No erythema, no desquamation.       LABS:  WBC   Date Value Ref Range Status   2021 18.9 (H) 3.5 - 11.0 k/uL Final   2021 17.5 (H) 3.5 - 11.0 k/uL Final   2021 18.4 (H) 3.5 - 11.0 k/uL Final     Segs Absolute   Date Value Ref Range Status   12/22/2021 10.96 (H) 1.8 - 7.7 k/uL Final   12/21/2021 14.52 (H) 1.8 - 7.7 k/uL Final   12/20/2021 15.27 (H) 1.8 - 7.7 k/uL Final     Hemoglobin   Date Value Ref Range Status   12/22/2021 13.0 (L) 13.5 - 17.5 g/dL Final   12/21/2021 12.3 (L) 13.5 - 17.5 g/dL Final   12/20/2021 12.6 (L) 13.5 - 17.5 g/dL Final     Platelet Count   Date Value Ref Range Status   06/04/2012 423 140 - 450 k/uL Final   06/03/2012 357 140 - 450 k/uL Final   06/02/2012 373 140 - 450 k/uL Final     Platelets   Date Value Ref Range Status   12/22/2021 825 (H) 140 - 450 k/uL Final   12/21/2021 830 (H) 140 - 450 k/uL Final   12/20/2021 759 (H) 140 - 450 k/uL Final     CREATININE   Date Value Ref Range Status   12/22/2021 0.49 (L) 0.70 - 1.20 mg/dL Final   12/21/2021 0.52 (L) 0.70 - 1.20 mg/dL Final   12/20/2021 0.59 (L) 0.70 - 1.20 mg/dL Final     No results found for: , CEA  PSA   Date Value Ref Range Status   12/09/2021 0.39 <4.1 ug/L Final     Comment:     The Roche \"ECLIA\" assay is used. Results obtained with different assay methods cannot be   used interchangeably. MEDICATIONS:  No current facility-administered medications for this encounter. Current Outpatient Medications:     oxyCODONE (OXYCONTIN) 80 MG extended release tablet, Take 1 tablet by mouth every 12 hours for 15 days. , Disp: 30 each, Rfl: 0    oxyCODONE (OXY-IR) 15 MG immediate release tablet, Take 1 tablet by mouth every 4 hours as needed for Pain for up to 15 days. , Disp: 90 tablet, Rfl: 0    ALPRAZolam (XANAX) 1 MG tablet, Take 1 tablet by mouth in the morning as needed, 1 tablet by mouth in afternoon as needed and 2 tablets by mouth in evening as needed, Disp: 60 tablet, Rfl: 0    Facility-Administered Medications Ordered in Other Encounters:     oxyCODONE (ROXICODONE) immediate release tablet 10 mg, 10 mg, Oral, Q4H PRN **OR** oxyCODONE (ROXICODONE) immediate release tablet 15 mg, 15 mg, Oral, Q4H PRN, Sin Bergeron DO, 15 mg at 12/22/21 1219    traZODone (DESYREL) tablet 50 mg, 50 mg, Oral, Nightly, Juanita Wright APRN - CNP    melatonin tablet 3 mg, 3 mg, Oral, Nightly PRN, BOBBY Suarez - CNP    gabapentin (NEURONTIN) capsule 300 mg, 300 mg, Oral, TID, Edgar Hutchison MD, 300 mg at 12/22/21 1394    ALPRAZolam (XANAX) tablet 2 mg, 2 mg, Oral, Nightly PRN, Tye Hutchison MD, 2 mg at 12/21/21 2120    oxyCODONE (OXYCONTIN) extended release tablet 80 mg, 80 mg, Oral, 2 times per day, Tye Hutchison MD, 80 mg at 12/22/21 0904    citalopram (CELEXA) tablet 20 mg, 20 mg, Oral, Daily, Edgar Hutchison MD, 20 mg at 12/22/21 4493    ALPRAZolam (XANAX) tablet 1 mg, 1 mg, Oral, 4x Daily PRN, Francheska Brito MD, 1 mg at 12/22/21 1116    dexamethasone (DECADRON) injection 4 mg, 4 mg, IntraVENous, TID, Francheska Brito MD, 4 mg at 12/22/21 0905    lisinopril (PRINIVIL;ZESTRIL) tablet 5 mg, 5 mg, Oral, Daily, Francheska Brito MD, 5 mg at 12/22/21 0905    sodium chloride flush 0.9 % injection 5-40 mL, 5-40 mL, IntraVENous, 2 times per day, Francheska Brito MD, 10 mL at 12/22/21 0905    sodium chloride flush 0.9 % injection 5-40 mL, 5-40 mL, IntraVENous, PRN, Francheska Brito MD, 10 mL at 12/18/21 1320    0.9 % sodium chloride infusion, 25 mL, IntraVENous, PRN, Francheska Brito MD    enoxaparin (LOVENOX) injection 40 mg, 40 mg, SubCUTAneous, Daily, Francheska Brito MD, 40 mg at 12/22/21 0905    ondansetron (ZOFRAN-ODT) disintegrating tablet 4 mg, 4 mg, Oral, Q8H PRN **OR** ondansetron (ZOFRAN) injection 4 mg, 4 mg, IntraVENous, Q6H PRN, Francheska Brito MD    polyethylene glycol [facility]) packet 17 g, 17 g, Oral, Daily PRN, Francheska Brito MD, 17 g at 12/21/21 2012    acetaminophen (TYLENOL) tablet 650 mg, 650 mg, Oral, Q6H PRN, 650 mg at 12/17/21 0018 **OR** acetaminophen (TYLENOL) suppository 650 mg, 650 mg, Rectal, Q6H PRN, Francheska Brito MD    nicotine (NICODERM CQ) 21 MG/24HR 1 patch, 1 patch, TransDERmal, Daily, Jaime Matos, APRN - CNP, 1 patch at 12/22/21 1030      ASSESSMENT PLAN:     Treatment setup and plan reviewed. Port images/CBCT images reviewed. Appropriate laboratory work was reviewed. Treatment side effects and toxicities reviewed with the patient, and appropriate management was advised. Will continue radiation treatment as planned, and recommend patient contact us if they have any questions or concerns. Patient will follow up outpatient with medical oncology to continue on with systemic therapy and see us as needed. Electronically signed by Michelet Avalos MD on 12/22/2021 at 2:14 PM      Drugs Prescribed:  New Prescriptions    No medications on file       Other Orders Placed:  No orders of the defined types were placed in this encounter.

## 2021-12-22 NOTE — CARE COORDINATION
Patient has pain medication ordered and sent to patient pharmacy - CM called to [facility] - confirmed prescriptions received. Xanax not filled - too soon to fill;  Oxycodone 15mg IR tablet ok, Oxycodone 80mg ER tablet needs prior auth - [place] started with Cover my Meds - (Key: [id number] - await response

## 2021-12-23 ENCOUNTER — HOSPITAL ENCOUNTER (OUTPATIENT)
Dept: RADIATION ONCOLOGY | Age: 51
Discharge: HOME | End: 2021-12-23
Attending: RADIOLOGY
Payer: COMMERCIAL

## 2021-12-23 ENCOUNTER — TELEPHONE (OUTPATIENT)
Dept: CASE MANAGEMENT | Age: 51
End: 2021-12-23

## 2021-12-23 VITALS
HEART RATE: 93 BPM | TEMPERATURE: 97.3 F | DIASTOLIC BLOOD PRESSURE: 105 MMHG | RESPIRATION RATE: 18 BRPM | SYSTOLIC BLOOD PRESSURE: 152 MMHG | WEIGHT: 214.95 LBS | BODY MASS INDEX: 30.09 KG/M2 | OXYGEN SATURATION: 99 % | HEIGHT: 71 IN

## 2021-12-23 LAB
ABSOLUTE EOS #: 0 K/UL (ref 0–0.4)
ABSOLUTE IMMATURE GRANULOCYTE: ABNORMAL K/UL (ref 0–0.3)
ABSOLUTE LYMPH #: 3.27 K/UL (ref 1–4.8)
ABSOLUTE MONO #: 1.74 K/UL (ref 0.1–0.8)
ANION GAP SERPL CALCULATED.3IONS-SCNC: 12 MMOL/L (ref 9–17)
BASOPHILS # BLD: 0 % (ref 0–2)
BASOPHILS ABSOLUTE: 0 K/UL (ref 0–0.2)
BUN BLDV-MCNC: 33 MG/DL (ref 6–20)
BUN/CREAT BLD: ABNORMAL (ref 9–20)
CALCIUM SERPL-MCNC: 10 MG/DL (ref 8.6–10.4)
CHLORIDE BLD-SCNC: 98 MMOL/L (ref 98–107)
CO2: 26 MMOL/L (ref 20–31)
CREAT SERPL-MCNC: 0.53 MG/DL (ref 0.7–1.2)
DIFFERENTIAL TYPE: ABNORMAL
EOSINOPHILS RELATIVE PERCENT: 0 % (ref 1–4)
GFR AFRICAN AMERICAN: >60 ML/MIN
GFR NON-AFRICAN AMERICAN: >60 ML/MIN
GFR SERPL CREATININE-BSD FRML MDRD: ABNORMAL ML/MIN/{1.73_M2}
GFR SERPL CREATININE-BSD FRML MDRD: ABNORMAL ML/MIN/{1.73_M2}
GLUCOSE BLD-MCNC: 140 MG/DL (ref 70–99)
HCT VFR BLD CALC: 40.5 % (ref 41–53)
HEMOGLOBIN: 13.6 G/DL (ref 13.5–17.5)
IMMATURE GRANULOCYTES: ABNORMAL %
LYMPHOCYTES # BLD: 15 % (ref 24–44)
MCH RBC QN AUTO: 29.9 PG (ref 26–34)
MCHC RBC AUTO-ENTMCNC: 33.7 G/DL (ref 31–37)
MCV RBC AUTO: 88.5 FL (ref 80–100)
MONOCYTES # BLD: 8 % (ref 1–7)
MORPHOLOGY: NORMAL
NRBC AUTOMATED: ABNORMAL PER 100 WBC
PDW BLD-RTO: 13.7 % (ref 12.5–15.4)
PLATELET # BLD: 838 K/UL (ref 140–450)
PLATELET ESTIMATE: ABNORMAL
PMV BLD AUTO: 7.2 FL (ref 6–12)
POTASSIUM SERPL-SCNC: 5 MMOL/L (ref 3.7–5.3)
RBC # BLD: 4.57 M/UL (ref 4.5–5.9)
RBC # BLD: ABNORMAL 10*6/UL
SEG NEUTROPHILS: 77 % (ref 36–66)
SEGMENTED NEUTROPHILS ABSOLUTE COUNT: 16.79 K/UL (ref 1.8–7.7)
SODIUM BLD-SCNC: 136 MMOL/L (ref 135–144)
WBC # BLD: 21.8 K/UL (ref 3.5–11)
WBC # BLD: ABNORMAL 10*3/UL

## 2021-12-23 PROCEDURE — 6370000000 HC RX 637 (ALT 250 FOR IP): Performed by: INTERNAL MEDICINE

## 2021-12-23 PROCEDURE — 85025 COMPLETE CBC W/AUTO DIFF WBC: CPT

## 2021-12-23 PROCEDURE — 6360000002 HC RX W HCPCS: Performed by: STUDENT IN AN ORGANIZED HEALTH CARE EDUCATION/TRAINING PROGRAM

## 2021-12-23 PROCEDURE — 77387 GUIDANCE FOR RADJ TX DLVR: CPT | Performed by: RADIOLOGY

## 2021-12-23 PROCEDURE — 6370000000 HC RX 637 (ALT 250 FOR IP): Performed by: STUDENT IN AN ORGANIZED HEALTH CARE EDUCATION/TRAINING PROGRAM

## 2021-12-23 PROCEDURE — 77412 RADIATION TX DELIVERY LVL 3: CPT | Performed by: RADIOLOGY

## 2021-12-23 PROCEDURE — 6360000002 HC RX W HCPCS: Performed by: INTERNAL MEDICINE

## 2021-12-23 PROCEDURE — 99232 SBSQ HOSP IP/OBS MODERATE 35: CPT | Performed by: INTERNAL MEDICINE

## 2021-12-23 PROCEDURE — 36415 COLL VENOUS BLD VENIPUNCTURE: CPT

## 2021-12-23 PROCEDURE — 77427 RADIATION TX MANAGEMENT X5: CPT | Performed by: RADIOLOGY

## 2021-12-23 PROCEDURE — 80048 BASIC METABOLIC PNL TOTAL CA: CPT

## 2021-12-23 RX ORDER — OXYCODONE HCL 20 MG/1
80 TABLET, FILM COATED, EXTENDED RELEASE ORAL EVERY 12 HOURS
Qty: 48 TABLET | Refills: 0 | Status: SHIPPED | OUTPATIENT
Start: 2021-12-23 | End: 2021-12-29

## 2021-12-23 RX ORDER — DEXAMETHASONE 4 MG/1
4 TABLET ORAL 2 TIMES DAILY WITH MEALS
Qty: 40 TABLET | Refills: 0 | Status: SHIPPED | OUTPATIENT
Start: 2021-12-23 | End: 2022-01-12

## 2021-12-23 RX ORDER — CITALOPRAM 20 MG/1
20 TABLET ORAL DAILY
Qty: 30 TABLET | Refills: 3 | Status: SHIPPED | OUTPATIENT
Start: 2021-12-24

## 2021-12-23 RX ORDER — POLYETHYLENE GLYCOL 3350 17 G/17G
17 POWDER, FOR SOLUTION ORAL DAILY PRN
Qty: 527 G | Refills: 1 | COMMUNITY
Start: 2021-12-23 | End: 2022-01-22

## 2021-12-23 RX ADMIN — HYDROMORPHONE HYDROCHLORIDE 1 MG: 1 INJECTION, SOLUTION INTRAMUSCULAR; INTRAVENOUS; SUBCUTANEOUS at 08:04

## 2021-12-23 RX ADMIN — DEXAMETHASONE SODIUM PHOSPHATE 4 MG: 4 INJECTION, SOLUTION INTRA-ARTICULAR; INTRALESIONAL; INTRAMUSCULAR; INTRAVENOUS; SOFT TISSUE at 09:07

## 2021-12-23 RX ADMIN — CITALOPRAM HYDROBROMIDE 20 MG: 20 TABLET, FILM COATED ORAL at 09:07

## 2021-12-23 RX ADMIN — OXYCODONE HYDROCHLORIDE 15 MG: 5 TABLET ORAL at 03:26

## 2021-12-23 RX ADMIN — GABAPENTIN 300 MG: 300 CAPSULE ORAL at 09:07

## 2021-12-23 RX ADMIN — OXYCODONE HYDROCHLORIDE 80 MG: 40 TABLET, FILM COATED, EXTENDED RELEASE ORAL at 09:07

## 2021-12-23 RX ADMIN — LISINOPRIL 5 MG: 5 TABLET ORAL at 09:07

## 2021-12-23 RX ADMIN — ALPRAZOLAM 1 MG: 0.5 TABLET ORAL at 09:13

## 2021-12-23 RX ADMIN — ENOXAPARIN SODIUM 40 MG: 100 INJECTION SUBCUTANEOUS at 09:07

## 2021-12-23 RX ADMIN — OXYCODONE HYDROCHLORIDE 15 MG: 5 TABLET ORAL at 12:10

## 2021-12-23 ASSESSMENT — PAIN DESCRIPTION - PROGRESSION: CLINICAL_PROGRESSION: NOT CHANGED

## 2021-12-23 ASSESSMENT — PAIN SCALES - GENERAL
PAINLEVEL_OUTOF10: 9
PAINLEVEL_OUTOF10: 10
PAINLEVEL_OUTOF10: 5
PAINLEVEL_OUTOF10: 6
PAINLEVEL_OUTOF10: 3
PAINLEVEL_OUTOF10: 10

## 2021-12-23 ASSESSMENT — PAIN DESCRIPTION - DESCRIPTORS
DESCRIPTORS: SHARP;SHOOTING;ACHING
DESCRIPTORS: ACHING;SHARP

## 2021-12-23 ASSESSMENT — PAIN DESCRIPTION - DIRECTION: RADIATING_TOWARDS: HIP

## 2021-12-23 ASSESSMENT — PAIN DESCRIPTION - ONSET: ONSET: PROGRESSIVE

## 2021-12-23 ASSESSMENT — PAIN DESCRIPTION - ORIENTATION
ORIENTATION: LEFT
ORIENTATION: LEFT

## 2021-12-23 ASSESSMENT — PAIN DESCRIPTION - LOCATION: LOCATION: HIP;BUTTOCKS

## 2021-12-23 ASSESSMENT — PAIN DESCRIPTION - PAIN TYPE
TYPE: ACUTE PAIN
TYPE: CHRONIC PAIN

## 2021-12-23 ASSESSMENT — PAIN DESCRIPTION - FREQUENCY
FREQUENCY: CONTINUOUS
FREQUENCY: CONTINUOUS

## 2021-12-23 ASSESSMENT — PAIN SCALES - WONG BAKER: WONGBAKER_NUMERICALRESPONSE: 4

## 2021-12-23 ASSESSMENT — PAIN - FUNCTIONAL ASSESSMENT: PAIN_FUNCTIONAL_ASSESSMENT: PREVENTS OR INTERFERES SOME ACTIVE ACTIVITIES AND ADLS

## 2021-12-23 NOTE — DISCHARGE INSTR - COC
Continuity of Care Form    Patient Name: Mike Mcfadden   :  1970  MRN:  [id number]    Admit date:  2021  Discharge date:  2021    Code Status Order: DNR-CCA   Advance Directives:      Admitting Physician:  No admitting provider for patient encounter. PCP: BOBBY Bernstein    Discharging Nurse:   [address] Unit/Room#: 249/143-05  Discharging Unit Phone Number: [phone]    Emergency Contact:   Extended Emergency Contact Information  Primary Emergency Contact: Lou Singer  Address: [address]  Home Phone: [phone]  Work Phone: [phone]  Mobile Phone: [phone]  Relation: Parent  Secondary Emergency Contact: Nahun Pearce [address], [address] [facility] [address] [facility] Phone: [phone]  Work Phone: [phone]  Mobile Phone: [phone]  Relation: Parent  Preferred language: English   needed?  No    Past Surgical History:  Past Surgical History:   Procedure Laterality Date    CT BIOPSY PERCUTANEOUS SUPERFICIAL BONE  2021    CT BIOPSY PERCUTANEOUS SUPERFICIAL BONE 2021 [facility] CT SCAN    EYE SURGERY      plate    SHOULDER SURGERY Right     [place], labial tear       Immunization History:   Immunization History   Administered Date(s) Administered    Influenza, Quadv, IM, PF (6 mo and older Fluzone, Flulaval, Fluarix, and 3 yrs and older Afluria) 12/10/2021    Tdap (Boostrix, Adacel) 2016       Active Problems:  Patient Active Problem List   Diagnosis Code    Multiple substance abuse ([facility] [address] F19.10    Alcohol abuse F10.10    Facial cellulitis L03.211    Right shoulder pain M25.511    Rib pain on left side R07.81    Elevated LFTs R79.89    Polysubstance abuse (HCC) F19.10    Thrombocytopenia (HCC) D69.6    Primary hypertension I10    Major depressive disorder, recurrent episode, severe (HCC) F33.2    Compulsive skin picking F42.4    Anxiety F41.9    Lesion of pelvic bone M89.9    Cannot walk R26.2    Lytic bone lesion of hip M89.8X5 Bearing Status/Restrictions: No weight bearing restirctions  Other Medical Equipment (for information only, NOT a DME order):  ***  Other Treatments: ***    Patient's personal belongings (please select all that are sent with patient): All belongings sent with patient. RN SIGNATURE:  Electronically signed by Johnney Nyhan, RN on 12/23/21 at 11:32 AM EST    CASE MANAGEMENT/SOCIAL WORK SECTION    Inpatient Status Date: 12/16/21  Readmission Risk Assessment Score:  Readmission Risk              Risk of Unplanned Readmission:  15           Discharging to Facility/ Agency   Name: [facility]  Address: [address] [place] [address]  Phone: [phone]  Fax:    Dialysis Facility (if applicable)   Name:  Address:  Dialysis Schedule:  Phone:  Fax:    / signature: Electronically signed by Kevan Ingram RN on 12/23/21 at 10:52 AM EST    PHYSICIAN SECTION    Prognosis: Fair    Condition at Discharge: Stable    Rehab Potential (if transferring to Rehab): Fair    Recommended Labs or Other Treatments After Discharge: ***    Physician Certification: I certify the above information and transfer of Gomez Husbands  is necessary for the continuing treatment of the diagnosis listed and that he requires [address] for greater 30 days.      Update Admission H&P: No change in H&P    PHYSICIAN SIGNATURE:  Electronically signed by Omar Grove DO on 12/23/21 at 9:57 AM EST

## 2021-12-23 NOTE — PROGRESS NOTES
Patient discharged to home in stable condition. IV removed intact and without difficulty. All belongings and discharge paperwork sent with patient and family. Patient decided to walk off the unit with his walker due to inability to sit in the walker.

## 2021-12-23 NOTE — DISCHARGE SUMMARY
evaluation he is found to have bony metastatic disease is admitted for radiation therapy and oncology consultation. His oxycodone was titrated to obtain regional analgesia. His symptoms are improving throughout his radiation treatments. Ultimately on the date of the 23rd he completed his radiation therapy as pain was reasonably controlled he was tolerating activities of daily living and was able be discharged in stable condition      Significant therapeutic interventions: Radiation therapy, oncology evaluation, titration of analgesic medications    Significant Diagnostic Studies:   Labs / Micro:  CBC:   Lab Results   Component Value Date    WBC 21.8 12/23/2021    RBC 4.57 12/23/2021    RBC 4.09 06/04/2012    HGB 13.6 12/23/2021    HCT 40.5 12/23/2021    MCV 88.5 12/23/2021    MCH 29.9 12/23/2021    MCHC 33.7 12/23/2021    RDW 13.7 12/23/2021     12/23/2021     06/04/2012     BMP:    Lab Results   Component Value Date    GLUCOSE 140 12/23/2021    GLUCOSE 104 06/06/2012     12/23/2021    K 5.0 12/23/2021    CL 98 12/23/2021    CO2 26 12/23/2021    ANIONGAP 12 12/23/2021    BUN 33 12/23/2021    CREATININE 0.53 12/23/2021    BUNCRER NOT REPORTED 12/23/2021    CALCIUM 10.0 12/23/2021    LABGLOM >60 12/23/2021    GFRAA >60 12/23/2021    GFR      12/23/2021    GFR NOT REPORTED 12/23/2021        Radiology:  XR PELVIS (1-2 VIEWS)    Result Date: 12/16/2021  Lytic expansile lesion involves all the left inferior pubic ramus extending into the left acetabulum measuring 8.2 x 6.7 cm. Significant progression has occurred from 09/15/2021. At that time, it measured 2.9 cm in greatest diameter. No pathological fracture was seen. XR HIP LEFT (2-3 VIEWS)    Result Date: 12/21/2021  Similar to previously with large mass in the region of left inferior pubic ramus apparently causing osseous destruction.        Consultations:    Consults:     Final Specialist Recommendations/Findings:   IP CONSULT TO ONCOLOGY  IP CONSULT TO RADIATION ONCOLOGY  IP CONSULT TO RADIATION ONCOLOGY  IP CONSULT TO HOSPICE  IP CONSULT TO PALLIATIVE CARE  IP CONSULT TO SPIRITUAL SERVICES      The patient was seen and examined on day of discharge and this discharge summary is in conjunction with any daily progress note from day of discharge. Discharge plan:     Disposition: Home    Physician Follow Up:   PCP 1 week, oncology as directed  No follow-up provider specified. Requiring Further Evaluation/Follow Up POST HOSPITALIZATION/Incidental Findings: None    Diet: regular diet    Activity: As tolerated    Instructions to Patient: Take medication as prescribed    Discharge Medications:      Medication List      START taking these medications    citalopram 20 MG tablet  Commonly known as: CELEXA  Take 1 tablet by mouth daily  Start taking on: December 24, 2021     dexamethasone 4 MG tablet  Commonly known as: DECADRON  Take 1 tablet by mouth 2 times daily (with meals) for 20 days     polyethylene glycol 17 g packet  Commonly known as: GLYCOLAX  Take 17 g by mouth daily as needed for Constipation        CHANGE how you take these medications    * ALPRAZolam 0.5 MG tablet  Commonly known as: Catherine Terry  What changed: Another medication with the same name was added. Make sure you understand how and when to take each. * ALPRAZolam 1 MG tablet  Commonly known as: Xanax  Take 1 tablet by mouth in the morning as needed, 1 tablet by mouth in afternoon as needed and 2 tablets by mouth in evening as needed  What changed: You were already taking a medication with the same name, and this prescription was added. Make sure you understand how and when to take each. * oxyCODONE 80 MG extended release tablet  Commonly known as: OxyCONTIN  Take 1 tablet by mouth every 12 hours for 15 days.   What changed:   medication strength  how much to take  when to take this     * oxyCODONE 15 MG immediate release tablet  Commonly known as: OXY-IR  Take 1 tablet by mouth every 4 hours as needed for Pain for up to 15 days. What changed: You were already taking a medication with the same name, and this prescription was added. Make sure you understand how and when to take each. * This list has 4 medication(s) that are the same as other medications prescribed for you. Read the directions carefully, and ask your doctor or other care provider to review them with you. CONTINUE taking these medications    gabapentin 100 MG capsule  Commonly known as: NEURONTIN  Take 1 capsule by mouth 3 times daily for 30 days.      ketorolac 10 MG tablet  Commonly known as: TORADOL  Take 1 tablet by mouth every 6 hours as needed for Pain     lisinopril 5 MG tablet  Commonly known as: PRINIVIL;ZESTRIL  Take 1 tablet by mouth daily     melatonin 3 MG Tabs tablet  Commonly known as: RA Melatonin  Take 1 tablet by mouth nightly as needed (insomnia)     oxyCODONE-acetaminophen 5-325 MG per tablet  Commonly known as: PERCOCET     traZODone 50 MG tablet  Commonly known as: DESYREL  Take 1 tablet by mouth nightly           Where to Get Your Medications      These medications were sent to [facility] [address] [place] [address] [place] [address]    Phone: [phone]   ALPRAZolam 1 MG tablet  citalopram 20 MG tablet  dexamethasone 4 MG tablet  oxyCODONE 15 MG immediate release tablet  oxyCODONE 80 MG extended release tablet     You can get these medications from any pharmacy    You don't need a prescription for these medications  polyethylene glycol 17 g packet         Discharge Procedure Orders   Randolph Sellers MD, [address] [place] [address]   Referral Priority: Routine Referral Type: Eval and Treat   Referral Reason: Specialty Services Required   Referred to Provider: Sonia Sim Requested Specialty: Hospice and Palliative Medicine   Number of Visits Requested: 1       Time Spent on discharge is  28 minutes in patient examination, evaluation, counseling as well as medication reconciliation, prescriptions for required medications, discharge plan and follow up. Electronically signed by   Yg Cates DO  12/23/2021  9:57 AM      Thank you BOBBY Lucero for the opportunity to be involved in this patient's care.

## 2021-12-23 NOTE — PROGRESS NOTES
[facility]  Office: [address], DO, Johnny Calero, DO, Sharon Quintanilla, DO, Thomas Isaac Blood, DO, Theresa Isidro MD, Garret Mobley MD, Skyler Cloud MD, Cory Soto MD, Becki Garner MD, Raven Caballero MD, Brigida Navarro MD, Debbie Ojeda, DO, Tom Enrique, DO, Latonya Link MD,  Roxana Au DO, Britney Bolaños MD, Patti Carrasco MD, Nola Biggs MD, Nirmal Nicolas MD, Sophie Davila MD, Ulises Cash MD, Xavier Rivas MD, Eulalia Mar, [facility], [facility], CNP, Van Tidwell, CNP, James Caputo, CNS, Rudolph Glasgow, [facility], Beryle Fix, CNP, Scott Plascencia, CNP, Stas oRse, CNP, Michael Matias, CNP, Robert Reagan PA-C, Elvis Nazario, DNP, Milton Alberto, [facility], Stephanie Palacio, CNP, Angel Salas, CNP, Ken Thakkar, CNP, Chino Oviedo, CNP, Ronaldo Ni, CNP, Jayro Jeffrey 1732    Progress Note    12/23/2021    6:47 AM    Name:   Vickie Carrel  MRN:     [id number]     [place]:      [de-identified]   Room:   [address] [place] [address] Day:  7  Admit Date:  12/16/2021  3:33 PM    PCP:   BOBBY Nowak  Code Status:  DNR-CCA    Subjective:     C/C:   Chief Complaint   Patient presents with    Hip Pain     Interval History Status: improved. Patient overall has improvement in his pain, pain continues to wax and wane. Denies any chest pain, shortness of breath, nausea vomiting, fevers or chills or acute complaints. Brief History:     Per prior chart documentation  \"Hubert Singer is a 46 y. o. male with a past medical history of recently diagnosed Stage IV adenocarcinoma of the lung who presented to the emergency department on 12/16/2021 complaining of intractable left leg pain. X-ray of the pelvis was obtained and was remarkable for a lytic expansile lesion involving the left inferior pubic ramus extending into the left acetabulum (8.2 x 6.7 cm in diameter).  The patient is admitted to internal medicine for further management of intractable pain as well as radiation therapy. \"    Review of Systems:     Constitutional:  negative for chills, fevers, sweats  Respiratory:  negative for cough, dyspnea on exertion, shortness of breath, wheezing  Cardiovascular:  negative for chest pain, chest pressure/discomfort, lower extremity edema, palpitations  Gastrointestinal:  negative for abdominal pain, constipation, diarrhea, nausea, vomiting  Neurological:  negative for dizziness, headache    Medications: Allergies: Allergies   Allergen Reactions    Morphine Itching       Current Meds:   Scheduled Meds:    traZODone  50 mg Oral Nightly    gabapentin  300 mg Oral TID    oxyCODONE  80 mg Oral 2 times per day    citalopram  20 mg Oral Daily    dexamethasone  4 mg IntraVENous TID    lisinopril  5 mg Oral Daily    sodium chloride flush  5-40 mL IntraVENous 2 times per day    enoxaparin  40 mg SubCUTAneous Daily    nicotine  1 patch TransDERmal Daily     Continuous Infusions:    sodium chloride       PRN Meds: oxyCODONE **OR** oxyCODONE, melatonin, ALPRAZolam, ALPRAZolam, sodium chloride flush, sodium chloride, ondansetron **OR** ondansetron, polyethylene glycol, acetaminophen **OR** acetaminophen    Data:     Past Medical History:   has a past medical history of Abscess, Anxiety, Cellulitis due to MRSA, Chronic back pain, Depression, Hypertension, Metastatic cancer ([facility] [address] Multiple substance abuse ([facility] and OCD (obsessive compulsive disorder). Social History:   reports that he has quit smoking. His smoking use included cigarettes. He has a 12.50 pack-year smoking history. He has never used smokeless tobacco. He reports current drug use. Drug: Marijuana Veldon Bunting). He reports that he does not drink alcohol.      Family History:   Family History   Problem Relation Age of Onset    Diabetes Mother     Hearing Loss Father     High Blood Pressure Father        Vitals:  BP (!) 145/78   Pulse 98   Temp 97.3 °F (36.3 °C)   Resp 20   Ht 5' 11\" (1.803 m)   Wt 214 lb 15.2 oz (97.5 kg)   SpO2 100%   BMI 29.98 kg/m²   Temp (24hrs), Av.3 °F (36.8 °C), Min:97.3 °F (36.3 °C), Max:99 °F (37.2 °C)    No results for input(s): POCGLU in the last 72 hours. I/O (24Hr): Intake/Output Summary (Last 24 hours) at 2021 0647  Last data filed at 2021 1141  Gross per 24 hour   Intake 480 ml   Output    Net 480 ml       Labs:  Hematology:  Recent Labs     21  0603 21  0601 21  0526   WBC 17.5* 18.9* 21.8*   RBC 4.11* 4.36* 4.57   HGB 12.3* 13.0* 13.6   HCT 36.2* 38.3* 40.5*   MCV 87.9 88.0 88.5   MCH 29.8 29.8 29.9   MCHC 34.0 33.8 33.7   RDW 13.4 13.5 13.7   * 825* 838*   MPV 7.2 7.2 7.2     Chemistry:  Recent Labs     21  0621  0601 21  0526    136 136   K 4.5 4.8 5.0    100 98   CO2 25 26 26   GLUCOSE 107* 106* 140*   BUN 26* 23* 33*   CREATININE 0.52* 0.49* 0.53*   ANIONGAP 10 10 12   LABGLOM >60 >60 >60   GFRAA >60 >60 >60   CALCIUM 10.0 10.3 10.0   No results for input(s): PROT, LABALBU, LABA1C, Q8QYQQT, Y0FEUJP, FT4, TSH, AST, ALT, LDH, GGT, ALKPHOS, LABGGT, BILITOT, BILIDIR, AMMONIA, AMYLASE, LIPASE, LACTATE, CHOL, HDL, LDLCHOLESTEROL, CHOLHDLRATIO, TRIG, VLDL, CPY90ZT, PHENYTOIN, PHENYF, URICACID, POCGLU in the last 72 hours.   ABG:  Lab Results   Component Value Date    FIO2 ROOM AIR 2014     Lab Results   Component Value Date/Time    SPECIAL NOT REPORTED 2017 12:53 AM     Lab Results   Component Value Date/Time    CULTURE VIRIDANS STREPTOCOCCUS GROUP LIGHT GROWTH (A) 2017 12:53 AM    CULTURE NO ANAEROBIC ORGANISMS ISOLATED AT 5 DAYS (A) 2017 12:53 AM    CULTURE  2017 12:53 AM     [employer] [address] [address] [phone]       Radiology:  XR PELVIS (1-2 VIEWS)    Result Date: 2021  Lytic expansile lesion involves all the left inferior pubic ramus extending into the left acetabulum measuring 8.2 x 6.7 cm. Significant progression has occurred from 09/15/2021. At that time, it measured 2.9 cm in greatest diameter. No pathological fracture was seen. XR HIP LEFT (2-3 VIEWS)    Result Date: 12/21/2021  Similar to previously with large mass in the region of left inferior pubic ramus apparently causing osseous destruction.        Physical Examination:     General appearance:  alert, cooperative and no distress  Mental Status:  oriented to person, place and time and normal affect  Lungs:  clear to auscultation bilaterally, normal effort  Heart:  regular rate and rhythm, no murmur  Abdomen:  soft, nontender, nondistended, normal bowel sounds, no masses, hepatomegaly, splenomegaly  Extremities:  no edema, redness, tenderness in the calves  Skin:  no gross lesions, rashes, induration    Assessment:     Hospital Problems           Last Modified POA    * (Principal) Bone metastases ([address] 12/21/2021 Yes    Primary hypertension 12/21/2021 Yes    Anxiety 12/17/2021 Yes    Hypercalcemia of malignancy 12/18/2021 Yes    Intractable pain 12/22/2021 Yes    Metastatic cancer ([address] 12/17/2021 Yes    Tobacco use 12/21/2021 Yes          Plan:     Pain medication as ordered, prescribed per oncology  Complete radiation therapy today  Continue present antihypertensives  Trend labs  GI DVT prophylaxis  Tobacco cessation  Discharge home today, outpatient palliative care and hospice care as appropriate    Wil Horton DO  12/23/2021  6:47 AM

## 2021-12-23 NOTE — PROGRESS NOTES
At shift change pt appeared agitated and in pain; perhaps the latter be the cause of the prior. After assessment and speaking with the pt about his medication administration he became less agitated. Pt is patient and cooperative and verbalizes understanding of the med admin for the entirety of the night shift. Pt is also aware of his Radiatio therapy for tomorrow a.m.

## 2021-12-23 NOTE — TELEPHONE ENCOUNTER
Name: Jesus Post  : 1970  MRN: [id number]    Oncology Navigation Follow-Up Note  Navigator reviewing chart and anticipate pt. Discharge today. PET scheduled for . Writer left pET details via  at [phone]. Plan to follow.    Electronically signed by Nelsy Ni RN on 2021 at 11:18 AM

## 2021-12-23 NOTE — CARE COORDINATION
[facility] ICU Quality Flow/Interdisciplinary Rounds Progress Note    Quality Flow Rounds held on December 23, 2021 at [address] Attending:  Bedside Nurse, , , Nursing Unit Leadership, Respiratory Therapy, Physical Therapy and Occupational Therapy    Anticipated Discharge Date:  Expected Discharge Date: 12/20/21    Anticipated Discharge Disposition:    Readmission Risk              Risk of Unplanned Readmission:  15           Discussed patient goal for the day, patient clinical progression, and barriers to discharge. The following Goal(s) of the Day/Commitment(s) have been identified:  Discharge - Obtain Order and Medications - Obtain Medication Assistance Prescriptions.        Betsey Vincent RN  December 23, 2021

## 2021-12-23 NOTE — CARE COORDINATION
Cover my meds reviewed for progress for Oxycodone 80 mg. Still awaiting response @ 10:05. Key: [id number]  11:16 contacted [employer] regarding Pre-cert,  Relates that the company is unavailble by phone at this time. 11:38 Care navigator contacted and message left. 12:24 Updated Dr Herbie Mcdermott on progress with medication. 12:35 Dr Herbie Mcdermott updated on plan of care. 12:45 Writer at bedside. Spoke with patient regarding awaiting  authorization approval.  Pt updated on plan of care. Pt relates that he will speak with Hospice on his own time, and that he has all the information. 12:49 Jessie at [facility] contacted regarding update. 13:00 Meds to [employer] approved with [employer]. Spoke with Leo Walker, updated. 13:45 Writer spoke with Dr Herbie Mcdermott. RX for Oxycodone sent to [address] [place] [address] for patient. Pt updated.

## 2021-12-26 ENCOUNTER — CARE COORDINATION (OUTPATIENT)
Dept: CASE MANAGEMENT | Age: 51
End: 2021-12-26

## 2021-12-26 NOTE — CARE COORDINATION
[facility] Trans[facility] Initial Follow Up Call    Call within 2 business days of discharge: Yes    Patient: Venus Stevens Patient : 1970    MRN: [id number]  Reason for Admission: Bone metastases     Discharge Date: 21 RARS: Readmission Risk Score: 16.4 ( )      Last Discharge [facility]       Complaint Diagnosis Description Type Department Provider    21 Hip Pain Metastasis to bone of unknown primary ([facility] [address] [facility] . .. ED to Hosp-Admission (Discharged) (ADMITTED) [facility] MS Светлана Ball, DO; Bette Cruz. .. Spoke with: 24 hour initial call. Called to pt's home. Person answering states pt. Is sleeping at this time and was recently admitted to hospice care. Call to [facility] of [address] [facility]. Spoke to Akila. Akila zuniga pt. Was not admitted. Call back to pt's home. Spoke to Deepali Young. Sunny zuniga he has a handbook and a nurse named Neema Pierce was present in the home for admission. Will re attempt tomorrow. For clarification   A CALL back and message was left from pt's father Sunny vidal. Sunny zuniga pt. Has declined Hospice care and has decided to pursue aggressive treatment. Will continue to follow.     Facility: [address] [place] [address]    Non-face-to-face services provided:  Obtained and reviewed discharge summary and/or continuity of care documents  Communication with home health agencies or other community services the patient is currently using-[facility] Transitions 24 Hour Call    Do you have all of your prescriptions and are they filled?: Yes (Comment: picked up today)  Were you discharged with any Home Care or Post Acute Services: Yes  Post Acute Services: Home Health (Comment: hospice of [address] [facility])  Care Transitions Interventions         Follow Up  Future Appointments   Date Time Provider Chana Simons   2021 10:15 AM Nalini Centeno MD [facility]   2021 11:00 AM SOY SALINAS PET/CT ROOM [facility] Celestina Patiño MENDEZ Fair LPN Care Transitions Nurse   [phone]

## 2021-12-27 ENCOUNTER — CARE COORDINATION (OUTPATIENT)
Dept: CASE MANAGEMENT | Age: 51
End: 2021-12-27

## 2021-12-27 ENCOUNTER — HOSPITAL ENCOUNTER (OUTPATIENT)
Facility: MEDICAL CENTER | Age: 51
End: 2021-12-27

## 2021-12-27 ENCOUNTER — TELEPHONE (OUTPATIENT)
Dept: CASE MANAGEMENT | Age: 51
End: 2021-12-27

## 2021-12-27 ENCOUNTER — TELEPHONE (OUTPATIENT)
Dept: ONCOLOGY | Age: 51
End: 2021-12-27

## 2021-12-27 NOTE — TELEPHONE ENCOUNTER
Name: Tha Adkins  : 1970  MRN: [id number]    Oncology Navigation Follow-Up Note  Navigator spoke with pt. Offering assistance and answering medication questions. Pt. Pati Nation with his parents they are able to transport him to [place] F/U appt. And PET. Writer instructing pt. To take xanax and pain medication prior to leaving the house for PET. Plan to keep pt. Updated once Olamide Tobar returns call.    Electronically signed by Terrence Martinez RN on 2021 at 11:02 AM

## 2021-12-27 NOTE — CARE COORDINATION
[facility] Trans[facility] Initial Follow Up Call    Call within 2 business days of discharge: Yes    Patient: Fanny Brown Patient : 1970   MRN: [id number]  Reason for Admission: liver CA  Lung CA  With bone mets  Discharge Date: 21 RARS: Readmission Risk Score: 16.4 ( )      Last Discharge [facility]       Complaint Diagnosis Description Type Department Provider    21 Hip Pain Metastasis to bone of unknown primary ([facility] [address] . .. ED to Hosp-Admission (Discharged) (ADMITTED) BEN BLOOM MS Eun Rutherford, DO; Claude Beaver. .. Spoke with: 24 hour initial call. Spoke to [facility]. [facility] states \" I don't want Hospice. \" \"I want to pursue treatment until it does not work anymore\" appetite is good. Bowel and bladder WNL. Pt. States he has [place] lot of pain\" in his bones. \" I can't even sit down\" \"pt. Has aching and burning pain. He denies pain in liver or lungs. Denies dyspnea. Reviewed medications[facility] Pt. Had radiation last Friday. Pt. Has not yet started Chemotherapy. Reviewed appt with Dr Gay Acosta on 21. Pt. Verbalized understanding. Much education provided on medications. Encouraged pt.to  keep a notebook and write down the times medications were taken and write down level of pain before and after medications are taken. [facility] verbalized understanding. Pt. Is being followed by Nurse navigator at [facility]. [facility] is anxious and emotional.  Acknowledged pt's feelings and emotional support provided. Pt. Appreciated f/u call and education provided today. Final call. Transitions of Care Initial Call    Was this an external facility discharge? No Discharge Facility: N/A    Challenges to be reviewed by the provider   Additional needs identified to be addressed with provider: No  none             Method of communication with provider : none      Advance Care Planning:   Does patient have an Advance Directive: reviewed and current. Was this a readmission?  Yes  Patient stated reason for admission: CA WITH BONE METS  Patients top risk factors for readmission: medical condition-CA  with mets to the bone    Care Transition Nurse (CTN) contacted the patient by telephone to perform post hospital discharge assessment. Verified name and  with patient as identifiers. Provided introduction to self, and explanation of the CTN role. CTN reviewed discharge instructions, medical action plan and red flags with patient who verbalized understanding. Patient given an opportunity to ask questions and does not have any further questions or concerns at this time. Were discharge instructions available to patient? Yes. Reviewed appropriate site of care based on symptoms and resources available to patient including: PCP, Specialist and When to call 911. The patient agrees to contact the PCP office for questions related to their healthcare. Medication reconciliation was performed with patient, who verbalizes understanding of administration of home medications. Advised obtaining a 90-day supply of all daily and as-needed medications. Covid Risk Education     Educated patient about risk for severe COVID-19 due to risk factors according to CDC guidelines. LPN CC reviewed discharge instructions, medical action plan and red flag symptoms with the patient who verbalized understanding. Discussed COVID vaccination status: Yes. Education provided on COVID-19 vaccination as appropriate. Discussed exposure protocols and quarantine with CDC Guidelines. Patient was given an opportunity to verbalize any questions and concerns and agrees to contact LPN CC or health care provider for questions related to their healthcare. Reviewed and educated patient on any new and changed medications related to discharge diagnosis. Was patient discharged with a pulse oximeter? No Discussed and confirmed pulse oximeter discharge instructions and when to notify provider or seek emergency care.        LPN CC provided contact information. No further follow-up call indicated based on severity of symptoms and risk factors.   Plan for next call: [address]: [address]    Non-face-to-face services provided:  Obtained and reviewed discharge summary and/or continuity of care documents    Care Transitions 24 Hour Call    Do you have all of your prescriptions and are they filled?: Yes (Comment: picked up today)  Post Acute Services: [address] (Comment: hospice [facility])  Care Transitions Interventions         Follow Up  Future Appointments   Date Time Provider Chana Simons   12/29/2021 10:15 AM Ivan Kahn MD [id number]   12/30/2021 11:00 AM SOY SALINAS PET/CT ROOM [facility] ST Celestina Batista, [address] [place] [address] Care Transitions Nurse   [phone]

## 2021-12-27 NOTE — TELEPHONE ENCOUNTER
Brionna Iglesias (SISTER IN LAW ) CALLING THAT PT FELL X 3 AND [address], SCREAMING IN PAIN ON COUCH.  ASKING FOR DR DE LEON AND PALLIATIVE CARE. ASKING IF SHOULD GO TO ER PB WITH PT FOR UNCONTROLLED PAIN.  WRITER SPOKE WITH GLENN, MESSAGE ON RECORDER  FROM 0803, NO DATE. SPOKE WITH ROBBIE, WHO STATES LEFT MESSAGE AT [facility] ON Friday 12/24/2021. STATES SHE IS RETURNING TO [place] SOON. STATES PT IS AT [facility] AND IS STILL IN PAIN. (P) TO CALL NOW  [phone] AT PT'S [facility] [place]. WRITER CALLED GLENN BACK AND NOTIFIED THE CALL WRITER RECEIVED WAS FROM 12/24/2021. GLENN, NURSE DERIAN STATES LEFT MESSAGE FOR RAD-ONC, DR PINEDA AND SPOKE WITH NEHA OF PALLIATIVE CARE. NOTIFIED GLENN PT STILL IN PAIN AT [facility] [place] AND PER ROBBIE REFUSED HOSPICE AND IS GOING TO DO RAD-ONC AND CHEMO.  Ana Vasquez PT WILL BE FOLLOWING HERE AT [facility] ON WED, 12/29/2021 AT 1015 AND HAS PET SCHEDULED ON 12/30/2021.

## 2021-12-27 NOTE — TELEPHONE ENCOUNTER
Name: Ana Rosa Mendoza  : 1970  MRN: [id number]    Oncology Navigation Follow-Up Note  Navigator received call from [facility]. In Triage and informing Writer pt. Had fallen and pain out of control. Carrie GALAN. Asking for navigastion assistance and or opinion. Rylee LAZCANO saw pt. And writer now placing call to Rylee Collazo for further assistance. Padmaritza Newport calling back and clarifying the message left on the phone was old and pt. Had already been to ED and pain addressed as well as Xray Hip completed. Writer updating Dr. Matti Dumont and VM left for [place] [facility]REAT. Due to pts. Pain still not controlled. Pt. Refusing Hospice and wanting to proceed with RTX/CTX. Pt. Already scheduled for MO F/U and PET.   Electronically signed by Edmond Shipman RN on 2021 at 10:14 AM

## 2021-12-29 ENCOUNTER — TELEPHONE (OUTPATIENT)
Dept: ONCOLOGY | Age: 51
End: 2021-12-29

## 2021-12-29 ENCOUNTER — OFFICE VISIT (OUTPATIENT)
Dept: ONCOLOGY | Age: 51
End: 2021-12-29
Payer: COMMERCIAL

## 2021-12-29 ENCOUNTER — TELEPHONE (OUTPATIENT)
Dept: CASE MANAGEMENT | Age: 51
End: 2021-12-29

## 2021-12-29 VITALS
BODY MASS INDEX: 29.46 KG/M2 | WEIGHT: 211.2 LBS | TEMPERATURE: 96.7 F | RESPIRATION RATE: 18 BRPM | SYSTOLIC BLOOD PRESSURE: 128 MMHG | HEART RATE: 120 BPM | DIASTOLIC BLOOD PRESSURE: 77 MMHG

## 2021-12-29 DIAGNOSIS — C80.1 ADENOCARCINOMA (HCC): Primary | ICD-10-CM

## 2021-12-29 DIAGNOSIS — C34.01: ICD-10-CM

## 2021-12-29 PROCEDURE — G8419 CALC BMI OUT NRM PARAM NOF/U: HCPCS | Performed by: INTERNAL MEDICINE

## 2021-12-29 PROCEDURE — 1111F DSCHRG MED/CURRENT MED MERGE: CPT | Performed by: INTERNAL MEDICINE

## 2021-12-29 PROCEDURE — G8427 DOCREV CUR MEDS BY ELIG CLIN: HCPCS | Performed by: INTERNAL MEDICINE

## 2021-12-29 PROCEDURE — 99211 OFF/OP EST MAY X REQ PHY/QHP: CPT

## 2021-12-29 PROCEDURE — 3017F COLORECTAL CA SCREEN DOC REV: CPT | Performed by: INTERNAL MEDICINE

## 2021-12-29 PROCEDURE — 1036F TOBACCO NON-USER: CPT | Performed by: INTERNAL MEDICINE

## 2021-12-29 PROCEDURE — 99205 OFFICE O/P NEW HI 60 MIN: CPT | Performed by: INTERNAL MEDICINE

## 2021-12-29 PROCEDURE — G8482 FLU IMMUNIZE ORDER/ADMIN: HCPCS | Performed by: INTERNAL MEDICINE

## 2021-12-29 RX ORDER — ALPRAZOLAM 1 MG/1
1 TABLET ORAL EVERY 6 HOURS PRN
Qty: 120 TABLET | Refills: 0 | Status: SHIPPED | OUTPATIENT
Start: 2021-12-29 | End: 2022-01-24

## 2021-12-29 NOTE — TELEPHONE ENCOUNTER
CHUCK HERE FOR MD VISIT  PORT W/ IR  CHEMO TEACH  TEMPUS TEST  PLAN FOR CHEMO ASAP  RTC C1  IR PORT PLACEMENT IS ON 1/7/22 @ 11AM AT [address] ARRIVAL @ 9:30AM  TEMPUS TEST DONE TODAY W/ DEMOGRAPHICS, LABS , IMAGING, PATHOLOGY REPORT MD NOTES FAXED TO [phone] AND SCANNED IN PT'S CHART  NEW ORDER CHEMO IS PENDING PRECERT W/ KARLEE  MD VISIT C1D1  SCRIPTS SENT TO PT PHARMACY  AVS PRINTED W/ INSTRUCTIONS AND GIVEN TO PT ON EXIT

## 2021-12-29 NOTE — TELEPHONE ENCOUNTER
Name: Fanny Brown  : 1970  MRN: [id number]    Oncology Navigation Follow-Up Note  Navigator met with pt. Face to face navigation packet given to pt. Along with business card and reviewed resources with pt. Lab drawing Tempus blood sample and writer reviewed with pt. Via model port placement . Molecular testing handout given to pt. As well as printout of \"who to call when\". Plan to follow. Writer reminding pt. To drink fluids prior to PET tomorrow due to pt. Being difficult poke! .   Electronically signed by Randal Wiley RN on 2021 at 11:30 AM

## 2021-12-29 NOTE — PROGRESS NOTES
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 Biopsy of his lytic lesion in the bone showed metastatic adenocarcinoma. I explained that his CT chest in the hospital did show right suprahilar lesion however his PET scan still awaiting. He has completed palliative radiation to left hip. He still complains of significant pain. He was in the hospital for pain management and has been evaluated by palliative care. Currently he is on OxyContin 80 mg twice daily, oxycodone 15 mg every 4 hours as needed and Xanax. He has significant anxiety issues. He is not able to sleep through night because of the anxiety. He denies any chest pain or shortness of breath. During this visit patient's allergy, social, medical, surgical history and medications were reviewed and updated. BRIEF ONCOLOGIC HISTORY:    The patient is a 46 y.o.  male him to the hospital with left-sided leg pain. CT scan was done in the ED which showed lytic lesions involving left inferior pubic ramus extending into the posterior inferior portion of the left acetabulum. And one of the differential was metastatic versus primary bone neoplasm. Heme-onc has been consulted. For possible malignancy and work-up. He had a biopsy of the bone lesion. Patient hemodynamically stable. On  labs calcium is elevated.     Past Medical History:   has a past medical history of Abscess, Anxiety, Cellulitis due to MRSA, Chronic back pain, Depression, Hypertension, Metastatic cancer ([facility] [address] Multiple substance abuse ([facility] [address] and OCD (obsessive compulsive disorder). Past Surgical History:   has a past surgical history that includes shoulder surgery (Right); Eye surgery; and CT BIOPSY SUPERFICIAL BONE PERCUTANEOUS (12/9/2021). Medications:    Reviewed in Epic     Allergies:  Morphine    Social History:   reports that he has quit smoking. His smoking use included cigarettes. He has a 12.50 pack-year smoking history. He has never used smokeless tobacco. He reports current drug use. Drug: Marijuana Germán Adinaw). He reports that he does not drink alcohol. Family History: family history includes Diabetes in his mother; Hearing Loss in his father; High Blood Pressure in his father. REVIEW OF SYSTEMS:    Constitutional: No fever or chills. No night sweats, no weight loss   Eyes: No eye discharge, double vision, or eye pain   HEENT: negative for sore mouth, sore throat, hoarseness and voice change   Respiratory: negative for cough , sputum, dyspnea, wheezing, hemoptysis, chest pain   Cardiovascular: negative for chest pain, dyspnea, palpitations, orthopnea, PND   Gastrointestinal: negative for nausea, vomiting, diarrhea, constipation, abdominal pain, Dysphagia, hematemesis and hematochezia   Genitourinary: negative for frequency, dysuria, nocturia, urinary incontinence, and hematuria   Integument: negative for rash, skin lesions, bruises.    Hematologic/Lymphatic: negative for easy bruising, bleeding, lymphadenopathy, or petechiae   Endocrine: negative for heat or cold intolerance,weight changes, change in bowel habits and hair loss   Musculoskeletal: negative for myalgias, arthralgias, pain, joint swelling,and bone pain   Neurological: negative for headaches, dizziness, seizures, weakness, numbness    PHYSICAL EXAM:      /77   Pulse 120   Temp 96.7 °F (35.9 °C) (Temporal)   Resp 18   Wt 211 lb 3.2 oz (95.8 kg)   BMI 29.46 kg/m²    Temp (24hrs), Av.9 °F (36.6 °C), Min:97 °F (36.1 °C), Max:98.8 °F (37.1 °C)    General appearance - well appearing, no in pain or distress   Mental status - alert and cooperative   Eyes - pupils equal and reactive, extraocular eye movements intact   Ears - bilateral TM's and external ear canals normal   Mouth - mucous membranes moist, pharynx normal without lesions   Neck - supple, no significant adenopathy   Lymphatics - no palpable lymphadenopathy, no hepatosplenomegaly   Chest - clear to auscultation, no wheezes, rales or rhonchi, symmetric air entry   Heart - normal rate, regular rhythm, normal S1, S2, no murmurs  Abdomen - soft, nontender, nondistended, no masses or organomegaly   Neurological - alert, oriented, normal speech, no focal findings or movement disorder noted   Musculoskeletal - no joint tenderness, deformity or swelling   Extremities - peripheral pulses normal, no pedal edema, no clubbing or cyanosis   Skin - normal coloration and turgor, no rashes, no suspicious skin lesions noted ,    DATA:    Lab Results   Component Value Date    WBC 21.8 (H) 2021    HGB 13.6 2021    HCT 40.5 (L) 2021    MCV 88.5 2021     (H) 2021    LYMPHOPCT 15 (L) 2021    RBC 4.57 2021    MCH 29.9 2021    MCHC 33.7 2021    RDW 13.7 2021         Lab Results   Component Value Date     2021    K 5.0 2021    CL 98 2021    CO2 26 2021    BUN 33 (H) 2021    CREATININE 0.53 (L) 2021    GLUCOSE 140 (H) 2021    CALCIUM 10.0 2021    PROT 6.9 2021    LABALBU 3.1 (L) 2017    BILITOT 0.56 2017    ALKPHOS 60 2017    AST 20 2017    ALT 26 2017    LABGLOM >60 2021    GFRAA >60 2021    GLOB NOT REPORTED 2014     PATHOLOGIC DATA:  Surgical Pathology Report  SURGICAL PATHOLOGY REPORT  Collected: 21   Lab status: Final   Resulting lab: [facility]   Value: -- Diagnosis --     Left pelvic bone, biopsy of mass:     -  Metastatic adenocarcinoma. Comment: Morphology and immunophenotype are nonspecific with respect   to primary.  Considerations include lung, breast, pancreaticobiliary,   GI, and others. Microscopic Description   Biopsies samples adenocarcinoma with moderate degree of glandular   differentiation.  Carcinoma is further characterized utilizing control   appropriate immunostains.  Carcinoma cells are positive for CK7 and   JONATAN-3.  They are negative for TTF-1, CK20, PSA, and CDX2. Note: The case is reviewed in intradepartmental pathology consultation   with consensus (RUBÉN, JESSI).         IMAGING DATA:    CT CHEST 12/10/21  Impression   Right suprahilar specular lesion which abuts the mediastinum with   questionable invasion as measured above concerning for malignancy, recommend   further evaluation with tissue sampling and/or PET-CT.  Indeterminate right   adrenal lesion as measured above, consider further assessment with dedicated   adrenal protocol CT or MRI. *Likely hepatic fatty infiltration.  Slightly contracted gallbladder with   mild wall enhancement.  Consider further assessment with right upper quadrant   ultrasound. *Interval enlargement of partially visualized left renal cyst.       IMPRESSION:   1. Lytic bone lesion, and biopsy confirming metastatic adenocarcinoma, unknown primary  2. History of substance abuse  3. Pain control: OxyContin 80 mg twice daily, oxycodone 15 mg every 4 hours as needed and Xanax. PLAN:  1. I reviewed the laboratory data, biopsy results, imaging studies, diagnosis, prognosis and treatment recommendations   2. I reviewed the NCCN guidelines and goals of care with patient and family.   I explained that he has stage IV advanced disease and the treatment goal will be palliative in nature only to improve quality of life and in the process help prolong his survival 3. Based on the imaging studies so far, I will proceed with systemic chemotherapy assuming possible lung primary while awaiting results of the TEMPUS testing and PET scan   4. I reviewed the risk benefits and side effects with patient and he is agreeable to proceed with   We reviewed the side effects of chemotherapy which include, but are not limited to, fatigue, nausea, vomiting, alopecia, myelosuppression, mucositis, allergic reaction, nephrotoxicity, ototoxicity, neurotoxicity, diarrhea, and constipation. Plan for port placement and chemo teaching  Will plan to start him on Xgeva every 6 weeks  Patient's questions were sought and answered to his satisfaction and he agreed to proceed with the plan      Mario Lindsey MD  Hematologist/Medical Oncologist    On this date 12/29/21  I have spent 40 minutes reviewing previous notes, test results and face to face with the patient discussing the diagnosis and importance of compliance with the treatment plan. Greater than 50% of that time was spent face-to-face with the patient in counseling and coordinating her care. This note is created with the assistance of a speech recognition program.  While intending to generate a document that actually reflects the content of the visit, the document can still have some errors including those of syntax and sound a like substitutions which may escape proof reading. It such instances, actual meaning can be extrapolated by contextual diversion.

## 2021-12-30 ENCOUNTER — HOSPITAL ENCOUNTER (OUTPATIENT)
Dept: NUCLEAR MEDICINE | Age: 51
Discharge: HOME | End: 2022-01-01
Payer: COMMERCIAL

## 2021-12-30 ENCOUNTER — TELEPHONE (OUTPATIENT)
Dept: ONCOLOGY | Age: 51
End: 2021-12-30

## 2021-12-30 ENCOUNTER — TELEPHONE (OUTPATIENT)
Dept: CASE MANAGEMENT | Age: 51
End: 2021-12-30

## 2021-12-30 LAB — GLUCOSE BLD-MCNC: 100 MG/DL (ref 75–110)

## 2021-12-30 PROCEDURE — 78815 PET IMAGE W/CT SKULL-THIGH: CPT

## 2021-12-30 PROCEDURE — 82947 ASSAY GLUCOSE BLOOD QUANT: CPT

## 2021-12-30 PROCEDURE — 3430000000 HC RX DIAGNOSTIC RADIOPHARMACEUTICAL: Performed by: INTERNAL MEDICINE

## 2021-12-30 PROCEDURE — 2580000003 HC RX 258: Performed by: INTERNAL MEDICINE

## 2021-12-30 PROCEDURE — A9552 F18 FDG: HCPCS | Performed by: INTERNAL MEDICINE

## 2021-12-30 RX ORDER — FLUDEOXYGLUCOSE F 18 200 MCI/ML
10 INJECTION, SOLUTION INTRAVENOUS
Status: COMPLETED | OUTPATIENT
Start: 2021-12-30 | End: 2021-12-30

## 2021-12-30 RX ORDER — SODIUM CHLORIDE 0.9 % (FLUSH) 0.9 %
10 SYRINGE (ML) INJECTION PRN
Status: DISCONTINUED | OUTPATIENT
Start: 2021-12-30 | End: 2022-01-02 | Stop reason: HOSPADM

## 2021-12-30 RX ADMIN — FLUDEOXYGLUCOSE F 18 9.7 MILLICURIE: 200 INJECTION, SOLUTION INTRAVENOUS at 11:28

## 2021-12-30 RX ADMIN — SODIUM CHLORIDE, PRESERVATIVE FREE 10 ML: 5 INJECTION INTRAVENOUS at 11:28

## 2021-12-30 NOTE — TELEPHONE ENCOUNTER
Name: Loris Kayser  : 1970  MRN: [id number]    Oncology Navigation Follow-Up Note  Navigator received call from pt. Informing navigator [facility] cannot fill Oxycontin script due to needing PA completed. Writer calling Pharmacy and confirming pt. Needing PA for Oxycontin. Writer attempting to reach out to Ariella CARO , but could not leave a VM (full). Writer left message on Triage line, but due to Holiday coming up wanting to bring to [address] [place] [address] attention immediately. Writer then spoke with Keya Blancas And updated. Plan to follow.    Electronically signed by Gertrudis Charles RN on 2021 at 2:00 PM

## 2022-01-03 ENCOUNTER — TELEPHONE (OUTPATIENT)
Dept: CASE MANAGEMENT | Age: 52
End: 2022-01-03

## 2022-01-03 DIAGNOSIS — M89.9 LESION OF PELVIC BONE: ICD-10-CM

## 2022-01-03 DIAGNOSIS — M89.8X5 LYTIC BONE LESION OF HIP: Primary | ICD-10-CM

## 2022-01-03 NOTE — TELEPHONE ENCOUNTER
Name: Mehnaz Mckinney  : 1970  MRN: [id number]    Oncology Navigation Follow-Up Note  Navigator reviewing chart and recent F/U notes. Writer reaching out to pt. Checking to see if pt. And physician had discussed Fx to hip identified on [facility] films ? Pt. Could not remember if he had discussed with MO? Writer reaching out to Dr. Judy Cohn and clarifying with him if he was aware of [address] ED visit? Dr. Judy Cohn unaware of visit or films completed at [address] Writer informed pt. Needing orthopaedic referral for Fx of hip. Dr. Judy Cohn okay with Dr. Ofe Kiran. 1:42 Writer placed referral to Dr. Ofe Kiran, plan to follow.   Electronically signed by Karlene Mon RN on 1/3/2022 at 1:32 PM

## 2022-01-03 NOTE — TELEPHONE ENCOUNTER
Name: Negrita Bowman  : 1970  MRN: [id number]    Oncology Navigation Follow-Up Note  Navigator returning VM from pt. . Pt. Was able to get scripts after much ongoing conversation with insurance company not wanting to pay for Brand Name! Insurance company did finally agree to cover medication . Due to shortage generic drug may not be available? Plan to follow.    Electronically signed by Nereida Padgett RN on 1/3/2022 at 10:49 AM

## 2022-01-05 ENCOUNTER — INITIAL CONSULT (OUTPATIENT)
Dept: PALLATIVE CARE | Age: 52
End: 2022-01-05
Payer: COMMERCIAL

## 2022-01-05 ENCOUNTER — TELEPHONE (OUTPATIENT)
Dept: CASE MANAGEMENT | Age: 52
End: 2022-01-05

## 2022-01-05 ENCOUNTER — HOSPITAL ENCOUNTER (INPATIENT)
Age: 52
LOS: 5 days | Discharge: HOME HEALTH | DRG: 861 | End: 2022-01-10
Attending: EMERGENCY MEDICINE | Admitting: INTERNAL MEDICINE
Payer: COMMERCIAL

## 2022-01-05 VITALS
TEMPERATURE: 98.6 F | SYSTOLIC BLOOD PRESSURE: 171 MMHG | DIASTOLIC BLOOD PRESSURE: 105 MMHG | RESPIRATION RATE: 20 BRPM | HEART RATE: 85 BPM | HEIGHT: 71 IN | BODY MASS INDEX: 29.46 KG/M2

## 2022-01-05 DIAGNOSIS — F41.9 ANXIETY: ICD-10-CM

## 2022-01-05 DIAGNOSIS — C80.1 ADENOCARCINOMA (HCC): ICD-10-CM

## 2022-01-05 DIAGNOSIS — E83.52 HYPERCALCEMIA OF MALIGNANCY: ICD-10-CM

## 2022-01-05 DIAGNOSIS — C79.51 MALIGNANT NEOPLASM METASTATIC TO BONE (HCC): ICD-10-CM

## 2022-01-05 DIAGNOSIS — M89.8X5 LYTIC BONE LESION OF HIP: ICD-10-CM

## 2022-01-05 DIAGNOSIS — R52 INTRACTABLE PAIN: ICD-10-CM

## 2022-01-05 DIAGNOSIS — C79.51 BONE METASTASES (HCC): Primary | ICD-10-CM

## 2022-01-05 DIAGNOSIS — G89.3 CANCER RELATED PAIN: ICD-10-CM

## 2022-01-05 DIAGNOSIS — R52 INTRACTABLE PAIN: Primary | ICD-10-CM

## 2022-01-05 DIAGNOSIS — C34.01: ICD-10-CM

## 2022-01-05 DIAGNOSIS — C79.51 BONE METASTASES (HCC): ICD-10-CM

## 2022-01-05 LAB
-: NORMAL
ABSOLUTE EOS #: 0.27 K/UL (ref 0–0.44)
ABSOLUTE IMMATURE GRANULOCYTE: 0.13 K/UL (ref 0–0.3)
ABSOLUTE LYMPH #: 3.14 K/UL (ref 1.1–3.7)
ABSOLUTE MONO #: 1.45 K/UL (ref 0.1–1.2)
ANION GAP SERPL CALCULATED.3IONS-SCNC: 8 MMOL/L (ref 9–17)
BASOPHILS # BLD: 0 % (ref 0–2)
BASOPHILS ABSOLUTE: 0.03 K/UL (ref 0–0.2)
BUN BLDV-MCNC: 23 MG/DL (ref 6–20)
BUN/CREAT BLD: 37 (ref 9–20)
CALCIUM SERPL-MCNC: 9.7 MG/DL (ref 8.6–10.4)
CHLORIDE BLD-SCNC: 99 MMOL/L (ref 98–107)
CO2: 28 MMOL/L (ref 20–31)
CREAT SERPL-MCNC: 0.62 MG/DL (ref 0.7–1.2)
DIFFERENTIAL TYPE: ABNORMAL
EOSINOPHILS RELATIVE PERCENT: 2 % (ref 1–4)
GFR AFRICAN AMERICAN: >60 ML/MIN
GFR NON-AFRICAN AMERICAN: >60 ML/MIN
GFR SERPL CREATININE-BSD FRML MDRD: ABNORMAL ML/MIN/{1.73_M2}
GFR SERPL CREATININE-BSD FRML MDRD: ABNORMAL ML/MIN/{1.73_M2}
GLUCOSE BLD-MCNC: 92 MG/DL (ref 70–99)
HCT VFR BLD CALC: 36.6 % (ref 40.7–50.3)
HEMOGLOBIN: 11.9 G/DL (ref 13–17)
IMMATURE GRANULOCYTES: 1 %
LYMPHOCYTES # BLD: 20 % (ref 24–43)
MCH RBC QN AUTO: 29.2 PG (ref 25.2–33.5)
MCHC RBC AUTO-ENTMCNC: 32.5 G/DL (ref 28.4–34.8)
MCV RBC AUTO: 89.7 FL (ref 82.6–102.9)
MONOCYTES # BLD: 9 % (ref 3–12)
NRBC AUTOMATED: 0 PER 100 WBC
PDW BLD-RTO: 13.4 % (ref 11.8–14.4)
PLATELET # BLD: 421 K/UL (ref 138–453)
PLATELET ESTIMATE: ABNORMAL
PMV BLD AUTO: 8.7 FL (ref 8.1–13.5)
POTASSIUM SERPL-SCNC: 4.1 MMOL/L (ref 3.7–5.3)
RBC # BLD: 4.08 M/UL (ref 4.21–5.77)
RBC # BLD: ABNORMAL 10*6/UL
REASON FOR REJECTION: NORMAL
SEG NEUTROPHILS: 68 % (ref 36–65)
SEGMENTED NEUTROPHILS ABSOLUTE COUNT: 10.63 K/UL (ref 1.5–8.1)
SODIUM BLD-SCNC: 135 MMOL/L (ref 135–144)
WBC # BLD: 15.7 K/UL (ref 3.5–11.3)
WBC # BLD: ABNORMAL 10*3/UL
ZZ NTE CLEAN UP: ORDERED TEST: NORMAL
ZZ NTE WITH NAME CLEAN UP: SPECIMEN SOURCE: NORMAL

## 2022-01-05 PROCEDURE — G8419 CALC BMI OUT NRM PARAM NOF/U: HCPCS | Performed by: INTERNAL MEDICINE

## 2022-01-05 PROCEDURE — 1111F DSCHRG MED/CURRENT MED MERGE: CPT | Performed by: INTERNAL MEDICINE

## 2022-01-05 PROCEDURE — 99284 EMERGENCY DEPT VISIT MOD MDM: CPT

## 2022-01-05 PROCEDURE — 3017F COLORECTAL CA SCREEN DOC REV: CPT | Performed by: INTERNAL MEDICINE

## 2022-01-05 PROCEDURE — 80048 BASIC METABOLIC PNL TOTAL CA: CPT

## 2022-01-05 PROCEDURE — 99222 1ST HOSP IP/OBS MODERATE 55: CPT | Performed by: NURSE PRACTITIONER

## 2022-01-05 PROCEDURE — 85025 COMPLETE CBC W/AUTO DIFF WBC: CPT

## 2022-01-05 PROCEDURE — 1200000000 HC SEMI PRIVATE

## 2022-01-05 PROCEDURE — G8482 FLU IMMUNIZE ORDER/ADMIN: HCPCS | Performed by: INTERNAL MEDICINE

## 2022-01-05 PROCEDURE — 96374 THER/PROPH/DIAG INJ IV PUSH: CPT

## 2022-01-05 PROCEDURE — 6370000000 HC RX 637 (ALT 250 FOR IP): Performed by: NURSE PRACTITIONER

## 2022-01-05 PROCEDURE — 6360000002 HC RX W HCPCS: Performed by: NURSE PRACTITIONER

## 2022-01-05 PROCEDURE — 96372 THER/PROPH/DIAG INJ SC/IM: CPT

## 2022-01-05 PROCEDURE — 6360000002 HC RX W HCPCS: Performed by: EMERGENCY MEDICINE

## 2022-01-05 PROCEDURE — 1036F TOBACCO NON-USER: CPT | Performed by: INTERNAL MEDICINE

## 2022-01-05 PROCEDURE — 99205 OFFICE O/P NEW HI 60 MIN: CPT | Performed by: INTERNAL MEDICINE

## 2022-01-05 PROCEDURE — 2580000003 HC RX 258: Performed by: NURSE PRACTITIONER

## 2022-01-05 PROCEDURE — G8427 DOCREV CUR MEDS BY ELIG CLIN: HCPCS | Performed by: INTERNAL MEDICINE

## 2022-01-05 RX ORDER — NICOTINE 21 MG/24HR
1 PATCH, TRANSDERMAL 24 HOURS TRANSDERMAL DAILY
Status: DISCONTINUED | OUTPATIENT
Start: 2022-01-05 | End: 2022-01-10 | Stop reason: HOSPADM

## 2022-01-05 RX ORDER — SODIUM CHLORIDE 0.9 % (FLUSH) 0.9 %
5-40 SYRINGE (ML) INJECTION PRN
Status: DISCONTINUED | OUTPATIENT
Start: 2022-01-05 | End: 2022-01-05 | Stop reason: SDUPTHER

## 2022-01-05 RX ORDER — ACETAMINOPHEN 650 MG/1
650 SUPPOSITORY RECTAL EVERY 6 HOURS PRN
Status: DISCONTINUED | OUTPATIENT
Start: 2022-01-05 | End: 2022-01-10 | Stop reason: HOSPADM

## 2022-01-05 RX ORDER — CITALOPRAM 20 MG/1
20 TABLET ORAL DAILY
Status: DISCONTINUED | OUTPATIENT
Start: 2022-01-06 | End: 2022-01-10 | Stop reason: HOSPADM

## 2022-01-05 RX ORDER — ALPRAZOLAM 1 MG/1
1 TABLET ORAL EVERY 6 HOURS PRN
Status: DISCONTINUED | OUTPATIENT
Start: 2022-01-05 | End: 2022-01-05

## 2022-01-05 RX ORDER — POTASSIUM CHLORIDE 20 MEQ/1
40 TABLET, EXTENDED RELEASE ORAL PRN
Status: DISCONTINUED | OUTPATIENT
Start: 2022-01-05 | End: 2022-01-10 | Stop reason: HOSPADM

## 2022-01-05 RX ORDER — POTASSIUM CHLORIDE 7.45 MG/ML
10 INJECTION INTRAVENOUS PRN
Status: DISCONTINUED | OUTPATIENT
Start: 2022-01-05 | End: 2022-01-10 | Stop reason: HOSPADM

## 2022-01-05 RX ORDER — SODIUM CHLORIDE 0.9 % (FLUSH) 0.9 %
5-40 SYRINGE (ML) INJECTION EVERY 12 HOURS SCHEDULED
Status: DISCONTINUED | OUTPATIENT
Start: 2022-01-05 | End: 2022-01-05 | Stop reason: SDUPTHER

## 2022-01-05 RX ORDER — MAGNESIUM SULFATE 1 G/100ML
1000 INJECTION INTRAVENOUS PRN
Status: DISCONTINUED | OUTPATIENT
Start: 2022-01-05 | End: 2022-01-10 | Stop reason: HOSPADM

## 2022-01-05 RX ORDER — NALOXONE HYDROCHLORIDE 0.4 MG/ML
0.4 INJECTION, SOLUTION INTRAMUSCULAR; INTRAVENOUS; SUBCUTANEOUS PRN
Status: DISCONTINUED | OUTPATIENT
Start: 2022-01-05 | End: 2022-01-06

## 2022-01-05 RX ORDER — HYDROMORPHONE HYDROCHLORIDE 1 MG/ML
1 INJECTION, SOLUTION INTRAMUSCULAR; INTRAVENOUS; SUBCUTANEOUS ONCE
Status: DISCONTINUED | OUTPATIENT
Start: 2022-01-05 | End: 2022-01-05

## 2022-01-05 RX ORDER — ALPRAZOLAM 1 MG/1
2 TABLET ORAL NIGHTLY PRN
Status: DISCONTINUED | OUTPATIENT
Start: 2022-01-05 | End: 2022-01-06

## 2022-01-05 RX ORDER — ONDANSETRON 2 MG/ML
4 INJECTION INTRAMUSCULAR; INTRAVENOUS EVERY 6 HOURS PRN
Status: DISCONTINUED | OUTPATIENT
Start: 2022-01-05 | End: 2022-01-06

## 2022-01-05 RX ORDER — SODIUM CHLORIDE 9 MG/ML
25 INJECTION, SOLUTION INTRAVENOUS PRN
Status: DISCONTINUED | OUTPATIENT
Start: 2022-01-05 | End: 2022-01-10 | Stop reason: HOSPADM

## 2022-01-05 RX ORDER — ACETAMINOPHEN 325 MG/1
650 TABLET ORAL EVERY 6 HOURS PRN
Status: DISCONTINUED | OUTPATIENT
Start: 2022-01-05 | End: 2022-01-10 | Stop reason: HOSPADM

## 2022-01-05 RX ORDER — POLYETHYLENE GLYCOL 3350 17 G/17G
17 POWDER, FOR SOLUTION ORAL DAILY PRN
Status: DISCONTINUED | OUTPATIENT
Start: 2022-01-05 | End: 2022-01-10 | Stop reason: HOSPADM

## 2022-01-05 RX ORDER — NALOXONE HYDROCHLORIDE 0.4 MG/ML
0.4 INJECTION, SOLUTION INTRAMUSCULAR; INTRAVENOUS; SUBCUTANEOUS PRN
Status: DISCONTINUED | OUTPATIENT
Start: 2022-01-05 | End: 2022-01-05 | Stop reason: SDUPTHER

## 2022-01-05 RX ORDER — SODIUM CHLORIDE 0.9 % (FLUSH) 0.9 %
10 SYRINGE (ML) INJECTION PRN
Status: DISCONTINUED | OUTPATIENT
Start: 2022-01-05 | End: 2022-01-10 | Stop reason: HOSPADM

## 2022-01-05 RX ORDER — OXYCODONE HCL 40 MG/1
80 TABLET, FILM COATED, EXTENDED RELEASE ORAL EVERY 12 HOURS
Status: DISCONTINUED | OUTPATIENT
Start: 2022-01-05 | End: 2022-01-10 | Stop reason: HOSPADM

## 2022-01-05 RX ORDER — SODIUM CHLORIDE 0.9 % (FLUSH) 0.9 %
5-40 SYRINGE (ML) INJECTION EVERY 12 HOURS SCHEDULED
Status: DISCONTINUED | OUTPATIENT
Start: 2022-01-05 | End: 2022-01-10 | Stop reason: HOSPADM

## 2022-01-05 RX ORDER — SODIUM CHLORIDE 9 MG/ML
INJECTION, SOLUTION INTRAVENOUS CONTINUOUS
Status: DISCONTINUED | OUTPATIENT
Start: 2022-01-05 | End: 2022-01-09

## 2022-01-05 RX ORDER — HYDROMORPHONE HYDROCHLORIDE 1 MG/ML
1 INJECTION, SOLUTION INTRAMUSCULAR; INTRAVENOUS; SUBCUTANEOUS ONCE
Status: COMPLETED | OUTPATIENT
Start: 2022-01-05 | End: 2022-01-05

## 2022-01-05 RX ORDER — DOCUSATE SODIUM 100 MG/1
100 CAPSULE, LIQUID FILLED ORAL 2 TIMES DAILY
Status: DISCONTINUED | OUTPATIENT
Start: 2022-01-05 | End: 2022-01-06

## 2022-01-05 RX ORDER — DEXAMETHASONE 4 MG/1
4 TABLET ORAL 2 TIMES DAILY WITH MEALS
Status: DISCONTINUED | OUTPATIENT
Start: 2022-01-05 | End: 2022-01-10 | Stop reason: HOSPADM

## 2022-01-05 RX ORDER — SODIUM CHLORIDE 9 MG/ML
25 INJECTION, SOLUTION INTRAVENOUS PRN
Status: DISCONTINUED | OUTPATIENT
Start: 2022-01-05 | End: 2022-01-05 | Stop reason: SDUPTHER

## 2022-01-05 RX ORDER — HYDROMORPHONE HCL 110MG/55ML
2 PATIENT CONTROLLED ANALGESIA SYRINGE INTRAVENOUS ONCE
Status: COMPLETED | OUTPATIENT
Start: 2022-01-05 | End: 2022-01-05

## 2022-01-05 RX ADMIN — SODIUM CHLORIDE 25 ML: 9 INJECTION, SOLUTION INTRAVENOUS at 17:24

## 2022-01-05 RX ADMIN — SODIUM CHLORIDE: 9 INJECTION, SOLUTION INTRAVENOUS at 17:38

## 2022-01-05 RX ADMIN — OXYCODONE HYDROCHLORIDE 80 MG: 40 TABLET, FILM COATED, EXTENDED RELEASE ORAL at 21:42

## 2022-01-05 RX ADMIN — ALPRAZOLAM 2 MG: 1 TABLET ORAL at 20:23

## 2022-01-05 RX ADMIN — DEXAMETHASONE 4 MG: 4 TABLET ORAL at 17:33

## 2022-01-05 RX ADMIN — HYDROMORPHONE HYDROCHLORIDE 2 MG: 2 INJECTION, SOLUTION INTRAMUSCULAR; INTRAVENOUS; SUBCUTANEOUS at 13:18

## 2022-01-05 RX ADMIN — Medication 12 MG: at 17:24

## 2022-01-05 RX ADMIN — HYDROMORPHONE HYDROCHLORIDE 1 MG: 1 INJECTION, SOLUTION INTRAMUSCULAR; INTRAVENOUS; SUBCUTANEOUS at 14:15

## 2022-01-05 RX ADMIN — DOCUSATE SODIUM 100 MG: 100 CAPSULE ORAL at 21:42

## 2022-01-05 ASSESSMENT — ENCOUNTER SYMPTOMS
VOMITING: 0
DIARRHEA: 0
COUGH: 0
NAUSEA: 0
FACIAL SWELLING: 0
CONSTIPATION: 0
SHORTNESS OF BREATH: 0
EYE DISCHARGE: 0
EYE REDNESS: 0
COLOR CHANGE: 0
ABDOMINAL PAIN: 0
SINUS PRESSURE: 0
BACK PAIN: 1
TROUBLE SWALLOWING: 0

## 2022-01-05 ASSESSMENT — PAIN SCALES - GENERAL
PAINLEVEL_OUTOF10: 10

## 2022-01-05 NOTE — PROGRESS NOTES
Pt admitted to room 2004 per cart in good condition from ED  Oriented to room and surroundings  Bed in lowest position, wheels locked, 2/4 side rails up  Call light in reach, room free of clutter, adequate lighting provided  Denies any further questions at this time  Instructed to call out with any questions/concerns/new onset of pain and/or n/v   White board updated  Continue to monitor with hourly rounding  STAY WITH ME protocol initiated   Bed alarm on/Fall Risk signs in place/Fall risk sticker to wrist band  Non-skid socks on/at bedside

## 2022-01-05 NOTE — PROGRESS NOTES
Pill Count 1/5/2022:    Pt relates he is on oxy ir 15mg per Dr. Linden Craig ordered #90 on12/22/21  Oxycodone 80mg  Per Dr. Linden Craig ordered #30 on 12/22/21  Pt relates he is not taking gabapentin, his dad questions if he is taking. Pt relates he does not think he is taking but he will check at home. Pt is on xanax 1 mg per Dr. Linden Craig. Pt relates he takes one tablet during the day, but takes 2 tablets before bed. Instructed patient that we will need him to bring in his pain medications with him to each palliative care appointment moving forward. Let him know that we will be checking pill counts with each appointment.

## 2022-01-05 NOTE — CONSULTS
Pharmacy Note  PCA Pharmacy To Dose - Initial Note    Demetria Buckner is a 46 y.o. male ordered patient controlled analgesia (PCA) with Dilaudid; consult received from Dr. Jerri Martinez. PCA Indication: Palliative Care    Patient is also receiving the following pain medications:  None at this time    Dose is based on BSA= 2.18 m2    LOADING DOSE: 0.4 mg   PCA DOSE: 0.26 mg   LOCKOUT: 10 min    BASAL DOSE: 0.4 mg/hr   1 hour dose limit: 1.56 mg   *Please contact pharmacy if dose adjustments are needed*       Thank you for the consult. Nursing to contact pharmacy with PCA pain management concerns.     Joanna Mancini, PharmD  Inpatient Pharmacist  1/5/2022 3:32 PM

## 2022-01-05 NOTE — ED NOTES
Bed: 24  Expected date:   Expected time:   Means of arrival:   Comments:  Cancer center pt      Nehal Zelaya RN  01/05/22 6550

## 2022-01-05 NOTE — ED PROVIDER NOTES
[facility] ED  EMERGENCY DEPARTMENT ENCOUNTER      Pt Name: Lonnie Cole  MRN: [id number]  [place] 1970  Date of evaluation: 1/5/2022  Provider: Fariba Norris MD    [address] [place] [address]       Chief Complaint   Patient presents with    Back Pain     down left leg. cancer pt         HISTORY OF PRESENT ILLNESS  (Location/Symptom, Timing/Onset, Context/Setting, Quality, Duration, Modifying Factors, Severity.)   Lonnie Cole is a 46 y.o. male who presents to the emergency department for pain. He is complaining of pain in his left lower back going into his left hip. He has a known history of adenocarcinoma which is metastatic of unknown origin. He has been in radiation therapy. In early December he had a CAT scan of his left hip area and was found to have a very large lytic lesion in the left pelvis going into the acetabulum. He has not yet seen an orthopedist.  The pain is severe and he was sent here from the [facility] center to be admitted for pain control with Dilaudid PCA. He rated the pain as a 10 and its continuous pain      Nursing Notes were reviewed. ALLERGIES     Morphine    CURRENT MEDICATIONS       Previous Medications    ALPRAZOLAM (XANAX) 1 MG TABLET    Take 1 tablet by mouth every 6 hours as needed for Anxiety for up to 30 days. CITALOPRAM (CELEXA) 20 MG TABLET    Take 1 tablet by mouth daily    DEXAMETHASONE (DECADRON) 4 MG TABLET    Take 1 tablet by mouth 2 times daily (with meals) for 20 days    GABAPENTIN (NEURONTIN) 100 MG CAPSULE    Take 1 capsule by mouth 3 times daily for 30 days. LISINOPRIL (PRINIVIL;ZESTRIL) 5 MG TABLET    Take 1 tablet by mouth daily    MELATONIN (RA MELATONIN) 3 MG TABS TABLET    Take 1 tablet by mouth nightly as needed (insomnia)    OXYCODONE (OXY-IR) 15 MG IMMEDIATE RELEASE TABLET    Take 1 tablet by mouth every 4 hours as needed for Pain for up to 15 days.     OXYCODONE (OXYCONTIN) 80 MG EXTENDED RELEASE TABLET    Take 1 tablet by mouth every 12 hours for 15 days. POLYETHYLENE GLYCOL (GLYCOLAX) 17 G PACKET    Take 17 g by mouth daily as needed for Constipation       PAST MEDICAL HISTORY         Diagnosis Date    Abscess     rt ear    Anxiety 10/5/2015    Cellulitis due to MRSA     Chronic back pain     Depression     Hypertension     Metastatic cancer ([facility] [address] 2021    Multiple substance abuse ([facility] [address]     OCD (obsessive compulsive disorder)        SURGICAL HISTORY           Procedure Laterality Date    CT BIOPSY PERCUTANEOUS SUPERFICIAL BONE  2021    CT BIOPSY PERCUTANEOUS SUPERFICIAL BONE 2021 [facility] CT SCAN    EYE SURGERY      plate    SHOULDER SURGERY Right     [place], labial tear         FAMILY HISTORY           Problem Relation Age of Onset    Diabetes Mother     Hearing Loss Father     High Blood Pressure Father      Family Status   Relation Name Status    Mother  Alive    Father  Alive    Brother  Alive    MGM      MGF          SOCIAL HISTORY      reports that he has quit smoking. His smoking use included cigarettes. He has a 12.50 pack-year smoking history. He has never used smokeless tobacco. He reports current drug use. Drug: Marijuana Fredick Copping). He reports that he does not drink alcohol. REVIEW OF SYSTEMS    (2-9 systems for level 4, 10 or more for level 5)     Review of Systems   Constitutional: Negative for chills, fatigue and fever. HENT: Negative for congestion, ear discharge and facial swelling. Eyes: Negative for discharge and redness. Respiratory: Negative for cough and shortness of breath. Cardiovascular: Negative for chest pain. Gastrointestinal: Negative for abdominal pain, constipation, diarrhea and vomiting. Genitourinary: Negative for dysuria and hematuria. Musculoskeletal: Positive for arthralgias and back pain. Skin: Negative for color change and rash. Neurological: Negative for syncope, numbness and headaches. Hematological: Negative for adenopathy. Psychiatric/Behavioral: Negative for confusion. The patient is not nervous/anxious. Except as noted above the remainder of the review of systems was reviewed and negative. PHYSICAL EXAM    (up to 7 for level 4, 8 or more for level 5)     Vitals:    01/05/22 1253 01/05/22 1254   BP: 119/89    Pulse: 112    Resp: 25    Temp:  98.8 °F (37.1 °C)   TempSrc:  Oral   SpO2: 97%    Weight: 210 lb (95.3 kg)    Height: 5' 11\" (1.803 m)        Physical Exam  Vitals reviewed. Constitutional:       General: He is not in acute distress. Appearance: He is well-developed. He is not diaphoretic. Comments: He appears quite uncomfortable. HENT:      Head: Normocephalic and atraumatic. Eyes:      General: No scleral icterus. Right eye: No discharge. Left eye: No discharge. Cardiovascular:      Rate and Rhythm: Normal rate and regular rhythm. Pulmonary:      Effort: Pulmonary effort is normal. No respiratory distress. Breath sounds: Normal breath sounds. No stridor. No wheezing or rales. Abdominal:      General: There is no distension. Palpations: Abdomen is soft. Tenderness: There is no abdominal tenderness. Musculoskeletal:         General: Normal range of motion. Cervical back: Neck supple. Lymphadenopathy:      Cervical: No cervical adenopathy. Skin:     General: Skin is warm and dry. Findings: No erythema or rash. Neurological:      Mental Status: He is alert and oriented to person, place, and time.    Psychiatric:         Behavior: Behavior normal.             DIAGNOSTIC RESULTS     EKG: All EKG's are interpreted by the Emergency Department Physician who either signs or Co-signs this chart in the absence of a cardiologist.    RADIOLOGY:   Non-plain film images such as CT, Ultrasound and MRI are read by the radiologist. Plain radiographic images are visualized and preliminarily interpreted by the emergency physician with the below findings:    Interpretation per the Radiologist below, if available at the time of this note:        LABS:  Labs Reviewed   CBC WITH AUTO DIFFERENTIAL - Abnormal; Notable for the following components:       Result Value    WBC 15.7 (*)     RBC 4.08 (*)     Hemoglobin 11.9 (*)     Hematocrit 36.6 (*)     Seg Neutrophils 68 (*)     Lymphocytes 20 (*)     Immature Granulocytes 1 (*)     Segs Absolute 10.63 (*)     Absolute Mono # 1.45 (*)     All other components within normal limits   SPECIMEN REJECTION   BASIC METABOLIC PANEL       All other labs were within normal range or not returned as of this dictation. EMERGENCY DEPARTMENT COURSE and DIFFERENTIAL DIAGNOSIS/MDM:   Vitals:    Vitals:    01/05/22 1253 01/05/22 1254   BP: 119/89    Pulse: 112    Resp: 25    Temp:  98.8 °F (37.1 °C)   TempSrc:  Oral   SpO2: 97%    Weight: 210 lb (95.3 kg)    Height: 5' 11\" (1.803 m)        Orders Placed This Encounter   Medications    DISCONTD: HYDROmorphone HCl PF (DILAUDID) injection 1 mg    HYDROmorphone (DILAUDID) injection 2 mg    HYDROmorphone HCl PF (DILAUDID) injection 1 mg       Medical Decision Making: He is given IM and then IV Dilaudid and he is having a PCA ordered. I spoke to Dr. Jer Del Castillo and she is requesting the patient be admitted to Dr. Josette Almonte. He is being admitted for intractable pain. Treatment diagnosis and disposition were discussed with the patient. CONSULTS:  IP CONSULT TO ORTHOPEDIC SURGERY    PROCEDURES:  None    FINAL IMPRESSION      1. Intractable pain          DISPOSITION/PLAN   DISPOSITION Decision To Admit 01/05/2022 02:13:32 PM      PATIENT REFERRED TO:   No follow-up provider specified. DISCHARGE MEDICATIONS:     New Prescriptions    No medications on file       The care is provided during an unprecedented national emergency due to the novel coronavirus, COVID-19.     (Please note that portions of this note were completed with a voice recognition program.  Efforts were made to edit the dictations but occasionally words are mis-transcribed.)    Wilmer Avery MD  Attending Emergency Physician           Wilmer Avery MD  01/05/22 6133

## 2022-01-05 NOTE — Clinical Note
Patient Class: Inpatient [101]   REQUIRED: Diagnosis: Intractable pain [676489]   Estimated Length of Stay: Estimated stay of more than 2 midnights

## 2022-01-05 NOTE — FLOWSHEET NOTE
SPIRITUAL CARE PROGRESS NOTE: OUTPATIENT PALLIATIVE CARE CLINIC [facility]    Spiritual Assessment: Writer met with Patient during his visit for a consultation at the [address] [place] [address]. Writer had met Pt previously when he was in the inpatient unit at [facility] lasts month. Patient was standing in the treatment room. He appeared distressed. He acknowledged and greeted writer. He shared about his symptoms, acknowledging the impact on him. He was tearful. He expressed gratitude for the support he received from the Palliative Care team he met this morning. Patient's Father returned to the room. Father spoke of his commitment to caring for Pt, noting that \"this is what life's about. \" Pt expressed gratitude for his parents. Patient and Father were receptive to praying the [facility]" together. Intervention: Writer provided supportive presence and active listening; inquired about Pt's sources of support and strength; offered words of encouragement and support; affirmed Pt's and Family's strengths; prayed with Pt and Father. Outcome: Patient thanked writer for the support. Plan: Chaplains will remain available to provide emotional and spiritual support as needed. 01/05/22 1229   Encounter Summary   Services provided to: Patient;Patient and family together   Referral/Consult From: Nurse   Support System Family members;Parent   Continue Visiting   (1/5/22)   Complexity of Encounter Moderate   Length of Encounter 15 minutes   Spiritual Assessment Completed Yes   Grief and Life Adjustment   Type Palliative care   Assessment Approachable;Tearful   Intervention Active listening;Explored feelings, thoughts, concerns;Explored coping resources;Prayer;Sustaining presence/ Ministry of presence; Discussed belief system/Congregation practices/lottie;Discussed illness/injury and it's impact; Discussed relationship with God;Discussed meaning/purpose   Outcome Receptive; Hopeful;Encouraged;Expressed feelings/needs/concerns;Engaged in conversation;Expressed gratitude; Tearful     Electronically signed by Monika Robles, Oncology Outpatient [facility] 08, [address] [facility] Radiation Oncology  1/5/2022  12:31 PM

## 2022-01-06 LAB
ANION GAP SERPL CALCULATED.3IONS-SCNC: 9 MMOL/L (ref 9–17)
BUN BLDV-MCNC: 17 MG/DL (ref 6–20)
BUN/CREAT BLD: ABNORMAL (ref 9–20)
CALCIUM SERPL-MCNC: 9.1 MG/DL (ref 8.6–10.4)
CHLORIDE BLD-SCNC: 104 MMOL/L (ref 98–107)
CO2: 23 MMOL/L (ref 20–31)
CREAT SERPL-MCNC: <0.4 MG/DL (ref 0.7–1.2)
GFR AFRICAN AMERICAN: ABNORMAL ML/MIN
GFR NON-AFRICAN AMERICAN: ABNORMAL ML/MIN
GFR SERPL CREATININE-BSD FRML MDRD: ABNORMAL ML/MIN/{1.73_M2}
GFR SERPL CREATININE-BSD FRML MDRD: ABNORMAL ML/MIN/{1.73_M2}
GLUCOSE BLD-MCNC: 114 MG/DL (ref 70–99)
HCT VFR BLD CALC: 36 % (ref 40.7–50.3)
HEMOGLOBIN: 11.4 G/DL (ref 13–17)
INR BLD: 0.9
MCH RBC QN AUTO: 29.2 PG (ref 25.2–33.5)
MCHC RBC AUTO-ENTMCNC: 31.7 G/DL (ref 28.4–34.8)
MCV RBC AUTO: 92.1 FL (ref 82.6–102.9)
NRBC AUTOMATED: 0.2 PER 100 WBC
PDW BLD-RTO: 13.6 % (ref 11.8–14.4)
PLATELET # BLD: 349 K/UL (ref 138–453)
PMV BLD AUTO: 8.4 FL (ref 8.1–13.5)
POTASSIUM SERPL-SCNC: 4.2 MMOL/L (ref 3.7–5.3)
PROTHROMBIN TIME: 11.7 SEC (ref 11.5–14.2)
RBC # BLD: 3.91 M/UL (ref 4.21–5.77)
SODIUM BLD-SCNC: 136 MMOL/L (ref 135–144)
WBC # BLD: 12.2 K/UL (ref 3.5–11.3)

## 2022-01-06 PROCEDURE — 80048 BASIC METABOLIC PNL TOTAL CA: CPT

## 2022-01-06 PROCEDURE — 6370000000 HC RX 637 (ALT 250 FOR IP): Performed by: INTERNAL MEDICINE

## 2022-01-06 PROCEDURE — 36415 COLL VENOUS BLD VENIPUNCTURE: CPT

## 2022-01-06 PROCEDURE — 99232 SBSQ HOSP IP/OBS MODERATE 35: CPT | Performed by: INTERNAL MEDICINE

## 2022-01-06 PROCEDURE — 97166 OT EVAL MOD COMPLEX 45 MIN: CPT

## 2022-01-06 PROCEDURE — 6360000002 HC RX W HCPCS: Performed by: NURSE PRACTITIONER

## 2022-01-06 PROCEDURE — 99222 1ST HOSP IP/OBS MODERATE 55: CPT | Performed by: INTERNAL MEDICINE

## 2022-01-06 PROCEDURE — 85610 PROTHROMBIN TIME: CPT

## 2022-01-06 PROCEDURE — 94761 N-INVAS EAR/PLS OXIMETRY MLT: CPT

## 2022-01-06 PROCEDURE — 97535 SELF CARE MNGMENT TRAINING: CPT

## 2022-01-06 PROCEDURE — 6370000000 HC RX 637 (ALT 250 FOR IP): Performed by: NURSE PRACTITIONER

## 2022-01-06 PROCEDURE — 97530 THERAPEUTIC ACTIVITIES: CPT

## 2022-01-06 PROCEDURE — 99222 1ST HOSP IP/OBS MODERATE 55: CPT | Performed by: NURSE PRACTITIONER

## 2022-01-06 PROCEDURE — 1200000000 HC SEMI PRIVATE

## 2022-01-06 PROCEDURE — 85027 COMPLETE CBC AUTOMATED: CPT

## 2022-01-06 PROCEDURE — 2580000003 HC RX 258: Performed by: NURSE PRACTITIONER

## 2022-01-06 RX ORDER — CYCLOBENZAPRINE HCL 10 MG
10 TABLET ORAL 3 TIMES DAILY PRN
Status: DISPENSED | OUTPATIENT
Start: 2022-01-06 | End: 2022-01-08

## 2022-01-06 RX ORDER — ALPRAZOLAM 0.5 MG/1
0.5 TABLET ORAL 3 TIMES DAILY PRN
Status: DISCONTINUED | OUTPATIENT
Start: 2022-01-06 | End: 2022-01-10

## 2022-01-06 RX ADMIN — CYCLOBENZAPRINE 10 MG: 10 TABLET, FILM COATED ORAL at 22:19

## 2022-01-06 RX ADMIN — SODIUM CHLORIDE: 9 INJECTION, SOLUTION INTRAVENOUS at 05:32

## 2022-01-06 RX ADMIN — CITALOPRAM HYDROBROMIDE 20 MG: 20 TABLET ORAL at 09:03

## 2022-01-06 RX ADMIN — ALPRAZOLAM 0.5 MG: 0.5 TABLET ORAL at 13:08

## 2022-01-06 RX ADMIN — ALPRAZOLAM 0.5 MG: 0.5 TABLET ORAL at 22:19

## 2022-01-06 RX ADMIN — Medication 12 MG: at 03:13

## 2022-01-06 RX ADMIN — DEXAMETHASONE 4 MG: 4 TABLET ORAL at 17:25

## 2022-01-06 RX ADMIN — Medication 10000 MCG: at 15:52

## 2022-01-06 RX ADMIN — OXYCODONE HYDROCHLORIDE 80 MG: 40 TABLET, FILM COATED, EXTENDED RELEASE ORAL at 20:31

## 2022-01-06 RX ADMIN — DEXAMETHASONE 4 MG: 4 TABLET ORAL at 09:03

## 2022-01-06 RX ADMIN — OXYCODONE HYDROCHLORIDE 80 MG: 40 TABLET, FILM COATED, EXTENDED RELEASE ORAL at 09:03

## 2022-01-06 RX ADMIN — ENOXAPARIN SODIUM 40 MG: 40 INJECTION SUBCUTANEOUS at 09:05

## 2022-01-06 RX ADMIN — DOCUSATE SODIUM 100 MG: 100 CAPSULE ORAL at 09:03

## 2022-01-06 RX ADMIN — ONDANSETRON 4 MG: 2 INJECTION INTRAMUSCULAR; INTRAVENOUS at 15:14

## 2022-01-06 ASSESSMENT — PAIN SCALES - GENERAL
PAINLEVEL_OUTOF10: 7

## 2022-01-06 ASSESSMENT — PAIN DESCRIPTION - FREQUENCY: FREQUENCY: CONTINUOUS

## 2022-01-06 ASSESSMENT — PAIN DESCRIPTION - ORIENTATION: ORIENTATION: LEFT

## 2022-01-06 ASSESSMENT — PAIN DESCRIPTION - DESCRIPTORS: DESCRIPTORS: ACHING;SHARP

## 2022-01-06 ASSESSMENT — PAIN DESCRIPTION - PAIN TYPE: TYPE: ACUTE PAIN

## 2022-01-06 ASSESSMENT — PAIN DESCRIPTION - ONSET: ONSET: ON-GOING

## 2022-01-06 ASSESSMENT — PAIN DESCRIPTION - LOCATION: LOCATION: HIP;LEG

## 2022-01-06 NOTE — ACP (ADVANCE CARE PLANNING)
Advance Care Planning     Advance Care Planning (ACP) Physician/NP/PA Conversation    Date of Conversation: 1/6/2022  Conducted with: Patient with Decision Making Capacity    Healthcare Decision Maker:    Primary Decision Maker: James Menon - Parent - [phone]    Secondary Decision Maker: Luna Almendarez - Parent - [phone]    Patient is  and has 1 biological daughter. He has completed DPOA paperwork and his dad Ramila High is poa #1 and Mom Maggie Small is POA#2. Care Preferences:    Hospitalization: \"If your health worsens and it becomes clear that your chance of recovery is unlikely, what would be your preference regarding hospitalization? \"  Hospitalized     Ventilation: \"If you were unable to breath on your own and your chance of recovery was unlikely, what would be your preference about the use of a ventilator (breathing machine) if it was available to you? \"  Full code     Resuscitation: \"In the event your heart stopped as a result of an underlying serious health condition, would you want attempts made to restart your heart, or would you prefer a natural death? \"  Full code     Conversation Outcomes / Follow-Up Plan:  Palliative Interaction:  I went in to speak with patient and introduced myself and my palliative care role.      Patient states his pain is improved with the Oxycontin and Dilaudid. Patient is getting Dilaudid per PCA.      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 He states he and his brother became clean together.      Patient is  and has 1 biological daughter. He has completed DPOA paperwork and his dad Ramila High is poa #1 and Farshad Prado is POA#2.      We discussed code status levels and I explained each level to him and he states right now he wants to go through immunotherapy treatment and wants to remain a full code status.     Patient lives at home with parents when not hospitalized. Emotional support provided to patient.     Length of Voluntary ACP Conversation in minutes:  16 minutes    Margrette Buerger, APRN - NP

## 2022-01-06 NOTE — CARE COORDINATION
Case Management Initial Discharge Plan  Mehnaz Mckinney,             Met with:patient to discuss discharge plans. Information verified: address, contacts, phone number, , insurance Yes  PCP: BOBBY Armendariz  Date of last visit:  (unsure of month)    Insurance Provider: [address]    Discharge Planning    Living Arrangements:   Lives with parents   Support Systems:   family    Home has 2 stories  2 stairs to climb to get into front door, 0 stairs to climb to reach second floor  Location of bedroom/bathroom in home  - stays on main floor    Patient able to perform ADL's:Assisted    Current Services (outpatient & in home) none  DME equipment: walker  DME provider: N/A    Pharmacy: [facility]    Potential Assistance Needed:   shower chair, raised toilet seat    Patient agreeable to home care: No  [place] of choice provided:  n/a    Prior SNF/Rehab Placement and Facility: No  Agreeable to SNF/Rehab: No  [place] of choice provided: n/a   Evaluation: n/a    Expected Discharge date:     Patient expects to be discharged to: Follow Up Appointment: Best Day/ Time:      Transportation provider: oliva  Transportation arrangements needed for discharge: No    Readmission Risk              Risk of Unplanned Readmission:  25             Does patient have a readmission risk score greater than 14?: Yes  If yes, follow-up appointment must be made within 7 days of discharge. Goal of Care:       Discharge Plan: Met with patient at bedside. Lives with parents and they assist pt as needed. Uses walker when ambulating. Diagnosed with metastatic lung cancer  and admitted for intractable pain from [facility]. Currently has dilaudid PCA pump. Recently D/C from [facility]. V's 21 for severe lt hip pain. Follows with Dr. Judy Cohn and pt states was to have port placement 1-7 and plan is to start chemo.   Has completerd 5 palliative radiation treatments. Had Hospice evaluation with [address] [place] [address] 12-25-21 and pt not ready to start services. Referral to [address] and pt deciding if he wants to continue with [facility] or switch to [address]. Therapy at bedside and pt was too painful to participate. Will need shower chair and raised toilet seat for home and perfect served Dr. Gerardo Guerrier for scripts and face to face note. Oncology following and Dr. Saul Kelly consulted. Awaiting ortho recommendations. Continue to follow.                        Electronically signed by Jus Elliott RN on 1/6/22 at 10:56 AM EST

## 2022-01-06 NOTE — CONSULTS
Palliative Care Inpatient Consult    NAME:  Monika Mackey  MEDICAL RECORD NUMBER:  [id number]  AGE: 46 y.o. GENDER: male  : 1970  TODAY'S DATE:  2022    Reasons for Consultation:    Goals of care   Code status discussion  Family support   Symptom management     Members of PC team contributing to this consultation are :  Erlni James CNP  Palliative care   Plan      Palliative Interaction:  I went in to speak with patient and introduced myself and my palliative care role. Patient states his pain is improved with the Oxycontin and Dilaudid. Patient is getting Dilaudid per PCA. 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 He states he and his brother became clean together. Patient is  and has 1 biological daughter. He has completed DPOA paperwork and his dad Tegan Rivera is poa #1 and Mom Sal Chairez is POA#2. We discussed code status levels and I explained each level to him and he states right now he wants to go through immunotherapy treatment and wants to remain a full code status. Patient lives at home with parents when not hospitalized. Emotional support provided to patient. Education/support to family  Discharge planning/helping to coordinate care  Communications with primary service  Pharmacologic pain management  Providing support for coping/adaptation/distress of patient  Discussing meaning/purpose   Caregiver support/education      History of Present Illness     The patient is a 46 y.o. Non- / non  male who presents with Back Pain (down left leg. cancer pt)      Referred to Palliative Care by   [x] Physician   [] Nursing  [] Family Request   [] Other:       He was admitted to the ICU service for Intractable pain [R52]. His hospital course has been associated with Intractable pain.  The patient has a complicated medical history and has been hospitalized since 2022 12:51 PM.    Raymond Alva is a 46year old male that has a medical history of Heroin use but been clean over a year, hypertension, depression, anxiety, metastatic bone cancer. Patient came to ER from palliative care clinic due to intractable back pain that radiates down left leg. In ER patient was hypercalcemic 11.3, WBC 16.3, and HGB 13.3. Palliative radiation was started on 12/17/2021 through 12/23. Plan is to start palliative systemic chemotherapy and states having Port placed soon. Patient oncologist is Dr Earle Weaver. Patient today states pain has improved and Dilaudid PCA and oxycontin are helping. Patient labs from today include PT 11.7, INR was 0.9, WBC 12.2, RBC 3.91, hemoglobin 11.4, hematocrit 36.0, platelets 955, glucose 114, BUN 17, creatinine less than 0.40, calcium 9.1, sodium 136, potassium 4.2, chloride 104. Patient had PET scan done on 12/30 and results are listed below. Patient has the following consults oncology due to metastatic bone cancer and intractable pain, Ortho due to left hip pain due to cancer, palliative care for symptom management goals of care and CODE STATUS discussion family support. Palliative care will continue to follow patient and reach out to patient's family to provide them with emotional support and updates as needed.     PET scan  1.  Large area of heterogeneous FDG uptake corresponding to a lytic lesion in   the left hemipelvis consistent with biopsy proven metastatic disease.       2.  Focus of abnormal radiotracer uptake in the left anterior inferior iliac   crest.       3.  Abnormal radiotracer uptake within a right pretracheal lymph node and in   a right suprahilar location.       4.  Focus of radiotracer in the soft tissues of the right upper arm of   indeterminate significance.       5.  Evaluation is severely complicated due to infiltration of radiotracer in   an IV in the left upper arm.       6.  Focus of FDG uptake in the right thyroid.       RECOMMENDATIONS:   1.  Advise thyroid ultrasound.  There is a hypermetabolic focus in the right   thyroid.  Expansile quickly growing bone lesions may be associated with   thyroid malignancy.       2.  Advise contrast enhanced CT scanning of the head, neck, abdomen and   pelvis.  Prior CT abdomen and pelvis was noncontrast.  Specifically, advise   contrast enhanced CT of the abdomen to assess the kidneys and pancreas. Active Hospital Problems    Diagnosis Date Noted    Intractable pain [R52] 01/05/2022    Tobacco use [Z72.0] 12/21/2021    Metastatic cancer ([address]   Lytic lesion to the pubic ramus [C79.9] 12/17/2021    Cancer related pain [G89.3] 12/16/2021    Anxiety [F41.9] 10/05/2015    Major depressive disorder, recurrent episode, severe ([address] [F33.2] 04/23/2015    Primary hypertension [I10] 08/21/2014       PAST MEDICAL HISTORY      Diagnosis Date    Abscess     rt ear    Anxiety 10/5/2015    Cellulitis due to MRSA     Chronic back pain     Depression     Hypertension     Metastatic cancer ([address] 12/17/2021    Multiple substance abuse ([address]     OCD (obsessive compulsive disorder)        PAST SURGICAL HISTORY  Past Surgical History:   Procedure Laterality Date    CT BIOPSY PERCUTANEOUS SUPERFICIAL BONE  12/9/2021    CT BIOPSY PERCUTANEOUS SUPERFICIAL BONE 12/9/2021 [facility] CT SCAN    EYE SURGERY      plate    SHOULDER SURGERY Right     [facility], labial tear       SOCIAL HISTORY  Social History     Tobacco Use    Smoking status: Former Smoker     Packs/day: 0.50     Years: 25.00     Pack years: 12.50     Types: Cigarettes    Smokeless tobacco: Never Used   Substance Use Topics    Alcohol use: No     Alcohol/week: 0.0 standard drinks     Comment: has not  drank in 2 years    Drug use: Yes     Types: Marijuana (Weed)     Comment: hx of marijuana/ denies today       ALLERGIES  Allergies   Allergen Reactions    Morphine Itching     PT DENIES         MEDICATIONS  Current Medications    citalopram  20 mg Oral Daily    dexamethasone  4 mg Oral BID WC    oxyCODONE 80 mg Oral Q12H    sodium chloride flush  5-40 mL IntraVENous 2 times per day    docusate sodium  100 mg Oral BID    enoxaparin  40 mg SubCUTAneous Daily    nicotine  1 patch TransDERmal Daily     naloxone, ondansetron, sodium chloride flush, sodium chloride, potassium chloride **OR** potassium alternative oral replacement **OR** potassium chloride, magnesium sulfate, polyethylene glycol, acetaminophen **OR** acetaminophen, ALPRAZolam  IV Drips/Infusions   HYDROmorphone      sodium chloride Stopped (01/05/22 5414)    sodium chloride 50 mL/hr at 01/06/22 0532     Home Medications  No current facility-administered medications on file prior to encounter. Current Outpatient Medications on File Prior to Encounter   Medication Sig Dispense Refill    ALPRAZolam (XANAX) 1 MG tablet Take 1 tablet by mouth every 6 hours as needed for Anxiety for up to 30 days. 120 tablet 0    citalopram (CELEXA) 20 MG tablet Take 1 tablet by mouth daily 30 tablet 3    polyethylene glycol (GLYCOLAX) 17 g packet Take 17 g by mouth daily as needed for Constipation 527 g 1    dexamethasone (DECADRON) 4 MG tablet Take 1 tablet by mouth 2 times daily (with meals) for 20 days 40 tablet 0    oxyCODONE (OXYCONTIN) 80 MG extended release tablet Take 1 tablet by mouth every 12 hours for 15 days. 30 each 0    oxyCODONE (OXY-IR) 15 MG immediate release tablet Take 1 tablet by mouth every 4 hours as needed for Pain for up to 15 days. 90 tablet 0    lisinopril (PRINIVIL;ZESTRIL) 5 MG tablet Take 1 tablet by mouth daily 30 tablet 1    gabapentin (NEURONTIN) 100 MG capsule Take 1 capsule by mouth 3 times daily for 30 days.  90 capsule 0    melatonin (RA MELATONIN) 3 MG TABS tablet Take 1 tablet by mouth nightly as needed (insomnia) 30 tablet 0       Data         /74   Pulse 77   Temp 97.9 °F (36.6 °C) (Oral)   Resp 15   Ht 5' 11\" (1.803 m)   Wt 205 lb (93 kg)   SpO2 99%   BMI 28.59 kg/m²     Wt Readings from Last 3 Encounters: 01/06/22 205 lb (93 kg)   12/29/21 211 lb 3.2 oz (95.8 kg)   12/21/21 214 lb 15.2 oz (97.5 kg)        Code Status: Full Code     ADVANCED CARE PLANNING:  Patient has capacity for medical decisions: yes  Health Care Power of : yes  Living Will: unsure      Personal, Social, and Family History  Marital Status:   Living situation: with family:  mother, father and daughter  Importance of lottie/Mosque/spiritual beliefs: [] Very [] Somewhat [] Not   Psychological Distress: moderate  Does patient understand diagnosis/treatment?  yes  Does caregiver understand diagnosis/treatment? did not speak with them     Past Medical History:   Diagnosis Date    Abscess     rt ear    Anxiety 10/5/2015    Cellulitis due to MRSA     Chronic back pain     Depression     Hypertension     Metastatic cancer ([facility] 12/17/2021    Multiple substance abuse ([facility]     OCD (obsessive compulsive disorder)          Family History   Problem Relation Age of Onset    Diabetes Mother     Hearing Loss Father     High Blood Pressure Father        Social History     Tobacco Use    Smoking status: Former Smoker     Packs/day: 0.50     Years: 25.00     Pack years: 12.50     Types: Cigarettes    Smokeless tobacco: Never Used   Substance Use Topics    Alcohol use: No     Alcohol/week: 0.0 standard drinks     Comment: has not  drank in 2 years    Drug use: Yes     Types: Marijuana (Weed)     Comment: hx of marijuana/ denies today           Assessment        REVIEW OF SYSTEMS  Constitutional: Alert and oriented answers questions and follow commands   Eyes: no eye pain or blurred vision  ENT: no hearing loss, congestion, or difficulty swallowing   Respiratory: oxygen per NC respirations relaxed oxygen per nc   Cardiovascular: no chest pain or pressure, no palpitations, no diaphoresis   Gastrointestinal: no nausea, vomiting,abdominal pain, diarrhea or constipation, no melena   Genitourinary: no dysuria, frequency, hematuria, or nocturia   Musculoskeletal: generalized discomfort and weakness  Skin: no rashes or sores   Neurological: Alert and oriented answers questions and follow commands     PHYSICAL ASSESSMENT:  Constitutional: Alert and oriented answers questions and follow commands   Head: Normocephalic and atraumatic. Eyes: EOM are normal. Pupils are equal, round. Neck: Normal range of motion. Neck supple. No tracheal deviation present. Cardiovascular: Normal rate and regular rhythm, S1, S2, no murmur. Pulmonary/Chest:Lungs diminished respirations relaxed oxygen per NC   Abdomen: Soft. No tenderness, not distended, no ascites, no organomegaly. Musculoskeletal:Generalized weakness and discomfort   Neurological: Alert and oriented answers questions and follow commands Skin: Normal turgor, no bleeding, no bruising. Palliative Performance Scale:  ___60%  Ambulation reduced; Significant disease; Can't do hobbies/housework; intake normal or reduced; occasional assist; LOC full/confusion  ___50%  Mainly sit/lie; Extensive disease; Can't do any work; Considerable assist; intake normal or reduced; LOC full/confusion  __x_40%  Mainly in bed; Extensive disease; Mainly assist; intake normal or reduced; LOC full/confusion   ___30%  Bed Bound; Extensive disease; Total care; intake reduced; LOC full/confusion  ___20%  Bed Bound; Extensive disease; Total care; intake minimal; Drowsy/coma  ___10%  Bed Bound; Extensive disease; Total care; Mouth care only; Drowsy/coma  ___0       Death      Principle Problem/Diagnosis:  Intractable pain    Additional Assessments:   Principal Problem:    Intractable pain  Active Problems:    Primary hypertension    Major depressive disorder, recurrent episode, severe ([address]    Anxiety    Cancer related pain    Metastatic cancer ([address] Lytic lesion to the pubic ramus    Tobacco use  Resolved Problems:    * No resolved hospital problems.  *    1- Symptom management/ pain control     Pain Assessment:  Dilaudid PCA and oxycontin               Anxiety:  xanax for anxiety                           Dyspnea:  oxygen per NC                           Fatigue:  generalized weakness and discomfort     Other:      2- Goals of care evaluation   The patient goals of care are improve or maintain function/quality of life   Goals of care discussed with:    [x] Patient independently    [] Patient and Family    [] Family or Healthcare DPOA independently    [] Unable to discuss with patient, family/DPOA not present    3- Code Status  Full Code    4- Other recommendations   - We will continue to provide comfort and support to the patient and the family  Palliative Care will continue to follow Mr. Singer's care as needed. Thank you for allowing Palliative Care to participate in the care of Mr. Emelyn Ramesh . This note has been dictated by dragon, typing errors may be a possibility. The total time I spent in seeing the patient, discussing goals of care, advanced directives, code status, greater than 50% time in counseling and other major issues was more than 60 minutes      Electronically signed by   Margrette Buerger, APRN - NP  Palliative Care Team  on 1/6/2022 at 11:33 AM    Palliative care office: [phone]    Please call with any palliative questions or concerns. Palliative Care Team is available via perfect serve or via phone.

## 2022-01-06 NOTE — PROGRESS NOTES
[facility]  Office: [address], DO, Kingsley Sam, DO, Lina Pantoja, DO, Natalie Horn Blood, DO, Tomer Tabor MD, Joanna Murray MD, Vivek Saravia MD, Jesica Blackman MD, Bonnie Hodgkin, MD, Xiomara Schneider MD, El Gazra MD, Reena Albright, DO, Rogelio Kaur, DO, Michael Yost MD,  Edmund Mayberry DO, Luna Hutchison MD, Bean Ricci MD, Luda Coyle MD, Olive Sapp MD, Bridgett Wong MD, Giovanni Sanches MD, Nita Polanco MD, [facility], [facility], CNP, Fabby Carrillo, CNP, Jann Reyes, CNS, Mary Huggins, CNP, Cathleen Polo, CNP, Vishnu Roberts, CNP, Debbie Earl, CNP, Scottie Vuong CNP, Radonna Aschoff, PA-C, Iris [facility], Efraín [facility], Kahlil Tabor, CNP, Shalini Mar, CNP, Home Dugan, CNP, Abdelrahman Espinoza, CNP, Erendira Gonzalez, CNP, Cecilio Bowman, [facility]    Progress Note    1/7/2022    1:23 PM    Name:   Shahbaz Jiménez  MRN:     [id number]     [place]:      [de-identified]   Room:   2004/2004-02  IP Day:  2  Admit Date:  1/5/2022 12:51 PM    PCP:   BOBBY Chatterjee  Code Status:  Full Code    Subjective:     C/C:   Chief Complaint   Patient presents with    Back Pain     down left leg. cancer pt     Interval History Status:   Back, left hip and left lower leg pain is better after starting Dilaudid PCA pump  Orthopedics  will be evaluating the patient for suspected left acetabular pathological fracture due to metastasis  Oncology plans to get port placement today to start palliative chemo as outpatient  Brief History:   Shahbaz Jiménez is a 46 y.o. Non- / non  male who presents with Back Pain (down left leg. cancer pt)   and is admitted to the hospital for the management of Intractable pain.     Presented to the emergency room from the cancer center for pain.   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 CT of the chest shows right suprahilar region of the lungs suggesting possible lung primary. PET scan performed on 12/30/2021 is concerning for thyroid malignancy and CT scan with contrast of the head, neck, abdomen and pelvis has been recommended. Palliative radiation was started on 12/17/2021 through 12/23. Plan is to start palliative systemic chemotherapy. He has not yet seen an orthopedist.  Pain is constant and radiates down the left leg. His home medications include OxyContin 80 mg twice daily, oxycodone 15 mg every 4 hours as needed and Xanax which have been ineffective. He was sent here to be admitted for pain control with Dilaudid PCA pump.       Review of Systems:     Constitutional:  negative for chills, fevers, sweats  Respiratory:  negative for cough, dyspnea on exertion, shortness of breath, wheezing  Cardiovascular:  negative for chest pain, chest pressure/discomfort, lower extremity edema, palpitations  Gastrointestinal:  negative for abdominal pain, constipation, diarrhea, nausea, vomiting  Neurological:  negative for dizziness, headache  + Lower back, left hip and left leg pain  Medications: Allergies:     Allergies   Allergen Reactions    Morphine Itching     PT DENIES       Current Meds:   Scheduled Meds:    citalopram  20 mg Oral Daily    dexamethasone  4 mg Oral BID WC    oxyCODONE  80 mg Oral Q12H    sodium chloride flush  5-40 mL IntraVENous 2 times per day    enoxaparin  40 mg SubCUTAneous Daily    nicotine  1 patch TransDERmal Daily     Continuous Infusions:    HYDROmorphone      sodium chloride Stopped (01/05/22 4276)    sodium chloride 50 mL/hr at 01/07/22 0904     PRN Meds: ALPRAZolam, cyclobenzaprine, sodium chloride flush, sodium chloride, potassium chloride **OR** potassium alternative oral replacement **OR** potassium chloride, magnesium sulfate, polyethylene glycol, acetaminophen **OR** acetaminophen    Data:     Past Medical History: has a past medical history of Abscess, Anxiety, Cellulitis due to MRSA, Chronic back pain, Depression, Hypertension, Metastatic cancer ([facility] [address] Multiple [facility] and OCD (obsessive compulsive disorder). Social History:   reports that he has quit smoking. His smoking use included cigarettes. He has a 12.50 pack-year smoking history. He has never used smokeless tobacco. He reports current drug use. Drug: Marijuana Gelene Chasten). He reports that he does not drink alcohol. Family History:   Family History   Problem Relation Age of Onset    Diabetes Mother     Hearing Loss Father     High Blood Pressure Father        Vitals:  /78   Pulse 66   Temp 97.6 °F (36.4 °C) (Oral)   Resp 14   Ht 5' 11\" (1.803 m)   Wt 209 lb 0.1 oz (94.8 kg)   SpO2 99%   BMI 29.15 kg/m²   Temp (24hrs), Av.5 °F (36.4 °C), Min:97 °F (36.1 °C), Max:98.1 °F (36.7 °C)    No results for input(s): POCGLU in the last 72 hours. I/O (24Hr):     Intake/Output Summary (Last 24 hours) at 2022 1323  Last data filed at 2022 1048  Gross per 24 hour   Intake 1155.66 ml   Output 800 ml   Net 355.66 ml       Labs:  Hematology:  Recent Labs     22  1350 22  0724 22  0730   WBC 15.7* 12.2* 15.2*   RBC 4.08* 3.91* 3.78*   HGB 11.9* 11.4* 10.9*   HCT 36.6* 36.0* 34.9*   MCV 89.7 92.1 92.3   MCH 29.2 29.2 28.8   MCHC 32.5 31.7 31.2   RDW 13.4 13.6 13.5    349 329   MPV 8.7 8.4 9.1   INR  --  0.9  --      Chemistry:  Recent Labs     22  1421 22  0724    136   K 4.1 4.2   CL 99 104   CO2 28 23   GLUCOSE 92 114*   BUN 23* 17   CREATININE 0.62* <0.40*   ANIONGAP 8* 9   LABGLOM >60 Can not be calculated   GFRAA >60 Can not be calculated   CALCIUM 9.7 9.1   No results for input(s): PROT, LABALBU, LABA1C, C6OIVRO, Y6FQNNW, FT4, TSH, AST, ALT, LDH, GGT, ALKPHOS, LABGGT, BILITOT, BILIDIR, AMMONIA, AMYLASE, LIPASE, LACTATE, CHOL, HDL, LDLCHOLESTEROL, CHOLHDLRATIO, TRIG, VLDL, JSE79DM, PHENYTOIN, Kuldip Hudson in the last 72 hours. ABG:  Lab Results   Component Value Date    FIO2 ROOM AIR 08/20/2014     Lab Results   Component Value Date/Time    SPECIAL NOT REPORTED 09/28/2017 12:53 AM     Lab Results   Component Value Date/Time    CULTURE VIRIDANS STREPTOCOCCUS GROUP LIGHT GROWTH (A) 09/28/2017 12:53 AM    CULTURE NO ANAEROBIC ORGANISMS ISOLATED AT 5 DAYS (A) 09/28/2017 12:53 AM    CULTURE  09/28/2017 12:53 AM     [employer] [address] [facility], [address] [place] [address] [phone]       Radiology:  No results found.     Physical Examination:        General appearance:  alert, cooperative and no distress, currently comfortable with PCA pump  Mental Status:  oriented to person, place and time and normal affect  Lungs:  clear to auscultation bilaterally, normal effort  Heart:  regular rate and rhythm, no murmur  Abdomen:  soft, nontender, nondistended, normal bowel sounds, no masses, hepatomegaly, splenomegaly  Extremities:  no edema, redness, tenderness in the calves  Skin:  no gross lesions, rashes, induration  + Left lower back tenderness on palpation with mild muscle spasm,+ left pubic ramus pain on palpation  Assessment:        Hospital Problems           Last Modified POA    * (Principal) Intractable pain 1/5/2022 Yes    Metastatic cancer ([address] -suspected left acetabular pathological fracture 1/7/2022 Yes    Primary hypertension 1/5/2022 Yes    Major depressive disorder, recurrent episode, severe ([address] 1/5/2022 Yes    Anxiety 1/5/2022 Yes    Cancer related pain 1/5/2022 Yes    Tobacco use 1/5/2022 Yes          Plan:        Continue Dilaudid PCA pump for pain control  Continue long-acting oral OxyContin  Continue oral dexamethasone  Started Flexeril for muscle spasms  Continue Colace   Orthopedic evaluation for left hip pain/left acetabular pathological fracture  Oncology evaluation for planning palliative chemo, port placement today  DVT prophylaxis  Monitor CBC    Raul Bansal MD  1/7/2022  1:23 PM

## 2022-01-06 NOTE — PROGRESS NOTES
Occupational Therapy   Occupational Therapy Initial Assessment  Date: 2022   Patient Name: Negrita Bowman  MRN: [id number]     : 1970    Date of Service: 2022    Discharge Recommendations:  Patient would benefit from continued therapy after discharge   Pt currently functioning below baseline. Would suggest additional therapy at time of discharge to maximize long term outcomes and prevent re-admission. Please refer to AM-PAC score for current level of function. MIGEL Summers reports patient is medically stable for therapy treatment this date. Chart reviewed prior to treatment and patient is agreeable for therapy. All lines intact and patient positioned comfortably at end of treatment. All patient needs addressed prior to ending therapy session. OT Equipment Recommendations  Equipment Needed: Yes  Mobility Devices: ADL Assistive Devices  ADL Assistive Devices: Reacher;Long-handled Sponge;Sock-Aid Hard;Shower Chair with back; Toileting - Raised Toilet Seat with arms;Emergency Alert System;Dressing Stick     Per H&P: Negrita Bowman is a 46 y.o. Non- / non  male who presents with Back Pain (down left leg. cancer pt)   and is admitted to the hospital for the management of Intractable pain.     Presented to the emergency room from the cancer center for pain. 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 CT of the chest shows right suprahilar region of the lungs suggesting possible lung primary. PET scan performed on 2021 is concerning for thyroid malignancy and CT scan with contrast of the head, neck, abdomen and pelvis has been recommended. Palliative radiation was started on 2021 through . Plan is to start palliative systemic chemotherapy. He has not yet seen an orthopedist.  Pain is constant and radiates down the left leg.   His home medications include OxyContin 80 mg twice daily, oxycodone 15 mg every 4 hours as needed and Xanax which have been ineffective. He was sent here to be admitted for pain control with Dilaudid PCA pump. Assessment   Performance deficits / Impairments: Decreased functional mobility ; Decreased safe awareness;Decreased ADL status; Decreased cognition;Decreased balance;Decreased endurance;Decreased strength;Decreased coordination  Assessment: Skilled OT is indicated to increase overall IND and safety with self-care and IND to return home when appopriate  Prognosis: Fair  Decision Making: Medium Complexity  OT Education: OT Role;Plan of Care;Home Exercise Program;ADL Adaptive Strategies;Transfer Training;Equipment; Energy Conservation  Patient Education: OT POC, shower chair and raised toilet seat, call light use/fall prevention, safety in function  REQUIRES OT FOLLOW UP: Yes  Activity Tolerance  Activity Tolerance: Patient limited by pain  Safety Devices  Safety Devices in place: Yes  Type of devices: All fall risk precautions in place;Nurse notified; Patient at risk for falls; Left in bed;Bed alarm in place           Patient Diagnosis(es): The encounter diagnosis was Intractable pain. has a past medical history of Abscess, Anxiety, Cellulitis due to MRSA, Chronic back pain, Depression, Hypertension, Metastatic cancer ([facility] [address] Multiple substance abuse ([facility] [address] and OCD (obsessive compulsive disorder). has a past surgical history that includes shoulder surgery (Right); Eye surgery; and CT BIOPSY SUPERFICIAL BONE PERCUTANEOUS (12/9/2021).            Restrictions  Restrictions/Precautions  Restrictions/Precautions: General Precautions,Up as Tolerated,Fall Risk,Contact Precautions  Required Braces or Orthoses?: No  Position Activity Restriction  Other position/activity restrictions: PCA pump, telemetry, LUE IV    Subjective   General  Chart Reviewed: Yes  Patient assessed for rehabilitation services?: Yes  Additional Pertinent Hx: metastatic adenocarcinoma to the bone with a large lytic lesion in the left pubic ramus  Family / Caregiver Present: No  Subjective  Subjective: \"I shouldn't have moved, it hurts so bad, I don't think this pain will ever go away\"  General Comment  Comments: Pt agreeable to OT eval  Pain Assessment  POSS Score (Patient Ctrl Analgesia): S  Vital Signs  Temp: 97.5 °F (36.4 °C)  Temp Source: Oral  Pulse: 86  Heart Rate Source: Monitor  Resp: 16  BP: 132/74  BP Location: Right Arm  MAP (mmHg): 89  Oxygen Therapy  SpO2: 100 %  O2 Device: Nasal cannula  Social/Functional History  Social/Functional History  Lives With: Family (lives with elderly parents)  Type of Home: House  Home Layout: Able to Live on Main level with bedroom/bathroom,Two level (basement)  Home Access: Stairs to enter with rails  Entrance Stairs - Number of Steps: 2+1  Entrance Stairs - Rails: Right  Bathroom Shower/Tub: Tub/Shower unit  Bathroom Toilet: Handicap height  Bathroom Equipment:  (no bathroom DME)  Home Equipment: Rolling walker  Receives Help From: Family  ADL Assistance: Needs assistance (Needs assistance for L sock)  Homemaking Assistance: Needs assistance  Homemaking Responsibilities: No (parents assist with cooking/cleaning/laundry)  Ambulation Assistance: Independent (uses RW at all times)  Transfer Assistance: Independent  Active : No  Patient's  Info: Parents drive or provided by [employer] services  Occupation: On disability  Leisure & Hobbies: 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 Additional Comments: pt reports 1 recent fall, L hip gave out and pt fell onto corner of bed. pt with dec ability to complete tasks d/t inc hip pain.  pt has meeting with pallative care       Objective   Vision: Within Functional Limits  Hearing: Within functional limits    Orientation  Overall Orientation Status: Within Functional Limits  Observation/Palpation  Posture: Fair  Observation: pt in 7/10 tolerable pain once repositioned in bed, 10/10 L hip pain with movement. Pt with increased anxiety d/t cancer diagnosis. Balance  Sitting Balance: Unable to assess(comment) (pt moved from supine to stand, from stand to laying)  Standing Balance: Minimal assistance (with handheld assist. Pt stood and unable to bear weight through LLE d/t pain.)  Standing Balance  Time: ~20 seconds  Activity: static standing EOB. Pt did not put weight through LLE. Pt stood on RLE with hand held assist.   Comment: Pt reports he is unable to sit in a chair or recliner d/t pain, pt reports he is only comfortable in bed right now. Functional Mobility  Functional Mobility Comments: pt not appropriate d/t L hip pain and unable to bear weight through LLE  ADL  Feeding: Independent (pt eating breakfast IND)  Grooming: Stand by assistance (laying in bed)  UE Bathing: Moderate assistance  LE Bathing: Maximum assistance  UE Dressing: Moderate assistance  LE Dressing: Maximum assistance  Toileting: Maximum assistance (pt only using urinal while laying in bed, pt has not been up to toilet)  Additional Comments: Pt is limited d/t L hip pain with movement, dec ability to sit in a chair, dec strength/endurance, and dec safety awareness. OT educated pt on DME equipment at home and recommended a shower chair with back and toilet riser with handles. Pt with Good understanding and would benefit from the equipment to maximize his safety/reduce fall risk  Tone RUE  RUE Tone: Normotonic  Tone LUE  LUE Tone: Normotonic  Coordination  Movements Are Fluid And Coordinated: No  Coordination and Movement description: Ataxia; Decreased accuracy     Bed mobility  Supine to Sit: Moderate assistance  Sit to Supine: Minimal assistance  Comment: pt able to reposition self in bed for comfort. Pt performed bed mob from laying to standing and standing to laying, as per pt, he is unable to tolerate sitting.  Min VCs for sequencing bed mob tasks, assist/awareness of lines, and use of bed rails.  Transfers  Sit to stand: Minimal assistance  Stand to sit: Minimal assistance  Transfer Comments: Pt went from laying to standing and standing to laying d/t pt unable to tolerate sitting EOB. Cognition  Overall Cognitive Status: Exceptions  Following Commands:  Follows one step commands consistently  Attention Span: Appears intact  Memory: Decreased short term memory  Safety Judgement: Decreased awareness of need for assistance;Decreased awareness of need for safety  Problem Solving: Assistance required to generate solutions;Assistance required to identify errors made;Assistance required to implement solutions;Assistance required to correct errors made  Insights: Fully aware of deficits  Initiation: Does not require cues  Sequencing: Requires cues for some        Sensation  Overall Sensation Status: WFL (pt denies)        LUE AROM (degrees)  LUE AROM : WFL  Left Hand AROM (degrees)  Left Hand AROM: WFL  RUE AROM (degrees)  RUE AROM : WFL  Right Hand AROM (degrees)  Right Hand AROM: WFL  LUE Strength  LUE Strength Comment: ~4+/5  RUE Strength  RUE Strength Comment: ~4-/5                   Plan   Plan  Times per week: 4-5x per week  Times per day: Daily  Current Treatment Recommendations: Strengthening,Endurance Training,Functional Mobility Training,Safety Education & Training,Pain Management,Balance Training,Self-Care / ADL,Equipment Evaluation, Education, & procurement,Patient/Caregiver Education & Training,Positioning,Home Management Training      AM-[facility] Score        -[facility] Inpatient Daily Activity Raw Score: 16 (01/06/22 1332)  AM-PAC Inpatient ADL T-Scale Score : 35.96 (01/06/22 1332)  ADL Inpatient CMS 0-100% Score: 53.32 (01/06/22 1332)  ADL Inpatient CMS G-Code Modifier : CK (01/06/22 1332)    Goals  Short term goals  Time Frame for Short term goals: by discharge, pt to demo  Short term goal 1: I/MI for bed mob tasks with bed rails as needed  Short term goal 2: I/MI for UB ADLs and Mod A for LB ADLs with AE as needed and Good safety  Short term goal 3: SBA for ADL transfers and AD and Good safety  Short term goal 4: increase BUE strength by 1/2 grade to increase IND with self-care and be IND with HEP  Short term goal 5: pt to be IND with EC/WS, fall prevention tech, as well as DME/AE recommendations with use of handouts as needed  Long term goals  Long term goal 1: tolerate re-assessment of functional mob to add goal to POC, if appropriate  Patient Goals   Patient goals : To have less pain       Therapy Time   Individual Concurrent Group Co-treatment   Time In 0818         Time Out 0916         Minutes 68          tx time: 58 min    Upon writer exit, call light within reach, pt retired to bed. All lines intact and patient positioned comfortably. All patient needs addressed prior to ending therapy session. Chart reviewed prior to treatment and patient is agreeable for therapy. RN reports patient is medically stable for therapy treatment this date.     Delisa Ford OTR/L

## 2022-01-06 NOTE — PLAN OF CARE
Problem: Falls - Risk of:  Goal: Will remain free from falls  Description: Will remain free from falls  1/6/2022 1219 by Edson Meckel, RN  Outcome: Ongoing  Note: Fall safety precautions maintained  1/6/2022 0215 by Dorothy Austin RN  Outcome: Ongoing     Problem: Pain:  Goal: Control of acute pain  Description: Control of acute pain  1/6/2022 1219 by Edson Meckel, RN  Outcome: Ongoing  Note: Pt reports improvement in pain with current regimen  1/6/2022 0215 by Dorothy Austin RN  Outcome: Ongoing

## 2022-01-06 NOTE — PROGRESS NOTES
Physical Therapy  DATE: 2022    NAME: Antolin Nuñez  MRN: [id number]   : 1970    Patient not seen this date for Physical Therapy due to:      [] Cancel by RN or physician due to:    [] Hemodialysis    [] Critical Lab Value Level     [] Blood transfusion in progress    [] Acute or unstable cardiovascular status   _MAP < 55 or more than >115  _HR < 40 or > 130    [] Acute or unstable pulmonary status   -FiO2 > 60%   _RR < 5 or >40    _O2 sats < 85%    [] Strict Bedrest    [] Off Unit for surgery or procedure    [] Off Unit for testing       [] Pending imaging to R/O fracture    [] Refusal by Patient      [x] Other:  Pt currently has strict bedrest orders and ortho consult placed to address L hip pain w/ concern for osseous destruction per most recent L hip radiograph. Pt not appropriate for participation in PT eval at this time. MIGEL Campbell aware. PT will continue to follow and ck back as able. [] PT being discontinued at this time. Patient independent. No further needs. [] PT being discontinued at this time as the patient has been transferred to hospice care. No further needs.       Tamia Regalado, PT

## 2022-01-06 NOTE — H&P
[facility]  Office: [address], , Cristina Soto, DO, Darrell Gordon, DO, All Velasco Blood, DO, Ayden Starkey MD, Montse Trivedi MD, Jose Bonilla MD, Itzel Maya MD, Kermit Katz MD, Jaylen Benavidez MD, Ksenia Ascencio MD, Lawrence Randall, DO, Sudhakar Melara, DO, Tia Delacruz MD,  Johann Galeazzi, DO, Nayeli Jolly MD, Clarita Anton MD, Kiah Black MD, Rehan Nava MD, Melania Hubbard MD, Jerel Dave MD, Maryanne Vargas MD, Mohsen Lazo [facility], [facility], CNP, Hadley Mancini, CNP, Pancho Pendleton, CNS, Ayden Sequeira, CNP, Teresita Gonzalez, CNP, Ani Emerson, CNP, Helene Cabrera, CNP, Annetta Lawton, CNP, Jimmie Crystal PA-C, James Mcclellan, DNP, Jeremiah Beebe, TOMASA, Terra Bird, CNP, Rachel Keita, CNP, Refugio Gonzalez, CNP, Antoinette Myles, CNP, Rolando Leo, CNP, Rene Garland, CNP         [address]    HISTORY AND PHYSICAL EXAMINATION            Date:   1/5/2022  Patient name:  Quenten Homans  Date of admission:  1/5/2022 12:51 PM  MRN:   [id number]  Account:  [de-identified]  YOB: 1970  PCP:    BOBBY Lopez  Room:   2004/2004-02  Code Status:    Full Code    Chief Complaint:     Chief Complaint   Patient presents with    Back Pain     down left leg. cancer pt       History Obtained From:     patient, electronic medical record    History of Present Illness:     Quenten Homans is a 46 y.o. Non- / non  male who presents with Back Pain (down left leg. cancer pt)   and is admitted to the hospital for the management of Intractable pain. Presented to the emergency room from the cancer center for pain. 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 CT of the chest shows right suprahilar region of the lungs suggesting possible lung primary.   PET scan performed on 12/30/2021 is concerning for thyroid malignancy and CT scan with contrast of the head, neck, abdomen and pelvis has been recommended. Palliative radiation was started on 12/17/2021 through 12/23. Plan is to start palliative systemic chemotherapy. He has not yet seen an orthopedist.  Pain is constant and radiates down the left leg. His home medications include OxyContin 80 mg twice daily, oxycodone 15 mg every 4 hours as needed and Xanax which have been ineffective. He was sent here to be admitted for pain control with Dilaudid PCA pump. Past Medical History:     Past Medical History:   Diagnosis Date    Abscess     rt ear    Anxiety 10/5/2015    Cellulitis due to MRSA     Chronic back pain     Depression     Hypertension     Metastatic cancer ([facility] [address] 12/17/2021    Multiple substance abuse ([facility] [address] [facility]     OCD (obsessive compulsive disorder)         Past Surgical History:     Past Surgical History:   Procedure Laterality Date    CT BIOPSY PERCUTANEOUS SUPERFICIAL BONE  12/9/2021    CT BIOPSY PERCUTANEOUS SUPERFICIAL BONE 12/9/2021 [facility] CT SCAN    EYE SURGERY      plate    SHOULDER SURGERY Right     [facility], labial tear        Medications Prior to Admission:     Prior to Admission medications    Medication Sig Start Date End Date Taking? Authorizing Provider   ALPRAZolam Rick Jacobs) 1 MG tablet Take 1 tablet by mouth every 6 hours as needed for Anxiety for up to 30 days. 12/29/21 1/28/22 Yes Xochitl Hollis MD   citalopram (CELEXA) 20 MG tablet Take 1 tablet by mouth daily 12/24/21  Yes Cammie Bergeron DO   polyethylene glycol (GLYCOLAX) 17 g packet Take 17 g by mouth daily as needed for Constipation 12/23/21 1/22/22 Yes Dominik Bergeron DO   dexamethasone (DECADRON) 4 MG tablet Take 1 tablet by mouth 2 times daily (with meals) for 20 days 12/23/21 1/12/22 Yes Dominik Bergeron DO   oxyCODONE (OXYCONTIN) 80 MG extended release tablet Take 1 tablet by mouth every 12 hours for 15 days.  12/22/21 1/6/22 Yes Nano Burdick MD   oxyCODONE (OXY-IR) 15 MG immediate release tablet Take 1 tablet by mouth every 4 hours as needed for Pain for up to 15 days. 21 Yes Nova Santoyo MD   lisinopril (PRINIVIL;ZESTRIL) 5 MG tablet Take 1 tablet by mouth daily 21  Yes Eslie King MD   gabapentin (NEURONTIN) 100 MG capsule Take 1 capsule by mouth 3 times daily for 30 days. 12/10/21 1/9/22 Yes Elsie King MD   melatonin (RA MELATONIN) 3 MG TABS tablet Take 1 tablet by mouth nightly as needed (insomnia) 9/15/21  Yes Angeline Zettie Cushing, DO        Allergies:     Morphine    Social History:     Tobacco:    reports that he has quit smoking. His smoking use included cigarettes. He has a 12.50 pack-year smoking history. He has never used smokeless tobacco.  Alcohol:      reports no history of alcohol use. Drug Use:  reports current drug use. Drug: Marijuana Faraz Nash). Family History:     Family History   Problem Relation Age of Onset    Diabetes Mother     Hearing Loss Father     High Blood Pressure Father        Review of Systems:     Positive and Negative as described in HPI. Review of Systems   Constitutional: Negative for activity change and fever. HENT: Negative for congestion, sinus pressure, sneezing and trouble swallowing. Respiratory: Negative for cough and shortness of breath. Cardiovascular: Negative for chest pain and palpitations. Gastrointestinal: Negative for abdominal pain, nausea and vomiting. Musculoskeletal: Positive for arthralgias and back pain. Neurological: Negative for dizziness and headaches. Psychiatric/Behavioral: Negative for confusion and decreased concentration. Physical Exam:   /75   Pulse 68   Temp 97.4 °F (36.3 °C) (Oral)   Resp 18   Ht 5' 11\" (1.803 m)   Wt 210 lb (95.3 kg)   SpO2 95%   BMI 29.29 kg/m²   Temp (24hrs), Av °F (36.7 °C), Min:97.1 °F (36.2 °C), Max:98.8 °F (37.1 °C)    No results for input(s): POCGLU in the last 72 hours.     Intake/Output Summary (Last 24 hours) at 1/5/2022 2311  Last data filed at 1/5/2022 2227  Gross per 24 hour   Intake --   Output 800 ml   Net -800 ml       Physical Exam  Constitutional:       General: He is not in acute distress. Appearance: He is not ill-appearing. HENT:      Right Ear: External ear normal.      Left Ear: External ear normal.   Eyes:      General:         Right eye: No discharge. Left eye: No discharge. Conjunctiva/sclera: Conjunctivae normal.   Cardiovascular:      Rate and Rhythm: Normal rate and regular rhythm. Pulmonary:      Effort: No respiratory distress. Breath sounds: Normal breath sounds. Abdominal:      Palpations: Abdomen is soft. Tenderness: There is no abdominal tenderness. Musculoskeletal:         General: No swelling or deformity. Skin:     General: Skin is warm and dry. Neurological:      General: No focal deficit present. Mental Status: He is alert and oriented to person, place, and time. Cranial Nerves: No cranial nerve deficit.          Investigations:      Laboratory Testing:  Recent Results (from the past 24 hour(s))   CBC Auto Differential    Collection Time: 01/05/22  1:50 PM   Result Value Ref Range    WBC 15.7 (H) 3.5 - 11.3 k/uL    RBC 4.08 (L) 4.21 - 5.77 m/uL    Hemoglobin 11.9 (L) 13.0 - 17.0 g/dL    Hematocrit 36.6 (L) 40.7 - 50.3 %    MCV 89.7 82.6 - 102.9 fL    MCH 29.2 25.2 - 33.5 pg    MCHC 32.5 28.4 - 34.8 g/dL    RDW 13.4 11.8 - 14.4 %    Platelets 454 615 - 284 k/uL    MPV 8.7 8.1 - 13.5 fL    NRBC Automated 0.0 0.0 per 100 WBC    Differential Type NOT REPORTED     Seg Neutrophils 68 (H) 36 - 65 %    Lymphocytes 20 (L) 24 - 43 %    Monocytes 9 3 - 12 %    Eosinophils % 2 1 - 4 %    Basophils 0 0 - 2 %    Immature Granulocytes 1 (H) 0 %    Segs Absolute 10.63 (H) 1.50 - 8.10 k/uL    Absolute Lymph # 3.14 1.10 - 3.70 k/uL    Absolute Mono # 1.45 (H) 0.10 - 1.20 k/uL    Absolute Eos # 0.27 0.00 - 0.44 k/uL    Basophils Absolute 0.03 0.00 - 0.20 k/uL Absolute Immature Granulocyte 0.13 0.00 - 0.30 k/uL    WBC Morphology NOT REPORTED     RBC Morphology NOT REPORTED     Platelet Estimate NOT REPORTED    SPECIMEN REJECTION    Collection Time: 01/05/22  1:50 PM   Result Value Ref Range    Specimen Source . BLOOD     Ordered Test BMP     Reason for Rejection Unable to perform testing: Specimen hemolyzed. - NOT REPORTED    Basic Metabolic Panel    Collection Time: 01/05/22  2:21 PM   Result Value Ref Range    Glucose 92 70 - 99 mg/dL    BUN 23 (H) 6 - 20 mg/dL    CREATININE 0.62 (L) 0.70 - 1.20 mg/dL    Bun/Cre Ratio 37 (H) 9 - 20    Calcium 9.7 8.6 - 10.4 mg/dL    Sodium 135 135 - 144 mmol/L    Potassium 4.1 3.7 - 5.3 mmol/L    Chloride 99 98 - 107 mmol/L    CO2 28 20 - 31 mmol/L    Anion Gap 8 (L) 9 - 17 mmol/L    GFR Non-African American >60 >60 mL/min    GFR African American >60 >60 mL/min    GFR Comment          GFR Staging NOT REPORTED        Imaging/Diagnostics:  PET CT SKULL BASE TO MID THIGH    Result Date: 12/30/2021  1. Large area of heterogeneous FDG uptake corresponding to a lytic lesion in the left hemipelvis consistent with biopsy proven metastatic disease. 2.  Focus of abnormal radiotracer uptake in the left anterior inferior iliac crest. 3.  Abnormal radiotracer uptake within a right pretracheal lymph node and in a right suprahilar location. 4.  Focus of radiotracer in the soft tissues of the right upper arm of indeterminate significance. 5.  Evaluation is severely complicated due to infiltration of radiotracer in an IV in the left upper arm. 6.  Focus of FDG uptake in the right thyroid. RECOMMENDATIONS: 1. Advise thyroid ultrasound. There is a hypermetabolic focus in the right thyroid. Expansile quickly growing bone lesions may be associated with thyroid malignancy. 2.  Advise contrast enhanced CT scanning of the head, neck, abdomen and pelvis.   Prior CT abdomen and pelvis was noncontrast.  Specifically, advise contrast enhanced CT of the abdomen to assess the kidneys and pancreas. Assessment :      Hospital Problems           Last Modified POA    * (Principal) Intractable pain 1/5/2022 Yes    Primary hypertension 1/5/2022 Yes    Major depressive disorder, recurrent episode, severe ([facility] [address] 1/5/2022 Yes    Anxiety 1/5/2022 Yes    Cancer related pain 1/5/2022 Yes    Metastatic cancer ([facility] [address] Lytic lesion to the pubic ramus 1/5/2022 Yes    Tobacco use 1/5/2022 Yes          Plan:     Patient status inpatient in the  Med/Surge    Intractable pain/bone mets. Inpatient admission. Dilaudid PCA pump and oxycodone. .  Consults to orthopedic surgery, oncology and palliative care. Hypertension. Monitoring control blood pressure. Continue lisinopril once verified  Anxiety. Xanax  Depression. Celexa  Tobacco use. Smoking cessation education. Nicotine patch  DVT prophylaxis    Consultations:   IP CONSULT TO ORTHOPEDIC SURGERY  IP CONSULT TO HEM/ONC  IP CONSULT TO HOSPITALIST  IP CONSULT TO ONCOLOGY  IP CONSULT TO PALLIATIVE CARE  PHARMACY TO DOSE MEDICATION  IP CONSULT TO PALLIATIVE CARE    Patient is admitted as inpatient status because of co-morbidities listed above, severity of signs and symptoms as outlined, requirement for current medical therapies and most importantly because of direct risk to patient if care not provided in a hospital setting. Expected length of stay > 48 hours.     BOBBY SHEFFIELD - CNP  1/5/2022  11:11 PM    Copy sent to BOBBY Vasquez

## 2022-01-07 ENCOUNTER — APPOINTMENT (OUTPATIENT)
Dept: INTERVENTIONAL RADIOLOGY/VASCULAR | Age: 52
DRG: 861 | End: 2022-01-07
Payer: COMMERCIAL

## 2022-01-07 LAB
HCT VFR BLD CALC: 34.9 % (ref 40.7–50.3)
HEMOGLOBIN: 10.9 G/DL (ref 13–17)
MCH RBC QN AUTO: 28.8 PG (ref 25.2–33.5)
MCHC RBC AUTO-ENTMCNC: 31.2 G/DL (ref 28–38)
MCV RBC AUTO: 92.3 FL (ref 82.6–102.9)
NRBC AUTOMATED: ABNORMAL PER 100 WBC
PDW BLD-RTO: 13.5 % (ref 11.8–14.4)
PLATELET # BLD: 329 K/UL (ref 138–453)
PMV BLD AUTO: 9.1 FL (ref 8.1–13.5)
RBC # BLD: 3.78 M/UL (ref 4.21–5.77)
WBC # BLD: 15.2 K/UL (ref 3.5–11.3)

## 2022-01-07 PROCEDURE — 02HV33Z INSERTION OF INFUSION DEVICE INTO SUPERIOR VENA CAVA, PERCUTANEOUS APPROACH: ICD-10-PCS | Performed by: RADIOLOGY

## 2022-01-07 PROCEDURE — 77001 FLUOROGUIDE FOR VEIN DEVICE: CPT

## 2022-01-07 PROCEDURE — 1200000000 HC SEMI PRIVATE

## 2022-01-07 PROCEDURE — 85027 COMPLETE CBC AUTOMATED: CPT

## 2022-01-07 PROCEDURE — 0JH60WZ INSERTION OF TOTALLY IMPLANTABLE VASCULAR ACCESS DEVICE INTO CHEST SUBCUTANEOUS TISSUE AND FASCIA, OPEN APPROACH: ICD-10-PCS | Performed by: RADIOLOGY

## 2022-01-07 PROCEDURE — 2580000003 HC RX 258: Performed by: RADIOLOGY

## 2022-01-07 PROCEDURE — 36561 INSERT TUNNELED CV CATH: CPT

## 2022-01-07 PROCEDURE — 99232 SBSQ HOSP IP/OBS MODERATE 35: CPT | Performed by: INTERNAL MEDICINE

## 2022-01-07 PROCEDURE — C1788 PORT, INDWELLING, IMP: HCPCS

## 2022-01-07 PROCEDURE — 6370000000 HC RX 637 (ALT 250 FOR IP): Performed by: NURSE PRACTITIONER

## 2022-01-07 PROCEDURE — 6360000002 HC RX W HCPCS: Performed by: NURSE PRACTITIONER

## 2022-01-07 PROCEDURE — 6370000000 HC RX 637 (ALT 250 FOR IP): Performed by: INTERNAL MEDICINE

## 2022-01-07 PROCEDURE — 36415 COLL VENOUS BLD VENIPUNCTURE: CPT

## 2022-01-07 PROCEDURE — 2580000003 HC RX 258: Performed by: NURSE PRACTITIONER

## 2022-01-07 PROCEDURE — 76937 US GUIDE VASCULAR ACCESS: CPT

## 2022-01-07 PROCEDURE — 6360000002 HC RX W HCPCS: Performed by: RADIOLOGY

## 2022-01-07 RX ADMIN — SODIUM CHLORIDE: 9 INJECTION, SOLUTION INTRAVENOUS at 09:04

## 2022-01-07 RX ADMIN — OXYCODONE HYDROCHLORIDE 80 MG: 40 TABLET, FILM COATED, EXTENDED RELEASE ORAL at 20:45

## 2022-01-07 RX ADMIN — Medication 10000 MCG: at 20:46

## 2022-01-07 RX ADMIN — OXYCODONE HYDROCHLORIDE 80 MG: 40 TABLET, FILM COATED, EXTENDED RELEASE ORAL at 08:59

## 2022-01-07 RX ADMIN — DEXAMETHASONE 4 MG: 4 TABLET ORAL at 17:53

## 2022-01-07 RX ADMIN — ALPRAZOLAM 0.5 MG: 0.5 TABLET ORAL at 22:13

## 2022-01-07 RX ADMIN — Medication 10000 MCG: at 10:40

## 2022-01-07 RX ADMIN — ALPRAZOLAM 0.5 MG: 0.5 TABLET ORAL at 14:42

## 2022-01-07 RX ADMIN — Medication 10000 MCG: at 00:36

## 2022-01-07 RX ADMIN — CITALOPRAM HYDROBROMIDE 20 MG: 20 TABLET ORAL at 08:59

## 2022-01-07 RX ADMIN — DEXAMETHASONE 4 MG: 4 TABLET ORAL at 08:59

## 2022-01-07 RX ADMIN — CEFAZOLIN SODIUM 1000 MG: 1 INJECTION, POWDER, FOR SOLUTION INTRAMUSCULAR; INTRAVENOUS at 12:28

## 2022-01-07 ASSESSMENT — PAIN SCALES - GENERAL
PAINLEVEL_OUTOF10: 7
PAINLEVEL_OUTOF10: 7
PAINLEVEL_OUTOF10: 8

## 2022-01-07 NOTE — CONSULTS
Patient currently not in the room, and is elsewhere for a procedure currently. History was obtained from the ER physician, and the floor nurse. I have reviewed the patient's imaging, but was not able to examine the patient. I recommend that the patient remain nonweightbearing on the left lower extremity, and may use a walker to ambulate with the help of physical therapy. I also again recommend a musculoskeletal oncology consult (previously discussed upon admission). Please page with any questions.

## 2022-01-07 NOTE — FLOWSHEET NOTE
Patient sleeping in a darkened room with the door closed. Writer offers a silent prayer. No family present. Patient does not respond. Spiritual Care will follow as needed.        01/06/22 1930   Encounter Summary   Services provided to: Patient   Referral/Consult From: [facility] Parent   Continue Visiting   (1/6/22 Pt Sleeping)   Complexity of Encounter Low   Length of Encounter 15 minutes   Routine   Type Follow up   Assessment Sleeping   Intervention Prayer   Outcome Did not respond

## 2022-01-07 NOTE — PROGRESS NOTES
Palliative Care  Consultation Note    Patient: Crystal Garcia  1970      Referring physician: Dr. Chris Smith: Bossman Boss MD        REASON FOR CONSULTATION:   Assist in symptomand pain control   Goals of care evaluation  Distress management  Facilitate communications  Non-pain symptoms:  Recommendations for theabove    HISTORY OF PRESENT ILLNESS:   Crystal Garcia is a 46 y.o. male with a history of Adenocarcinoma of unknown primary, likely lung and follows with Dr. Niesha Huerta for Oncology . Palliative Care was consulted to help manage symptoms, facilitate communications and establish goals of care. Today, Mr. Julio Cesar Brewer is complaining of sever pain in left hip and leg. He can not sit or stand. He appears to be in significant distress with some  Diaphoresis, inability to concentrate, restless appearing. The patient was evaluated and examined,  Patient's father was present was also interviewed . Long discussion to address issues mentioned above. I spoke with Dr. Parmjit brown and also had a conversation with Dr. Niesha Huerta concerning this patient's condition. I reviewed medications, home situation, distress and dealing with new terminal cancer diagnosis. The patient is in so much pain that he cannot even concentrate on what is happening to him. After discussion the plan is to admit him to the hospital and start him on a PCA pump. Then he will be transition to methadone for pain control. While in the hospital he will be able to be seen by Ortho to evaluate the left hip fracture which may actually help his pain. Another component of the conversation with Dr. Joel Brown resulted in a better understanding of the patient's prognosis which is very poor. With or without targeted immunotherapy his prognosis is not good, likely months.   We will see how well we can get his pain controlled in the hospital.  I did call the emergency room and notify them of this patient, he will be admitted Abuse History:  Social History     Substance and Sexual Activity   Alcohol Use No    Alcohol/week: 0.0 standard drinks    Comment: has not  drank in 2 years     Social History     Substance and Sexual Activity   Drug Use Yes    Types: Marijuana (Weed)    Comment: hx of marijuana/ denies today       Available records including labs and imaging results werereviewed. Recent and past history, med list,family history, social history and review of systems were personally reviewed and updated in EHR       ADVANCEDCARE PLANNING:  Code status: [unfilled]  Patient has capacity for medical decisions: yes  [address] of : yes  Living Will: no  Family aware of advance directives: yes   Family agrees with advance directives: yes    Review of Systems:  Review of Systems  As above. Physical Exam:  BP (!) 171/105   Pulse 85   Temp 98.6 °F (37 °C)   Resp 20   Ht 5' 11\" (1.803 m)   BMI 29.46 kg/m²     Gen: severe distress, tearful, painful, can not get comfortable  Head:   Atraumatic normocephalic   ENT:  Sclera white mucous membranes moist   neck: Supple   respiratory:  Increased work of breathing, rhonchi bilaterally   cardiovascular S1-S2 regular rate and rhythm radial pulses 2+   GI:  Soft positive bowel sounds   musculoskeletal:  Difficulty walking, hunched over   neuro:  No facial asymmetry able to move all extremities   psych:  Distressed, tearful   skin:  Appears pale,      Wt Readings from Last 3 Encounters:   01/06/22 205 lb (93 kg)   12/29/21 211 lb 3.2 oz (95.8 kg)   12/21/21 214 lb 15.2 oz (97.5 kg)     Impression and Plan: Admit to the hospital for PCA pain control. Would use hydromorphone and then transition to methadone. Ortho consult while in the hospital. Palliative care consult in the hospital. Patient taken to ER for admission d/t covid testing.        PalliativePerformance Scale:  __x_60%    Ambulation reduced; Significant disease; Can't do hobbies/housework; intake normal or reduced; occasional assist; LOC full/confusion  ___50%    Mainly sit/lie; Extensive disease; Can't do any work; Considerable assist; intake normal or reduced; LOC full/confusion  ___40%    Mainly in bed; Extensive disease; Mainly assist; intakenormal or reduced; occasional assist; LOC full/confusion  ___30%    Bed Bound; Extensive disease; Total care; intake reduced; LOC full/confusion  ___20%    Bed Bound; Extensive disease; Total care; intakeminimal; Drowsy/coma  ___10%    Bed Bound; Extensive disease; Total care; Mouth care only; Drowsy/coma  ___0               Death    Patient Active Problem List   Diagnosis    [facility] abuse ([facility] [address]    Alcohol abuse    Facial cellulitis    Right shoulder pain    Rib pain on left side    Elevated LFTs    Polysubstance abuse ([address]    Thrombocytopenia (HCC)    Primary hypertension    Major depressive disorder, recurrent episode, severe ([address]    Compulsive skin picking    Anxiety    Lesion of pelvic bone    Cannot walk    Lytic bone lesion of hip    Hypercalcemia of malignancy    Cancer related pain    Metastatic cancer ([address]   Lytic lesion to the pubic ramus    Bone metastases (HCC)    Tobacco use    Carcinoma of hilus of right lung (HCC)    Intractable pain     Meeting         Disease progression  Functional decline  Treatment options/plans  Provide clinical updates and answer questions    3- Code Status: Full      4- Other recommendations  Once pain controlled, will need to discuss with patient goals of care in light of prognosis      Electronically signed by   Kirsten Pugh MD  on 1/6/2022 at 11:34 PM    [id number] Kianna MD  Cell: [phone]

## 2022-01-07 NOTE — PROGRESS NOTES
Returned to room, port site right chest with transparent dressing intact. Family at bedside .  Lunch tray given

## 2022-01-07 NOTE — PLAN OF CARE
Problem: Falls - Risk of:  Goal: Will remain free from falls  Description: Will remain free from falls  1/7/2022 0157 by Lolis Etienne RN  Outcome: Ongoing     Problem: Falls - Risk of:  Goal: Absence of physical injury  Description: Absence of physical injury  Outcome: Ongoing     Problem: Pain:  Goal: Pain level will decrease  Description: Pain level will decrease  Outcome: Ongoing     Problem: Pain:  Goal: Control of acute pain  Description: Control of acute pain  1/7/2022 0157 by Lolis Etienne RN  Outcome: Ongoing     Problem: Pain:  Goal: Control of chronic pain  Description: Control of chronic pain  Outcome: Ongoing

## 2022-01-07 NOTE — PROGRESS NOTES
Today's Date: 1/7/2022  Patient Name: Mary Dos Santos  Date of admission: 1/5/2022 12:51 PM  Patient's age: 46 y.o., 1970  Admission Dx: Intractable pain [R52]    Reason for Consult: metastatic adenocarcinoma  Requesting Physician: Naomie Regan MD    CHIEF COMPLAINT:    Chief Complaint   Patient presents with    Back Pain     down left leg. cancer pt       SUBJECTIVE:    Pt seen and examined  Pain is controlled   Had PORT placement   awaiting ortho iput   No NV    HISTORY OF PRESENT ILLNESS:    Mary Dos Santos is a 46 y.o. male who is admitted to the hospital on 1/5/2022  for pain control. Patient has a diagnosis of stage IV metastatic adenocarcinoma with most likely lung primary and was planning to get started on palliative chemotherapy. He has a large metastasis to his left pelvic region and has received bilateral injection therapy however his pain has been not well controlled despite on high pain medications. He had a CT of the hip at [facility] which showed possible acetabular fracture. Patient yesterday was seen by palliative care in the office and was sent to ER for further evaluation inpatient management of his pain. Past Medical History:   has a past medical history of Abscess, Anxiety, Cellulitis due to MRSA, Chronic back pain, Depression, Hypertension, Metastatic cancer ([address] Multiple substance abuse ([address] and OCD (obsessive compulsive disorder). Past Surgical History:   has a past surgical history that includes shoulder surgery (Right); Eye surgery; CT BIOPSY SUPERFICIAL BONE PERCUTANEOUS (12/9/2021); and IR PORT PLACEMENT > 5 YEARS (1/7/2022). Medications:    Prior to Admission medications    Medication Sig Start Date End Date Taking? Authorizing Provider   ALPRAZolam Kelvin Camacho) 1 MG tablet Take 1 tablet by mouth every 6 hours as needed for Anxiety for up to 30 days.  12/29/21 1/28/22 Yes Esme Rivera MD   citalopram (CELEXA) 20 MG tablet Take 1 tablet by mouth daily 12/24/21  Yes Dane Bergeron DO   polyethylene glycol (GLYCOLAX) 17 g packet Take 17 g by mouth daily as needed for Constipation 12/23/21 1/22/22 Yes Dominik Bergeron DO   dexamethasone (DECADRON) 4 MG tablet Take 1 tablet by mouth 2 times daily (with meals) for 20 days 12/23/21 1/12/22 Yes Dominik Bergeron DO   lisinopril (PRINIVIL;ZESTRIL) 5 MG tablet Take 1 tablet by mouth daily 12/11/21  Yes Stephanie Brooks MD   gabapentin (NEURONTIN) 100 MG capsule Take 1 capsule by mouth 3 times daily for 30 days.  12/10/21 1/9/22 Yes Stephanie Brooks MD   melatonin (RA MELATONIN) 3 MG TABS tablet Take 1 tablet by mouth nightly as needed (insomnia) 9/15/21  Yes Marlene Ramos Mc, DO     Current Facility-Administered Medications   Medication Dose Route Frequency Provider Last Rate Last Admin    ALPRAZolam Donzella Sans) tablet 0.5 mg  0.5 mg Oral TID PRN Omar Jimenez MD   0.5 mg at 01/07/22 1442    cyclobenzaprine (FLEXERIL) tablet 10 mg  10 mg Oral TID PRN Riki Lopez MD   10 mg at 01/06/22 2219    HYDROmorphone (DILAUDID) 200 mcg/mL PCA   IntraVENous Continuous Gerianne Booty, APRN - CNP   10,000 mcg at 01/07/22 1040    citalopram (CELEXA) tablet 20 mg  20 mg Oral Daily Gerianne Booty, APRN - CNP   20 mg at 01/07/22 0859    dexamethasone (DECADRON) tablet 4 mg  4 mg Oral BID WC Gerianne Booty, APRN - CNP   4 mg at 01/07/22 0859    oxyCODONE (OXYCONTIN) extended release tablet 80 mg  80 mg Oral Q12H Gerianne Booty, APRN - CNP   80 mg at 01/07/22 0859    sodium chloride flush 0.9 % injection 5-40 mL  5-40 mL IntraVENous 2 times per day Gerianne Booty, APRN - CNP        sodium chloride flush 0.9 % injection 10 mL  10 mL IntraVENous PRN Gerianne Booty, APRN - CNP        0.9 % sodium chloride infusion  25 mL IntraVENous PRN Kathy Hale APRN - CNP   Stopped at 01/05/22 7577    potassium chloride (KLOR-CON M) extended release tablet 40 mEq  40 mEq Oral PRN BOBBY Pagan - CNP        Or    potassium bicarb-citric acid (EFFER-K) effervescent tablet 40 mEq  40 mEq Oral PRN Ebb Maidens, APRN - CNP        Or    potassium chloride 10 mEq/100 mL IVPB (Peripheral Line)  10 mEq IntraVENous PRN Ebb Maidens, APRN - CNP        magnesium sulfate 1000 mg in dextrose 5% 100 mL IVPB  1,000 mg IntraVENous PRN Ebb Maidens, APRN - CNP        polyethylene glycol (GLYCOLAX) packet 17 g  17 g Oral Daily PRN Ebb Maidens, APRN - CNP        acetaminophen (TYLENOL) tablet 650 mg  650 mg Oral Q6H PRN Ebb Maidens, APRN - CNP        Or    acetaminophen (TYLENOL) suppository 650 mg  650 mg Rectal Q6H PRN Ebb Maidens, APRN - CNP        enoxaparin (LOVENOX) injection 40 mg  40 mg SubCUTAneous Daily Ebb Maidens, APRN - CNP   40 mg at 01/06/22 0905    0.9 % sodium chloride infusion   IntraVENous Continuous Ebb Maidens, APRN - CNP 50 mL/hr at 01/07/22 0904 New Bag at 01/07/22 0904    nicotine (NICODERM CQ) 21 MG/24HR 1 patch  1 patch TransDERmal Daily Ebb Maidens, APRN - CNP   1 patch at 01/07/22 0900       Allergies:  Morphine    Social History:   reports that he has quit smoking. His smoking use included cigarettes. He has a 12.50 pack-year smoking history. He has never used smokeless tobacco. He reports current drug use. Drug: Marijuana Neema Coho). He reports that he does not drink alcohol. Family History: family history includes Diabetes in his mother; Hearing Loss in his father; High Blood Pressure in his father. REVIEW OF SYSTEMS:    Constitutional: No fever or chills.  No night sweats, no weight loss   Eyes: No eye discharge, double vision, or eye pain   HEENT: negative for sore mouth, sore throat, hoarseness and voice change   Respiratory: negative for cough , sputum, dyspnea, wheezing, hemoptysis, chest pain   Cardiovascular: negative for chest pain, dyspnea, palpitations, orthopnea, PND   Gastrointestinal: negative for nausea, vomiting, diarrhea, constipation, abdominal pain, Dysphagia, hematemesis and hematochezia   Genitourinary: negative for frequency, dysuria, nocturia, urinary incontinence, and hematuria   Integument: negative for rash, skin lesions, bruises.    Hematologic/Lymphatic: negative for easy bruising, bleeding, lymphadenopathy, or petechiae   Endocrine: negative for heat or cold intolerance,weight changes, change in bowel habits and hair loss   Musculoskeletal: negative for myalgias, arthralgias, pain, joint swelling,and bone pain   Neurological: negative for headaches, dizziness, seizures, weakness, numbness    PHYSICAL EXAM:      /63   Pulse 66   Temp 97.5 °F (36.4 °C) (Oral)   Resp 16   Ht 5' 11\" (1.803 m)   Wt 209 lb 0.1 oz (94.8 kg)   SpO2 96%   BMI 29.15 kg/m²    Temp (24hrs), Av.4 °F (36.3 °C), Min:97 °F (36.1 °C), Max:97.7 °F (36.5 °C)    General appearance - well appearing, no in pain or distress   Mental status - alert and cooperative   Eyes - pupils equal and reactive, extraocular eye movements intact   Ears - bilateral TM's and external ear canals normal   Mouth - mucous membranes moist, pharynx normal without lesions   Neck - supple, no significant adenopathy   Lymphatics - no palpable lymphadenopathy, no hepatosplenomegaly   Chest - clear to auscultation, no wheezes, rales or rhonchi, symmetric air entry   Heart - normal rate, regular rhythm, normal S1, S2, no murmurs  Abdomen - soft, nontender, nondistended, no masses or organomegaly   Neurological - alert, oriented, normal speech, no focal findings or movement disorder noted   Musculoskeletal - no joint tenderness, deformity or swelling   Extremities - peripheral pulses normal, no pedal edema, no clubbing or cyanosis   Skin - normal coloration and turgor, no rashes, no suspicious skin lesions noted ,    DATA:    Labs:   CBC:   Recent Labs     22  0724 22  0730   WBC 12.2* 15.2*   HGB 11.4* 10.9*   HCT 36.0* 34.9*    329     BMP:   Recent Labs     22  1421 22  0724    136   K 4.1 4.2   CO2 28 23   BUN 23* 17   CREATININE 0.62* <0.40*   LABGLOM >60 Can not be calculated   GLUCOSE 92 114*     PT/INR:   Recent Labs     01/06/22  0724   PROTIME 11.7   INR 0.9       IMAGING DATA:  IR PORT PLACEMENT > 5 YEARS   Final Result   Successful ultrasound and fluoroscopy guided Port-A-Cath placement             Primary Problem  Intractable pain    Active Hospital Problems    Diagnosis Date Noted    Intractable pain [R52] 01/05/2022    Tobacco use [Z72.0] 12/21/2021    Metastatic cancer ([facility] -suspected left acetabular pathological fracture [C79.9] 12/17/2021    Cancer related pain [G89.3] 12/16/2021    Anxiety [F41.9] 10/05/2015    Major depressive disorder, recurrent episode, severe ([facility] [F33.2] 04/23/2015    Primary hypertension [I10] 08/21/2014     IMPRESSION:   1. Metastatic adenocarcinoma with left hip metastasis, was planning to start peritoneal chemotherapy as  2. Patient has completed palliative radiation therapy to his bone lesion    RECOMMENDATIONS:  1. I reviewed the laboratory data, diagnosis, imaging studies and treatment recommendations with patient  2. Continue current management for his pain as per palliative care  3. Orthopedic consult for suspicious pathological fracture  4. Had PORT placement  5. Plan for better to chemotherapy as outpatient  6. We will follow    Discussed with pt  and Nurse. Thank you for asking us to see this patient. Mario Huerta MD  Hematologist/Medical Oncologist    Cell: [phone]    This note is created with the assistance of a speech recognition program.  While intending to generate a document that actually reflects the content of the visit, the document can still have some errors including those of syntax and sound a like substitutions which may escape proof reading. It such instances, actual meaning can be extrapolated by contextual diversion.

## 2022-01-07 NOTE — BRIEF OP NOTE
Brief Postoperative Note    Demetria Buckner  YOB: 1970  [id number]    Pre-operative Diagnosis: Metastatic CA    Post-operative Diagnosis: Same    Procedure: Mediport placement    Anesthesia: Local    Surgeons/Assistants: Prashanth    Estimated Blood Loss: less than 50     Complications: None    Specimens: Was Not Obtained    Findings: Successful placement    Electronically signed by Maria Luisa Davey MD on 1/7/2022 at 1:13 PM

## 2022-01-07 NOTE — PROGRESS NOTES
Physical Therapy  DATE: 2022    NAME: Tello Du  MRN: [id number]   : 1970    Patient not seen this date for Physical Therapy due to:      [x] Cancel by RN or physician due to: patient has not been seen by ortho and going for port placement  [] Hemodialysis    [] Critical Lab Value Level     [] Blood transfusion in progress    [] Acute or unstable cardiovascular status   _MAP < 55 or more than >115  _HR < 40 or > 130    [] Acute or unstable pulmonary status   -FiO2 > 60%   _RR < 5 or >40    _O2 sats < 85%    [] Strict Bedrest    [] Off Unit for surgery or procedure    [] Off Unit for testing       [] Pending imaging to R/O fracture    [] Refusal by Patient      [] Other      [] PT being discontinued at this time. Patient independent. No further needs. [] PT being discontinued at this time as the patient has been transferred to hospice care. No further needs.       Sharyn Leo, PT

## 2022-01-07 NOTE — PROGRESS NOTES
Occupational Therapy  [facility]  Occupational Therapy Not Seen Note    Patient not available for Occupational Therapy due to:    [] Testing:    [] Hemodialysis    [] Cancelled by RN:    []Refusal by Patient:    [] Surgery:     [] Intubation:     [] Pain Medication:    [] Sedation:     [] Spine Precautions :    [] Medical Instability:    [x] Other: cx per RN; patient has not been seen by ortho and going for port placement    Zackary Cam, OTR/L

## 2022-01-07 NOTE — CONSULTS
Today's Date: 1/6/2022  Patient Name: Liz Urias  Date of admission: 1/5/2022 12:51 PM  Patient's age: 46 y.o., 1970  Admission Dx: Intractable pain [R52]    Reason for Consult: metastatic adenocarcinoma  Requesting Physician: Yobani Deleon MD    CHIEF COMPLAINT:    Chief Complaint   Patient presents with    Back Pain     down left leg. cancer pt       History Obtained From:  patient and chart    HISTORY OF PRESENT ILLNESS:      Liz Urias is a 46 y.o. male who is admitted to the hospital on 1/5/2022  for pain control. Patient has a diagnosis of stage IV metastatic adenocarcinoma with most likely lung primary and was planning to get started on palliative chemotherapy. He has a large metastasis to his left pelvic region and has received bilateral injection therapy however his pain has been not well controlled despite on high pain medications. He had a CT of the hip at [facility] which showed possible acetabular fracture. Patient yesterday was seen by palliative care in the office and was sent to ER for further evaluation inpatient management of his pain. Past Medical History:   has a past medical history of Abscess, Anxiety, Cellulitis due to MRSA, Chronic back pain, Depression, Hypertension, Metastatic [address] ([facility] [address] Multiple substance abuse ([facility] [address] and OCD (obsessive compulsive disorder). Past Surgical History:   has a past surgical history that includes shoulder surgery (Right); Eye surgery; and CT BIOPSY SUPERFICIAL BONE PERCUTANEOUS (12/9/2021). Medications:    Prior to Admission medications    Medication Sig Start Date End Date Taking? Authorizing Provider   ALPRAZolam Lynnie Lesch) 1 MG tablet Take 1 tablet by mouth every 6 hours as needed for Anxiety for up to 30 days.  12/29/21 1/28/22 Yes Catalina Sierra MD   citalopram (CELEXA) 20 MG tablet Take 1 tablet by mouth daily 12/24/21  Yes Dominik Bergeron DO   polyethylene glycol (GLYCOLAX) 17 g packet Take 17 g by mouth daily as needed for Constipation 12/23/21 1/22/22 Yes Dominik Bergeron DO   dexamethasone (DECADRON) 4 MG tablet Take 1 tablet by mouth 2 times daily (with meals) for 20 days 12/23/21 1/12/22 Yes Dominik Bergeron DO   oxyCODONE (OXYCONTIN) 80 MG extended release tablet Take 1 tablet by mouth every 12 hours for 15 days. 12/22/21 1/6/22 Yes Armando Owen MD   oxyCODONE (OXY-IR) 15 MG immediate release tablet Take 1 tablet by mouth every 4 hours as needed for Pain for up to 15 days. 12/22/21 1/6/22 Yes Armando Owen MD   lisinopril (PRINIVIL;ZESTRIL) 5 MG tablet Take 1 tablet by mouth daily 12/11/21  Yes Tere Maynard MD   gabapentin (NEURONTIN) 100 MG capsule Take 1 capsule by mouth 3 times daily for 30 days.  12/10/21 1/9/22 Yes Tere Maynard MD   melatonin (RA MELATONIN) 3 MG TABS tablet Take 1 tablet by mouth nightly as needed (insomnia) 9/15/21  Yes Marlene Bella DO     Current Facility-Administered Medications   Medication Dose Route Frequency Provider Last Rate Last Admin    ALPRAZolam Mike Griffins) tablet 0.5 mg  0.5 mg Oral TID PRN Omar Tate MD   0.5 mg at 01/06/22 1308    cyclobenzaprine (FLEXERIL) tablet 10 mg  10 mg Oral TID PRN Omar Tate MD        HYDROmorphone (DILAUDID) 200 mcg/mL PCA   IntraVENous Continuous EliBuffaloarnoldo Estrada APRN - CNP   10,000 mcg at 01/06/22 1552    citalopram (CELEXA) tablet 20 mg  20 mg Oral Daily Charissa Estrada, APRN - CNP   20 mg at 01/06/22 0903    dexamethasone (DECADRON) tablet 4 mg  4 mg Oral BID WC Charissa Estrada, APRN - CNP   4 mg at 01/06/22 1725    oxyCODONE (OXYCONTIN) extended release tablet 80 mg  80 mg Oral Q12H Charissa Estrada APRN - CNP   80 mg at 01/06/22 2337    sodium chloride flush 0.9 % injection 5-40 mL  5-40 mL IntraVENous 2 times per day Deborrah Plain, APRN - CNP        sodium chloride flush 0.9 % injection 10 mL  10 mL IntraVENous PRN Deborrah Plain, APRN - CNP        0.9 % sodium chloride infusion  25 mL IntraVENous PRN Mo Mcgraw Romayne DunBOBBY kumar - CNP   Stopped at 01/05/22 1737    potassium chloride (KLOR-CON M) extended release tablet 40 mEq  40 mEq Oral PRN BOBBY Ayon - CNP        Or    potassium bicarb-citric acid (EFFER-K) effervescent tablet 40 mEq  40 mEq Oral PRN Bjs JEZ SutherlandN - CNP        Or    potassium chloride 10 mEq/100 mL IVPB (Peripheral Line)  10 mEq IntraVENous PRN BOBBY Ayon - CNP        magnesium sulfate 1000 mg in dextrose 5% 100 mL IVPB  1,000 mg IntraVENous PRN Bjs JEZ SutherlandN - CNP        polyethylene glycol (GLYCOLAX) packet 17 g  17 g Oral Daily PRN BOBBY Ayon - CNP        acetaminophen (TYLENOL) tablet 650 mg  650 mg Oral Q6H PRN BOBBY Ayon - CNP        Or    acetaminophen (TYLENOL) suppository 650 mg  650 mg Rectal Q6H PRN BOBBY Ayon - CNP        enoxaparin (LOVENOX) injection 40 mg  40 mg SubCUTAneous Daily BOBBY Ayon - CNP   40 mg at 01/06/22 0905    0.9 % sodium chloride infusion   IntraVENous Continuous BOBBY Ayon - CNP 50 mL/hr at 01/06/22 1826 Rate Verify at 01/06/22 1826    nicotine (NICODERM CQ) 21 MG/24HR 1 patch  1 patch TransDERmal Daily BOBBY Ayon - CNP   1 patch at 01/06/22 7753       Allergies:  Morphine    Social History:   reports that he has quit smoking. His smoking use included cigarettes. He has a 12.50 pack-year smoking history. He has never used smokeless tobacco. He reports current drug use. Drug: Marijuana [facility]). He reports that he does not drink alcohol. Family History: family history includes Diabetes in his mother; Hearing Loss in his father; High Blood Pressure in his father. REVIEW OF SYSTEMS:    Constitutional: No fever or chills.  No night sweats, no weight loss   Eyes: No eye discharge, double vision, or eye pain   HEENT: negative for sore mouth, sore throat, hoarseness and voice change   Respiratory: negative for cough , sputum, dyspnea, wheezing, hemoptysis, chest pain Cardiovascular: negative for chest pain, dyspnea, palpitations, orthopnea, PND   Gastrointestinal: negative for nausea, vomiting, diarrhea, constipation, abdominal pain, Dysphagia, hematemesis and hematochezia   Genitourinary: negative for frequency, dysuria, nocturia, urinary incontinence, and hematuria   Integument: negative for rash, skin lesions, bruises.    Hematologic/Lymphatic: negative for easy bruising, bleeding, lymphadenopathy, or petechiae   Endocrine: negative for heat or cold intolerance,weight changes, change in bowel habits and hair loss   Musculoskeletal: negative for myalgias, arthralgias, pain, joint swelling,and bone pain   Neurological: negative for headaches, dizziness, seizures, weakness, numbness    PHYSICAL EXAM:      BP (!) 139/90   Pulse 84   Temp 97 °F (36.1 °C) (Oral)   Resp 16   Ht 5' 11\" (1.803 m)   Wt 205 lb (93 kg)   SpO2 100%   BMI 28.59 kg/m²    Temp (24hrs), Av.6 °F (36.4 °C), Min:97 °F (36.1 °C), Max:98.1 °F (36.7 °C)    General appearance - well appearing, no in pain or distress   Mental status - alert and cooperative   Eyes - pupils equal and reactive, extraocular eye movements intact   Ears - bilateral TM's and external ear canals normal   Mouth - mucous membranes moist, pharynx normal without lesions   Neck - supple, no significant adenopathy   Lymphatics - no palpable lymphadenopathy, no hepatosplenomegaly   Chest - clear to auscultation, no wheezes, rales or rhonchi, symmetric air entry   Heart - normal rate, regular rhythm, normal S1, S2, no murmurs  Abdomen - soft, nontender, nondistended, no masses or organomegaly   Neurological - alert, oriented, normal speech, no focal findings or movement disorder noted   Musculoskeletal - no joint tenderness, deformity or swelling   Extremities - peripheral pulses normal, no pedal edema, no clubbing or cyanosis   Skin - normal coloration and turgor, no rashes, no suspicious skin lesions noted ,    DATA:    Labs:   CBC: Recent Labs     01/05/22  1350 01/06/22  0724   WBC 15.7* 12.2*   HGB 11.9* 11.4*   HCT 36.6* 36.0*    349     BMP:   Recent Labs     01/05/22  1421 01/06/22  0724    136   K 4.1 4.2   CO2 28 23   BUN 23* 17   CREATININE 0.62* <0.40*   LABGLOM >60 Can not be calculated   GLUCOSE 92 114*     PT/INR:   Recent Labs     01/06/22  0724   PROTIME 11.7   INR 0.9       IMAGING DATA:  No orders to display       Primary Problem  Intractable pain    Active Hospital Problems    Diagnosis Date Noted    Intractable pain [R52] 01/05/2022    Tobacco use [Z72.0] 12/21/2021    Metastatic cancer ([facility] [address] Lytic lesion to the pubic ramus [C79.9] 12/17/2021    Cancer related pain [G89.3] 12/16/2021    Anxiety [F41.9] 10/05/2015    Major depressive disorder, recurrent episode, severe ([facility] [address] [F33.2] 04/23/2015    Primary hypertension [I10] 08/21/2014     IMPRESSION:   1. Metastatic adenocarcinoma with left hip metastasis, was planning to start peritoneal chemotherapy as  2. Patient has completed palliative radiation therapy to his bone lesion    RECOMMENDATIONS:  1. I reviewed the laboratory data, diagnosis, imaging studies and treatment recommendations with patient  2. Continue current management for his pain as per palliative care  3. Orthopedic consult for suspicious pathological fracture  4. Plan for better to chemotherapy as outpatient  5. We will follow    Discussed with pt  and Nurse. Thank you for asking us to see this patient. Mario Moya MD  Hematologist/Medical Oncologist    Cell: [phone]    This note is created with the assistance of a speech recognition program.  While intending to generate a document that actually reflects the content of the visit, the document can still have some errors including those of syntax and sound a like substitutions which may escape proof reading. It such instances, actual meaning can be extrapolated by contextual diversion.

## 2022-01-07 NOTE — FLOWSHEET NOTE
Patient is approachable as he reclines in the bed while eating. Patient engages in conversation and shares about his terminal diagnosis. Patient shares about his feelings surrounding his diagnosis and about his spiritual journey. Patient has parents for support. Writer provides listening presence, emotional support and listening presence. Writer nurtures hope. Patient expresses appreciation for the visit. Spiritual Care will follow as needed.        01/07/22 1400   Encounter Summary   Services provided to: Patient   Referral/Consult From: [facility] Parent   Continue Visiting   (1/7/22)   Complexity of Encounter Moderate   Length of Encounter 30 minutes   Spiritual Assessment Completed Yes   Routine   Type Follow up   Assessment Approachable   Intervention Active listening;Explored feelings, thoughts, concerns;Explored coping resources;Nurtured hope   Outcome Expressed gratitude

## 2022-01-07 NOTE — CARE COORDINATION
Social work: faxed to [address] for possible elevated toilet seat and shower seat with back. No order is available and no face to face note is available. Will need both to complete the order in case pt might be ready over the weekend.  to follow.   Galindo ford

## 2022-01-08 PROCEDURE — 6370000000 HC RX 637 (ALT 250 FOR IP): Performed by: NURSE PRACTITIONER

## 2022-01-08 PROCEDURE — 97530 THERAPEUTIC ACTIVITIES: CPT

## 2022-01-08 PROCEDURE — 97116 GAIT TRAINING THERAPY: CPT

## 2022-01-08 PROCEDURE — 99232 SBSQ HOSP IP/OBS MODERATE 35: CPT | Performed by: INTERNAL MEDICINE

## 2022-01-08 PROCEDURE — 97163 PT EVAL HIGH COMPLEX 45 MIN: CPT

## 2022-01-08 PROCEDURE — 1200000000 HC SEMI PRIVATE

## 2022-01-08 PROCEDURE — 6370000000 HC RX 637 (ALT 250 FOR IP): Performed by: INTERNAL MEDICINE

## 2022-01-08 PROCEDURE — 6360000002 HC RX W HCPCS: Performed by: NURSE PRACTITIONER

## 2022-01-08 RX ORDER — NALOXONE HYDROCHLORIDE 0.4 MG/ML
0.4 INJECTION, SOLUTION INTRAMUSCULAR; INTRAVENOUS; SUBCUTANEOUS PRN
Status: DISCONTINUED | OUTPATIENT
Start: 2022-01-08 | End: 2022-01-10 | Stop reason: HOSPADM

## 2022-01-08 RX ADMIN — ALPRAZOLAM 0.5 MG: 0.5 TABLET ORAL at 08:43

## 2022-01-08 RX ADMIN — Medication 10000 MCG: at 18:38

## 2022-01-08 RX ADMIN — ALPRAZOLAM 0.5 MG: 0.5 TABLET ORAL at 18:52

## 2022-01-08 RX ADMIN — Medication 10000 MCG: at 09:58

## 2022-01-08 RX ADMIN — OXYCODONE HYDROCHLORIDE 80 MG: 40 TABLET, FILM COATED, EXTENDED RELEASE ORAL at 08:39

## 2022-01-08 RX ADMIN — DEXAMETHASONE 4 MG: 4 TABLET ORAL at 08:39

## 2022-01-08 RX ADMIN — DICLOFENAC SODIUM 4 G: 10 GEL TOPICAL at 12:58

## 2022-01-08 RX ADMIN — DEXAMETHASONE 4 MG: 4 TABLET ORAL at 18:49

## 2022-01-08 RX ADMIN — CITALOPRAM HYDROBROMIDE 20 MG: 20 TABLET ORAL at 08:40

## 2022-01-08 RX ADMIN — OXYCODONE HYDROCHLORIDE 80 MG: 40 TABLET, FILM COATED, EXTENDED RELEASE ORAL at 21:14

## 2022-01-08 ASSESSMENT — PAIN SCALES - GENERAL
PAINLEVEL_OUTOF10: 6
PAINLEVEL_OUTOF10: 7
PAINLEVEL_OUTOF10: 9
PAINLEVEL_OUTOF10: 7
PAINLEVEL_OUTOF10: 10

## 2022-01-08 ASSESSMENT — PAIN DESCRIPTION - ORIENTATION: ORIENTATION: LEFT

## 2022-01-08 ASSESSMENT — PAIN DESCRIPTION - PAIN TYPE: TYPE: ACUTE PAIN

## 2022-01-08 ASSESSMENT — PAIN DESCRIPTION - LOCATION: LOCATION: BUTTOCKS;HIP;LEG

## 2022-01-08 NOTE — PROGRESS NOTES
[facility]  Office: [phone]  Evie Chowdhury DO, Michelle Arrington DO, Richard Mitchell DO, Isa Roque DO, Lan Crouch MD, Kristyn Freedman MD, Joi Nelson MD, Mark Up MD, Khanh Martin MD, Girish Dallas MD, Forrest Edwards MD, Elmer Gonsalves DO, Justyn Oscar DO, Juan Juarez MD,  Genevieve Jeter DO, Sheryl Campbell MD, Ritika Baltazar MD, Keren Lopez MD, Jai Stevenson MD, Britton Chase MD, Yohana Vallejo MD, Mary Fagan MD, Virginia Potter, [facility], [facility], [facility], Daniel Braun, CNP, Jose Cervantes, CNS, Yesica Valladares, CNP, Edna Pete, CNP, Sena Javier, CNP, Sharron Monroy, CNP, Sophy Shaw, CNP, Milissa Pallas, PA-C, Trudi Soulier, TOMASA, Jimena Yepez, TOMASA, Mitzy Nelson, CNP, Gabriel Stoll CNP, Frankey Blush, CNP, Celso Shi CNP, Amilcar Barber, CNP, Brina Castillo, [facility]    Progress Note         1/8/2022    12:26 PM    Name:   Tello Du  MRN:     [id number]     [place]:      [de-identified]   Room:   2004/2004-02  IP Day:  3  Admit Date:  1/5/2022 12:51 PM    PCP:   BOBBY Bennett  Code Status:  Full Code    Subjective:     C/C:   Chief Complaint   Patient presents with    Back Pain     down left leg. cancer pt     Interval History Status:   Back, left hip and left lower leg pain is better after starting Dilaudid PCA pump, it comes back when he tries to move  Orthopedics yet to evaluate the patient for suspected left acetabular pathological fracture due to metastasis  Oncology got port placement yesterday 1/7/2022 to start palliative chemo as outpatient  Discussed with patient since palliative attending wanted to start him on methadone after discharge, patient requesting not to start methadone since it would interfere with Xanax and he definitely wants to continue Xanax for anxiety  Brief History:   Tello Ana Laura is a 46 y.o.  Non- / non  male who presents with Back Pain (down left leg. cancer pt)   and is admitted to the hospital for the management of Intractable pain.     Presented to the emergency room from the cancer center for pain. 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 CT of the chest shows right suprahilar region of the lungs suggesting possible lung primary. PET scan performed on 12/30/2021 is concerning for thyroid malignancy and CT scan with contrast of the head, neck, abdomen and pelvis has been recommended. Palliative radiation was started on 12/17/2021 through 12/23. Plan is to start palliative systemic chemotherapy. He has not yet seen an orthopedist.  Pain is constant and radiates down the left leg. His home medications include OxyContin 80 mg twice daily, oxycodone 15 mg every 4 hours as needed and Xanax which have been ineffective. He was sent here to be admitted for pain control with Dilaudid PCA pump.       Review of Systems:     Constitutional:  negative for chills, fevers, sweats  Respiratory:  negative for cough, dyspnea on exertion, shortness of breath, wheezing  Cardiovascular:  negative for chest pain, chest pressure/discomfort, lower extremity edema, palpitations  Gastrointestinal:  negative for abdominal pain, constipation, diarrhea, nausea, vomiting  Neurological:  negative for dizziness, headache  + Lower back, left hip and left leg pain more on movements  Medications: Allergies:     Allergies   Allergen Reactions    Morphine Itching     PT DENIES       Current Meds:   Scheduled Meds:    citalopram  20 mg Oral Daily    dexamethasone  4 mg Oral BID WC    oxyCODONE  80 mg Oral Q12H    sodium chloride flush  5-40 mL IntraVENous 2 times per day    enoxaparin  40 mg SubCUTAneous Daily    nicotine  1 patch TransDERmal Daily     Continuous Infusions:    HYDROmorphone      sodium chloride Stopped (01/05/22 8727)    sodium chloride 50 mL/hr at 22 0533     PRN Meds: ALPRAZolam, cyclobenzaprine, sodium chloride flush, sodium chloride, potassium chloride **OR** potassium alternative oral replacement **OR** potassium chloride, magnesium sulfate, polyethylene glycol, acetaminophen **OR** acetaminophen    Data:     Past Medical History:   has a past medical history of Abscess, Anxiety, Cellulitis due to MRSA, Chronic back pain, Depression, Hypertension, Metastatic cancer ([facility] Multiple substance abuse ([facility] and OCD (obsessive compulsive disorder). Social History:   reports that he has quit smoking. His smoking use included cigarettes. He has a 12.50 pack-year smoking history. He has never used smokeless tobacco. He reports current drug use. Drug: Marijuana Michael Budge). He reports that he does not drink alcohol. Family History:   Family History   Problem Relation Age of Onset    Diabetes Mother     Hearing Loss Father     High Blood Pressure Father        Vitals:  /84   Pulse 87   Temp 97.9 °F (36.6 °C) (Oral)   Resp 17   Ht 5' 11\" (1.803 m)   Wt 209 lb 9.6 oz (95.1 kg)   SpO2 94%   BMI 29.23 kg/m²   Temp (24hrs), Av.9 °F (36.6 °C), Min:97 °F (36.1 °C), Max:99.2 °F (37.3 °C)    No results for input(s): POCGLU in the last 72 hours. I/O (24Hr):     Intake/Output Summary (Last 24 hours) at 2022 1226  Last data filed at 2022 1018  Gross per 24 hour   Intake 1396.37 ml   Output 2825 ml   Net -1428.63 ml       Labs:  Hematology:  Recent Labs     22  1350 22  0724 22  0730   WBC 15.7* 12.2* 15.2*   RBC 4.08* 3.91* 3.78*   HGB 11.9* 11.4* 10.9*   HCT 36.6* 36.0* 34.9*   MCV 89.7 92.1 92.3   MCH 29.2 29.2 28.8   MCHC 32.5 31.7 31.2   RDW 13.4 13.6 13.5    349 329   MPV 8.7 8.4 9.1   INR  --  0.9  --      Chemistry:  Recent Labs     22  1421 22  0724    136   K 4.1 4.2   CL 99 104   CO2 28 23   GLUCOSE 92 114*   BUN 23* 17   CREATININE 0.62* <0.40*   ANIONGAP 8* 9   LABGLOM >60 Can not be calculated   GFRAA >60 Can not be calculated   CALCIUM 9.7 9.1   No results for input(s): PROT, LABALBU, LABA1C, N6AHWTK, J9RHYWM, FT4, TSH, AST, ALT, LDH, GGT, ALKPHOS, LABGGT, BILITOT, BILIDIR, AMMONIA, AMYLASE, LIPASE, LACTATE, CHOL, HDL, LDLCHOLESTEROL, CHOLHDLRATIO, TRIG, VLDL, MDH99VQ, PHENYTOIN, PHENYF, URICACID, POCGLU in the last 72 hours. ABG:  Lab Results   Component Value Date    FIO2 ROOM AIR 08/20/2014     Lab Results   Component Value Date/Time    SPECIAL NOT REPORTED 09/28/2017 12:53 AM     Lab Results   Component Value Date/Time    CULTURE VIRIDANS STREPTOCOCCUS GROUP LIGHT GROWTH (A) 09/28/2017 12:53 AM    CULTURE NO ANAEROBIC ORGANISMS ISOLATED AT 5 DAYS (A) 09/28/2017 12:53 AM    CULTURE  09/28/2017 12:53 AM     [address] [phone]       Radiology:  No results found. Physical Examination:        General appearance:  alert, cooperative and no distress, currently comfortable with PCA pump  Mental Status:  oriented to person, place and time and anxious problem multiple is a crusted over there now.   Daily do more days if angry with her health for degenerative is affect  Lungs:  clear to auscultation bilaterally, normal effort  Heart:  regular rate and rhythm, no murmur  Abdomen:  soft, nontender, nondistended, normal bowel sounds, no masses, hepatomegaly, splenomegaly  Extremities:  no edema, redness, tenderness in the calves  Skin:  no gross lesions, rashes, induration  + Left lower back tenderness on palpation with mild muscle spasm,+ left pubic ramus pain on palpation  Assessment:        Hospital Problems           Last Modified POA    * (Principal) Intractable pain 1/5/2022 Yes    Metastatic cancer ([address] -suspected left acetabular pathological fracture 1/7/2022 Yes    Primary hypertension 1/5/2022 Yes    Major depressive disorder, recurrent episode, severe ([address] 1/5/2022 Yes    Anxiety 1/5/2022 Yes    Cancer related pain 1/5/2022 Yes Tobacco use 1/5/2022 Yes          Plan:        Continue Dilaudid PCA pump for pain control  Continue long-acting oral OxyContin  Continue oral dexamethasone  Started Flexeril for muscle spasms and Xanax for anxiety  Continue Colace   Flector patch/diclofenac gel to painful left hip area  Orthopedic evaluation for left hip pain/left acetabular pathological fracture  Oncology evaluation for planning palliative chemo, port placement has been done   DVT prophylaxis  Possible discharge tomorrow when he is more stable    Lord Melanie MD  1/8/2022  12:26 PM

## 2022-01-08 NOTE — PROGRESS NOTES
Pt was seen in our palliative care clinic this week (before admission). We sent him to the ER directly from our clinic for admission to get his pain managed and get an ortho consult. The plan was to transition patient to methadone on discharge for pain control. I received a call today from patient stating he did NOT want to be started on methadone. He states he needs to stay on his xanax r/t his mental status and anxiety. I explained that if we can get his pain under control, that could possibly help his anxiety. He states he does not want to take methadone. He states they are considering putting him on patches. Our team will follow up on Monday to formulate an outpatient plan.      [address], M[facility] [place], [facility]

## 2022-01-08 NOTE — PROGRESS NOTES
Physical Therapy    Facility/Department: [facility]  Initial Assessment    NAME: Liz Urias  : 1970  MRN: [id number]    Date of Service: 2022    Discharge Recommendations:  24 hour supervision or assist,Home with Home health PT (Home w/ 24-7 assist)        Assessment   Body structures, Functions, Activity limitations: Decreased functional mobility ; Decreased endurance  Assessment: Pt limited primarily by pain L LE  Prognosis: Good  Decision Making: High Complexity  PT Education: PT Role;Plan of Care;General Safety  Patient Education: Handouts: pain mgt techniques & TTWB ambulation and sit<-->stand technique w/ RW  REQUIRES PT FOLLOW UP: Yes  Activity Tolerance  Activity Tolerance: Patient limited by endurance; Patient limited by pain       Patient Diagnosis(es): The encounter diagnosis was Intractable pain. has a past medical history of Abscess, Anxiety, Cellulitis due to MRSA, Chronic back pain, Depression, Hypertension, Metastatic cancer ([facility] Multiple substance abuse ([facility] and OCD (obsessive compulsive disorder). has a past surgical history that includes shoulder surgery (Right); Eye surgery; CT BIOPSY SUPERFICIAL BONE PERCUTANEOUS (2021); and IR PORT PLACEMENT > 5 YEARS (2022).     Restrictions  Restrictions/Precautions  Restrictions/Precautions: Contact Precautions,Fall Risk,Up as Tolerated,General Precautions  Required Braces or Orthoses?: No  Position Activity Restriction  Other position/activity restrictions: PCA pump, telemetry, LUE IV  Vision/Hearing        Subjective  General  Chart Reviewed: Yes  Patient assessed for rehabilitation services?: Yes  Response To Previous Treatment: Not applicable  Family / Caregiver Present: No  Follows Commands: Within Functional Limits  General Comment  Comments: OK for PT per Percy Cuadra RN  Pain Screening  Patient Currently in Pain: Yes  Pain Assessment  Pain Assessment: 0-10  Pain Level: 7  Pain Type: Acute pain  Pain Location: Buttocks;Hip;Leg  Pain Orientation: Left  Vital Signs  Patient Currently in Pain: Yes       Orientation  Orientation  Overall Orientation Status: Within Normal Limits  Social/Functional History  Social/Functional History  Lives With: Family (lives with elderly parents)  Type of Home: House  Home Layout: Able to Live on Main level with bedroom/bathroom,Two level (basement)  Home Access: Stairs to enter with rails  Entrance Stairs - Number of Steps: 2+1  Entrance Stairs - Rails: Right  Bathroom Shower/Tub: Tub/Shower unit  Bathroom Toilet: Handicap height  Bathroom Equipment:  (no bathroom DME)  Home Equipment: Rolling walker  Receives Help From: Family  ADL Assistance: Needs assistance (Needs assistance for L sock)  Homemaking Assistance: Needs assistance  Homemaking Responsibilities: No (parents assist with cooking/cleaning/laundry)  Ambulation Assistance: Independent (uses RW at all times)  Transfer Assistance: Independent  Active : No  Patient's  Info: Parents drive or provided by [employer]  Occupation: On [address] [place] [address]: 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 Additional Comments: pt reports 1 recent fall, L hip gave out and pt fell onto corner of bed. pt with dec ability to complete tasks d/t inc hip pain.  pt has meeting with pallative care  Cognition   Cognition  Overall Cognitive Status: WFL    Objective     Observation/Palpation  Posture: Fair  Body Mechanics: Pt needs to modify mobility techniques to tolerate L LE pain    AROM RLE (degrees)  RLE AROM: WNL  AROM LLE (degrees)  LLE AROM : WNL  AROM RUE (degrees)  RUE General AROM: See OT eval for B UE ROM  Strength RLE  Strength RLE: WFL  Comment: 5/5 R LE  Strength LLE  Comment: NA per pts request due to pain  Strength RUE  Comment: See OT eval for B UE MMT  Tone RLE  RLE Tone: Normotonic  Tone LLE  LLE Tone:  (Didfficulrty w/ L LE due to pain)  Motor Control  Gross Motor?: WNL  Sensation  Overall Sensation Status: WNL  Bed mobility  Rolling to Right: Minimal assistance  Supine to Sit: Moderate assistance  Sit to Supine: Moderate assistance  Scooting: Moderate assistance  Transfers  Sit to Stand: Moderate Assistance  Stand to sit: Moderate Assistance  Stand Pivot Transfers: Moderate Assistance  Ambulation  Ambulation?: Yes  WB Status: TTWB L LE due to L LE pain  Ambulation 1  Surface: level tile  Device: Rolling Walker  Assistance: Minimal assistance  Gait Deviations: Slow Carol  Distance: 35' x 1  Comments: Instruction and demo'd proper pattern w/ RW to allow for greater tolerance of ambulation w/ RW  Stairs/Curb  Stairs?: No     Balance  Posture: Fair  Sitting - Static: Good  Sitting - Dynamic: Good  Standing - Static: Good;-  Standing - Dynamic: Fair;+        Plan   Plan  Times per week: 1-2x/day,5-6 days/week  Current Treatment Recommendations: Balance Training,Endurance Training,Gait Training,Transfer Training,Functional Mobility Training  Safety Devices  Type of devices: Gait belt,Left in bed,Call light within reach,Chair alarm in place  Restraints  Initially in place: No    G-Code       OutComes Score  Balance Score: 10 (01/08/22 1550)  Gait Score: 7 (01/08/22 1550)        Tinetti Total Score: 17 (01/08/22 1550)                                   AM-PAC Score  AM-PAC Inpatient Mobility Raw Score : 14 (01/08/22 1549)  AM-PAC Inpatient T-Scale Score : 38.1 (01/08/22 1549)  Mobility Inpatient CMS 0-100% Score: 61.29 (01/08/22 1549)  Mobility Inpatient CMS G-Code Modifier : CL (01/08/22 1549)          Goals  Short term goals  Time Frame for Short term goals: 12 treatments  Short term goal 1: Independent bed mobility/transfers  Short term goal 2:  Independent ambulation w/ RW TTWB 100' x 1  Short term goal 3: Tolerate 30 min ther act  Short term goal 4: Good standing balance  Patient Goals   Patient goals : Be able to be active withot L LE pain       Therapy Time   Individual Concurrent Group Co-treatment   Time In 5796 (Treatment time 24 minutes)         Time Out 1256         Minutes [address] [place] [address], [address] [facility]

## 2022-01-08 NOTE — PLAN OF CARE
Problem: Falls - Risk of:  Goal: Will remain free from falls  Description: Will remain free from falls  1/8/2022 1220 by Harry Bella RN  Outcome: Ongoing  1/8/2022 0328 by Nina Argueta RN  Outcome: Ongoing  Note: Siderails up x 2  Hourly rounding  Call light in reach  Instructed to call for assist before attempting out of bed. Remains free from falls and accidental injury at this time   Floor free from obstacles  Bed is locked and in lowest position  Adequate lighting provided  Bed alarm on, Red Falling star and Stay with Me signs posted      1/8/2022 0327 by Nina Argueta RN  Outcome: Ongoing  Note: Siderails up x 2  Hourly rounding  Call light in reach  Instructed to call for assist before attempting out of bed. Remains free from falls and accidental injury at this time   Floor free from obstacles  Bed is locked and in lowest position  Adequate lighting provided  Bed alarm on, Red Falling star and Stay with Me signs posted      Goal: Absence of physical injury  Description: Absence of physical injury  1/8/2022 1220 by Harry Bella RN  Outcome: Ongoing  1/8/2022 0328 by Nina Argueta RN  Outcome: Ongoing  1/8/2022 0327 by Nina Argueta RN  Outcome: Ongoing     Problem: Pain:  Goal: Pain level will decrease  Description: Pain level will decrease  1/8/2022 1220 by Harry Bella RN  Outcome: Ongoing  1/8/2022 0328 by Nina Argueta RN  Outcome: Ongoing  1/8/2022 0327 by Nina Argueta RN  Outcome: Ongoing  Goal: Control of acute pain  Description: Control of acute pain  1/8/2022 1220 by Harry Bella RN  Outcome: Ongoing  1/8/2022 0328 by Nina Argueta RN  Outcome: Ongoing  Note: Pain level assessment complete.    Patient educated on pain scale and control interventions  PRN pain medication given per patient request  Patient instructed to call out with new onset of pain or unrelieved pain    1/8/2022 0327 by Nina Argueta RN  Outcome: Ongoing  Note: Pain level assessment complete.    Patient educated on pain scale and control interventions  PRN pain medication given per patient request  Patient instructed to call out with new onset of pain or unrelieved pain    Goal: Control of chronic pain  Description: Control of chronic pain  1/8/2022 1220 by Flo Bianchi RN  Outcome: Ongoing  1/8/2022 0328 by Shiela Garay RN  Outcome: Ongoing  1/8/2022 0327 by Shiela Garay RN  Outcome: Ongoing

## 2022-01-08 NOTE — PLAN OF CARE
Problem: Falls - Risk of:  Goal: Will remain free from falls  Description: Will remain free from falls  1/8/2022 0328 by Héctor Calloway RN  Outcome: Ongoing  Note: Siderails up x 2  Hourly rounding  Call light in reach  Instructed to call for assist before attempting out of bed. Remains free from falls and accidental injury at this time   Floor free from obstacles  Bed is locked and in lowest position  Adequate lighting provided  Bed alarm on, Red Falling star and Stay with Me signs posted      1/8/2022 0327 by Héctor Calloway RN  Outcome: Ongoing  Note: Siderails up x 2  Hourly rounding  Call light in reach  Instructed to call for assist before attempting out of bed. Remains free from falls and accidental injury at this time   Floor free from obstacles  Bed is locked and in lowest position  Adequate lighting provided  Bed alarm on, Red Falling star and Stay with Me signs posted      Goal: Absence of physical injury  Description: Absence of physical injury  1/8/2022 0328 by Héctor Calloway RN  Outcome: Ongoing  1/8/2022 0327 by Héctor Calloway RN  Outcome: Ongoing     Problem: Pain:  Goal: Pain level will decrease  Description: Pain level will decrease  1/8/2022 0328 by Héctor Calloway RN  Outcome: Ongoing  1/8/2022 0327 by Héctor Calloway RN  Outcome: Ongoing  Goal: Control of acute pain  Description: Control of acute pain  1/8/2022 0328 by Héctor Calloway RN  Outcome: Ongoing  Note: Pain level assessment complete. Patient educated on pain scale and control interventions  PRN pain medication given per patient request  Patient instructed to call out with new onset of pain or unrelieved pain    1/8/2022 0327 by Héctor Calloway RN  Outcome: Ongoing  Note: Pain level assessment complete.    Patient educated on pain scale and control interventions  PRN pain medication given per patient request  Patient instructed to call out with new onset of pain or unrelieved pain    Goal: Control of chronic pain  Description: Control of chronic pain  1/8/2022 0328 by Amada Kocher, RN  Outcome: Ongoing  1/8/2022 0327 by Amada Kocher, RN  Outcome: Ongoing

## 2022-01-08 NOTE — PROGRESS NOTES
[facility]  Office: [address], DO, Beau Delaware, DO, Juan M Aleksandr, DO, Crystalina Harada Blood, DO, Governor Camila MD, Karma Villagran MD, Seb Sorto MD, Bethel Hwang MD, Dejan Garcia MD, Bill Marquez MD, Sae Banks MD, Janina Keith, DO, Darryl Saxena, DO, Ly Wang MD,  Joseph Samuels DO, Racquel Fay MD, Willy Delaney MD, Stephan Roy MD, Veronica Thompson MD, Maxime Scott MD, Viktoriya Sharp MD, Robert Garner MD, Cassie Wiley, [facility], [facility], CNP, Ernie Gutiérrez, CNP, Wing Crespo, CNS, Choco Contreras, CNP, Sanya Wu, CNP, Yulisa Moore, CNP, Connor Serna, CNP, Lucrecia Alvarez, CNP, Minda Benton PA-C, Jamie Jarvis, DNP, Vahid Fisher, DNP, Cristal James, CNP, Tran Thomas, CNP, Tristan Yousif, CNP, Romy Harkins, CNP, Dominic Rivera, CNP, Janis Denis, CNP         [facility]    Progress Note (late entry, this note is for 1/7/2022)        1/8/2022    12:25 PM    Name:   Cheryl Page  MRN:     [id number]     [place]:      [de-identified]   Room:   2004/2004-02  IP Day:  3  Admit Date:  1/5/2022 12:51 PM    PCP:   BOBBY Quinonez  Code Status:  Full Code    Subjective:     C/C:   Chief Complaint   Patient presents with    Back Pain     down left leg. cancer pt     Interval History Status:   Back, left hip and left lower leg pain is better after starting Dilaudid PCA pump  Orthopedics  will be evaluating the patient for suspected left acetabular pathological fracture due to metastasis  Oncology plans to get port placement today to start palliative chemo as outpatient  Brief History:   Cheryl Page is a 46 y.o. Non- / non  male who presents with Back Pain (down left leg. cancer pt)   and is admitted to the hospital for the management of Intractable pain.     Presented to the emergency room from the cancer center for pain.   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 CT of the chest shows right suprahilar region of the lungs suggesting possible lung primary. PET scan performed on 12/30/2021 is concerning for thyroid malignancy and CT scan with contrast of the head, neck, abdomen and pelvis has been recommended. Palliative radiation was started on 12/17/2021 through 12/23. Plan is to start palliative systemic chemotherapy. He has not yet seen an orthopedist.  Pain is constant and radiates down the left leg. His home medications include OxyContin 80 mg twice daily, oxycodone 15 mg every 4 hours as needed and Xanax which have been ineffective. He was sent here to be admitted for pain control with Dilaudid PCA pump.       Review of Systems:     Constitutional:  negative for chills, fevers, sweats  Respiratory:  negative for cough, dyspnea on exertion, shortness of breath, wheezing  Cardiovascular:  negative for chest pain, chest pressure/discomfort, lower extremity edema, palpitations  Gastrointestinal:  negative for abdominal pain, constipation, diarrhea, nausea, vomiting  Neurological:  negative for dizziness, headache  + Lower back, left hip and left leg pain  Medications: Allergies:     Allergies   Allergen Reactions    Morphine Itching     PT DENIES       Current Meds:   Scheduled Meds:    citalopram  20 mg Oral Daily    dexamethasone  4 mg Oral BID WC    oxyCODONE  80 mg Oral Q12H    sodium chloride flush  5-40 mL IntraVENous 2 times per day    enoxaparin  40 mg SubCUTAneous Daily    nicotine  1 patch TransDERmal Daily     Continuous Infusions:    HYDROmorphone      sodium chloride Stopped (01/05/22 4022)    sodium chloride 50 mL/hr at 01/08/22 6055     PRN Meds: ALPRAZolam, cyclobenzaprine, sodium chloride flush, sodium chloride, potassium chloride **OR** potassium alternative oral replacement **OR** potassium chloride, magnesium sulfate, polyethylene glycol, acetaminophen **OR** acetaminophen    Data:     Past Medical History:   has a past medical history of Abscess, Anxiety, Cellulitis due to MRSA, Chronic back pain, Depression, Hypertension, Metastatic cancer ([facility] [address] Multiple substance abuse ([facility] and OCD (obsessive compulsive disorder). Social History:   reports that he has quit smoking. His smoking use included cigarettes. He has a 12.50 pack-year smoking history. He has never used smokeless tobacco. He reports current drug use. Drug: Marijuana Lyndel Ernesto). He reports that he does not drink alcohol. Family History:   Family History   Problem Relation Age of Onset    Diabetes Mother     Hearing Loss Father     High Blood Pressure Father        Vitals:  /84   Pulse 87   Temp 97.9 °F (36.6 °C) (Oral)   Resp 17   Ht 5' 11\" (1.803 m)   Wt 209 lb 9.6 oz (95.1 kg)   SpO2 94%   BMI 29.23 kg/m²   Temp (24hrs), Av.9 °F (36.6 °C), Min:97 °F (36.1 °C), Max:99.2 °F (37.3 °C)    No results for input(s): POCGLU in the last 72 hours. I/O (24Hr):     Intake/Output Summary (Last 24 hours) at 2022 1225  Last data filed at 2022 1018  Gross per 24 hour   Intake 1396.37 ml   Output 2825 ml   Net -1428.63 ml       Labs:  Hematology:  Recent Labs     22  1350 22  0724 22  0730   WBC 15.7* 12.2* 15.2*   RBC 4.08* 3.91* 3.78*   HGB 11.9* 11.4* 10.9*   HCT 36.6* 36.0* 34.9*   MCV 89.7 92.1 92.3   MCH 29.2 29.2 28.8   MCHC 32.5 31.7 31.2   RDW 13.4 13.6 13.5    349 329   MPV 8.7 8.4 9.1   INR  --  0.9  --      Chemistry:  Recent Labs     22  1421 22  0724    136   K 4.1 4.2   CL 99 104   CO2 28 23   GLUCOSE 92 114*   BUN 23* 17   CREATININE 0.62* <0.40*   ANIONGAP 8* 9   LABGLOM >60 Can not be calculated   GFRAA >60 Can not be calculated   CALCIUM 9.7 9.1   No results for input(s): PROT, LABALBU, LABA1C, O7BVCNW, L1AKALA, FT4, TSH, AST, ALT, LDH, GGT, ALKPHOS, LABGGT, BILITOT, BILIDIR, AMMONIA, AMYLASE, LIPASE, LACTATE, CHOL, HDL, LDLCHOLESTEROL, CHOLHDLRATIO, TRIG, VLDL, [id number], PHENYTOIN, PHENYF, URICACID, POCGLU in the last 72 hours. ABG:  Lab Results   Component Value Date    FIO2 ROOM AIR 08/20/2014     Lab Results   Component Value Date/Time    SPECIAL NOT REPORTED 09/28/2017 12:53 AM     Lab Results   Component Value Date/Time    CULTURE VIRIDANS STREPTOCOCCUS GROUP LIGHT GROWTH (A) 09/28/2017 12:53 AM    CULTURE NO ANAEROBIC ORGANISMS ISOLATED AT 5 DAYS (A) 09/28/2017 12:53 AM    CULTURE  09/28/2017 12:53 AM     [facility] [id number] [facility], [address] [place] [address] [phone]       Radiology:  No results found.     Physical Examination:        General appearance:  alert, cooperative and no distress, currently comfortable with PCA pump  Mental Status:  oriented to person, place and time and normal affect  Lungs:  clear to auscultation bilaterally, normal effort  Heart:  regular rate and rhythm, no murmur  Abdomen:  soft, nontender, nondistended, normal bowel sounds, no masses, hepatomegaly, splenomegaly  Extremities:  no edema, redness, tenderness in the calves  Skin:  no gross lesions, rashes, induration  + Left lower back tenderness on palpation with mild muscle spasm,+ left pubic ramus pain on palpation  Assessment:        Hospital Problems           Last Modified POA    * (Principal) Intractable pain 1/5/2022 Yes    Metastatic cancer ([address] -suspected left acetabular pathological fracture 1/7/2022 Yes    Primary hypertension 1/5/2022 Yes    Major depressive disorder, recurrent episode, severe ([address] 1/5/2022 Yes    Anxiety 1/5/2022 Yes    Cancer related pain 1/5/2022 Yes    Tobacco use 1/5/2022 Yes          Plan:        Continue Dilaudid PCA pump for pain control  Continue long-acting oral OxyContin  Continue oral dexamethasone  Started Flexeril for muscle spasms  Continue Colace   Orthopedic evaluation for left hip pain/left acetabular pathological fracture  Oncology evaluation for planning palliative chemo, port placement today  DVT prophylaxis  Monitor CBC    Efrain Tobias MD  1/8/2022  12:25 PM

## 2022-01-08 NOTE — PROGRESS NOTES
Today's Date: 1/8/2022  Patient Name: Kevin Rob  Date of admission: 1/5/2022 12:51 PM  Patient's age: 46 y.o., 1970  Admission Dx: Intractable pain [R52]    Reason for Consult: metastatic adenocarcinoma  Requesting Physician: Ean Johnson MD    CHIEF COMPLAINT:    Chief Complaint   Patient presents with    Back Pain     down left leg. cancer pt       History Obtained From:  patient and chart    Subjective    Patient on PCA pump still having severe pain  Was evaluated by Ortho and recommended musculoskeletal oncology consult    HISTORY OF PRESENT ILLNESS:      Kevin Rob is a 46 y.o. male who is admitted to the hospital on 1/5/2022  for pain control. Patient has a diagnosis of stage IV metastatic adenocarcinoma with most likely lung primary and was planning to get started on palliative chemotherapy. He has a large metastasis to his left pelvic region and has received bilateral injection therapy however his pain has been not well controlled despite on high pain medications. He had a CT [facility] hip at [facility] which showed possible acetabular fracture. Patient yesterday was seen by palliative care in the office and was sent to ER for further evaluation inpatient management of his pain. Past Medical History:   has a past medical history of Abscess, Anxiety, Cellulitis due to MRSA, Chronic back pain, Depression, Hypertension, Metastatic cancer ([address] Multiple substance abuse ([address] and OCD (obsessive compulsive disorder). Past Surgical History:   has a past surgical history that includes shoulder surgery (Right); Eye surgery; CT BIOPSY SUPERFICIAL BONE PERCUTANEOUS (12/9/2021); and IR PORT PLACEMENT > 5 YEARS (1/7/2022). Medications:    Prior to Admission medications    Medication Sig Start Date End Date Taking? Authorizing Provider   ALPRAZolam Hans Moya) 1 MG tablet Take 1 tablet by mouth every 6 hours as needed for Anxiety for up to 30 days.  12/29/21 1/28/22 Yes Mike Kilpatrick MD   citalopram (CELEXA) 20 MG tablet Take 1 tablet by mouth daily 12/24/21  Yes Cades [place] DO Elyssa   polyethylene glycol (GLYCOLAX) 17 g packet Take 17 g by mouth daily as needed for Constipation 12/23/21 1/22/22 Yes Dominik Bergeron DO   dexamethasone (DECADRON) 4 MG tablet Take 1 tablet by mouth 2 times daily (with meals) for 20 days 12/23/21 1/12/22 Yes Dominik Bergeron DO   lisinopril (PRINIVIL;ZESTRIL) 5 MG tablet Take 1 tablet by mouth daily 12/11/21  Yes Ken Davila MD   gabapentin (NEURONTIN) 100 MG capsule Take 1 capsule by mouth 3 times daily for 30 days.  12/10/21 1/9/22 Yes Ken Davila MD   melatonin (RA MELATONIN) 3 MG TABS tablet Take 1 tablet by mouth nightly as needed (insomnia) 9/15/21  Yes Marlene Mcfadden DO     Current Facility-Administered Medications   Medication Dose Route Frequency Provider Last Rate Last Admin    diclofenac sodium (VOLTAREN) 1 % gel 4 g  4 g Topical TID Omar Bradford MD   4 g at 01/08/22 1258    ALPRAZolam Tejal Nice) tablet 0.5 mg  0.5 mg Oral TID PRN Omar Bradford MD   0.5 mg at 01/08/22 1852    HYDROmorphone (DILAUDID) 200 mcg/mL PCA   IntraVENous Continuous BOBBY Riddle CNP   10,000 mcg at 01/08/22 1838    citalopram (CELEXA) tablet 20 mg  20 mg Oral Daily BOBBY Riddle CNP   20 mg at 01/08/22 0840    dexamethasone (DECADRON) tablet 4 mg  4 mg Oral BID WC BOBBY Riddle CNP   4 mg at 01/08/22 1849    oxyCODONE (OXYCONTIN) extended release tablet 80 mg  80 mg Oral Q12H BOBBY Riddle CNP   80 mg at 01/08/22 0839    sodium chloride flush 0.9 % injection 5-40 mL  5-40 mL IntraVENous 2 times per day BOBBY Riddle CNP        sodium chloride flush 0.9 % injection 10 mL  10 mL IntraVENous PRN BOBBY Riddle - CNP        0.9 % sodium chloride infusion  25 mL IntraVENous PRN BOBBY Riddle CNP   Stopped at 01/05/22 2933    potassium chloride (KLOR-CON M) extended release tablet 40 mEq 40 mEq Oral PRN Viky Poll, APRN - CNP        Or    potassium bicarb-citric acid (EFFER-K) effervescent tablet 40 mEq  40 mEq Oral PRN Viky Poll, APRN - CNP        Or    potassium chloride 10 mEq/100 mL IVPB (Peripheral Line)  10 mEq IntraVENous PRN Viky Poll, APRN - CNP        magnesium sulfate 1000 mg in dextrose 5% 100 mL IVPB  1,000 mg IntraVENous PRN Viky Poll, APRN - CNP        polyethylene glycol (GLYCOLAX) packet 17 g  17 g Oral Daily PRN Viky Poll, APRN - CNP        acetaminophen (TYLENOL) tablet 650 mg  650 mg Oral Q6H PRN Viky Poll, APRN - CNP        Or    acetaminophen (TYLENOL) suppository 650 mg  650 mg Rectal Q6H PRN Viky Poll, APRN - CNP        enoxaparin (LOVENOX) injection 40 mg  40 mg SubCUTAneous Daily Viky Poll, APRN - CNP   40 mg at 01/06/22 0905    0.9 % sodium chloride infusion   IntraVENous Continuous Viky Poll, APRN - CNP   Stopped at 01/08/22 0919    nicotine (NICODERM CQ) 21 MG/24HR 1 patch  1 patch TransDERmal Daily Viky Poll, APRN - CNP   1 patch at 01/08/22 9204       Allergies:  Morphine    Social History:   reports that he has quit smoking. His smoking use included cigarettes. He has a 12.50 pack-year smoking history. He has never used smokeless tobacco. He reports current drug use. Drug: Marijuana Fredick Copping). He reports that he does not drink alcohol. Family History: family history includes Diabetes in his mother; Hearing Loss in his father; High Blood Pressure in his father. REVIEW OF SYSTEMS:    Constitutional: No fever or chills.  No night sweats, no weight loss   Eyes: No eye discharge, double vision, or eye pain   HEENT: negative for sore mouth, sore throat, hoarseness and voice change   Respiratory: negative for cough , sputum, dyspnea, wheezing, hemoptysis, chest pain   Cardiovascular: negative for chest pain, dyspnea, palpitations, orthopnea, PND   Gastrointestinal: negative for nausea, vomiting, diarrhea, constipation, abdominal pain, Dysphagia, hematemesis and hematochezia   Genitourinary: negative for frequency, dysuria, nocturia, urinary incontinence, and hematuria   Integument: negative for rash, skin lesions, bruises.    Hematologic/Lymphatic: negative for easy bruising, bleeding, lymphadenopathy, or petechiae   Endocrine: negative for heat or cold intolerance,weight changes, change in bowel habits and hair loss   Musculoskeletal: Severe debilitating left hip pain  Neurological: negative for headaches, dizziness, seizures, weakness, numbness    PHYSICAL EXAM:      /75   Pulse 76   Temp 97.2 °F (36.2 °C) (Oral)   Resp 17   Ht 5' 11\" (1.803 m)   Wt 209 lb 9.6 oz (95.1 kg)   SpO2 96%   BMI 29.23 kg/m²    Temp (24hrs), Av.8 °F (36.6 °C), Min:97 °F (36.1 °C), Max:99.2 °F (37.3 °C)    General appearance - well appearing, no in pain or distress   Mental status - alert and cooperative   Eyes - pupils equal and reactive, extraocular eye movements intact   Ears - bilateral TM's and external ear canals normal   Mouth - mucous membranes moist, pharynx normal without lesions   Neck - supple, no significant adenopathy   Lymphatics - no palpable lymphadenopathy, no hepatosplenomegaly   Chest - clear to auscultation, no wheezes, rales or rhonchi, symmetric air entry   Heart - normal rate, regular rhythm, normal S1, S2, no murmurs  Abdomen - soft, nontender, nondistended, no masses or organomegaly   Neurological - alert, oriented, normal speech, no focal findings or movement disorder noted   Musculoskeletal - no joint tenderness, deformity or swelling   Extremities - peripheral pulses normal, no pedal edema, no clubbing or cyanosis   Skin - normal coloration and turgor, no rashes, no suspicious skin lesions noted ,    DATA:    Labs:   CBC:   Recent Labs     22  0724 22  0730   WBC 12.2* 15.2*   HGB 11.4* 10.9*   HCT 36.0* 34.9*    329     BMP:   Recent Labs     22  0724      K 4.2 CO2 23   BUN 17   CREATININE <0.40*   LABGLOM Can not be calculated   GLUCOSE 114*     PT/INR:   Recent Labs     01/06/22  0724   PROTIME 11.7   INR 0.9       IMAGING DATA:  IR PORT PLACEMENT > 5 YEARS   Final Result   Successful ultrasound and fluoroscopy guided Port-A-Cath placement             Primary Problem  Intractable pain    Active Hospital Problems    Diagnosis Date Noted    Intractable pain [R52] 01/05/2022    Tobacco use [Z72.0] 12/21/2021    Metastatic cancer ([address] -suspected left acetabular pathological fracture [C79.9] 12/17/2021    Cancer related pain [G89.3] 12/16/2021    Anxiety [F41.9] 10/05/2015    Major depressive disorder, recurrent episode, severe ([address] [F33.2] 04/23/2015    Primary hypertension [I10] 08/21/2014     IMPRESSION:   1. Metastatic adenocarcinoma with left hip metastasis, was planning to start chemotherapy   2. Patient has completed palliative radiation therapy to his bone lesion  3. Severe left hip pain currently on PCA pump with possible pathologic fracture    RECOMMENDATIONS:  1. I reviewed the laboratory data, diagnosis, imaging studies and treatment recommendations with patient  2. Continue current management for his pain as per palliative care/PCA pump  3. Discussed the case with orthopedic Dr. Evelena Cheadle and recommended musculoskeletal oncology> transfer patient to facility with a oncologic orthopedic possible [place] [facility] [place] [facility] [place] [facility]/   4.  Plan for chemotherapy as outpatient   5.    nonweightbearing on the left lower extremity, walker to ambulate with the help of physical therapy      Discussed with pt  and Nurse/orhto    Thank you for asking us to see this patient.    Polina Mane MD  Hematologist/Medical Oncologist        This note is created with the assistance of a speech recognition program.  While intending to generate a document that actually reflects the content of the visit, the document can still have some errors including those of syntax and sound a like substitutions which may escape proof reading. It such instances, actual meaning can be extrapolated by contextual diversion.

## 2022-01-09 PROCEDURE — 94761 N-INVAS EAR/PLS OXIMETRY MLT: CPT

## 2022-01-09 PROCEDURE — 1200000000 HC SEMI PRIVATE

## 2022-01-09 PROCEDURE — 6370000000 HC RX 637 (ALT 250 FOR IP): Performed by: INTERNAL MEDICINE

## 2022-01-09 PROCEDURE — 6370000000 HC RX 637 (ALT 250 FOR IP): Performed by: NURSE PRACTITIONER

## 2022-01-09 PROCEDURE — 99232 SBSQ HOSP IP/OBS MODERATE 35: CPT | Performed by: INTERNAL MEDICINE

## 2022-01-09 PROCEDURE — 6360000002 HC RX W HCPCS: Performed by: NURSE PRACTITIONER

## 2022-01-09 RX ORDER — OXYCODONE HYDROCHLORIDE 15 MG/1
15 TABLET ORAL
Status: DISCONTINUED | OUTPATIENT
Start: 2022-01-09 | End: 2022-01-10

## 2022-01-09 RX ORDER — DOCUSATE SODIUM 100 MG/1
100 CAPSULE, LIQUID FILLED ORAL 2 TIMES DAILY
Status: DISCONTINUED | OUTPATIENT
Start: 2022-01-09 | End: 2022-01-10 | Stop reason: HOSPADM

## 2022-01-09 RX ADMIN — OXYCODONE HYDROCHLORIDE 80 MG: 40 TABLET, FILM COATED, EXTENDED RELEASE ORAL at 20:47

## 2022-01-09 RX ADMIN — CITALOPRAM HYDROBROMIDE 20 MG: 20 TABLET ORAL at 08:14

## 2022-01-09 RX ADMIN — OXYCODONE HYDROCHLORIDE 15 MG: 15 TABLET ORAL at 18:32

## 2022-01-09 RX ADMIN — DEXAMETHASONE 4 MG: 4 TABLET ORAL at 08:15

## 2022-01-09 RX ADMIN — Medication 10000 MCG: at 04:32

## 2022-01-09 RX ADMIN — OXYCODONE HYDROCHLORIDE 15 MG: 15 TABLET ORAL at 14:19

## 2022-01-09 RX ADMIN — DEXAMETHASONE 4 MG: 4 TABLET ORAL at 18:32

## 2022-01-09 RX ADMIN — DOCUSATE SODIUM 100 MG: 100 CAPSULE ORAL at 14:42

## 2022-01-09 RX ADMIN — ALPRAZOLAM 0.5 MG: 0.5 TABLET ORAL at 13:24

## 2022-01-09 RX ADMIN — ALPRAZOLAM 0.5 MG: 0.5 TABLET ORAL at 08:15

## 2022-01-09 RX ADMIN — DOCUSATE SODIUM 100 MG: 100 CAPSULE ORAL at 20:47

## 2022-01-09 RX ADMIN — OXYCODONE HYDROCHLORIDE 80 MG: 40 TABLET, FILM COATED, EXTENDED RELEASE ORAL at 08:15

## 2022-01-09 RX ADMIN — OXYCODONE HYDROCHLORIDE 15 MG: 15 TABLET ORAL at 22:45

## 2022-01-09 ASSESSMENT — PAIN SCALES - GENERAL
PAINLEVEL_OUTOF10: 8
PAINLEVEL_OUTOF10: 7
PAINLEVEL_OUTOF10: 7
PAINLEVEL_OUTOF10: 6
PAINLEVEL_OUTOF10: 6
PAINLEVEL_OUTOF10: 7
PAINLEVEL_OUTOF10: 8

## 2022-01-09 ASSESSMENT — PAIN - FUNCTIONAL ASSESSMENT
PAIN_FUNCTIONAL_ASSESSMENT: PREVENTS OR INTERFERES SOME ACTIVE ACTIVITIES AND ADLS
PAIN_FUNCTIONAL_ASSESSMENT: PREVENTS OR INTERFERES SOME ACTIVE ACTIVITIES AND ADLS

## 2022-01-09 ASSESSMENT — PAIN DESCRIPTION - LOCATION
LOCATION: BUTTOCKS;HIP;GROIN
LOCATION: BUTTOCKS;HIP;GROIN

## 2022-01-09 ASSESSMENT — PAIN DESCRIPTION - ORIENTATION
ORIENTATION: LEFT
ORIENTATION: LEFT

## 2022-01-09 ASSESSMENT — PAIN DESCRIPTION - PAIN TYPE
TYPE: ACUTE PAIN
TYPE: ACUTE PAIN

## 2022-01-09 ASSESSMENT — PAIN DESCRIPTION - FREQUENCY
FREQUENCY: CONTINUOUS
FREQUENCY: CONTINUOUS

## 2022-01-09 ASSESSMENT — PAIN DESCRIPTION - ONSET
ONSET: ON-GOING
ONSET: ON-GOING

## 2022-01-09 ASSESSMENT — PAIN DESCRIPTION - PROGRESSION
CLINICAL_PROGRESSION: NOT CHANGED
CLINICAL_PROGRESSION: NOT CHANGED

## 2022-01-09 ASSESSMENT — PAIN DESCRIPTION - DESCRIPTORS
DESCRIPTORS: ACHING;SHARP
DESCRIPTORS: ACHING;SHARP

## 2022-01-09 NOTE — PROGRESS NOTES
[facility]  Office: [phone]  Holger Parker, DO, Ministerio Jane, DO, Audra Boast, DO, Belkis Ge Blood, DO, Blanquita Dumont MD, Anu Naik MD, Ace Potter MD, Boogie Randolph MD, Ana Rasheed MD, Felecia Reilly MD, Isa Sharp MD, Young Hercules DO, Jackie Thurman DO, Irene Romo MD,  Marcio Jhaveri DO, Philemon Simmonds, MD, Creed Ahumada, MD, Marlow Cowden, MD, Bernadette Perez MD, Leeanna Gonzalez MD, Jenna Olivo MD, Yvonne Armstrong MD, Mari Nicole, [facility], [facility], [facility], Maria M Vera, CNP, Joel Nelson, CNS, Alexa Ramirez, CNP, Hernan Bean, CNP, Jhon Sahu, CNP, Parker Chambers, CNP, Reta Lantigua, CNP, Nohemi Raymond PA-C, Ginny Rushing [facility], Diamond Napoles, [facility], Efra Medina, CNP, Baldwin Essex, CNP, Francis Grover, CNP, Antonietta Dias, CNP, Rashida Huerta CNP, Gaynelle Pallas, [facility]    Progress Note         1/10/2022    2:01 PM    Name:   Lonnie Cole  MRN:     [id number]     [place]:      [de-identified]   Room:   2004/2004-02  IP Day:  5  Admit Date:  1/5/2022 12:51 PM    PCP:   BOBBY Velasquez  Code Status:  Full Code    Subjective:     C/C:   Chief Complaint   Patient presents with    Back Pain     down left leg. cancer pt     Interval History Status:   Back, left hip and left lower leg pain is tolerable after starting Dilaudid PCA pump, it comes back when he tries to move  Orthopedics recommends patient to be transferred to facility where there is orthopedic oncology, at [facility] for suspected left acetabular pathological fracture due to metastasis  Oncology got port placement  1/7/2022 to start palliative chemo as outpatient  Discussed with patient earlier since palliative attending wanted to start him on methadone after discharge, patient requesting not to start methadone since it would interfere with Xanax and he definitely wants to continue Xanax for anxiety  Patient wants to start palliative chemo then decide about going to [facility] or [place] if needed      Brief History:   Vickie Carrel is a 46 y.o. Non- / non  male who presents with Back Pain (down left leg. cancer pt)   and is admitted to the hospital for the management of Intractable pain.     Presented to the emergency room from the cancer center for pain. 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 CT of the chest shows right suprahilar region of the lungs suggesting possible lung primary. PET scan performed on 12/30/2021 is concerning for thyroid malignancy and CT scan with contrast of the head, neck, abdomen and pelvis has been recommended. Palliative radiation was started on 12/17/2021 through 12/23. Plan is to start palliative systemic chemotherapy. He has not yet seen an orthopedist.  Pain is constant and radiates down the left leg. His home medications include OxyContin 80 mg twice daily, oxycodone 15 mg every 4 hours as needed and Xanax which have been ineffective. He was sent here to be admitted for pain control with Dilaudid PCA pump.       Review of Systems:     Constitutional:  negative for chills, fevers, sweats  Respiratory:  negative for cough, dyspnea on exertion, shortness of breath, wheezing  Cardiovascular:  negative for chest pain, chest pressure/discomfort, lower extremity edema, palpitations  Gastrointestinal:  negative for abdominal pain, +constipation, denies nausea, vomiting  Neurological:  negative for dizziness, headache  + Lower back, left hip and left leg pain more on movements  Medications: Allergies:     Allergies   Allergen Reactions    Morphine Itching     PT DENIES       Current Meds:   Scheduled Meds:    oxyCODONE  40 mg Oral BID    magnesium hydroxide  30 mL Oral Daily    docusate sodium  100 mg Oral BID    diclofenac sodium  4 g Topical TID    citalopram  20 mg Oral Daily    dexamethasone  4 mg Oral BID WC    oxyCODONE  80 mg Oral Q12H    sodium chloride flush  5-40 mL IntraVENous 2 times per day    enoxaparin  40 mg SubCUTAneous Daily    nicotine  1 patch TransDERmal Daily     Continuous Infusions:    sodium chloride Stopped (22 727)     PRN Meds: oxyCODONE, ALPRAZolam, sennosides-docusate sodium, naloxone, sodium chloride flush, sodium chloride, potassium chloride **OR** potassium alternative oral replacement **OR** potassium chloride, magnesium sulfate, polyethylene glycol, acetaminophen **OR** acetaminophen    Data:     Past Medical History:   has a past medical history of Abscess, Anxiety, Cellulitis due to MRSA, Chronic back pain, Depression, Hypertension, Metastatic cancer ([facility] Multiple substance abuse ([facility] and OCD (obsessive compulsive disorder). Social History:   reports that he has quit smoking. His smoking use included cigarettes. He has a 12.50 pack-year smoking history. He has never used smokeless tobacco. He reports current drug use. Drug: Marijuana Ar[place]). He reports that he does not drink alcohol. Family History:   Family History   Problem Relation Age of Onset    Diabetes Mother     Hearing Loss Father     High Blood Pressure Father        Vitals:  /80   Pulse 63   Temp 97.3 °F (36.3 °C) (Axillary)   Resp 18   Ht 5' 11\" (1.803 m)   Wt 210 lb 3.2 oz (95.3 kg)   SpO2 94%   BMI 29.32 kg/m²   Temp (24hrs), Av.6 °F (36.4 °C), Min:97.3 °F (36.3 °C), Max:98.1 °F (36.7 °C)    No results for input(s): POCGLU in the last 72 hours. I/O (24Hr): Intake/Output Summary (Last 24 hours) at 1/10/2022 1401  Last data filed at 1/10/2022 0641  Gross per 24 hour   Intake 1363.06 ml   Output 1450 ml   Net -86.94 ml       Labs:  Hematology:  No results for input(s): WBC, RBC, HGB, HCT, MCV, MCH, MCHC, RDW, PLT, MPV, SEDRATE, CRP, INR, DDIMER, [id number], LABABSO in the last 72 hours.     Invalid input(s): PT  Chemistry:  No results for input(s): NA, K, CL, CO2, GLUCOSE, BUN, CREATININE, MG, ANIONGAP, LABGLOM, GFRAA, CALCIUM, CAION, PHOS, PSA, PROBNP, TROPHS, CKTOTAL, CKMB, CKMBINDEX, MYOGLOBIN, DIGOXIN, LACTACIDWB in the last 72 hours. No results for input(s): PROT, LABALBU, LABA1C, F6UGLML, X8JXEGB, FT4, TSH, AST, ALT, LDH, GGT, ALKPHOS, LABGGT, BILITOT, BILIDIR, AMMONIA, AMYLASE, LIPASE, LACTATE, CHOL, HDL, LDLCHOLESTEROL, CHOLHDLRATIO, TRIG, VLDL, WQK27OC, PHENYTOIN, PHENYF, URICACID, POCGLU in the last 72 hours. ABG:  Lab Results   Component Value Date    FIO2 ROOM AIR 08/20/2014     Lab Results   Component Value Date/Time    SPECIAL NOT REPORTED 09/28/2017 12:53 AM     Lab Results   Component Value Date/Time    CULTURE VIRIDANS STREPTOCOCCUS GROUP LIGHT GROWTH (A) 09/28/2017 12:53 AM    CULTURE NO ANAEROBIC ORGANISMS ISOLATED AT 5 DAYS (A) 09/28/2017 12:53 AM    CULTURE  09/28/2017 12:53 AM     [employer] [address] [facility], [address] [place] [address] [phone]       Radiology:  No results found. Physical Examination:        General appearance:  alert, cooperative and no distress, currently comfortable with PCA pump  Mental Status:  oriented to person, place and time and anxious problem multiple is a crusted over there now.   Daily do more days if angry with her health for degenerative is affect  Lungs:  clear to auscultation bilaterally, normal effort  Heart:  regular rate and rhythm, no murmur  Abdomen:  soft, nontender, nondistended, normal bowel sounds, no masses, hepatomegaly, splenomegaly  Extremities:  no edema, redness, tenderness in the calves  Skin:  no gross lesions, rashes, induration  + Left lower back tenderness on palpation with mild muscle spasm,+ left pubic ramus pain on palpation  Assessment:        Hospital Problems           Last Modified POA    * (Principal) Intractable pain 1/5/2022 Yes    Metastatic cancer ([address] -suspected left acetabular pathological fracture 1/7/2022 Yes    Primary hypertension 1/5/2022 Yes    Major depressive disorder, recurrent episode, severe ([address] 1/5/2022 Yes    Anxiety 1/5/2022 Yes    Cancer related pain 1/5/2022 Yes    Tobacco use 1/5/2022 Yes    Constipation due to opioid therapy 1/10/2022 Yes          Plan:        Continue Dilaudid PCA pump   [place] and longer acting narcotics will be increased by palliative care [place]  consultation with oncology  Continue oral dexamethasone as palliative treatment   continue Flexeril for muscle spasms and Xanax for anxiety  Continue Colace,   Flector patch/diclofenac gel to painful left hip area  Oncology  planning palliative chemo, port placement has been done  Patient wants to decide for going to [facility] or [place] for orthopedic oncology later after starting palliative chemo  DVT prophylaxis while in hospital  Discharge home when pain is better controlled    Alyssa Phalen, MD  1/10/2022  2:01 PM

## 2022-01-09 NOTE — PLAN OF CARE
Problem: Falls - Risk of:  Goal: Will remain free from falls  Description: Will remain free from falls  1/9/2022 0122 by Denisa Andino RN  Outcome: Ongoing     Problem: Pain:  Goal: Pain level will decrease  Description: Pain level will decrease  1/9/2022 0122 by Denisa Andino RN  Outcome: Ongoing  Note: Pain level assessment complete. Patient educated on pain scale and control interventions  Scheduled oxycodone ER pain medication given. Dilaudid PCA pump infusing. Pain assessment Q4hrs with vitals. Pillow support. Patient instructed to call out with new onset of pain or unrelieved pain  Will continue to monitor.

## 2022-01-09 NOTE — PLAN OF CARE
Problem: Falls - Risk of:  Goal: Will remain free from falls  Description: Will remain free from falls  1/9/2022 1155 by Meri Alexander RN  Outcome: Ongoing  1/9/2022 0122 by Juan Antonio Ren RN  Outcome: Ongoing  Goal: Absence of physical injury  Description: Absence of physical injury  Outcome: Ongoing     Problem: Pain:  Goal: Pain level will decrease  Description: Pain level will decrease  1/9/2022 1155 by Meri Alexander RN  Outcome: Ongoing  1/9/2022 0122 by Juan Antonio Ren RN  Outcome: Ongoing  Note: Pain level assessment complete. Patient educated on pain scale and control interventions  Scheduled oxycodone ER pain medication given. Dilaudid PCA pump infusing. Pain assessment Q4hrs with vitals. Pillow support. Patient instructed to call out with new onset of pain or unrelieved pain  Will continue to monitor.     Goal: Control of acute pain  Description: Control of acute pain  Outcome: Ongoing  Goal: Control of chronic pain  Description: Control of chronic pain  Outcome: Ongoing

## 2022-01-09 NOTE — PROGRESS NOTES
Today's Date: 1/9/2022  Patient Name: Jessica Rodriguez  Date of admission: 1/5/2022 12:51 PM  Patient's age: 46 y.o., 1970  Admission Dx: Intractable pain [R52]    Reason for Consult: metastatic adenocarcinoma  Requesting Physician: Benjamin Dillard MD    CHIEF COMPLAINT:    Chief Complaint   Patient presents with    Back Pain     down left leg. cancer pt       History Obtained From:  patient and chart    Subjective    Patient on PCA pump still having severe pain but feeling better  Patient does not want to be transferred to [facility] to be seen by oncology orthopedic at this time he preferred to do it as an outpatient    HISTORY OF PRESENT ILLNESS:      Jessica Rodriguez is a 46 y.o. male who is admitted to the hospital on 1/5/2022  for pain control. Patient has a diagnosis of stage IV metastatic adenocarcinoma with most likely lung primary and was planning to get started on palliative chemotherapy. He has a large metastasis to his left pelvic region and has received bilateral injection therapy however his pain has been not well controlled despite on high pain medications. He had a CT of the hip at [facility] which showed possible acetabular fracture. Patient yesterday was seen by palliative care in the office and was sent to ER for further evaluation inpatient management of his pain. Past Medical History:   has a past medical history of Abscess, Anxiety, Cellulitis due to MRSA, Chronic back pain, Depression, Hypertension, Metastatic cancer ([facility] Multiple substance abuse ([facility] and OCD (obsessive compulsive disorder). Past Surgical History:   has a past surgical history that includes shoulder surgery (Right); Eye surgery; CT BIOPSY SUPERFICIAL BONE PERCUTANEOUS (12/9/2021); and IR PORT PLACEMENT > 5 YEARS (1/7/2022). Medications:    Prior to Admission medications    Medication Sig Start Date End Date Taking?  Authorizing Provider   ALPRAZolam Raegan Caldwell) 1 MG tablet Take 1 tablet by mouth every 6 hours as needed for Anxiety for up to 30 days. 12/29/21 1/28/22 Yes Marguerite Allison MD   citalopram (CELEXA) 20 MG tablet Take 1 tablet by mouth daily 12/24/21  Yes Yoel Bergeron DO   polyethylene glycol (GLYCOLAX) 17 g packet Take 17 g by mouth daily as needed for Constipation 12/23/21 1/22/22 Yes Dominik Bergeron DO   dexamethasone (DECADRON) 4 MG tablet Take 1 tablet by mouth 2 times daily (with meals) for 20 days 12/23/21 1/12/22 Yes Dominik Bergeron DO   lisinopril (PRINIVIL;ZESTRIL) 5 MG tablet Take 1 tablet by mouth daily 12/11/21  Yes Deanna Fernandez MD   gabapentin (NEURONTIN) 100 MG capsule Take 1 capsule by mouth 3 times daily for 30 days.  12/10/21 1/9/22 Yes Deanna Fernandez MD   melatonin (RA MELATONIN) 3 MG TABS tablet Take 1 tablet by mouth nightly as needed (insomnia) 9/15/21  Yes Marlene Whelan DO     Current Facility-Administered Medications   Medication Dose Route Frequency Provider Last Rate Last Admin    oxyCODONE (OXY-IR) immediate release tablet 15 mg  15 mg Oral Q2H PRN Omar Fox MD   15 mg at 01/09/22 1419    docusate sodium (COLACE) capsule 100 mg  100 mg Oral BID Omar Fox MD   100 mg at 01/09/22 1442    diclofenac sodium (VOLTAREN) 1 % gel 4 g  4 g Topical TID Karri Carreno MD   4 g at 01/08/22 1258    naloxone [facility]) injection 0.4 mg  0.4 mg IntraVENous PRN Omar Fox MD        ALPRAZolam Donnajean Perish) tablet 0.5 mg  0.5 mg Oral TID PRN Omar Fox MD   0.5 mg at 01/09/22 1324    HYDROmorphone (DILAUDID) 200 mcg/mL PCA   IntraVENous Continuous BOBBY Gonsalves - CNP 0 mL/hr at 01/09/22 0430 10,000 mcg at 01/09/22 0432    citalopram (CELEXA) tablet 20 mg  20 mg Oral Daily BOBBY Gonsalves CNP   20 mg at 01/09/22 0814    dexamethasone (DECADRON) tablet 4 mg  4 mg Oral BID  BOBBY Gonsalves CNP   4 mg at 01/09/22 0815    oxyCODONE (OXYCONTIN) extended release tablet 80 mg  80 mg Oral Q12H Raegan Flores Graves, APRN - CNP   80 mg at 01/09/22 0815    sodium chloride flush 0.9 % injection 5-40 mL  5-40 mL IntraVENous 2 times per day Viky Poll, APRN - CNP        sodium chloride flush 0.9 % injection 10 mL  10 mL IntraVENous PRN Viky Poll, APRN - CNP        0.9 % sodium chloride infusion  25 mL IntraVENous PRN Viky Poll, APRN - CNP   Stopped at 01/05/22 1737    potassium chloride (KLOR-CON M) extended release tablet 40 mEq  40 mEq Oral PRN Viky Poll, APRN - CNP        Or    potassium bicarb-citric acid (EFFER-K) effervescent tablet 40 mEq  40 mEq Oral PRN Viky Poll, APRN - CNP        Or    potassium chloride 10 mEq/100 mL IVPB (Peripheral Line)  10 mEq IntraVENous PRN Viky Poll, APRN - CNP        magnesium sulfate 1000 mg in dextrose 5% 100 mL IVPB  1,000 mg IntraVENous PRN Viky Poll, APRN - CNP        polyethylene glycol (GLYCOLAX) packet 17 g  17 g Oral Daily PRN Viky Poll, APRN - CNP        acetaminophen (TYLENOL) tablet 650 mg  650 mg Oral Q6H PRN Viky Poll, APRN - CNP        Or    acetaminophen (TYLENOL) suppository 650 mg  650 mg Rectal Q6H PRN Viky Poll, APRN - CNP        enoxaparin (LOVENOX) injection 40 mg  40 mg SubCUTAneous Daily Viky Poll, APRN - CNP   40 mg at 01/06/22 0905    nicotine (NICODERM CQ) 21 MG/24HR 1 patch  1 patch TransDERmal Daily Viky Poll, APRN - CNP   1 patch at 01/08/22 7180       Allergies:  Morphine    Social History:   reports that he has quit smoking. His smoking use included cigarettes. He has a 12.50 pack-year smoking history. He has never used smokeless tobacco. He reports current drug use. Drug: Marijuana Fredick Copping). He reports that he does not drink alcohol. Family History: family history includes Diabetes in his mother; Hearing Loss in his father; High Blood Pressure in his father. REVIEW OF SYSTEMS:    Constitutional: No fever or chills.  No night sweats, no weight loss   Eyes: No eye discharge, double vision, or eye pain   HEENT: negative for sore mouth, sore throat, hoarseness and voice change   Respiratory: negative for cough , sputum, dyspnea, wheezing, hemoptysis, chest pain   Cardiovascular: negative for chest pain, dyspnea, palpitations, orthopnea, PND   Gastrointestinal: negative for nausea, vomiting, diarrhea, constipation, abdominal pain, Dysphagia, hematemesis and hematochezia   Genitourinary: negative for frequency, dysuria, nocturia, urinary incontinence, and hematuria   Integument: negative for rash, skin lesions, bruises.    Hematologic/Lymphatic: negative for easy bruising, bleeding, lymphadenopathy, or petechiae   Endocrine: negative for heat or cold intolerance,weight changes, change in bowel habits and hair loss   Musculoskeletal: Severe debilitating left hip pain  Neurological: negative for headaches, dizziness, seizures, weakness, numbness    PHYSICAL EXAM:      /70   Pulse 80   Temp 98.1 °F (36.7 °C) (Oral)   Resp 16   Ht 5' 11\" (1.803 m)   Wt 209 lb 9.6 oz (95.1 kg)   SpO2 96%   BMI 29.23 kg/m²    Temp (24hrs), Av.7 °F (36.5 °C), Min:97.2 °F (36.2 °C), Max:98.2 °F (36.8 °C)    General appearance - well appearing, no in pain or distress   Mental status - alert and cooperative   Eyes - pupils equal and reactive, extraocular eye movements intact   Ears - bilateral TM's and external ear canals normal   Mouth - mucous membranes moist, pharynx normal without lesions   Neck - supple, no significant adenopathy   Lymphatics - no palpable lymphadenopathy, no hepatosplenomegaly   Chest - clear to auscultation, no wheezes, rales or rhonchi, symmetric air entry   Heart - normal rate, regular rhythm, normal S1, S2, no murmurs  Abdomen - soft, nontender, nondistended, no masses or organomegaly   Neurological - alert, oriented, normal speech, no focal findings or movement disorder noted   Musculoskeletal - no joint tenderness, deformity or swelling   Extremities - peripheral pulses normal, no pedal edema, no clubbing or cyanosis   Skin - normal coloration and turgor, no rashes, no suspicious skin lesions noted ,    DATA:    Labs:   CBC:   Recent Labs     01/07/22  0730   WBC 15.2*   HGB 10.9*   HCT 34.9*        BMP:   No results for input(s): NA, K, CO2, BUN, CREATININE, LABGLOM, GLUCOSE in the last 72 hours. PT/INR:   No results for input(s): PROTIME, INR in the last 72 hours. IMAGING DATA:  IR PORT PLACEMENT > 5 YEARS   Final Result   Successful ultrasound and fluoroscopy guided Port-A-Cath placement             Primary Problem  Intractable pain    Active Hospital Problems    Diagnosis Date Noted    Intractable pain [R52] 01/05/2022    Tobacco use [Z72.0] 12/21/2021    Metastatic cancer ([facility] -suspected left acetabular pathological fracture [C79.9] 12/17/2021    Cancer related pain [G89.3] 12/16/2021    Anxiety [F41.9] 10/05/2015    Major depressive disorder, recurrent episode, severe ([facility] [F33.2] 04/23/2015    Primary hypertension [I10] 08/21/2014     IMPRESSION:   1. Metastatic adenocarcinoma with left hip metastasis, was planning to start chemotherapy   2. Patient has completed palliative radiation therapy to his bone lesion  3. Severe left hip pain currently on PCA pump with possible pathologic fracture    RECOMMENDATIONS:  1. I reviewed the laboratory data, diagnosis, imaging studies and treatment recommendations with patient  2. Continue current management for his pain as per palliative care/PCA pump  3. Discussed the case with orthopedic Dr. Bubba Biggs and recommended musculoskeletal oncology> transfer patient to facility with a oncologic orthopedic possible option [facility] or [place]/however patient does not want to be transferred at this time and would like to proceed as an outpatient  4.  Plan for chemotherapy as outpatient   5.    nonweightbearing on the left lower extremity, walker to ambulate with the help of physical therapy      Discussed with pt  and Nurse/orhto    Thank you for asking us to see this patient.    Gloria Villagomez MD  Hematologist/Medical Oncologist        This note is created with the assistance of a speech recognition program.  While intending to generate a document that actually reflects the content of the visit, the document can still have some errors including those of syntax and sound a like substitutions which may escape proof reading. It such instances, actual meaning can be extrapolated by contextual diversion.

## 2022-01-10 VITALS
SYSTOLIC BLOOD PRESSURE: 129 MMHG | HEART RATE: 63 BPM | WEIGHT: 210.2 LBS | DIASTOLIC BLOOD PRESSURE: 80 MMHG | TEMPERATURE: 97.3 F | HEIGHT: 71 IN | BODY MASS INDEX: 29.43 KG/M2 | OXYGEN SATURATION: 94 % | RESPIRATION RATE: 18 BRPM

## 2022-01-10 PROBLEM — K59.03 CONSTIPATION DUE TO OPIOID THERAPY: Status: ACTIVE | Noted: 2022-01-10

## 2022-01-10 PROBLEM — T40.2X5A CONSTIPATION DUE TO OPIOID THERAPY: Status: ACTIVE | Noted: 2022-01-10

## 2022-01-10 LAB
EKG ATRIAL RATE: 77 BPM
EKG P AXIS: 53 DEGREES
EKG P-R INTERVAL: 122 MS
EKG Q-T INTERVAL: 360 MS
EKG QRS DURATION: 74 MS
EKG QTC CALCULATION (BAZETT): 407 MS
EKG R AXIS: 59 DEGREES
EKG T AXIS: 35 DEGREES
EKG VENTRICULAR RATE: 77 BPM

## 2022-01-10 PROCEDURE — 6370000000 HC RX 637 (ALT 250 FOR IP): Performed by: NURSE PRACTITIONER

## 2022-01-10 PROCEDURE — 97164 PT RE-EVAL EST PLAN CARE: CPT

## 2022-01-10 PROCEDURE — 93005 ELECTROCARDIOGRAM TRACING: CPT

## 2022-01-10 PROCEDURE — 99233 SBSQ HOSP IP/OBS HIGH 50: CPT | Performed by: NURSE PRACTITIONER

## 2022-01-10 PROCEDURE — 97116 GAIT TRAINING THERAPY: CPT

## 2022-01-10 PROCEDURE — 97168 OT RE-EVAL EST PLAN CARE: CPT

## 2022-01-10 PROCEDURE — 97535 SELF CARE MNGMENT TRAINING: CPT

## 2022-01-10 PROCEDURE — 6360000002 HC RX W HCPCS: Performed by: NURSE PRACTITIONER

## 2022-01-10 PROCEDURE — 99233 SBSQ HOSP IP/OBS HIGH 50: CPT | Performed by: INTERNAL MEDICINE

## 2022-01-10 PROCEDURE — 6370000000 HC RX 637 (ALT 250 FOR IP): Performed by: INTERNAL MEDICINE

## 2022-01-10 PROCEDURE — 97530 THERAPEUTIC ACTIVITIES: CPT

## 2022-01-10 RX ORDER — OXYCODONE HYDROCHLORIDE 80 MG/1
80 TABLET, FILM COATED, EXTENDED RELEASE ORAL EVERY 12 HOURS
Qty: 28 EACH | Refills: 0 | Status: SHIPPED | OUTPATIENT
Start: 2022-01-10 | End: 2022-01-24

## 2022-01-10 RX ORDER — ALPRAZOLAM 1 MG/1
1 TABLET ORAL 3 TIMES DAILY PRN
Status: DISCONTINUED | OUTPATIENT
Start: 2022-01-10 | End: 2022-01-10 | Stop reason: HOSPADM

## 2022-01-10 RX ORDER — NALOXONE HYDROCHLORIDE 0.4 MG/ML
0.4 INJECTION, SOLUTION INTRAMUSCULAR; INTRAVENOUS; SUBCUTANEOUS PRN
Qty: 0.4 ML | Refills: 1 | Status: SHIPPED | OUTPATIENT
Start: 2022-01-10

## 2022-01-10 RX ORDER — OXYCODONE HCL 40 MG/1
40 TABLET, FILM COATED, EXTENDED RELEASE ORAL 2 TIMES DAILY
Status: DISCONTINUED | OUTPATIENT
Start: 2022-01-10 | End: 2022-01-10 | Stop reason: HOSPADM

## 2022-01-10 RX ORDER — NICOTINE 21 MG/24HR
1 PATCH, TRANSDERMAL 24 HOURS TRANSDERMAL DAILY
Qty: 30 PATCH | Refills: 0 | Status: SHIPPED | OUTPATIENT
Start: 2022-01-11

## 2022-01-10 RX ORDER — OXYCODONE HYDROCHLORIDE 20 MG/1
20 TABLET ORAL EVERY 4 HOURS PRN
Qty: 84 TABLET | Refills: 0 | Status: SHIPPED | OUTPATIENT
Start: 2022-01-10 | End: 2022-01-24

## 2022-01-10 RX ORDER — DOCUSATE SODIUM 100 MG/1
100 CAPSULE, LIQUID FILLED ORAL 2 TIMES DAILY
Qty: 20 CAPSULE | Refills: 0 | Status: SHIPPED | OUTPATIENT
Start: 2022-01-10 | End: 2022-01-20

## 2022-01-10 RX ORDER — OXYCODONE HCL 40 MG/1
40 TABLET, FILM COATED, EXTENDED RELEASE ORAL EVERY 12 HOURS
Qty: 28 EACH | Refills: 0 | Status: SHIPPED | OUTPATIENT
Start: 2022-01-10 | End: 2022-01-24

## 2022-01-10 RX ORDER — SENNA AND DOCUSATE SODIUM 50; 8.6 MG/1; MG/1
1 TABLET, FILM COATED ORAL 2 TIMES DAILY PRN
Status: DISCONTINUED | OUTPATIENT
Start: 2022-01-10 | End: 2022-01-10 | Stop reason: HOSPADM

## 2022-01-10 RX ORDER — OXYCODONE HCL 40 MG/1
40 TABLET, FILM COATED, EXTENDED RELEASE ORAL 2 TIMES DAILY
Qty: 28 EACH | Refills: 0 | Status: SHIPPED | OUTPATIENT
Start: 2022-01-10 | End: 2022-01-10

## 2022-01-10 RX ADMIN — ALPRAZOLAM 0.5 MG: 0.5 TABLET ORAL at 00:38

## 2022-01-10 RX ADMIN — CITALOPRAM HYDROBROMIDE 20 MG: 20 TABLET ORAL at 09:08

## 2022-01-10 RX ADMIN — ALPRAZOLAM 1 MG: 1 TABLET ORAL at 14:00

## 2022-01-10 RX ADMIN — OXYCODONE HYDROCHLORIDE 15 MG: 15 TABLET ORAL at 06:01

## 2022-01-10 RX ADMIN — OXYCODONE HYDROCHLORIDE 15 MG: 15 TABLET ORAL at 09:14

## 2022-01-10 RX ADMIN — DEXAMETHASONE 4 MG: 4 TABLET ORAL at 09:08

## 2022-01-10 RX ADMIN — OXYCODONE HYDROCHLORIDE 20 MG: 15 TABLET ORAL at 14:33

## 2022-01-10 RX ADMIN — OXYCODONE HYDROCHLORIDE 80 MG: 40 TABLET, FILM COATED, EXTENDED RELEASE ORAL at 09:08

## 2022-01-10 RX ADMIN — ENOXAPARIN SODIUM 40 MG: 40 INJECTION SUBCUTANEOUS at 09:09

## 2022-01-10 RX ADMIN — ALPRAZOLAM 0.5 MG: 0.5 TABLET ORAL at 08:05

## 2022-01-10 RX ADMIN — DOCUSATE SODIUM 100 MG: 100 CAPSULE ORAL at 09:08

## 2022-01-10 RX ADMIN — OXYCODONE HYDROCHLORIDE 15 MG: 15 TABLET ORAL at 11:51

## 2022-01-10 RX ADMIN — OXYCODONE HYDROCHLORIDE 40 MG: 40 TABLET, FILM COATED, EXTENDED RELEASE ORAL at 12:55

## 2022-01-10 ASSESSMENT — PAIN DESCRIPTION - PAIN TYPE: TYPE: ACUTE PAIN

## 2022-01-10 ASSESSMENT — PAIN DESCRIPTION - FREQUENCY: FREQUENCY: CONTINUOUS

## 2022-01-10 ASSESSMENT — PAIN DESCRIPTION - LOCATION: LOCATION: BUTTOCKS;HIP;GROIN

## 2022-01-10 ASSESSMENT — PAIN - FUNCTIONAL ASSESSMENT: PAIN_FUNCTIONAL_ASSESSMENT: PREVENTS OR INTERFERES SOME ACTIVE ACTIVITIES AND ADLS

## 2022-01-10 ASSESSMENT — PAIN SCALES - GENERAL
PAINLEVEL_OUTOF10: 7
PAINLEVEL_OUTOF10: 7
PAINLEVEL_OUTOF10: 8
PAINLEVEL_OUTOF10: 7

## 2022-01-10 ASSESSMENT — PAIN DESCRIPTION - ORIENTATION: ORIENTATION: LEFT

## 2022-01-10 ASSESSMENT — PAIN DESCRIPTION - DESCRIPTORS: DESCRIPTORS: ACHING;SHARP

## 2022-01-10 ASSESSMENT — PAIN DESCRIPTION - PROGRESSION: CLINICAL_PROGRESSION: NOT CHANGED

## 2022-01-10 ASSESSMENT — PAIN DESCRIPTION - ONSET: ONSET: ON-GOING

## 2022-01-10 NOTE — PROGRESS NOTES
[facility]  Office: [phone]  Leroy Zeng, DO, Love Favre, DO, Grayland Runner, DO, Liat Roque, DO, Lawrence Grant MD, Roopa Barrera MD, Kendra Paulino MD, Tarah Finn MD, Carissa Valenzuela MD, Teddy Jasso MD, Edward Arango MD, Katelyn Fletcher DO, Margaret Guerrero DO, Murlean Gowers, MD,  Nicole Rod DO, Abdiel Carrillo MD, Africa Durham MD, Jonel Ordonez MD, Katya Boogie MD, Rico Riley MD, Colt Moura MD, Maverick Saunders MD, Eddie Gabriel [facility], [facility], CNP, Deepak Gutierrez, CNP, Jessica Barnes, CNS, Esa Raymond, CNP, Ginger Barkley, CNP, Zhou Grady, CNP, Malaika Sims, CNP, Luci Gibson, CNP, CANDICE KendrickC, Raquel Cerna, [facility], Stephan Brown [facility], Siri Stevens, CNP, Jose L Madrid, CNP, Raimundo Langston CNP, Jarett Boykin, CNP, Edward Garvin, CNP, Daniel Deleon, [facility]    Progress Note         1/10/2022    2:11 PM    Name:   Mike Mcfadden  MRN:     [id number]     [place]:      [de-identified]   Room:   2004/2004-02  IP Day:  5  Admit Date:  1/5/2022 12:51 PM    PCP:   BOBBY Trimble  Code Status:  Full Code    Subjective:     C/C:   Chief Complaint   Patient presents with    Back Pain     down left leg. cancer pt     Interval History Status:   Back, left hip and left lower leg pain is tolerable after starting Dilaudid PCA pump, it comes back when he tries to move  Orthopedics recommends patient to be transferred to facility where there is orthopedic oncology, at [facility] for suspected left acetabular pathological fracture due to metastasis  Oncology got port placement  1/7/2022 to start palliative chemo as outpatient  Discussed with patient earlier since palliative attending wanted to start him on methadone after discharge, patient requesting not to start methadone since it would interfere with Xanax and he definitely wants to continue Xanax for anxiety  Patient wants to start palliative chemo then decide about going to [facility] clinic or [facility] if needed    Complains of constipation despite of getting Colace and mild pain during defecation  Brief History:   Kalee Zamudio is a 46 y.o. Non- / non  male who presents with Back Pain (down left leg. cancer pt)   and is admitted to the hospital for the management of Intractable pain.     Presented to the emergency room from the cancer center for pain. 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 CT of the chest shows right suprahilar region of the lungs suggesting possible lung primary. PET scan performed on 12/30/2021 is concerning for thyroid malignancy and CT scan with contrast of the head, neck, abdomen and pelvis has been recommended. Palliative radiation was started on 12/17/2021 through 12/23. Plan is to start palliative systemic chemotherapy. He has not yet seen an orthopedist.  Pain is constant and radiates down the left leg. His home medications include OxyContin 80 mg twice daily, oxycodone 15 mg every 4 hours as needed and Xanax which have been ineffective. He was sent here to be admitted for pain control with Dilaudid PCA pump.       Review of Systems:     Constitutional:  negative for chills, fevers, sweats  Respiratory:  negative for cough, dyspnea on exertion, shortness of breath, wheezing  Cardiovascular:  negative for chest pain, chest pressure/discomfort, lower extremity edema, palpitations  Gastrointestinal:  negative for abdominal pain, +constipation, denies nausea, vomiting  Neurological:  negative for dizziness, headache  + Lower back, left hip and left leg pain more on movements  Medications: Allergies:     Allergies   Allergen Reactions    Morphine Itching     PT DENIES       Current Meds:   Scheduled Meds:    oxyCODONE  40 mg Oral BID    magnesium hydroxide  30 mL Oral Daily    docusate sodium  100 mg Oral BID    diclofenac sodium  4 g Topical TID    citalopram  20 mg Oral Daily    dexamethasone  4 mg Oral BID WC    oxyCODONE  80 mg Oral Q12H    sodium chloride flush  5-40 mL IntraVENous 2 times per day    enoxaparin  40 mg SubCUTAneous Daily    nicotine  1 patch TransDERmal Daily     Continuous Infusions:    sodium chloride Stopped (22 0177)     PRN Meds: oxyCODONE, ALPRAZolam, sennosides-docusate sodium, naloxone, sodium chloride flush, sodium chloride, potassium chloride **OR** potassium alternative oral replacement **OR** potassium chloride, magnesium sulfate, polyethylene glycol, acetaminophen **OR** acetaminophen    Data:     Past Medical History:   has a past medical history of Abscess, Anxiety, Cellulitis due to MRSA, Chronic back pain, Depression, Hypertension, [facility] Multiple substance abuse ([email or web address] and OCD (obsessive compulsive disorder). Social History:   reports that he has quit smoking. His smoking use included cigarettes. He has a 12.50 pack-year smoking history. He has never used smokeless tobacco. He reports current drug use. Drug: Marijuana [employer]). He reports that he does not drink alcohol. Family History:   Family History   Problem Relation Age of Onset    Diabetes Mother     Hearing Loss Father     High Blood Pressure Father        Vitals:  /80   Pulse 63   Temp 97.3 °F (36.3 °C) (Axillary)   Resp 18   Ht 5' 11\" (1.803 m)   Wt 210 lb 3.2 oz (95.3 kg)   SpO2 94%   BMI 29.32 kg/m²   Temp (24hrs), Av.6 °F (36.4 °C), Min:97.3 °F (36.3 °C), Max:98.1 °F (36.7 °C)    No results for input(s): POCGLU in the last 72 hours. I/O (24Hr):     Intake/Output Summary (Last 24 hours) at 1/10/2022 1411  Last data filed at 1/10/2022 0641  Gross per 24 hour   Intake 1363.06 ml   Output 1450 ml   Net -86.94 ml       Labs:  Hematology:  No results for input(s): WBC, RBC, HGB, HCT, MCV, MCH, MCHC, RDW, PLT, MPV, SEDRATE, CRP, INR, DDIMER, LP9KUZDF, LABABSO in the last 72 hours. Invalid input(s): PT  Chemistry:  No results for input(s): NA, K, CL, CO2, GLUCOSE, BUN, CREATININE, MG, ANIONGAP, LABGLOM, GFRAA, CALCIUM, CAION, PHOS, PSA, PROBNP, TROPHS, CKTOTAL, CKMB, CKMBINDEX, MYOGLOBIN, DIGOXIN, LACTACIDWB in the last 72 hours. No results for input(s): PROT, LABALBU, LABA1C, V8TBSGE, Y3JQARG, FT4, TSH, AST, ALT, LDH, GGT, ALKPHOS, LABGGT, BILITOT, BILIDIR, AMMONIA, AMYLASE, LIPASE, LACTATE, CHOL, HDL, LDLCHOLESTEROL, CHOLHDLRATIO, TRIG, VLDL, VKE25ED, PHENYTOIN, PHENYF, URICACID, POCGLU in the last 72 hours. ABG:  Lab Results   Component Value Date    FIO2 ROOM AIR 08/20/2014     Lab Results   Component Value Date/Time    SPECIAL NOT REPORTED 09/28/2017 12:53 AM     Lab Results   Component Value Date/Time    CULTURE VIRIDANS STREPTOCOCCUS GROUP LIGHT GROWTH (A) 09/28/2017 12:53 AM    CULTURE NO ANAEROBIC ORGANISMS ISOLATED AT 5 DAYS (A) 09/28/2017 12:53 AM    CULTURE  09/28/2017 12:53 AM     [address] [phone]       Radiology:  No results found. Physical Examination:        General appearance:  alert, cooperative and no distress, currently comfortable with PCA pump  Mental Status:  oriented to person, place and time and anxious problem multiple is a crusted over there now.   Daily do more days if angry with her health for degenerative is affect  Lungs:  clear to auscultation bilaterally, normal effort  Heart:  regular rate and rhythm, no murmur  Abdomen:  soft, nontender, nondistended, normal bowel sounds, no masses, hepatomegaly, splenomegaly  Extremities:  no edema, redness, tenderness in the calves  Skin:  no gross lesions, rashes, induration  + Left lower back tenderness on palpation with mild muscle spasm,+ left pubic ramus pain on palpation  Assessment:        Hospital Problems           Last Modified POA    * (Principal) Intractable pain 1/5/2022 Yes    Metastatic cancer ([address] -suspected left acetabular pathological fracture 1/7/2022 Yes    Primary hypertension 1/5/2022 Yes    Major depressive disorder, recurrent episode, severe ([address] 1/5/2022 Yes    Anxiety 1/5/2022 Yes    Cancer related pain 1/5/2022 Yes    Tobacco use 1/5/2022 Yes    Constipation due to opioid therapy 1/10/2022 Yes          Plan:        Hold Dilaudid PCA pump and discontinue if oral pain medicines are helping  [place] and longer acting narcotics will be increased by palliative care in consultation with oncology  Continue oral dexamethasone as palliative treatment   continue Flexeril for muscle spasms and Xanax for anxiety  Continue Colace, add milk of magnesia  Flector patch/diclofenac gel to painful left hip area  Oncology  planning palliative chemo, port placement has been done  Patient wants to decide for going to [facility] or [place] for orthopedic oncology later after starting palliative chemo  DVT prophylaxis while in hospital  Discharge home today if pain better with increasing dose of oral narcotics    DME hospital bed  Cecilia Garcia was evaluated today and a DME order was entered for variable height hospital bed because he requires assistance for positioning needs not possible in an ordinary bed, complexity of body positioning needs, requirement of elevation of head of bed more than 30 degrees for the diagnosis of Left hip fracture. Patient needs variability of bed height to perform patient transfers and for eating, personal cares and hygiene. Current body Weight: 210 lb 3.2 oz (95.3 kg). The need for this equipment was discussed with the patient and he understands and is in agreement. DME BEDSIDE COMMODE    Cecilia Garcia requires a drop arm bedside commode due to being confined to one level of the home and is physically incapable of utilizing regular toilet facilities. Drop arm required to facilitate transferring. Current body weight is Weight: 210 lb 3.2 oz (95.3 kg).     DME manual wheelchair  Gaby Webber was evaluated today and a DME order was entered for a lightweight wheelchair because he requires this to successfully complete daily living tasks of personal cares and ambulating. A lightweight wheelchair is necessary due to the patient's impaired ambulation and mobility restrictions. The patient would not be able to resolve these daily living tasks using a cane or walker. The patient can self-propel a lightweight wheelchair safely in their home and can maneuver within their home with adequate access. The patient cannot self-propel in a standard wheelchair. The need for this equipment was discussed with the patient and he understands, is in agreement, and has not expressed an unwillingness to use the wheelchair.          Total time spent more than 50 minutes on all above      Jamal Ramirez MD  1/10/2022  2:11 PM

## 2022-01-10 NOTE — PLAN OF CARE
Met back with patient at intervals through the day. Pt alert, oriented and is carrying on medical business per phone with insurance company, hospital bed company for delivery and with pharmacy. Pt relates that hospital bed was going to be delivered today but they held off till tomorrow so that they can get bed out of house to make room for hospital bed. Reviewed with Erma Gudino again the dosages of the pain medications. Encouraged gabapentin as ordered. Let Erma Gudino know that he is at a low dose and we may need to adjust that medication to help with his comfort. Encouraged oral fluids and stool softeners/ laxatives to maintain bowel movements without straining. I spoke with Pharmacist Nhi at [facility] and let her know that we are calling to see if prior auth is needed on patient's medication oxycodone ER 80 mg or 40 mg. Also if prior auth is needed on oxy-ir 20 mg tablets. Nhi relates that we are 3 days early for the oxycodone ER 80 mg tablets so those can not be run today. She relates that the oxycodone ER 40 mg and oxy-ir 20 mg both prescriptions have run through insurance without needing prior auth. I thanked Nhi and gave her palliative care number should she need any assistance related to this meds that were ordered. I called and spoke with Anna Liang at [facility] regarding referral.  Anna Liang relates that they have an order from previous as well. Anna Liang spoke with Erma Gudino prior to his appointment with [address] [place] [address]. I updated her on patient condition, plan to go home with palliative care services with Leticia Lay. Reviewed the hospitalization course and what pain medications we are ordering for discharge. I let her know that my nurse practitioner has sent scripts to patient's pharmacy for a 14 day supply. Anna Liang relates she is holding an appointment for 1/19/22 at 0830 for them to come out to patient's home for consultation.     I notified Erma Gudino of appointment date and time

## 2022-01-10 NOTE — PROGRESS NOTES
..    Palliative Care Progress Note    NAME:  Alma Soler  MEDICAL RECORD NUMBER:  [id number]  AGE: 46 y.o. GENDER: male  : 1970  TODAY'S DATE:  1/10/2022    Reason for Consult:  symptom management, pain management and goals of care  History of Present Illness     The patient is a 46 y.o. Non- / non  male who presents with Back Pain (down left leg. cancer pt)      Referred to Palliative Care by  [x] Physician   [] Nursing  [] Family Request   [] Other:       He was admitted to the primary service for Intractable pain [R52]. His hospital course has been associated with intractable pain, metastatic cancer, suspected left acetabular pathologic fracture, hypertension, major depressive disorder-recurrent episode, anxiety, tobacco use and constipation due to opioid therapy. The patient has a complicated medical history and has been hospitalized since 2022 12:51 PM.    OVERNIGHT EVENTS:  Stable overnight. pca pump d/c'd this am.  Plan for discharge to home today. PCP started patient on 80 mg ER OxyContin, and 15 mg oxycodone every 2 hours as needed. /80   Pulse 63   Temp 97.3 °F (36.3 °C) (Axillary)   Resp 18   Ht 5' 11\" (1.803 m)   Wt 210 lb 3.2 oz (95.3 kg)   SpO2 94%   BMI 29.32 kg/m²     Assessment        REVIEW OF SYSTEMS    []   UNABLE TO OBTAIN:     Constitutional:  []   Chills   []  Fatigue   []  Fevers   []  Malaise   [x]  Weight loss   [] Other:     Respiratory:   []  Cough    []  Shortness of breath    []  Chest pain    [] Other:     Cardiovascular:   []  Chest pain  []  Dyspnea    []  Exertional chest pressure/discomfort     [] Fatigue      []  Palpitations    []  Syncope   [] Other:     Gastrointestinal:   []  Abdominal pain   [x]  Constipation    []  Diarrhea    []   Dysphagia   []  Reflux             []  Vomiting   [] Other:  Increased pain when trying to have bowel movement.   Encouraged patient to add stool softners and laxative to promote easy evacuation of stool on a regular basis since he is on strong constipating pain medications. Genitourinary:  []  Dysuria     []  Frequency   []  Hematuria   [] Nocturia   []  Urinary incontinence   [x] Other: urinating in urinal.     Musculoskeletal:   [] Back pain    [x]  Muscle weakness   []  Myalgias    []  Neck pain   []  Stiff joints   [x]  Other: pain left buttock, left leg, radiates to scrotum, radiates down to knee and sometimes down to foot. Behavioral/Psych:   [x] Anxiety    []  Depression     []  Mood swings   [] Other:     PHYSICAL ASSESSMENT:     General: [x]  Oriented x3      [] well appearing      [] Intubated      [] ill appearing      [x] Other: chronically ill appearing    Mental Status: [x] normal mental status exam      [] drowsy      [] Confused      [] Other:     Cardiovascular: [x]  Regular rate/rhythm      [] Arrhythmia      [] Other:     Chest: [x] Effort normal      [x] lungs clear      [] respiratory distress      [] Tachypnea      []  Other:    Abdomen: [] Soft/non-tender      [x]  Normal appearance      [] Distended      [] Ascites      [] Other:    Neurological: [x] Normal Speech      [x] Normal Sensation      []  Deficits present:      Extremity:  [x] normal skin color/temp      [] clubbing/cyanosis      [x]  No edema      [] Other:     Palliative Performance Scale:  ___60%  Ambulation reduced; Significant disease; Can't do hobbies/housework; intake normal or reduced; occasional assist; LOC full/confusion  __x_50%  Mainly sit/lie; Extensive disease; Can't do any work; Considerable assist; intake normal or reduced; LOC full/confusion  ___40%  Mainly in bed; Extensive disease; Mainly assist; intake normal or reduced; LOC full/confusion   ___30%  Bed Bound; Extensive disease; Total care; intake reduced; LOCfull/confusion  ___20%  Bed Bound; Extensive disease; Total care; intake minimal; Drowsy/coma  ___10%  Bed Bound; Extensive disease;  Total care; Mouth care only; Drowsy/coma  ___0 Death      Plan      Palliative Interaction:    Met with patient who is alert, oriented and appreciative of all his care. He relates his pain is a 7 and is better then before. He relates this is tolerable. Discussed with patient non weight bearing for left leg. Plan is to go home today. Pt relates that he does not want Methadone \"I have had it before it does not help for my pain\"  He also relates that he \"can not have xanax while on methadone\". He relates that the xanax helps with his anxiety, depression, crying and helps his thoughts. Pt denies being suicidal, but relates coming to  with non curative cancer is hard and stressful. He relates he can not stop the xanax. He relates he worries about his parents and tries not to let them see him like this. Pt is agreeable to going home. We discussed continued palliative care at [phone] outpatient or if he would prefer palliative care to come to his home. Angeli Granado relates that palliative care in the home would help because traveling in car is painful and it is a lot on his parents to drive him places. Port is in place in right subclavian area of chest.  No redness or swelling. Patient denies allergy to morphine. He relates that he had a dose of morphine iv once and he had a skin reaction. Pt relates it was redness at iv site because iv was not in place, he does not believe it to be an \"allergy\" to morphine. He denies having an anaphylactic reaction to the medication. Notified patient that we will speak with physician to get adequate pain control set up for discharge,  Encouraged him to take xanax as he was taking prior to hospital stay. Notify doctor or palliative care nurse if he is too sedated with new meds or if pharmacy relates prior authorization is needed for pain medications. Encouraged him to take in fluids and take stool softner and laxatives to make sure that stools are easy to pass.   Notified patient we will set up with [facility]. Patient had questions about palliative care versus hospice care, and these were answered. At this time he would like to continue with palliative care. I discussed patient's refusal for methadone with Dr. Pineda Lam who had seen patient in clinic. She reports that we will likely not be able to control patient's pain with OxyContin. I further discussed with Dr. Bouchra Ervin current pain regimen prior to discharge, and explained that patient will likely not be controlled. We reviewed 24-hour PCA settings, and patient took 12.4 mg of Dilaudid. I reviewed this with the RN to confirm. Patient was also on 80 mg OxyContin ER twice daily with PCA. I discussed transitioning patient to OxyContin 120 mg ER twice daily, and oxycodone 20 mg every 4 hours as needed. Dr. Bouchra Ervin is agreeable with this plan. Patient is agreeable with this plan as well. Referral faxed to palliative care [address] today. Education/support to staff  Education/support to patient  Discharge planning/helping to coordinate care  Communications with primary service  Pharmacologic pain management  Non-pain symptom management anxiety, constipation due to opiod use  Providing support for coping/adaptation/distress of patient  Managing depression/anxiety  Transfer to less acute setting  Code status clarified: Full Code  Provided information about hospice  Pain management for comfort  Validating patient/family distress  Continued communication updates  Principle Problem/Diagnosis:  Intractable pain    Additional Assessments:  Principal Problem:    Intractable pain  Active Problems:    Primary hypertension    Major depressive disorder, recurrent episode, severe (HCC)    Anxiety    Cancer related pain    Metast[facility] ([facility] -suspected left acetabular pathological fracture    Tobacco use    Constipation due to opioid therapy  Resolved Problems:    * No resolved hospital problems.  *    1- Symptom management/ pain control     Pain Assessment: improved, rates pain as a 7 where it was a 9 before. pca stopped, home     Pain medication determined, scripts sent to outpatient pharmacy. Anxiety:  Met with patient, listened to his needs, one on one support given, reassurance given   Support patient with referral to palliative care for care at home. Dyspnea:  Respiratory status stable. Room air                          Fatigue:  Encouraged oral intake, home care for assistance in the home. Other:  Malnutrition-     We feel the patient symptoms are being controlled. his current regimen is reviewed by myself and discussed with the staff. We recommend adjusting his medications as follows  Oxycodone 80mg ER every 12 hours (to total 120 mg every 12 hours)  Oxycodone 40mg ER every 12 hours (to total 120 mg every 12 hours)  Oxy-IR 20 mg every 4 to 6 hours prn  Continue xanax 1 mg 1 tablet TID PRN  Continue colace daily and miralax prn  Add Senokot S prn      2- Goals of care evaluation   The patient goals of care are live longer, improve or maintain function/quality of life, provide comfort care/support/palliation/relieve suffering, remain at home and preserve independence/autonomy/control   Goals of care discussed with:    [x] Patient independently    [] Patient and Family    [] Family or Healthcare DPOA independently    [] Unable to discuss with patient, family/DPOA not present    3- Code Status  Full Code    4- Other recommendations  - We will continue to provide comfort and support to the patient and the family    Please call with any palliative questions or concerns. Palliative Care Team is available via perfect serve or via phone. Palliative Care will continue to follow Mr. Singer's care as needed. The note has been dictated by dragon, typing errors may be a possibility     Thank you for allowing Palliative Care to participate in the care of Mr. Berenice Rios .        Electronically signed by BOBBY Ruvalcaba - CNP  Palliative Care Team  on 1/10/2022 at 3:13 PM    Palliative care can be reached via perfect serve.

## 2022-01-10 NOTE — CARE COORDINATION
Scripts and face to face note faxed to [facility] for DME. Will be delivered to patients home.

## 2022-01-10 NOTE — PROGRESS NOTES
Occupational Therapy   Occupational Therapy Re-Assessment  Date: 1/10/2022   Patient Name: Evelina Spurling  MRN: [id number]     : 1970    Date of Service: 1/10/2022    Discharge Recommendations:  Patient would benefit from continued therapy after discharge Due to recent hospitalization and medical condition, pt would benefit from additional therapy at time of discharge to ensure safety. Please refer to the AM-PAC score for current functional status. OT Equipment Recommendations  Mobility Devices: Hospital Bed; Wheelchair  Wheelchair: Standard  Hospital Bed : Bed Rails - Partial  ADL Assistive Devices: Reacher;Long-handled Sponge;Sock-Aid Hard;Shower Chair with back; Toileting - Raised Toilet Seat with arms;Emergency Alert System;Dressing Stick  RN reports patient is medically stable for therapy treatment this date. Chart reviewed prior to treatment and patient is agreeable for therapy. All lines intact and patient positioned comfortably at end of treatment. All patient needs addressed prior to ending therapy session. Assessment   Performance deficits / Impairments: Decreased functional mobility ; Decreased safe awareness;Decreased ADL status; Decreased cognition;Decreased balance;Decreased endurance;Decreased strength;Decreased coordination  Assessment: pt reassessed this date d/t new orders for NWB; all goals reassessed and new equip recommendations made. Pt is significantly limited in all functional areas d/t pain. Prognosis: Fair  Decision Making: High Complexity  OT Education: OT Role;Plan of Care;Home Exercise Program;ADL Adaptive Strategies;Transfer Training;Equipment; Energy Conservation;Precautions  Patient Education: equip recommendations,  call light use/fall prevention, safety in function, NWB techs  REQUIRES OT FOLLOW UP: Yes  Activity Tolerance  Activity Tolerance: Patient limited by pain  Safety Devices  Safety Devices in place: Yes  Type of devices:  All fall risk precautions in place;Nurse notified; Patient at risk for falls; Left in bed;Bed alarm in place;Call light within reach;Gait belt           Patient Diagnosis(es): The primary encounter diagnosis was Intractable pain. Diagnoses of Lytic bone lesion of hip, Adenocarcinoma ([facility] Bone metastases ([facility] Carcinoma of hilus of right lung ([facility] Cancer related pain, Hypercalcemia of malignancy, Malignant neoplasm metastatic to bone [facility]), and Anxiety were also pertinent to this visit. has a past medical history of Abscess, Anxiety, Cellulitis due to MRSA, Chronic back pain, Depression, Hypertension, Metastatic cancer ([facility] Multiple substance abuse ([facility] and OCD (obsessive compulsive disorder). has a past surgical history that includes shoulder surgery (Right); Eye surgery; CT BIOPSY SUPERFICIAL BONE PERCUTANEOUS (12/9/2021); and IR PORT PLACEMENT > 5 YEARS (1/7/2022).            Restrictions  Restrictions/Precautions  Restrictions/Precautions: General Precautions,Up as Tolerated,Fall Risk,Contact Precautions,Weight Bearing  Required Braces or Orthoses?: No  Lower Extremity Weight Bearing Restrictions  Left Lower Extremity Weight Bearing: Non Weight Bearing  Position Activity Restriction  Other position/activity restrictions: PCA pump, telemetry, LUE IV    Subjective   General  Chart Reviewed: Yes  Patient assessed for rehabilitation services?: Yes  Additional Pertinent Hx: metastatic adenocarcinoma to the bone with a large lytic lesion in the left pubic ramus  Family / Caregiver Present: No  Subjective  Subjective: \"I shouldn't have moved, it hurts so bad, I don't think this pain will ever go away\"  General Comment  Comments: Pt agreeable to OT eval  Patient Currently in Pain: Yes  Pain Assessment  Pain Level: 7  Vital Signs  Patient Currently in Pain: Yes  Social/Functional History  Social/Functional History  Lives With: Family (lives with elderly parents)  Type of Home: House  Home Layout: Able to Live on Main level with bedroom/bathroom,Two level (basement)  Home Access: Stairs to enter with rails  Entrance Stairs - Number of Steps: 1 step from garage  Entrance Stairs - Rails: Right  Bathroom Shower/Tub: Tub/Shower unit,Walk-in shower  Bathroom Toilet: Handicap height  Bathroom Equipment: Shower chair (no bathroom DME)  Home Equipment: Rolling walker  Receives Help From: Family  ADL Assistance: Needs assistance (Needs assistance for L sock)  Homemaking Assistance: Needs assistance  Homemaking Responsibilities: No (parents assist with cooking/cleaning/laundry)  Ambulation Assistance: Independent (uses RW at all times)  Transfer Assistance: Independent  Active : No  Patient's  Info: Parents drive or provided by [employer]  Occupation: On disability  Leisure & Hobbies: 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 Additional Comments: pt reports 1 recent fall, L hip gave out and pt fell onto corner of bed. pt with dec ability to complete tasks d/t inc hip pain. pt has meeting with pallative care       Objective   Vision: Within Functional Limits  Hearing: Within functional limits    Orientation  Overall Orientation Status: Within Functional Limits  Observation/Palpation  Posture: Good  Observation: pt. appears anxious over situation and pain level; very pleasant and agreeable to OOB mobility even though 10/10 pain  Balance  Sitting Balance: Minimal assistance  Standing Balance: Moderate assistance (x 1-2; inc. assist to maintain NWB. Pt putting L foot down to balance but standing tolerance significantly limited by pain)  Standing Balance  Comment: Pt reports he is unable to sit in a chair or recliner d/t pain, pt reports he is only comfortable in bed right now. Functional Mobility  Assist Level: Moderate assistance (x2)  Functional Mobility Comments: Pt. attempted 2 hops fwd. and 2 back to bed NWB LLE.   Pt. with good demo, however 2 hops is max tolerance to holding LLE up off floor for NWB. After this he feels he needs to rest foot on floor to relieve pain. Did not push amb. distance at this time. Static standing, NWB LLE for 2 min. with pt. keeping L foot off floor ashlyn. of time. Discussed equip needs for home to give pt options for mob: recommend w/c to use for most mob vs walker as NWB is difficult and painful. And, w/c tolerance likely poor d/t pain in sitting. Therefore, also recommending BSC and hospital bed to limit pain aggravating mob  ADL  Feeding: Independent  Grooming: Stand by assistance;Minimal assistance  UE Bathing: Moderate assistance  LE Bathing: Maximum assistance  UE Dressing: Moderate assistance  LE Dressing: Maximum assistance  Toileting: Maximum assistance  Additional Comments: Pt is limited d/t L hip pain with movement, dec ability to sit in a chair, dec strength/endurance, and dec safety awareness. OT educated pt NWB techs with ADLS and recommend BSC, w/c, and hospital bed for home d/t pt's very limited abiltiy to mobilize to bathroom at this time. Pt with Good understanding and would benefit from the equipment to maximize his safety/reduce fall risk  Tone RUE  RUE Tone: Normotonic  Tone LUE  LUE Tone: Normotonic     Bed mobility  Supine to Sit: Contact guard assistance;Minimal assistance  Sit to Supine: Contact guard assistance;Minimal assistance  Comment: pt able to perform most all bed mob on  his own at his request d/t pain/wanting to be in control of movements. Suggested leg /bed sheet to assist. Pt needing to go into standing right from sup to sit d/t max pain in hip in seated position. Reverse for sit to supine  Transfers  Sit to stand:  Moderate assistance  Stand to sit: Moderate assistance     Cognition  Problem Solving: Assistance required to generate solutions;Assistance required to implement solutions;Assistance required to identify errors made;Assistance required to correct errors made  Perception  Overall Perceptual Status: WFL     Sensation  Overall Sensation Status: WNL        LUE AROM (degrees)  LUE AROM : WFL  Left Hand AROM (degrees)  Left Hand AROM: WFL  RUE AROM (degrees)  RUE AROM : WFL  Right Hand AROM (degrees)  Right Hand AROM: WFL  LUE Strength  Gross LUE Strength: WFL  LUE Strength Comment: ~4+/5  RUE Strength  RUE Strength Comment: R shoulder limited with previous injury; shoulder 3+/5, other 4-5                   Plan   Plan  Times per week: 4-5x per week  Times per day: Daily  Current Treatment Recommendations: Strengthening,Endurance Training,Functional Mobility Training,Safety Education & Training,Pain Management,Balance Training,Self-Care / ADL,Equipment Evaluation, Education, & procurement,Patient/Caregiver Education & Training,Positioning,Home Management Training          [facility] Inpatient Daily Activity Raw Score: 16 (01/10/22 1340)  AM-PAC Inpatient ADL T-Scale Score : 35.96 (01/10/22 1340)  ADL Inpatient CMS 0-100% Score: 53.32 (01/10/22 1340)  ADL Inpatient CMS G-Code Modifier : CK (01/10/22 1340)    Goals  Short term goals  Time Frame for Short term goals: by discharge, pt to demo  Short term goal 1: I/MI for bed mob tasks with bed rails as needed  Short term goal 2: I/MI for UB ADLs and Mod A for LB ADLs with AE as needed and Good safety  Short term goal 3: SBA for ADL transfers and AD and Good safety and good adherance to NWB precaution  Short term goal 4: increase BUE strength by 1/2 grade to increase IND with self-care and be IND with HEP  Short term goal 5: pt to be IND with EC/WS, fall prevention tech, as well as DME/AE recommendations with use of handouts as needed  Long term goals  Long term goal 1: tolerate re-assessment of functional mob to add goal to POC, if appropriate  Patient Goals   Patient goals :  To have less pain       Therapy Time   Individual Concurrent Group Co-treatment   Time In 0935         Time Out 1022         Minutes 47          treatment min: 45  Co-treatment with PT warranted secondary to decreased safety and independence requiring 2 skilled therapy professionals to address individual discipline's goals. OT addressing preparation for ADL transfer, sitting balance for increased ADL performance, sitting/activity tolerance, functional reaching, environmental safety/scanning, fall prevention, functional mobility for ADL transfers, ability to sequence and follow directions and functional UE strength.        [address], OT

## 2022-01-10 NOTE — DISCHARGE SUMMARY
[facility]  Office: [address], DO, Tjlev Simpson, DO, Cruzito Guzman, DO, Alen Roque, DO, Chris Pelaez MD, Haven Goodman MD, Jesus Ribeiro MD, Stuart Givens MD, Mark Ramesh MD, Loan Acosta MD, Silvia May MD, Thang Gudino, DO, Oxana Perez, DO, Lg Jang MD,  Sumanth Mar, DO, Kevin Johnston MD, Miki Arora MD, Ann Pa MD, Jordin Gilman MD, Virginia Ribeiro MD, Tanja Menchaca MD, Hector Mora MD, Bryan Faust, [facility], [facility], CNP, Latonia Cancel, CNP, Judit Fret, CNS, Elisa Donovan, CNP, Amara Brain, CNP, Federica Cherry, CNP, Margo Estrella, CNP, Gwyn Turcios, CNP, ROBERT Callejas-SYDNEY, Pearl Vargas, DNP, Jill Wren, [facility], Paris Herrera, CNP, Mamta Amaya, CNP, Johnny Locus, CNP, Kaleb Jaimes, CNP, Josee Mater, CNP, Tessie Daily, [address] [facility]    Discharge Summary     Patient ID: Lara Krishnan  :  1970   MRN: [id number]     ACCOUNT:  [de-identified]   Patient's PCP: BOBBY Glaser  Admit Date: 2021   Discharge Date: 12/10/21  Length of Stay: 3  Code Status:  Full Code  Admitting Physician: Zoey Piedra MD  Discharge Physician: Zoey Piedra MD     Active Discharge Diagnoses:     Hospital Problem Lists:  Principal Problem:    Lesion of pelvic bone  Active Problems:    Multiple substance abuse ([address]    Major depressive disorder, recurrent episode, severe ([address]    Anxiety    Cannot walk    Lytic bone lesion of hip    Hypercalcemia of malignancy  Resolved Problems:    * No resolved hospital problems.  *      Admission Condition:  poor     Discharged Condition: fair    Hospital Stay:     Hospital Course:  Lara Krishnan is a 46 y.o. male who was admitted for the management of   Lesion of pelvic bone , presented to ER with *Leg Pain    Per the PA:  \"Hubert Singer is a 51 y.o. L who initially presented to our hospital with complaints of left hip pain.  CT of his hip showed an expansile lytic lesion involving the majority of the left inferior pubic ramus extending into the posterior/inferior portion of the left acetabulum.  Differential includes metastatic disease versus primary bone neoplasm.  Patient was having significant pain making it difficult for him to ambulate.  He says pain has been getting extensively worse over the last 2 weeks.  Vitals on admission were stable.  Labs also unremarkable.  Patient denies any chest pain, nausea, vomiting.  Admits to some weight loss. \"    Patient seen by Oncology and underwent a CT guided bone biopsy. Assessment/ Plan  1. Left pelvic lytic lesion- increase OxyContin 40 mg BID, Toradol q 6hrs prn pain,  Percocet 2 tabs q 6 hrs prn pain.  Await Bone biopsy results, appreciate Hem/Onc eval, PSA normal, add Neurontin for neuropathic pain, check a CT chest while here since he is a previous smoker  2.  Hypercalcemia- Ca 11.1 yesterday, improved  3.  Constipation- Senna-S daily, Miralax for stool regimen while on narcotics  4.  Anxiety - Ativan 1mg po q 6hrs prn  5.  Gi/ DVT proph    Significant therapeutic interventions: Pain control, CT guided bone biopsy    Significant Diagnostic Studies:   Labs / Micro:    Pathology  Left pelvic bone, biopsy of mass:     -  Metastatic adenocarcinoma. Comment: Morphology and immunophenotype are nonspecific with respect   to primary.  Considerations include lung, breast, pancreaticobiliary,   GI, and others. Radiology:    12/7/21  CT left hip  1.  Expansile lytic lesion involving the majority of the left inferior pubic   ramus extending into the posterior/inferior portion of the left acetabulum. The differential includes metastatic disease versus primary bone neoplasm.    Consider whole-body bone scan versus PET scan to evaluate for any additional   lesions.  The degree of osseous replacement by the mass is significant which   could result in a pathologic fracture.       2.  Additional 1.2 cm lytic lesion in the left iliac bone. 12/8/2021  NM Bone scan  Three-phase positive uptake associated with the destructive lytic lesion in   the left ischium and inferior pubic ramus.  The delayed phase uptake is   primarily along the margin of the lesion, typical of predominantly lytic   lesions.  Other lytic lesions may not be apparent on bone scan.  The overall   appearance is highly concerning for malignancy. 12/10/2021  CT chest with contrast  *Right suprahilar specular lesion which abuts the mediastinum with   questionable invasion as measured above concerning for malignancy, recommend   further evaluation with tissue sampling and/or PET-CT.  Indeterminate right   adrenal lesion as measured above, consider further assessment with dedicated   adrenal protocol CT or MRI. *Likely hepatic fatty infiltration.  Slightly contracted gallbladder with   mild wall enhancement.  Consider further assessment with right upper quadrant   ultrasound. *Interval enlargement of partially visualized left renal cyst.         Consultations:    Consults:     Final Specialist Recommendations/Findings:   IP CONSULT TO HOSPITALIST  IP CONSULT TO HEM/ONC  IP CONSULT TO CASE MANAGEMENT      The patient was seen and examined on day of discharge and this discharge summary is in conjunction with any daily progress note from day of discharge. Discharge plan:     Disposition: Home    Physician Follow Up:     Kevin Montana MD  [address] [facility] [address]  [phone]    In 1 week      [facility]. OWEN Garcias, [address] [address]  Number: [phone]          Follow up with PCP  In 1 week  follow up       Requiring Further Evaluation/Follow Up POST HOSPITALIZATION/Incidental Findings: PET CT    Diet: regular diet    Activity: As tolerated    Instructions to Patient: Follow up with Hem/Onc    Discharge Medications:      Medication List      START taking these medications    gabapentin 100 MG capsule  Commonly known as: NEURONTIN  Take 1 capsule by mouth 3 times daily for 30 days. lisinopril 5 MG tablet  Commonly known as: PRINIVIL;ZESTRIL  Take 1 tablet by mouth daily        CONTINUE taking these medications    melatonin 3 MG Tabs tablet  Commonly known as: RA Melatonin  Take 1 tablet by mouth nightly as needed (insomnia)        STOP taking these medications    acetaminophen 500 MG tablet  Commonly known as: TYLENOL     naproxen 375 MG tablet  Commonly known as: NAPROSYN     penicillin v potassium 500 MG tablet  Commonly known as: VEETID        ASK your doctor about these medications    oxyCODONE-acetaminophen 5-325 MG per tablet  Commonly known as: PERCOCET  Take 2 tablets by mouth every 6 hours as needed for Pain for up to 3 days. Ask about: Should I take this medication? Where to Get Your Medications      These medications were sent to [facility] [address] [facility], [address], [address] [place] [address]    Phone: [phone]   · lisinopril 5 MG tablet     You can get these medications from any pharmacy    Bring a paper prescription for each of these medications  · gabapentin 100 MG capsule  · oxyCODONE-acetaminophen 5-325 MG per tablet         No discharge procedures on file. Time Spent on discharge is  20 mins in patient examination, evaluation, counseling as well as medication reconciliation, prescriptions for required medications, discharge plan and follow up. Electronically signed by   Danielle Pantoja MD  1/10/2022  8:08 AM      Thank you BOBBY Bowen for the opportunity to be involved in this patient's care.

## 2022-01-10 NOTE — DISCHARGE SUMMARY
[facility]  Office: [address], DO, Kingsley Sam, DO, Lina Pantoja, DO, Natalie Horn Blood, DO, Tomer Tabor MD, Joanna Murray MD, Vievk Saravia MD, Jesica Blackman MD, Bonnie Hodgkin, MD, Xiomara Schneider MD, El Garza MD, Reena Albright, DO, Rogelio Kaur, DO, Michael Yost MD,  Edmund Mayberry DO, Luna Hutchison MD, Bean Ricci MD, Luda Coyle MD, Olive Sapp MD, Bridgett Wong MD, Giovanni Sanches MD, Nita Polanco MD, [facility], [facility], CNP, Fabby Carrillo, CNP, Jann Reyes, CNS, Mary Huggins, CNP, Cathleen Polo, CNP, Vishnu Roberts CNP, Debbie Earl, CNP, Scottie Vuong CNP, Radonna Aschoff, PA-SYDNEY, Iris Prado DNP, Efraín Chowdhury, [facility], Kahlil Tabor, CNP, Shalini Mar, CNP, Home Dugan, CNP, Abdelrahman Espinoza, CNP, Erendira Gonzalez, CNP, Cecilio Bowman, [facility]    Discharge Summary     Patient ID: Shahbaz Jiménez  :  1970   MRN: [id number]     ACCOUNT:  [de-identified]   Patient's PCP: Rodolfo Dakin, APRN  Admit Date: 2022   Discharge Date: 1/10/2022     Length of Stay: 5  Code Status:  Full Code  Admitting Physician: Inder Howard MD  Discharge Physician: Inder Howard MD     Active Discharge Diagnoses:     Hospital Problem Lists:  Principal Problem:    Intractable pain  Active Problems:    Metastatic cancer ([facility] [address] -suspected left acetabular pathological fracture    Primary hypertension    Major depressive disorder, recurrent episode, severe ([facility] [address]    Anxiety    Cancer related pain    Tobacco use    Constipation due to opioid therapy  Resolved Problems:    * No resolved hospital problems.  *      Admission Condition:  poor     Discharged Condition: fair    Hospital Stay:   Admitting history:  Timothy Jaramillo a 46 y.o. Non- / non  male who presents with Back Pain (down left leg. cancer pt)   and is admitted to the hospital for the management of Intractable pain.     Presented to the emergency room from the cancer center for pain. 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  CT of the chest shows right suprahilar region of the lungs suggesting possible lung primary.  PET scan performed on 12/30/2021 is concerning for thyroid malignancy and CT scan with contrast of the head, neck, abdomen and pelvis has been recommended. Palliative radiation was started on 12/17/2021 through 12/23. Harshad Fernandez is to start palliative systemic chemotherapy. He has not yet seen an orthopedist.  Pain is constant and radiates down the left leg.  His home medications include OxyContin 80 mg twice daily, oxycodone 15 mg every 4 hours as needed and Xanax which have been ineffective.  He was sent here to be admitted for pain control with Dilaudid PCA pump.       Hospital Course:  Back, left hip and left lower leg pain is tolerable after starting Dilaudid PCA pump, it comes back when he tries to move  Orthopedics recommends patient to be transferred to facility where there is orthopedic oncology, at [facility] for suspected left acetabular pathological fracture due to metastasis  Oncology got port placement  1/7/2022 to start palliative chemo as outpatient  Discussed with patient earlier since palliative attending wanted to start him on methadone after discharge, patient requesting not to start methadone since it would interfere with Xanax and he definitely wants to continue Xanax for anxiety  Patient wants to start palliative chemo then decide about going to [facility] [employer] [facility] or [address] if needed     Complains of constipation despite of getting Colace and mild pain during defecation    Plan:         Hold Dilaudid PCA pump and discontinue if oral pain medicines are helping  [place] and longer acting narcotics will be increased by palliative care in consultation with oncology  Continue oral dexamethasone as palliative treatment   continue Flexeril for muscle spasms and Xanax for anxiety  Continue Colace, add milk of magnesia  Flector patch/diclofenac gel to painful left hip area  Oncology  planning palliative chemo, port placement has been done  Patient wants to decide for going to [facility] or [facility] for orthopedic oncology later after starting palliative chemo  DVT prophylaxis while in hospital  Discharge home today if pain better with increasing dose of oral narcotics     DME hospital bed  Braulio Bey was evaluated today and a DME order was entered for variable height hospital bed because he requires assistance for positioning needs not possible in an ordinary bed, complexity of body positioning needs, requirement of elevation of head of bed more than 30 degrees for the diagnosis of Left hip fracture. Patient needs variability of bed height to perform patient transfers and for eating, personal cares and hygiene. Current body Weight: 210 lb 3.2 oz (95.3 kg). The need for this equipment was discussed with the patient and he understands and is in agreement.     DME BEDSIDE COMMODE    Braulio Bey requires a drop arm bedside commode due to being confined to one level of the home and is physically incapable of utilizing regular toilet facilities.  Drop arm required to facilitate transferring. Current body weight is Weight: 210 lb 3.2 oz (95.3 kg).     DME manual wheelchair  Braulio Bey was evaluated today and a DME order was entered for a lightweight wheelchair because he requires this to successfully complete daily living tasks of personal cares and ambulating. A lightweight wheelchair is necessary due to the patient's impaired ambulation and mobility restrictions. The patient would not be able to resolve these daily living tasks using a cane or walker.   The patient can self-propel a lightweight wheelchair safely in their home and can maneuver within their home with adequate access. The patient cannot self-propel in a standard wheelchair.   The need for this equipment was discussed with the patient and he understands, is in agreement, and has not expressed an unwillingness to use the wheelchair      Significant therapeutic interventions: See above notes    Significant Diagnostic Studies:   Labs / Micro:  CBC:   Lab Results   Component Value Date    WBC 15.2 01/07/2022    RBC 3.78 01/07/2022    RBC 4.09 06/04/2012    HGB 10.9 01/07/2022    HCT 34.9 01/07/2022    MCV 92.3 01/07/2022    MCH 28.8 01/07/2022    MCHC 31.2 01/07/2022    RDW 13.5 01/07/2022     01/07/2022     06/04/2012     BMP:    Lab Results   Component Value Date    GLUCOSE 114 01/06/2022    GLUCOSE 104 06/06/2012     01/06/2022    K 4.2 01/06/2022     01/06/2022    CO2 23 01/06/2022    ANIONGAP 9 01/06/2022    BUN 17 01/06/2022    CREATININE <0.40 01/06/2022    BUNCRER Can not be calculated 01/06/2022    CALCIUM 9.1 01/06/2022    LABGLOM Can not be calculated 01/06/2022    GFRAA Can not be calculated 01/06/2022    GFR      01/06/2022    GFR NOT REPORTED 01/06/2022     HFP:    Lab Results   Component Value Date    PROT 6.9 12/09/2021     CMP:    Lab Results   Component Value Date    GLUCOSE 114 01/06/2022    GLUCOSE 104 06/06/2012     01/06/2022    K 4.2 01/06/2022     01/06/2022    CO2 23 01/06/2022    BUN 17 01/06/2022    CREATININE <0.40 01/06/2022    ANIONGAP 9 01/06/2022    ALKPHOS 60 09/28/2017    ALT 26 09/28/2017    AST 20 09/28/2017    BILITOT 0.56 09/28/2017    LABALBU 3.1 09/28/2017    LABALBU 3.9 03/29/2012    ALBUMIN 0.9 09/28/2017    LABGLOM Can not be calculated 01/06/2022    GFRAA Can not be calculated 01/06/2022    GFR      01/06/2022    GFR NOT REPORTED 01/06/2022    PROT 6.9 12/09/2021    CALCIUM 9.1 01/06/2022     PT/INR:    Lab Results   Component Value Date    PROTIME 11.7 01/06/2022    PROTIME 10.3 02/07/2012    INR 0.9 01/06/2022     PTT:   Lab Results   Component Value Date    APTT 25.4 09/27/2017     FLP:  No results found for: CHOL, TRIG, HDL  U/A:    Lab Results   Component Value Date    COLORU YELLOW 12/08/2013    TURBIDITY CLEAR 12/08/2013    SPECGRAV 1.022 12/08/2013    HGBUR NEGATIVE 12/08/2013    PHUR 5.5 12/08/2013    PROTEINU NEGATIVE 12/08/2013    GLUCOSEU NEGATIVE 12/08/2013    GLUCOSEU NEGATIVE 02/10/2012    KETUA NEGATIVE 12/08/2013    BILIRUBINUR NEGATIVE 12/08/2013    BILIRUBINUR NEGATIVE  Verified by ictotest. 02/10/2012    UROBILINOGEN Normal 12/08/2013    NITRU NEGATIVE 12/08/2013    LEUKOCYTESUR NEGATIVE 12/08/2013     TSH:  No results found for: TSH     Radiology:  IR PORT PLACEMENT > 5 YEARS    Result Date: 1/7/2022  Successful ultrasound and fluoroscopy guided Port-A-Cath placement       Consultations:    Consults:     Final Specialist Recommendations/Findings:   IP CONSULT TO ORTHOPEDIC SURGERY  IP CONSULT TO HEM/ONC  IP CONSULT TO HOSPITALIST  IP CONSULT TO ONCOLOGY  IP CONSULT TO PALLIATIVE CARE  PHARMACY TO DOSE MEDICATION  IP CONSULT TO PALLIATIVE CARE      The patient was seen and examined on day of discharge and this discharge summary is in conjunction with any daily progress note from day of discharge.     Discharge plan:     Disposition: Home with home care    Physician Follow Up:     [address]  [address], [address] [place] [address],[address] [facility] [address]  [phone]      As needed       Requiring Further Evaluation/Follow Up POST HOSPITALIZATION/Incidental Findings: Palliative chemotherapy as per oncology plan    Diet: As tolerated    Activity: As tolerated    Instructions to Patient: Take stool softeners to avoid constipation    Discharge Medications:      Medication List      START taking these medications    diclofenac sodium 1 % Gel  Commonly known as: VOLTAREN  Apply 4 g topically three times daily for 8 doses     docusate sodium 100 MG capsule  Commonly known as: COLACE  Take 1 capsule by mouth 2 times daily for 10 days     magnesium hydroxide 400 MG/5ML suspension  Commonly known as: MILK OF MAGNESIA  Take 30 mLs by mouth daily     naloxone 0.4 MG/ML injection  Commonly known as: NARCAN  Infuse 1 mL intravenously as needed (Opioid reversal)     nicotine 21 MG/24HR  Commonly known as: NICODERM CQ  Place 1 patch onto the skin daily  Start taking on: January 11, 2022        CHANGE how you take these medications    * oxyCODONE 20 MG immediate release tablet  Commonly known as: ROXICODONE  Take 1 tablet by mouth every 4 hours as needed for Pain for up to 14 days. What changed:   medication strength  how much to take     * oxyCODONE 80 MG extended release tablet  Commonly known as: OxyCONTIN  Take 1 tablet by mouth every 12 hours for 14 days. What changed:   Another medication with the same name was added. Make sure you understand how and when to take each. Another medication with the same name was changed. Make sure you understand how and when to take each. * oxyCODONE 40 MG extended release tablet  Commonly known as: OXYCONTIN  Take 1 tablet by mouth every 12 hours for 14 days. What changed: You were already taking a medication with the same name, and this prescription was added. Make sure you understand how and when to take each. * This list has 3 medication(s) that are the same as other medications prescribed for you. Read the directions carefully, and ask your doctor or other care provider to review them with you. CONTINUE taking these medications    ALPRAZolam 1 MG tablet  Commonly known as: Xanax  Take 1 tablet by mouth every 6 hours as needed for Anxiety for up to 30 days.      citalopram 20 MG tablet  Commonly known as: CELEXA  Take 1 tablet by mouth daily     dexamethasone 4 MG tablet  Commonly known as: DECADRON  Take 1 tablet by mouth 2 times daily (with meals) for 20 days     polyethylene glycol 17 g packet  Commonly known as: GLYCOLAX  Take 17 g by mouth daily as needed for Constipation        STOP taking these medications    gabapentin 100 MG capsule  Commonly known as: NEURONTIN     lisinopril 5 MG tablet  Commonly known as: PRINIVIL;ZESTRIL     melatonin 3 MG Tabs tablet  Commonly known as: RA Melatonin           Where to Get Your Medications      These medications were sent to [address] [address]    Phone: [phone]   diclofenac sodium 1 % Gel  docusate sodium 100 MG capsule  naloxone 0.4 MG/ML injection  nicotine 21 MG/24HR  oxyCODONE 20 MG immediate release tablet  oxyCODONE 40 MG extended release tablet  oxyCODONE 80 MG extended release tablet     You can get these medications from any pharmacy    You don't need a prescription for these medications  magnesium hydroxide 400 MG/5ML suspension         Discharge Procedure Orders   External Referral To Palliative Care   Referral Priority: Routine Referral Type: Eval and Treat   Referral Reason: Specialty Services Required   Requested Specialty: Palliative Medicine   Number of Visits Requested: 1       Time Spent on discharge is  35 mins in patient examination, evaluation, counseling as well as medication reconciliation, prescriptions for required medications, discharge plan and follow up. Electronically signed by   Christopher Pittman MD  1/10/2022  5:43 PM      Thank you BOBBY Melgar for the opportunity to be involved in this patient's care.

## 2022-01-10 NOTE — DISCHARGE INSTR - COC
Continuity of Care Form    Patient Name: Cheryl Page   :  1970  MRN:  [id number]    Admit date:  2022  Discharge date:  1/10/2022    Code Status Order: Full Code   Advance Directives:      Admitting Physician:  Chen Olson MD  PCP: BOBBY Quinonez    Discharging Nurse: Marnie Abbasi [facility] Unit/Room#:   Discharging Unit Phone Number: [phone]    Emergency Contact:   Extended Emergency Contact Information  Primary Emergency Contact: Lou Singer  Address: [address]  Home Phone: [phone]  Work Phone: [phone]  Mobile Phone: [phone]  Relation: Parent  Secondary Emergency Contact: [facility] [address] [facility] Phone: [phone]  Work Phone: [phone]  Mobile Phone: [phone]  Relation: Parent  Preferred language: English   needed?  No    Past Surgical History:  Past Surgical History:   Procedure Laterality Date    CT BIOPSY PERCUTANEOUS SUPERFICIAL BONE  2021    CT BIOPSY PERCUTANEOUS SUPERFICIAL BONE 2021 [facility] CT SCAN    EYE SURGERY      plate    IR PORT PLACEMENT EQUAL OR GREATER THAN 5 YEARS  2022    IR PORT PLACEMENT EQUAL OR GREATER THAN 5 YEARS 2022 [facility] SPECIAL PROCEDURES    SHOULDER SURGERY Right     [facility], labial tear       Immunization History:   Immunization History   Administered Date(s) Administered    COVID-19, Moderna, Booster, PF, 50mcg/0.25ml 2022    COVID-19, Moderna, Primary or Immunocompromised, PF, 100mcg/0.5mL 2021, 2021, 2022    Influenza, Quadv, IM, PF (6 mo and older Fluzone, Flulaval, Fluarix, and 3 yrs and older Afluria) 10/17/2018, 12/10/2021    Tdap (Boostrix, Adacel) 2016       Active Problems:  Patient Active Problem List   Diagnosis Code    Multiple substance abuse ([facility] [address] F19.10    Alcohol abuse F10.10    Facial cellulitis L03.211    Right shoulder pain M25.511    Rib pain on left side R07.81    Elevated LFTs R79.89 Polysubstance abuse (HCC) F19.10    Thrombocytopenia (HCC) D69.6    Primary hypertension I10    Major depressive disorder, recurrent episode, severe (HCC) F33.2    Compulsive skin picking F42.4    Anxiety F41.9    Lesion of pelvic bone M89.9    Cannot walk R26.2    Lytic bone lesion of hip M89.8X5    Hypercalcemia of malignancy E83.52    Cancer related pain G89.3    Metastatic cancer ([address] -suspected left acetabular pathological fracture C79.9    Bone metastases (HCC) C79.51    Tobacco use Z72.0    Carcinoma of hilus of right lung (HCC) C34.01    Intractable pain R52       Isolation/Infection:   Isolation            Contact          Patient Infection Status       Infection Onset Added Last Indicated Last Indicated By Review Planned Expiration Resolved Resolved By    [facility]  12/03/13 12/03/13 Kath Christianson RN                Nurse Assessment:  Last Vital Signs: /80   Pulse 63   Temp 97.3 °F (36.3 °C) (Axillary)   Resp 18   Ht 5' 11\" (1.803 m)   Wt 210 lb 3.2 oz (95.3 kg)   SpO2 94%   BMI 29.32 kg/m²     Last documented pain score (0-10 scale): Pain Level: 7  Last Weight:   Wt Readings from Last 1 Encounters:   01/10/22 210 lb 3.2 oz (95.3 kg)     Mental Status:  oriented and alert    IV Access:  - None    Nursing Mobility/ADLs:  Walking   Assisted  Transfer  Assisted  Bathing  Assisted  Dressing  Assisted  Toileting  Assisted  Feeding  Independent  Med Admin  Independent  Med Delivery   whole    Wound Care Documentation and Therapy:        Elimination:  Continence: Bowel: Yes  Bladder: Yes  Urinary Catheter: None   Colostomy/Ileostomy/Ileal Conduit: No       Date of Last BM: 1/10/2022    Intake/Output Summary (Last 24 hours) at 1/10/2022 1212  Last data filed at 1/10/2022 0641  Gross per 24 hour   Intake 1363.06 ml   Output 1450 ml   Net -86.94 ml     I/O last 3 completed shifts: In: 1405.3 [P.O.:400; I.V.:1005.3]  Out: 3000 [Urine:3000]    Safety Concerns:      At Risk for Falls    Impairments/Disabilities:    NWB left with walker  ***    Nutrition Therapy:  Current Nutrition Therapy:   - Oral Diet:  General    Routes of Feeding: Oral  Liquids: No Restrictions  Daily Fluid Restriction: no  Last Modified Barium Swallow with Video (Video Swallowing Test): not done    Treatments at the Time of Hospital Discharge:   Respiratory Treatments: ***  Oxygen Therapy:  is not on home oxygen therapy. Ventilator:    - No ventilator support    Rehab Therapies: Physical Therapy and Occupational Therapy  Weight Bearing Status/Restrictions: Non-weight bearing on left leg  Other Medical Equipment (for information only, NOT a DME order):  wheelchair, walker, bath bench, bedside commode, and hospital bed  Other Treatments: Skilled nursing assessment  Med teaching and compliance  Nurse will assess for HHA need     Patient's personal belongings (please select all that are sent with patient): elevated toilet seat, bath bench  ***    RN SIGNATURE:  Electronically signed by Kingsley Morales RN on 1/10/22 at 12:23 PM EST    CASE MANAGEMENT/SOCIAL WORK SECTION    Inpatient Status Date: ***    Readmission Risk Assessment Score:  Readmission Risk              Risk of Unplanned Readmission:  27           Discharging to Facility/ Agency   Name: Michael Galvin  Address:  Phone: [phone]  Fax: [phone]      / signature: Electronically signed by Ioana Irene RN on 1/10/22 at 12:17 PM EST    PHYSICIAN SECTION    Prognosis: {Prognosis:[id number]}    Condition at Discharge: [address] Patient Condition:[id number]}    Rehab Potential (if transferring to Rehab): Guarded    Recommended Labs or Other Treatments After Discharge: pain medicines as per oncology, PT,OT    Physician Certification: I certify the above information and transfer of Antolin Nuñez  is necessary for the continuing treatment of the diagnosis listed and that he requires Home Care for greater 30 days.      Update Admission H&P: No change in H&P    PHYSICIAN SIGNATURE:  Electronically signed by Meera Villatoro MD on 1/10/22 at 5:07 PM EST

## 2022-01-10 NOTE — CARE COORDINATION
Call to [facility] with [facility] with referral and they can accept. Informed of D/C today. Waiting for scripts for manual W/C, hospital bed and bedside commode. Spoke to Monika with [facility] and face sheet faxed.

## 2022-01-10 NOTE — PROGRESS NOTES
disch home with father, to car by w/c with escort.  Waffle mattress/pump, shower chair and elevated toilet seat sent with pt

## 2022-01-10 NOTE — PROGRESS NOTES
Physical Therapy    Facility/Department: [facility] SURG  Reassessment-  Pt. Is NWB LLE. New goals set. NAME: Braulio Bey  : 1970  MRN: [id number]    Date of Service: 1/10/2022  Braulio Bey is a 46 y.o. Non- / non  male who presents with Back Pain (down left leg. cancer pt)   and is admitted to the hospital for the management of Intractable pain.     Presented to the emergency room from the [facility] center for pain. 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 CT of the chest shows right suprahilar region of the lungs suggesting possible lung primary. PET scan performed on 2021 is concerning for thyroid malignancy and CT scan with contrast of the head, neck, abdomen and pelvis has been recommended. Palliative radiation was started on 2021 through . Plan is to start palliative systemic chemotherapy. He has not yet seen an orthopedist.  Pain is constant and radiates down the left leg. His home medications include OxyContin 80 mg twice daily, oxycodone 15 mg every 4 hours as needed and Xanax which have been ineffective. He was sent here to be admitted for pain control with Dilaudid PCA pump. Discharge Recommendations:  24 hour supervision or assist,Home with Home health PT (Home w/ 24-7 assist)   PT Equipment Recommendations  Equipment Needed: Yes  Mobility Devices: Hospital Bed; Wheelchair  Wheelchair: Standard  Other: bedside commode    Assessment   Body structures, Functions, Activity limitations: Decreased functional mobility ; Increased pain  Assessment: Pt. experiencing MAX pain 10/10+ with all mobility L hip. Pt. with difficulty maintaining LLE position for NWB LLE, although understanding concept and has upper body strength to do so. Recommending hospital bed, bedside commode and w/c for home, as pt. is unable to maintain NWB LLE for household distances.   Prognosis: Fair  Decision Making: High Complexity  PT Education: PT Role;Plan of Care;General Safety; Functional Mobility Training;Transfer Training;Goals  REQUIRES PT FOLLOW UP: Yes  Activity Tolerance  Activity Tolerance: Patient limited by pain;Treatment limited secondary to medical complications (free text)       Patient Diagnosis(es): The primary encounter diagnosis was Intractable pain. Diagnoses of Lytic bone lesion of hip, Adenocarcinoma ([facility] [address] Bone metastases ([facility] [address] Carcinoma of hilus of right lung ([facility] [address] Cancer related pain, Hypercalcemia of malignancy, Malignant neoplasm metastatic to bone [facility]), and Anxiety were also pertinent to this visit. has a past medical history of Abscess, Anxiety, Cellulitis due to MRSA, Chronic back pain, Depression, Hypertension, Metastatic cancer ([facility] [address] Multiple substance abuse ([facility] [address] and OCD (obsessive compulsive disorder). has a past surgical history that includes shoulder surgery (Right); Eye surgery; CT BIOPSY SUPERFICIAL BONE PERCUTANEOUS (12/9/2021); and IR PORT PLACEMENT > 5 YEARS (1/7/2022). Restrictions  Restrictions/Precautions  Restrictions/Precautions: General Precautions,Up as Tolerated,Fall Risk,Contact Precautions,Weight Bearing  Required Braces or Orthoses?: No  Lower Extremity Weight Bearing Restrictions  Left Lower Extremity Weight Bearing: Non Weight Bearing  Position Activity Restriction  Other position/activity restrictions: PCA pump, telemetry, LUE IV  Vision/Hearing        Subjective  General  Chart Reviewed: Yes  Patient assessed for rehabilitation services?: Yes  Additional Pertinent Hx: metastatic bone CA  Family / Caregiver Present: No  Follows Commands: Within Functional Limits  Subjective  Subjective:  Max pain now to have a bowel movement with blood          Orientation  Orientation  Overall Orientation Status: Within Normal Limits  Social/Functional History  Social/Functional History  Lives With: Family (lives with elderly parents)  Type of Home: House  Home Layout: Able to Live on Main level with bedroom/bathroom,Two level (basement)  Home Access: Stairs to enter with rails  Entrance Stairs - Number of Steps: 1 step from garage  Entrance Stairs - Rails: Right  Bathroom Shower/Tub: Tub/Shower unit,Walk-in shower  Bathroom Toilet: Handicap height  Bathroom Equipment: Shower chair (no bathroom DME)  Home Equipment: Rolling walker  Receives Help From: Family  ADL Assistance: Needs assistance (Needs assistance for L sock)  Homemaking Assistance: Needs assistance  Homemaking Responsibilities: No (parents assist with cooking/cleaning/laundry)  Ambulation Assistance: Independent (uses RW at all times)  Transfer Assistance: Independent  Active : No  Patient's  Info: Parents drive or provided by [employer]  Occupation: On disability  Leisure & Hobbies: 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 Additional Comments: pt reports 1 recent fall, L hip gave out and pt fell onto corner of bed. pt with dec ability to complete tasks d/t inc hip pain.  pt has meeting with pallative care  Cognition   Cognition  Problem Solving: Assistance required to generate solutions;Assistance required to implement solutions;Assistance required to identify errors made;Assistance required to correct errors made    Objective     Observation/Palpation  Posture: Good  Observation: pt. appears anxious over situation and pain level; very pleasant and agreeable to OOB mobility even though 10/10 pain    AROM LLE (degrees)  LLE General AROM: unable to tolerate neutral hip / knee extension due to hip pain;  feels most comfortable at 30 degrees hip/knee flexion in bed  AROM RUE (degrees)  RUE General AROM: See OT eval for B UE ROM  Strength RUE  Comment: See OT eval for B UE MMT        Bed mobility  Supine to Sit: Contact guard assistance;Minimal assistance  Sit to Supine: Contact guard assistance;Minimal assistance  Comment: Pt. able to perform most all bed mob. on his own at his request due to pain/ wanting to be in control of all mvmts. After supine to sit, pt. goes immediately into standing due to max pain in hip in seated position and unable to tolerate. Reverse for sit to supine. Transfers  Sit to Stand: Moderate Assistance;2 Person Assistance  Stand to sit: Moderate Assistance;2 Person Assistance  Ambulation  Ambulation?: Yes  Ambulation 1  Surface: level tile  Device: Rolling Walker  Assistance: Moderate assistance;2 Person assistance  Quality of Gait: Pt. attempted 2 hops fwd. and 2 back to bed NWB LLE. Pt. with good demo, however 2 hops is max tolerance to holding LLE up off floor for NWB. After this he feels he needs to rest foot on floor to relieve pain. Did not push amb. distance at this time. Static standing, NWB LLE for 2 min. with pt. keeping L foot off floor ashlyn. of time. Distance: 2ft.   Comments: back to bed and positioned for comfort with pillows     Balance  Posture: Good  Sitting - Dynamic: Good;- (NWB LLE)        Plan   Plan  Times per week: 1-2x/day,5-6 days/week  Current Treatment Recommendations: Balance Training,Endurance Training,Gait Training,Transfer Training,Functional Mobility Training,Strengthening,Wheelchair Mobility Training,Patient/Caregiver Education & Training,Safety Education & Training,Home Exercise Program  Safety Devices  Type of devices: Gait belt,Left in bed,Call light within reach,All fall risk precautions in place,Patient at risk for falls,Bed alarm in place,Nurse notified  Restraints  Initially in place: No    G-Code       OutComes Score                                                  AM-PAC Score      Raw score- 10       Goals  Short term goals  Time Frame for Short term goals: 12 treatments  Short term goal 1: Independent bed mobility/transfers using bed rails/ controls as able (requesting hospital bed for pt. for home)  Short term goal 2: Pt. to perform bed to commode transfer with RW, SBA, NWB LLE  Short term goal 3: Pt. to be indep with w/c mobility should he get one for home (d/c planner looking into it)  Short term goal 4: Pt. to tolearte 25+ min. of PT daily for ther ex and functional mob. training as able with pain to ease transfers  Patient Goals   Patient goals : Be able to be active without L LE pain       Therapy Time   Individual Concurrent Group Co-treatment   Time In 0925         Time Out 1015         Minutes 50              Treatment time:  40min. Co-treatment with OT warranted secondary to decreased safety and independence requiring 2 skilled therapy professionals to address individual discipline's goals.     Bertha Han, PT

## 2022-01-11 ENCOUNTER — TELEPHONE (OUTPATIENT)
Dept: PALLATIVE CARE | Age: 52
End: 2022-01-11

## 2022-01-11 ENCOUNTER — TELEPHONE (OUTPATIENT)
Dept: CASE MANAGEMENT | Age: 52
End: 2022-01-11

## 2022-01-11 NOTE — TELEPHONE ENCOUNTER
Name: Evelina Spurling  : 1970  MRN: [id number]    Oncology Navigation Follow-Up Note  Navigator reviewing chart and received update from palliative care-Grace Zelayar checking chemotherapy orders and remain pending. PDL1 negative, plan to follow. Pt.  To see MO with C-1 D-1  Electronically signed by Stephanie Ferreira RN on 2022 at 11:26 AM

## 2022-01-11 NOTE — TELEPHONE ENCOUNTER
Palliative Care Follow up:    Returned call from [facility] this am.  He relates port site is a little sore and pink. I let him know I thought it looked good yesterday. I also let him know that Suzette his nurse navigator was notified that he was discharged yesterday and that he needs follow up with Dr. Ramila Doyle. Let him know that Florence Vargas will be reaching out. We discussed his pain level. He relates his pain level is tolerable on the meds he is on. He relates that hospital bed is coming today. He relates that someone is coming out either [place] or home health nurse. He relates that his bowel movements are still painful. We discussed to drink plenty of water, senokot s and miralax are over the counter stimulate/laxative. These can be taken along with colace. Encouraged to monitor the bowel movements if they are difficult take miralax or senokot more frequently, if the stools become loose then take less of the stimulant/laxative. [facility] relates he will get the miralax and senokot. Encouragement and support given through out the phone call. Encouraged to call as needed.

## 2022-01-12 ENCOUNTER — TELEPHONE (OUTPATIENT)
Dept: INFUSION THERAPY | Age: 52
End: 2022-01-12

## 2022-01-12 NOTE — TELEPHONE ENCOUNTER
VM from [facility]pe [place] at [address] requesting call back re: pt. Chart reviewed, pt is seen at [facility]. Their number was given to [facility].

## 2022-01-13 ENCOUNTER — TELEPHONE (OUTPATIENT)
Dept: CASE MANAGEMENT | Age: 52
End: 2022-01-13

## 2022-01-13 NOTE — TELEPHONE ENCOUNTER
Name: Nish Mitchell  : 1970  MRN: [id number]    Oncology Navigation Follow-Up Note  Navigator spoke with [place] and treatment plan remains pending.   Electronically signed by Mackenzie Smith RN on 2022 at 8:28 AM

## 2022-01-14 ENCOUNTER — TELEPHONE (OUTPATIENT)
Dept: CASE MANAGEMENT | Age: 52
End: 2022-01-14

## 2022-01-14 NOTE — TELEPHONE ENCOUNTER
Name: Antolin Nuñez  : 1970  MRN: [id number]    Oncology Navigation Follow-Up Note  Navigator spoke with [facility]-precert and authorization needing confirmation of EGFR-as of yesterday still pending , plan to monitor closely.   Electronically signed by Clem Etienne RN on 2022 at 8:24 AM

## 2022-01-17 ENCOUNTER — TELEPHONE (OUTPATIENT)
Dept: CASE MANAGEMENT | Age: 52
End: 2022-01-17

## 2022-01-17 NOTE — TELEPHONE ENCOUNTER
Name: Negrita Bowman  : 1970  MRN: [id number]    Oncology Navigation Follow-Up Note  Navigator reviewing chart and referral for chemotherapy. Auth pending due to delay in [employer] results.    Electronically signed by Nereida Padgett RN on 2022 at 3:12 PM

## 2022-01-19 ENCOUNTER — TELEPHONE (OUTPATIENT)
Dept: ONCOLOGY | Age: 52
End: 2022-01-19

## 2022-01-19 NOTE — TELEPHONE ENCOUNTER
[facility] ASKING IF PT NEEDS ORDER FOR X-RAYS, MORE PAIN WITH WALKING AND STANDING, REPORT PER NURSE (P) [phone]    NOTE TO MD

## 2022-01-21 ENCOUNTER — TELEPHONE (OUTPATIENT)
Dept: CASE MANAGEMENT | Age: 52
End: 2022-01-21

## 2022-01-21 ENCOUNTER — TELEPHONE (OUTPATIENT)
Dept: ONCOLOGY | Age: 52
End: 2022-01-21

## 2022-01-21 ENCOUNTER — TELEPHONE (OUTPATIENT)
Dept: INFUSION THERAPY | Facility: MEDICAL CENTER | Age: 52
End: 2022-01-21

## 2022-01-21 DIAGNOSIS — C80.1 ADENOCARCINOMA (HCC): Primary | ICD-10-CM

## 2022-01-21 NOTE — TELEPHONE ENCOUNTER
Name: Antolin Nuñez  : 1970  MRN: [id number]    Oncology Navigation Follow-Up Note  Navigator received message from Elis Dalton working phones today and pt. Wanting navigator to call him. Writer reaching out to pt. , but could not leave a message.   Electronically signed by Clem Etienne RN on 2022 at 10:43 AM

## 2022-01-21 NOTE — TELEPHONE ENCOUNTER
RN check of new chemotherapy orders per Dr. Kyle Rosenthal. 12/9/21 pathology - metastatic adenocarcinoma of left pelvic bone - unknown primary/possible lung    21 day cycle Keytruda/Alimta/Carbo    Ht = 180.3 cm  Wt = 95.8 kg  BSA = 2.19    Keytruda 200 mg (flat dose) IV on day 1  Alimta 500 mg/m2 = 1095 mg IV on day 1  Carboplatin AUC 5    Kardex updated and labs to epic; chart to  for scheduling; note to pool for 2nd RN check. Patient will need B12 injection prior to chemo commencing.     ORDER REVIEWED AND AGREE WITH CALCULATIONS  PER BELLA LAINEZ

## 2022-01-24 DIAGNOSIS — C80.1 ADENOCARCINOMA (HCC): ICD-10-CM

## 2022-01-24 RX ORDER — ALPRAZOLAM 1 MG/1
TABLET ORAL
Qty: 120 TABLET | Refills: 0 | Status: SHIPPED | OUTPATIENT
Start: 2022-01-24 | End: 2022-02-23

## 2022-01-25 ENCOUNTER — TELEPHONE (OUTPATIENT)
Dept: CASE MANAGEMENT | Age: 52
End: 2022-01-25

## 2022-01-25 NOTE — TELEPHONE ENCOUNTER
Name: Ana Rosa Mendoza  : 1970  MRN: [id number]    Oncology Navigation Follow-Up Note  Navigator calling Dr. Laura Akins asking for his review of Temp[employer] testing scanned into media today and if this should be sufficient for precert or do we radha to wait for RNA sequencing? Plan to follow. . Precert-Genaro rebollar.   Electronically signed by Edmond Shipman RN on 2022 at 1:00 PM

## 2022-01-26 ENCOUNTER — APPOINTMENT (OUTPATIENT)
Dept: CT IMAGING | Age: 52
End: 2022-01-26
Payer: COMMERCIAL

## 2022-01-26 ENCOUNTER — HOSPITAL ENCOUNTER (EMERGENCY)
Age: 52
Discharge: HOME | End: 2022-01-26
Attending: EMERGENCY MEDICINE
Payer: COMMERCIAL

## 2022-01-26 VITALS
DIASTOLIC BLOOD PRESSURE: 63 MMHG | WEIGHT: 213 LBS | HEIGHT: 71 IN | TEMPERATURE: 98 F | SYSTOLIC BLOOD PRESSURE: 146 MMHG | RESPIRATION RATE: 18 BRPM | HEART RATE: 97 BPM | BODY MASS INDEX: 29.82 KG/M2 | OXYGEN SATURATION: 100 %

## 2022-01-26 DIAGNOSIS — M79.605 LEFT LEG PAIN: Primary | ICD-10-CM

## 2022-01-26 DIAGNOSIS — K62.5 RB (RECTAL BLEEDING): ICD-10-CM

## 2022-01-26 LAB
ABSOLUTE EOS #: 0.18 K/UL (ref 0–0.44)
ABSOLUTE IMMATURE GRANULOCYTE: 0.11 K/UL (ref 0–0.3)
ABSOLUTE LYMPH #: 1.97 K/UL (ref 1.1–3.7)
ABSOLUTE MONO #: 1.47 K/UL (ref 0.1–1.2)
ANION GAP SERPL CALCULATED.3IONS-SCNC: 7 MMOL/L (ref 9–17)
BASOPHILS # BLD: 0 % (ref 0–2)
BASOPHILS ABSOLUTE: 0.03 K/UL (ref 0–0.2)
BUN BLDV-MCNC: 16 MG/DL (ref 6–20)
BUN/CREAT BLD: 29 (ref 9–20)
CALCIUM SERPL-MCNC: 9.5 MG/DL (ref 8.6–10.4)
CHLORIDE BLD-SCNC: 102 MMOL/L (ref 98–107)
CO2: 27 MMOL/L (ref 20–31)
CREAT SERPL-MCNC: 0.55 MG/DL (ref 0.7–1.2)
DATE, STOOL #1: NORMAL
DATE, STOOL #2: NORMAL
DATE, STOOL #3: NORMAL
DIFFERENTIAL TYPE: ABNORMAL
EOSINOPHILS RELATIVE PERCENT: 2 % (ref 1–4)
GFR AFRICAN AMERICAN: >60 ML/MIN
GFR NON-AFRICAN AMERICAN: >60 ML/MIN
GFR SERPL CREATININE-BSD FRML MDRD: ABNORMAL ML/MIN/{1.73_M2}
GFR SERPL CREATININE-BSD FRML MDRD: ABNORMAL ML/MIN/{1.73_M2}
GLUCOSE BLD-MCNC: 89 MG/DL (ref 70–99)
HCT VFR BLD CALC: 27.9 % (ref 40.7–50.3)
HEMOCCULT SP1 STL QL: NEGATIVE
HEMOCCULT SP2 STL QL: NORMAL
HEMOCCULT SP3 STL QL: NORMAL
HEMOGLOBIN: 8.1 G/DL (ref 13–17)
IMMATURE GRANULOCYTES: 1 %
LYMPHOCYTES # BLD: 23 % (ref 24–43)
MCH RBC QN AUTO: 29.1 PG (ref 25.2–33.5)
MCHC RBC AUTO-ENTMCNC: 29 G/DL (ref 28.4–34.8)
MCV RBC AUTO: 100.4 FL (ref 82.6–102.9)
MONOCYTES # BLD: 17 % (ref 3–12)
NRBC AUTOMATED: 0 PER 100 WBC
PDW BLD-RTO: 14.4 % (ref 11.8–14.4)
PLATELET # BLD: 221 K/UL (ref 138–453)
PLATELET ESTIMATE: ABNORMAL
PMV BLD AUTO: 8.6 FL (ref 8.1–13.5)
POTASSIUM SERPL-SCNC: 4.4 MMOL/L (ref 3.7–5.3)
RBC # BLD: 2.78 M/UL (ref 4.21–5.77)
RBC # BLD: ABNORMAL 10*6/UL
SEG NEUTROPHILS: 57 % (ref 36–65)
SEGMENTED NEUTROPHILS ABSOLUTE COUNT: 4.87 K/UL (ref 1.5–8.1)
SODIUM BLD-SCNC: 136 MMOL/L (ref 135–144)
TIME, STOOL #1: 1200
TIME, STOOL #2: NORMAL
TIME, STOOL #3: NORMAL
WBC # BLD: 8.6 K/UL (ref 3.5–11.3)
WBC # BLD: ABNORMAL 10*3/UL

## 2022-01-26 PROCEDURE — 96376 TX/PRO/DX INJ SAME DRUG ADON: CPT

## 2022-01-26 PROCEDURE — 2580000003 HC RX 258: Performed by: PHYSICIAN ASSISTANT

## 2022-01-26 PROCEDURE — 85025 COMPLETE CBC W/AUTO DIFF WBC: CPT

## 2022-01-26 PROCEDURE — 80048 BASIC METABOLIC PNL TOTAL CA: CPT

## 2022-01-26 PROCEDURE — 72192 CT PELVIS W/O DYE: CPT

## 2022-01-26 PROCEDURE — 96375 TX/PRO/DX INJ NEW DRUG ADDON: CPT

## 2022-01-26 PROCEDURE — 96374 THER/PROPH/DIAG INJ IV PUSH: CPT

## 2022-01-26 PROCEDURE — 82270 OCCULT BLOOD FECES: CPT

## 2022-01-26 PROCEDURE — 6360000002 HC RX W HCPCS: Performed by: PHYSICIAN ASSISTANT

## 2022-01-26 PROCEDURE — 99283 EMERGENCY DEPT VISIT LOW MDM: CPT

## 2022-01-26 RX ORDER — DOCUSATE SODIUM 100 MG/1
100 CAPSULE, LIQUID FILLED ORAL 2 TIMES DAILY
Qty: 60 CAPSULE | Refills: 0 | Status: SHIPPED | OUTPATIENT
Start: 2022-01-26

## 2022-01-26 RX ORDER — KETOROLAC TROMETHAMINE 30 MG/ML
30 INJECTION, SOLUTION INTRAMUSCULAR; INTRAVENOUS ONCE
Status: COMPLETED | OUTPATIENT
Start: 2022-01-26 | End: 2022-01-26

## 2022-01-26 RX ORDER — FENTANYL CITRATE 50 UG/ML
25 INJECTION, SOLUTION INTRAMUSCULAR; INTRAVENOUS ONCE
Status: COMPLETED | OUTPATIENT
Start: 2022-01-26 | End: 2022-01-26

## 2022-01-26 RX ORDER — OXYCODONE HYDROCHLORIDE 80 MG/1
80 TABLET, FILM COATED, EXTENDED RELEASE ORAL EVERY 12 HOURS
COMMUNITY

## 2022-01-26 RX ORDER — DEXAMETHASONE SODIUM PHOSPHATE 10 MG/ML
10 INJECTION, SOLUTION INTRAMUSCULAR; INTRAVENOUS ONCE
Status: COMPLETED | OUTPATIENT
Start: 2022-01-26 | End: 2022-01-26

## 2022-01-26 RX ORDER — FENTANYL CITRATE 50 UG/ML
100 INJECTION, SOLUTION INTRAMUSCULAR; INTRAVENOUS ONCE
Status: COMPLETED | OUTPATIENT
Start: 2022-01-26 | End: 2022-01-26

## 2022-01-26 RX ORDER — HEPARIN SODIUM (PORCINE) LOCK FLUSH IV SOLN 100 UNIT/ML 100 UNIT/ML
500 SOLUTION INTRAVENOUS PRN
Status: DISCONTINUED | OUTPATIENT
Start: 2022-01-26 | End: 2022-01-26 | Stop reason: HOSPADM

## 2022-01-26 RX ORDER — 0.9 % SODIUM CHLORIDE 0.9 %
500 INTRAVENOUS SOLUTION INTRAVENOUS ONCE
Status: COMPLETED | OUTPATIENT
Start: 2022-01-26 | End: 2022-01-26

## 2022-01-26 RX ADMIN — DEXAMETHASONE SODIUM PHOSPHATE 10 MG: 10 INJECTION, SOLUTION INTRAMUSCULAR; INTRAVENOUS at 11:06

## 2022-01-26 RX ADMIN — SODIUM CHLORIDE 500 ML: 9 INJECTION, SOLUTION INTRAVENOUS at 11:06

## 2022-01-26 RX ADMIN — HEPARIN 500 UNITS: 100 SYRINGE at 13:19

## 2022-01-26 RX ADMIN — FENTANYL CITRATE 25 MCG: 50 INJECTION, SOLUTION INTRAMUSCULAR; INTRAVENOUS at 13:19

## 2022-01-26 RX ADMIN — FENTANYL CITRATE 100 MCG: 50 INJECTION, SOLUTION INTRAMUSCULAR; INTRAVENOUS at 11:06

## 2022-01-26 RX ADMIN — KETOROLAC TROMETHAMINE 30 MG: 30 INJECTION, SOLUTION INTRAMUSCULAR at 11:06

## 2022-01-26 ASSESSMENT — ENCOUNTER SYMPTOMS: BLOOD IN STOOL: 1

## 2022-01-26 ASSESSMENT — PAIN DESCRIPTION - ORIENTATION: ORIENTATION: LEFT

## 2022-01-26 ASSESSMENT — PAIN DESCRIPTION - FREQUENCY: FREQUENCY: CONTINUOUS

## 2022-01-26 ASSESSMENT — PAIN SCALES - GENERAL
PAINLEVEL_OUTOF10: 10

## 2022-01-26 ASSESSMENT — PAIN DESCRIPTION - LOCATION: LOCATION: LEG

## 2022-01-26 ASSESSMENT — PAIN DESCRIPTION - DESCRIPTORS: DESCRIPTORS: SHARP

## 2022-01-26 NOTE — ED PROVIDER NOTES
eMERGENCY dEPARTMENT eNCOUnter   [address] [place] Name: Emily Zhao  MRN: [id number]  [place] 1970  Date of evaluation: 1/26/22     Emily Zhao is a 46 y.o. male with CC: Leg Pain (left stage 4 bone cancer)        This visit was performed by both a physician and an APC. I performed all aspects of the MDM as documented. The care is provided during an unprecedented national emergency due to the novel coronavirus, COVID 19.     Cy Irizarry MD  Attending Emergency Physician          Cynthia Tong MD  01/26/22 [phone]

## 2022-01-26 NOTE — ED PROVIDER NOTES
[facility] ED  eMERGENCY dEPARTMENT eNCOUnter      Pt Name: mEily Zhao  MRN: [id number]  [place] 1970  Date of evaluation: 1/26/22      CHIEF COMPLAINT       Chief Complaint   Patient presents with    Leg Pain     left stage 4 bone cancer         HISTORY OF PRESENT ILLNESS    Emily Zhao is a 46 y.o. male who presents complaining of he is here because he is complaining of left leg and left hip pain states that he has stage IV bone cancer. He has been taking OxyContin extended release at home he is still having pain he is here for evaluation. The history is provided by the patient. Leg Injury  Location:  Hip and leg  Time since incident:  2 weeks  Injury: no    Hip location:  L hip  Leg location:  L leg  Pain details:     Quality:  Aching and throbbing    Severity:  Severe    Onset quality:  Gradual    Timing:  Constant    Progression:  Worsening  Chronicity:  Recurrent  Dislocation: no    Foreign body present:  No foreign bodies  Tetanus status:  Unknown  Prior injury to area:  Unable to specify  Relieved by:  Nothing  Worsened by:  Bearing weight  Ineffective treatments:  None tried  Associated symptoms: decreased ROM    Associated symptoms: no muscle weakness    Risk factors: known bone disorder        REVIEW OF SYSTEMS       Review of Systems   Gastrointestinal: Positive for blood in stool. Musculoskeletal: Positive for arthralgias. All other systems reviewed and are negative.       PAST MEDICAL HISTORY     Past Medical History:   Diagnosis Date    Abscess     rt ear    Anxiety 10/5/2015    Cellulitis due to MRSA     Chronic back pain     Depression     Hypertension     Metastatic cancer ([facility] [address] 12/17/2021    Multiple substance abuse ([facility] [address]     OCD (obsessive compulsive disorder)        SURGICAL HISTORY       Past Surgical History:   Procedure Laterality Date    CT BIOPSY PERCUTANEOUS SUPERFICIAL BONE  12/9/2021    CT BIOPSY PERCUTANEOUS SUPERFICIAL BONE 12/9/2021 [facility] CT SCAN    EYE SURGERY      plate    IR PORT PLACEMENT EQUAL OR GREATER THAN 5 YEARS  2022    IR PORT PLACEMENT EQUAL OR GREATER THAN 5 YEARS 2022 [facility]    SHOULDER SURGERY Right     [place], labial tear       CURRENT MEDICATIONS       Previous Medications    ALPRAZOLAM (XANAX) 1 MG TABLET    take 1 tablet by mouth every 6 hours if needed for anxiety    CITALOPRAM (CELEXA) 20 MG TABLET    Take 1 tablet by mouth daily    DICLOFENAC SODIUM (VOLTAREN) 1 % GEL    Apply 4 g topically three times daily for 8 doses    MAGNESIUM HYDROXIDE (MILK OF MAGNESIA) 400 MG/5ML SUSPENSION    Take 30 mLs by mouth daily    NALOXONE (NARCAN) 0.4 MG/ML INJECTION    Infuse 1 mL intravenously as needed (Opioid reversal)    NICOTINE (NICODERM CQ) 21 MG/24HR    Place 1 patch onto the skin daily    OXYCODONE (OXYCONTIN) 80 MG EXTENDED RELEASE TABLET    Take 120 mg by mouth every 12 hours. ALLERGIES     has no active allergies. FAMILY HISTORY     He indicated that his mother is alive. He indicated that his father is alive. He indicated that his brother is alive. He indicated that his maternal grandmother is . He indicated that his maternal grandfather is . SOCIAL HISTORY      reports that he has quit smoking. His smoking use included cigarettes. He has a 12.50 pack-year smoking history. He has never used smokeless tobacco. He reports current drug use. Drug: Marijuana Suad Geronimo). He reports that he does not drink alcohol. PHYSICAL EXAM     INITIAL VITALS: BP (!) 146/63   Pulse 97   Temp 98 °F (36.7 °C) (Oral)   Resp 18   Ht 5' 11\" (1.803 m)   Wt 213 lb (96.6 kg)   SpO2 100%   BMI 29.71 kg/m²      Physical Exam  Vitals and nursing note reviewed. Constitutional:       Appearance: He is well-developed. HENT:      Head: Normocephalic and atraumatic. Cardiovascular:      Rate and Rhythm: Normal rate and regular rhythm. Pulses: Normal pulses.       Heart sounds: Normal heart sounds. Pulmonary:      Effort: Pulmonary effort is normal.      Breath sounds: Normal breath sounds. Abdominal:      Palpations: There is no mass. Musculoskeletal:         General: No deformity. Right lower leg: No edema. Left lower leg: No edema. Skin:     Capillary Refill: Capillary refill takes less than 2 seconds. Neurological:      Mental Status: He is alert and oriented to person, place, and time. MEDICAL DECISION MAKIN-year-old male with primarily lung cancer with metastasis to the bones presents today with left hip pain. He has a known lesion in the left in inferior pubic rami per imaging that was done recently. He is having increased pain he is unable to stand without assistance and significant discomfort. While in the emergency department we did check some labs his CBC most notably hemoglobin is decreased 2.9 points in the past couple of weeks. He states that he has had some blood in his stool not every time but sometimes. We did a CT of the pelvis and showed increase size of a lesion in the inferior pubic rami. I offered admission for the patient to help get his pain under control and factors speak with oncologist covering for Dr. Felecia Lara he is familiar with the patient. The patient states that he does not want to be admitted he would rather go home. I discussed findings regarding his labs and decrease in his hemoglobin as well as the increased size of the lesion noted on CT scan. He states that he would rather go home and he will call his oncologist to follow-up. Will go with patient decision and I will discharge him home. Suggest ice or heat to the affected area. Increase fluids fruits and vegetables. Stool softener.     DIAGNOSTIC RESULTS     EKG: All EKG's are interpreted by the Emergency Department Physician who either signs or Co-signs this chart in the absence of acardiologist.        RADIOLOGY:[place] film, CT, MRI, and formal ultrasound images (except ED bedside ultrasound) are read by the radiologist and the images and interpretations are directly viewed by the emergency physician. LABS:All lab results were reviewed by myself, and all abnormals are listed below. Labs Reviewed   CBC WITH AUTO DIFFERENTIAL - Abnormal; Notable for the following components:       Result Value    RBC 2.78 (*)     Hemoglobin 8.1 (*)     Hematocrit 27.9 (*)     Lymphocytes 23 (*)     Monocytes 17 (*)     Immature Granulocytes 1 (*)     Absolute Mono # 1.47 (*)     All other components within normal limits   BASIC METABOLIC PANEL W/ REFLEX TO MG FOR LOW K - Abnormal; Notable for the following components:    CREATININE 0.55 (*)     Bun/Cre Ratio 29 (*)     Anion Gap 7 (*)     All other components within normal limits   OCCULT BLOOD SCREEN   URINE RT REFLEX TO CULTURE         EMERGENCY DEPARTMENT COURSE:   Vitals:    Vitals:    01/26/22 1009   BP: (!) 146/63   Pulse: 97   Resp: 18   Temp: 98 °F (36.7 °C)   TempSrc: Oral   SpO2: 100%   Weight: 213 lb (96.6 kg)   Height: 5' 11\" (1.803 m)       The patient was given the following medications while in the emergency department:  Orders Placed This Encounter   Medications    0.9 % sodium chloride bolus    dexamethasone (PF) (DECADRON) injection 10 mg    ketorolac (TORADOL) injection 30 mg    fentaNYL (SUBLIMAZE) injection 100 mcg    docusate sodium (COLACE) 100 MG capsule     Sig: Take 1 capsule by mouth 2 times daily     Dispense:  60 capsule     Refill:  0    fentaNYL (SUBLIMAZE) injection 25 mcg    heparin flush 100 UNIT/ML injection 500 Units       -------------------------      CRITICAL CARE:     CONSULTS:  IP CONSULT TO ONCOLOGY  IP CONSULT TO INTERNAL MEDICINE    PROCEDURES:  Procedures    FINAL IMPRESSION      1.  Left leg pain    2. RB (rectal bleeding)          DISPOSITION/PLAN   DISPOSITION        PATIENT REFERREDTO:  Manasa Sanchez MD  [address] [place] [id number] [phone]    Schedule an appointment as soon as possible for a visit in 1 day      [facility] ED  [address] [facility]  [phone]    If symptoms worsen      DISCHARGEMEDICATIONS:  New Prescriptions    DOCUSATE SODIUM (COLACE) 100 MG CAPSULE    Take 1 capsule by mouth 2 times daily       (Please note that portions of this note were completed with a voice recognition program.  Efforts were made to edit thedictations but occasionally words are mis-transcribed.)    MARCELO Rajput PA-C  01/26/22 [id number]

## 2022-01-26 NOTE — FLOWSHEET NOTE
[address] at [facility] [address] NOTE    Shift date: 1/26/22  Shift day: Wednesday   Shift # 1    Room # STA10/10   Name: Liz Urias            Age: 46 y.o. Gender: male          Hinduism: Jewish    Referral: Follow up visit from the [facility]. Admit Date & Time: 1/26/2022 10:12 AM    PATIENT/EVENT DESCRIPTION:  Liz Urias is a 46 y.o. male with whom writer visited in the Emergency Department. Writer has met Patient on a few occasions in the inpatient and outpatient settings. Patient was accompanied by his Mother. Writer spoke with Patient's nurse, Luis Tam, prior to visiting Patient. SPIRITUAL ASSESSMENT/INTERVENTION:  Patient was lying on the bed in the Emergency Department. His Mother was sitting in a chair at bedside. Patient acknowledged Katie Romero, who entered the room with his nurse. Patient had his eyes closed most of the visit. He told the nurse that the medication had helped his pain. Mother shared that Patient's father was also experiencing a health challenge. Mother processed her feelings and thoughts about Pt's situation. She identified her lottie and family as what has helped her cope. She voiced hopes that Patient will be able to get more support, noting that he could benefit from more assistance at home. Patient was receptive to writer offering a blessing for him. Writer offered supportive presence and active listening; inquired about Mother's sources of support and strength; offered words of support and encouragement; affirmed Pt's and Mother's strengths; offered a blessing for Pt and Family. SPIRITUAL CARE FOLLOW-UP PLAN:  Chaplains will remain available to offer spiritual and emotional support as needed.        01/26/22 1643   Encounter Summary   Services provided to: Patient and family together   Referral/Consult From: [facility] Family members;Parent   Continue Visiting   (1/26/22)   Complexity of Encounter Moderate Length of Encounter 15 minutes   Spiritual Assessment Completed Yes   Routine   Type Follow up   Spiritual/Mandaeism   Type Spiritual support   Assessment Approachable; Anxious   Intervention Active listening;Explored feelings, thoughts, concerns;Explored coping resources;Prayer;Sustaining presence/ Ministry of presence; Discussed illness/injury and it's impact; Discussed belief system/Hindu practices/lottie;Discussed relationship with God;Discussed meaning/purpose   Outcome Expressed feelings/needs/concerns;Engaged in conversation;Expressed gratitude;Coping; Hopeful;Receptive;Encouraged     Electronically signed by Esthela Green on 1/26/2022 at 4:44 PM.  [address] [employer]  [phone]

## 2022-01-27 ENCOUNTER — TELEPHONE (OUTPATIENT)
Dept: INFUSION THERAPY | Facility: MEDICAL CENTER | Age: 52
End: 2022-01-27

## 2022-01-27 ENCOUNTER — TELEPHONE (OUTPATIENT)
Dept: CASE MANAGEMENT | Age: 52
End: 2022-01-27

## 2022-01-27 NOTE — TELEPHONE ENCOUNTER
Patient leaves message on triage line requesting to speak with Leanne Deluca, nurse navigator. Writer speaks with Leanne Deluca and gives message to call patient.

## 2022-01-27 NOTE — TELEPHONE ENCOUNTER
Name: Tello Du  : 1970  MRN: [id number]    Oncology Navigation Follow-Up Note  Navigator spoke with pt. Today and informed him writer is working with precert to clarify Molecular testing? Plan to keep pt. Updated. Molecular testing is holding up chemo auth! Writer spoke with Yue [facility]ke[facility] and clarifying with him why EGFR/KRAS/ALK are not listed as pertinent negative. Pts. Case was submitted as metastatic adenocarcinoma, therefore EGFR/ALK/KRAS would only show up if positive. From: Christine Landa   Sent:  10:20 AM  To: [email or web address]  Cc: [email or web address]>; [email or web address]>; [email or web address]  Subject: Benjamin Valencia [id number]    [place] Karen,  I just spoke with Segun Amaya([facility] [phone]) to clarify what you are seeing on the scanned [facility] report. So because this case is metastatic adenocarcinoma(unknown Primary-suspected Lung , but not proven) you will not see pertinent negatives on this report. If the diagnosis had been right upper lobe adenocarcinoma Lung Cancer you would have seen those test that are pertinent to lung cancer EGFR/KRAS/ALK. Does this make sense? If you look at the complete report from xT panel there are at least 360 mutations that this sample has been tested for, all which had they been positive would have shown up under Somatic gene positives. Joie Ewing sent me a list of pertinent negatives attached to each diagnosis that you would normally see with a true diagnosis. Im attaching this list if its helpful. Im not sure who will be taking your place, but please share this with those in precert and Joie Ewing is willing to set something up with [facility] to go over such details if needed. I know Im coming from a clinical background, however not sure if your replacement will have clinical experience?  Please keep Joie Ewing in Mind if [facility] thinks it would be helpful? Please let me know if this enough information to submit pts. Chemo, hes waited long enough. I appreciate your help. Thanks,   Writer then spoke with Dr. Judit Gonzalez and informed of above and Dr. Judit Gonzalez requesting we place precert as Urgent! Shira grayson.       Electronically signed by Ryan Menchaca RN on 1/27/2022 at 10:23 AM

## 2022-01-28 ENCOUNTER — TELEPHONE (OUTPATIENT)
Dept: ONCOLOGY | Age: 52
End: 2022-01-28

## 2022-01-28 NOTE — TELEPHONE ENCOUNTER
KARLEE HUC TO TRIAGE TO STATE PT CALLING WITH PAIN NEEDS, \"NEEDS DIFFERENT PAIN MEDS\"    CALLED PT BACK AND PT SLOW TO RESPOND, DOES NOT STATE ANY PAIN NEEDS AT THIS TIME. PT IS RECEIVING OXYCONTIN 80 MG Q 12 HOURS AS STATED ON Epic MED LIST, NOT Pankaj Steele MD, NOT LISTED.

## 2022-01-31 ENCOUNTER — HOSPITAL ENCOUNTER (OUTPATIENT)
Dept: INFUSION THERAPY | Facility: MEDICAL CENTER | Age: 52
Discharge: HOME | End: 2022-01-31
Payer: COMMERCIAL

## 2022-01-31 ENCOUNTER — TELEPHONE (OUTPATIENT)
Dept: INFUSION THERAPY | Facility: MEDICAL CENTER | Age: 52
End: 2022-01-31

## 2022-01-31 ENCOUNTER — HOSPITAL ENCOUNTER (OUTPATIENT)
Facility: MEDICAL CENTER | Age: 52
End: 2022-01-31
Payer: COMMERCIAL

## 2022-01-31 VITALS
DIASTOLIC BLOOD PRESSURE: 62 MMHG | TEMPERATURE: 98.4 F | HEART RATE: 110 BPM | RESPIRATION RATE: 18 BRPM | SYSTOLIC BLOOD PRESSURE: 138 MMHG

## 2022-01-31 DIAGNOSIS — C34.01: Primary | ICD-10-CM

## 2022-01-31 PROCEDURE — 96372 THER/PROPH/DIAG INJ SC/IM: CPT

## 2022-01-31 PROCEDURE — 6360000002 HC RX W HCPCS: Performed by: INTERNAL MEDICINE

## 2022-01-31 RX ORDER — CYANOCOBALAMIN 1000 UG/ML
1000 INJECTION INTRAMUSCULAR; SUBCUTANEOUS ONCE
Status: COMPLETED | OUTPATIENT
Start: 2022-01-31 | End: 2022-01-31

## 2022-01-31 RX ADMIN — CYANOCOBALAMIN 1000 MCG: 1000 INJECTION, SOLUTION INTRAMUSCULAR at 13:58

## 2022-01-31 NOTE — FLOWSHEET NOTE
Writer encountered and visited briefly with Patient and Father. Writer accompanied Pt and Father to the outpatient laboratory area. Patient and Father shared how they were doing. Patient expressed gratitude for his Father. Writer offered words of support and encouragement to Pt and Father who expressed thanks. 01/31/22 1812   Encounter Summary   Services provided to: Patient and family together   Referral/Consult From: Nurse   Support System Family members;Parent   Continue Visiting   (1/31/22)   Complexity of Encounter Low   Length of Encounter 15 minutes   Routine   Type Follow up   Assessment Calm; Approachable   Intervention Active listening;Explored feelings, thoughts, concerns;Sustaining presence/ Ministry of presence; Discussed illness/injury and it's impact   Outcome Coping;Expressed feelings/needs/concerns;Engaged in conversation;Expressed gratitude;Receptive; Hopeful;Encouraged     Electronically signed by Gayle Abbasi, Oncology Outpatient [facility] 19, [facility] Radiation Oncology  1/31/2022  6:14 PM

## 2022-01-31 NOTE — PROGRESS NOTES
Patient arrived per wheelchair with family member for Vitamin B 12 injection. Patient complains of back and right hip pain of which he is taking pain meds. No other complaints or concerns. Vitamin B 12 injection given with band aide applied to site. Patient left unit per wheelchair at discharge.

## 2022-01-31 NOTE — TELEPHONE ENCOUNTER
WRITER RETURNED PT CALL , DOES NOT KNOW APPT TIME, TODAY 1/31/2022 IS 1330 FOR VIT B 12 , AND ON   2/2/2022 AT 0900 FOR TREATMENT AND MD.    NOTIFIED PT OF THIS AND PT STATES HAS PLENTY OF OXYCONTIN 40 MG, NEEDS NORE OXYCONTIN 80 MG AND ROXICODONE 20 MG. PT SOUNDS CONFUSED WITH HIS SPEAKING, DOES NOT KNOW THE DOCTOR PRESCRIBING HIS PAIN MEDS, WHEN ASKED PER WRITER AND HAVING DIFFICULTY KEEPING APPT STRAIGHT. WRITER CALLED BOY (P) THAT IS IN Epic    S[place]ERA NURSE IS BELLA MCKEON RN (P)  [phone]    WRITER CALLED AND SPOKE WITH BELLA SIMMONS AND NOTIFIED OF ABOVE, SHE STATES SHE WAS SUPPOSED TO SEE PT AT 1430.--NOTIFIED HAS APPT HERE AT 1330. ALSO THAT PT SOUNDS CONFUSED. LISSETTE STATES ARE CONSIDERING A FACILITY FOR PT, DUE TO THIS AND HIS PARENTS THAT HE LIVES WITH ARE FRAIL AND UNABLE TO ASSIST HIM. BOY IS FILLING HIS PAIN MEDS--BELLA SIMMONS NOTIFIED PT CALLED FOR REFILL OF ABOVE MEDS.     SHE STATES SHE WILL SPEAK WITH PT.

## 2022-02-01 ENCOUNTER — TELEPHONE (OUTPATIENT)
Dept: ONCOLOGY | Facility: CLINIC | Age: 52
End: 2022-02-01

## 2022-02-01 ENCOUNTER — TELEPHONE (OUTPATIENT)
Dept: CASE MANAGEMENT | Age: 52
End: 2022-02-01

## 2022-02-01 ENCOUNTER — TELEPHONE (OUTPATIENT)
Dept: ONCOLOGY | Age: 52
End: 2022-02-01

## 2022-02-01 NOTE — TELEPHONE ENCOUNTER
SW received consult Suzette to clarify need of patient. SITA called patient. States that his disability  is requesting letter signed by physician that indicates the staging, diagnosis, and prognosis for patient to help expedite his disability approval. SITA advised will contact triage and nurse navigator to help get patient letter for tomorrow.

## 2022-02-01 NOTE — TELEPHONE ENCOUNTER
PT CALLING TRIAGE X 2 TO SPEAK TO GLENN MENARD, GAVE CELL NUMBER , SO HE CAN ANSWER PHONE, CAN NOT GET UP FROM COUCH QUICKLY , PT STATES CELL (P) [phone].     [facility] RN NOTIFIED IN MESSAGE AND IN PERSON

## 2022-02-02 ENCOUNTER — OFFICE VISIT (OUTPATIENT)
Dept: ONCOLOGY | Age: 52
End: 2022-02-02
Payer: COMMERCIAL

## 2022-02-02 ENCOUNTER — TELEPHONE (OUTPATIENT)
Dept: ONCOLOGY | Age: 52
End: 2022-02-02

## 2022-02-02 ENCOUNTER — OFFICE VISIT (OUTPATIENT)
Dept: PALLATIVE CARE | Age: 52
End: 2022-02-02
Payer: COMMERCIAL

## 2022-02-02 ENCOUNTER — HOSPITAL ENCOUNTER (OUTPATIENT)
Dept: INFUSION THERAPY | Facility: MEDICAL CENTER | Age: 52
Discharge: HOME | End: 2022-02-02
Payer: COMMERCIAL

## 2022-02-02 VITALS
SYSTOLIC BLOOD PRESSURE: 130 MMHG | BODY MASS INDEX: 29.23 KG/M2 | WEIGHT: 209.6 LBS | RESPIRATION RATE: 18 BRPM | DIASTOLIC BLOOD PRESSURE: 93 MMHG | TEMPERATURE: 96.2 F | HEART RATE: 111 BPM

## 2022-02-02 VITALS — TEMPERATURE: 96.2 F | HEART RATE: 111 BPM | DIASTOLIC BLOOD PRESSURE: 93 MMHG | SYSTOLIC BLOOD PRESSURE: 130 MMHG

## 2022-02-02 DIAGNOSIS — M89.8X5 LYTIC BONE LESION OF HIP: Primary | ICD-10-CM

## 2022-02-02 DIAGNOSIS — C34.01: ICD-10-CM

## 2022-02-02 DIAGNOSIS — R52 INTRACTABLE PAIN: ICD-10-CM

## 2022-02-02 DIAGNOSIS — C80.1 ADENOCARCINOMA (HCC): Primary | ICD-10-CM

## 2022-02-02 DIAGNOSIS — C79.51 MALIGNANT NEOPLASM METASTATIC TO BONE (HCC): ICD-10-CM

## 2022-02-02 DIAGNOSIS — C79.51 BONE METASTASES (HCC): Primary | ICD-10-CM

## 2022-02-02 DIAGNOSIS — C80.1 ADENOCARCINOMA (HCC): ICD-10-CM

## 2022-02-02 LAB
ABSOLUTE EOS #: 0.18 K/UL (ref 0–0.4)
ABSOLUTE IMMATURE GRANULOCYTE: 0.36 K/UL (ref 0–0.3)
ABSOLUTE LYMPH #: 3.28 K/UL (ref 1–4.8)
ABSOLUTE MONO #: 2 K/UL (ref 0.2–0.8)
ALBUMIN SERPL-MCNC: 3.6 G/DL (ref 3.5–5.2)
ALBUMIN/GLOBULIN RATIO: ABNORMAL (ref 1–2.5)
ALP BLD-CCNC: 75 U/L (ref 40–129)
ALT SERPL-CCNC: 37 U/L (ref 5–41)
ANION GAP SERPL CALCULATED.3IONS-SCNC: 10 MMOL/L (ref 9–17)
AST SERPL-CCNC: 23 U/L
BASOPHILS # BLD: 0 %
BASOPHILS ABSOLUTE: 0 K/UL (ref 0–0.2)
BILIRUB SERPL-MCNC: 0.39 MG/DL (ref 0.3–1.2)
BUN BLDV-MCNC: 20 MG/DL (ref 6–20)
BUN/CREAT BLD: 38 (ref 9–20)
CALCIUM SERPL-MCNC: 9.7 MG/DL (ref 8.6–10.4)
CHLORIDE BLD-SCNC: 101 MMOL/L (ref 98–107)
CHOLESTEROL/HDL RATIO: 2.7
CHOLESTEROL: 158 MG/DL
CO2: 25 MMOL/L (ref 20–31)
CREAT SERPL-MCNC: 0.52 MG/DL (ref 0.7–1.2)
DIFFERENTIAL TYPE: ABNORMAL
EOSINOPHILS RELATIVE PERCENT: 1 % (ref 1–4)
GFR AFRICAN AMERICAN: >60 ML/MIN
GFR NON-AFRICAN AMERICAN: >60 ML/MIN
GFR SERPL CREATININE-BSD FRML MDRD: ABNORMAL ML/MIN/{1.73_M2}
GFR SERPL CREATININE-BSD FRML MDRD: ABNORMAL ML/MIN/{1.73_M2}
GLUCOSE BLD-MCNC: 97 MG/DL (ref 70–99)
HCT VFR BLD CALC: 37.2 % (ref 40.7–50.3)
HDLC SERPL-MCNC: 59 MG/DL
HEMOGLOBIN: 11.7 G/DL (ref 13–17)
IMMATURE GRANULOCYTES: 2 %
LDL CHOLESTEROL: 83 MG/DL (ref 0–130)
LYMPHOCYTES # BLD: 18 % (ref 24–44)
MCH RBC QN AUTO: 29 PG (ref 25.2–33.5)
MCHC RBC AUTO-ENTMCNC: 31.5 G/DL (ref 28.4–34.8)
MCV RBC AUTO: 92.1 FL (ref 82.6–102.9)
MONOCYTES # BLD: 11 % (ref 1–7)
NRBC AUTOMATED: 0 PER 100 WBC
PDW BLD-RTO: 14.6 % (ref 11.8–14.4)
PLATELET # BLD: 472 K/UL (ref 138–453)
PLATELET ESTIMATE: ABNORMAL
PMV BLD AUTO: 8.5 FL (ref 8.1–13.5)
POTASSIUM SERPL-SCNC: 4.1 MMOL/L (ref 3.7–5.3)
RBC # BLD: 4.04 M/UL (ref 4.21–5.77)
RBC # BLD: ABNORMAL 10*6/UL
SEG NEUTROPHILS: 68 % (ref 36–66)
SEGMENTED NEUTROPHILS ABSOLUTE COUNT: 12.38 K/UL (ref 1.8–7.7)
SODIUM BLD-SCNC: 136 MMOL/L (ref 135–144)
TOTAL PROTEIN: 6.4 G/DL (ref 6.4–8.3)
TRIGL SERPL-MCNC: 80 MG/DL
TSH SERPL DL<=0.05 MIU/L-ACNC: 1.2 MIU/L (ref 0.3–5)
VLDLC SERPL CALC-MCNC: NORMAL MG/DL (ref 1–30)
WBC # BLD: 18.2 K/UL (ref 3.5–11.3)
WBC # BLD: ABNORMAL 10*3/UL

## 2022-02-02 PROCEDURE — 99211 OFF/OP EST MAY X REQ PHY/QHP: CPT | Performed by: INTERNAL MEDICINE

## 2022-02-02 PROCEDURE — G8419 CALC BMI OUT NRM PARAM NOF/U: HCPCS | Performed by: INTERNAL MEDICINE

## 2022-02-02 PROCEDURE — 1036F TOBACCO NON-USER: CPT | Performed by: INTERNAL MEDICINE

## 2022-02-02 PROCEDURE — G8428 CUR MEDS NOT DOCUMENT: HCPCS | Performed by: INTERNAL MEDICINE

## 2022-02-02 PROCEDURE — 96409 CHEMO IV PUSH SNGL DRUG: CPT

## 2022-02-02 PROCEDURE — 1111F DSCHRG MED/CURRENT MED MERGE: CPT | Performed by: INTERNAL MEDICINE

## 2022-02-02 PROCEDURE — 96367 TX/PROPH/DG ADDL SEQ IV INF: CPT

## 2022-02-02 PROCEDURE — 80053 COMPREHEN METABOLIC PANEL: CPT

## 2022-02-02 PROCEDURE — 3017F COLORECTAL CA SCREEN DOC REV: CPT | Performed by: INTERNAL MEDICINE

## 2022-02-02 PROCEDURE — 96365 THER/PROPH/DIAG IV INF INIT: CPT

## 2022-02-02 PROCEDURE — 96411 CHEMO IV PUSH ADDL DRUG: CPT

## 2022-02-02 PROCEDURE — 80061 LIPID PANEL: CPT

## 2022-02-02 PROCEDURE — 96375 TX/PRO/DX INJ NEW DRUG ADDON: CPT

## 2022-02-02 PROCEDURE — 6360000002 HC RX W HCPCS: Performed by: INTERNAL MEDICINE

## 2022-02-02 PROCEDURE — 85025 COMPLETE CBC W/AUTO DIFF WBC: CPT

## 2022-02-02 PROCEDURE — 36591 DRAW BLOOD OFF VENOUS DEVICE: CPT

## 2022-02-02 PROCEDURE — 84443 ASSAY THYROID STIM HORMONE: CPT

## 2022-02-02 PROCEDURE — 96417 CHEMO IV INFUS EACH ADDL SEQ: CPT

## 2022-02-02 PROCEDURE — 99215 OFFICE O/P EST HI 40 MIN: CPT | Performed by: INTERNAL MEDICINE

## 2022-02-02 PROCEDURE — G8482 FLU IMMUNIZE ORDER/ADMIN: HCPCS | Performed by: INTERNAL MEDICINE

## 2022-02-02 PROCEDURE — G8427 DOCREV CUR MEDS BY ELIG CLIN: HCPCS | Performed by: INTERNAL MEDICINE

## 2022-02-02 PROCEDURE — 99214 OFFICE O/P EST MOD 30 MIN: CPT | Performed by: INTERNAL MEDICINE

## 2022-02-02 PROCEDURE — 2580000003 HC RX 258: Performed by: INTERNAL MEDICINE

## 2022-02-02 PROCEDURE — 96413 CHEMO IV INFUSION 1 HR: CPT

## 2022-02-02 RX ORDER — PALONOSETRON 0.05 MG/ML
0.25 INJECTION, SOLUTION INTRAVENOUS ONCE
OUTPATIENT
Start: 2022-03-02 | End: 2022-02-23

## 2022-02-02 RX ORDER — DIPHENHYDRAMINE HYDROCHLORIDE 50 MG/ML
50 INJECTION INTRAMUSCULAR; INTRAVENOUS
Status: CANCELLED | OUTPATIENT
Start: 2022-02-02

## 2022-02-02 RX ORDER — ONDANSETRON 8 MG/1
8 TABLET, ORALLY DISINTEGRATING ORAL EVERY 8 HOURS PRN
Qty: 40 TABLET | Refills: 1 | Status: SHIPPED | OUTPATIENT
Start: 2022-02-02

## 2022-02-02 RX ORDER — ALPRAZOLAM 1 MG/1
TABLET ORAL
Qty: 120 TABLET | Refills: 0 | Status: CANCELLED | OUTPATIENT
Start: 2022-02-02 | End: 2022-03-04

## 2022-02-02 RX ORDER — PALONOSETRON 0.05 MG/ML
0.25 INJECTION, SOLUTION INTRAVENOUS ONCE
Status: CANCELLED | OUTPATIENT
Start: 2022-02-02 | End: 2022-02-02

## 2022-02-02 RX ORDER — EPINEPHRINE 1 MG/ML
0.3 INJECTION, SOLUTION, CONCENTRATE INTRAVENOUS PRN
OUTPATIENT
Start: 2022-03-02

## 2022-02-02 RX ORDER — ALBUTEROL SULFATE 90 UG/1
4 AEROSOL, METERED RESPIRATORY (INHALATION) PRN
Status: CANCELLED | OUTPATIENT
Start: 2022-02-02

## 2022-02-02 RX ORDER — ALBUTEROL SULFATE 90 UG/1
4 AEROSOL, METERED RESPIRATORY (INHALATION) PRN
OUTPATIENT
Start: 2022-03-02

## 2022-02-02 RX ORDER — SODIUM CHLORIDE 9 MG/ML
20 INJECTION, SOLUTION INTRAVENOUS ONCE
OUTPATIENT
Start: 2022-03-02 | End: 2022-02-23

## 2022-02-02 RX ORDER — DEXAMETHASONE SODIUM PHOSPHATE 10 MG/ML
10 INJECTION INTRAMUSCULAR; INTRAVENOUS ONCE
Status: COMPLETED | OUTPATIENT
Start: 2022-02-02 | End: 2022-02-02

## 2022-02-02 RX ORDER — SODIUM CHLORIDE 9 MG/ML
5-40 INJECTION INTRAVENOUS PRN
Status: CANCELLED | OUTPATIENT
Start: 2022-02-02

## 2022-02-02 RX ORDER — EPINEPHRINE 1 MG/ML
0.3 INJECTION, SOLUTION, CONCENTRATE INTRAVENOUS PRN
Status: CANCELLED | OUTPATIENT
Start: 2022-02-02

## 2022-02-02 RX ORDER — SODIUM CHLORIDE 9 MG/ML
5-40 INJECTION INTRAVENOUS PRN
OUTPATIENT
Start: 2022-03-02

## 2022-02-02 RX ORDER — SODIUM CHLORIDE 9 MG/ML
INJECTION, SOLUTION INTRAVENOUS CONTINUOUS
Status: CANCELLED | OUTPATIENT
Start: 2022-02-02

## 2022-02-02 RX ORDER — MEPERIDINE HYDROCHLORIDE 50 MG/ML
12.5 INJECTION INTRAMUSCULAR; INTRAVENOUS; SUBCUTANEOUS PRN
OUTPATIENT
Start: 2022-03-02

## 2022-02-02 RX ORDER — ACETAMINOPHEN 325 MG/1
650 TABLET ORAL
OUTPATIENT
Start: 2022-03-02

## 2022-02-02 RX ORDER — ONDANSETRON 2 MG/ML
8 INJECTION INTRAMUSCULAR; INTRAVENOUS
OUTPATIENT
Start: 2022-03-02

## 2022-02-02 RX ORDER — SODIUM CHLORIDE 9 MG/ML
25 INJECTION, SOLUTION INTRAVENOUS PRN
Status: CANCELLED | OUTPATIENT
Start: 2022-02-02

## 2022-02-02 RX ORDER — HEPARIN SODIUM (PORCINE) LOCK FLUSH IV SOLN 100 UNIT/ML 100 UNIT/ML
500 SOLUTION INTRAVENOUS PRN
Status: CANCELLED | OUTPATIENT
Start: 2022-02-02

## 2022-02-02 RX ORDER — HYDROMORPHONE HYDROCHLORIDE 4 MG/1
4 TABLET ORAL EVERY 4 HOURS PRN
COMMUNITY

## 2022-02-02 RX ORDER — MEPERIDINE HYDROCHLORIDE 50 MG/ML
12.5 INJECTION INTRAMUSCULAR; INTRAVENOUS; SUBCUTANEOUS PRN
Status: CANCELLED | OUTPATIENT
Start: 2022-02-02

## 2022-02-02 RX ORDER — SODIUM CHLORIDE 0.9 % (FLUSH) 0.9 %
5-40 SYRINGE (ML) INJECTION PRN
Status: DISCONTINUED | OUTPATIENT
Start: 2022-02-02 | End: 2022-02-03 | Stop reason: HOSPADM

## 2022-02-02 RX ORDER — ACETAMINOPHEN 325 MG/1
650 TABLET ORAL
Status: CANCELLED | OUTPATIENT
Start: 2022-02-02

## 2022-02-02 RX ORDER — SODIUM CHLORIDE 9 MG/ML
INJECTION, SOLUTION INTRAVENOUS CONTINUOUS
OUTPATIENT
Start: 2022-03-02

## 2022-02-02 RX ORDER — SODIUM CHLORIDE 9 MG/ML
20 INJECTION, SOLUTION INTRAVENOUS ONCE
Status: CANCELLED | OUTPATIENT
Start: 2022-02-02 | End: 2022-02-02

## 2022-02-02 RX ORDER — SODIUM CHLORIDE 9 MG/ML
25 INJECTION, SOLUTION INTRAVENOUS PRN
OUTPATIENT
Start: 2022-03-02

## 2022-02-02 RX ORDER — HEPARIN SODIUM (PORCINE) LOCK FLUSH IV SOLN 100 UNIT/ML 100 UNIT/ML
500 SOLUTION INTRAVENOUS PRN
OUTPATIENT
Start: 2022-03-02

## 2022-02-02 RX ORDER — SODIUM CHLORIDE 0.9 % (FLUSH) 0.9 %
5-40 SYRINGE (ML) INJECTION PRN
OUTPATIENT
Start: 2022-03-02

## 2022-02-02 RX ORDER — PALONOSETRON 0.05 MG/ML
0.25 INJECTION, SOLUTION INTRAVENOUS ONCE
Status: COMPLETED | OUTPATIENT
Start: 2022-02-02 | End: 2022-02-02

## 2022-02-02 RX ORDER — SODIUM CHLORIDE 9 MG/ML
20 INJECTION, SOLUTION INTRAVENOUS ONCE
Status: COMPLETED | OUTPATIENT
Start: 2022-02-02 | End: 2022-02-02

## 2022-02-02 RX ORDER — ONDANSETRON 2 MG/ML
8 INJECTION INTRAMUSCULAR; INTRAVENOUS
Status: CANCELLED | OUTPATIENT
Start: 2022-02-02

## 2022-02-02 RX ORDER — SODIUM CHLORIDE 0.9 % (FLUSH) 0.9 %
5-40 SYRINGE (ML) INJECTION PRN
Status: CANCELLED | OUTPATIENT
Start: 2022-02-02

## 2022-02-02 RX ORDER — HEPARIN SODIUM (PORCINE) LOCK FLUSH IV SOLN 100 UNIT/ML 100 UNIT/ML
500 SOLUTION INTRAVENOUS PRN
Status: DISCONTINUED | OUTPATIENT
Start: 2022-02-02 | End: 2022-02-03 | Stop reason: HOSPADM

## 2022-02-02 RX ORDER — DIPHENHYDRAMINE HYDROCHLORIDE 50 MG/ML
50 INJECTION INTRAMUSCULAR; INTRAVENOUS
OUTPATIENT
Start: 2022-03-02

## 2022-02-02 RX ADMIN — SODIUM CHLORIDE 200 MG: 9 INJECTION, SOLUTION INTRAVENOUS at 11:45

## 2022-02-02 RX ADMIN — DEXAMETHASONE SODIUM PHOSPHATE 10 MG: 10 INJECTION INTRAMUSCULAR; INTRAVENOUS at 10:29

## 2022-02-02 RX ADMIN — SODIUM CHLORIDE 20 ML/HR: 9 INJECTION, SOLUTION INTRAVENOUS at 10:28

## 2022-02-02 RX ADMIN — CARBOPLATIN 750 MG: 10 INJECTION INTRAVENOUS at 12:58

## 2022-02-02 RX ADMIN — FOSAPREPITANT 150 MG: 150 INJECTION, POWDER, LYOPHILIZED, FOR SOLUTION INTRAVENOUS at 10:44

## 2022-02-02 RX ADMIN — SODIUM CHLORIDE, PRESERVATIVE FREE 10 ML: 5 INJECTION INTRAVENOUS at 14:29

## 2022-02-02 RX ADMIN — PALONOSETRON 0.25 MG: 0.05 INJECTION, SOLUTION INTRAVENOUS at 10:29

## 2022-02-02 RX ADMIN — HYDROMORPHONE HYDROCHLORIDE 1 MG: 1 INJECTION, SOLUTION INTRAMUSCULAR; INTRAVENOUS; SUBCUTANEOUS at 10:07

## 2022-02-02 RX ADMIN — HEPARIN SODIUM (PORCINE) LOCK FLUSH IV SOLN 100 UNIT/ML 500 UNITS: 100 SOLUTION at 14:29

## 2022-02-02 RX ADMIN — SODIUM CHLORIDE 1000 MG: 9 INJECTION, SOLUTION INTRAVENOUS at 12:31

## 2022-02-02 ASSESSMENT — PAIN SCALES - GENERAL: PAINLEVEL_OUTOF10: 8

## 2022-02-02 NOTE — PROGRESS NOTES
Patient arrives via wheelchair with mom for cycle 1 day 1 treatment and MD visit. Patient states he is in 8/10 pain in his groin, buttocks and testicular area. Patient denies other complaints or concerns. Vitals as charted. Port accessed; specimen sent. Labs reviewed. MD in room, orders for 1 mg IV dilaudid and continue with treatment. Palliative care to see patient. 1 mg IV dilaudid given. Alivia Camilo RN in room for chemotherapy teaching. Patient premedicated. Keytruda infused with no sign of adverse reaction; line flushed. Alimta infused with no sign of adverse reaction; line flushed. Palliative care team and Dr. Pineda Lam met with patient. Patient decided to be admitted to hospice for pain control, ok to finish chemotherapy today. Palliative care team notified Dr. Gunnar Moya. Latoya Jude (Pts mother) notified of plan for pt to be admitted to hospice. Latoya Roque states she will meet the patient at the facility. Transportation set for patient to be moved to facility. Carboplatin infused with no sign of adverse reaction; line flushed. Port flushed and heparinized with intact loyd needle removed per protocol. Pt discharged with [facility] transportation with all of patients belongings.

## 2022-02-02 NOTE — TELEPHONE ENCOUNTER
2/1/2022  CALLING NEEDS LETTER STATING   PROGNOSIS  DIAGNOSIS  STAGING  TO EXPEDITE DISABILITY CASE    Spoke with dr Richy Clark at 56 and pt is going to hospice, should not need letter for disability    2/2/2022 PALLIATIVE PROGRESS NOTES REVIEWED PER MD AND TO FRONT OFFICE TO BE SCANNED

## 2022-02-02 NOTE — FLOWSHEET NOTE
SPIRITUAL CARE PROGRESS NOTE: Outpatient Palliative Care    Spiritual Assessment: Writer learned from Dr. Doreen Rosario that Patient had decided to enter hospice. Patient was lying on a bed in the treatment cubicle of the infusion clinic. Patient appeared sleepy. He opened his eyes to acknowledge writer and then closed them. He apologized for being sleepy. Writer returned as Patient was being transported to the ambulance to go to hospice. Writer walked with Patient and  to the exit of the hospital. Patient did not interact much. He called the pharmacy to ask for a refill on his medication. He acknowledged writer's words of support and expressed thanks. Intervention: Writer provided supportive presence and empathy; offered words of encouragement and support; assured Pt of her prayers; prayed silently for Pt. Outcome: Patient was transported to the inpatient unit at [facility]. Plan: Chaplains will remain available to provide emotional and spiritual support as needed. As Patient has decided to enter hospice, the  at [facility] will be available to support Patient.        02/02/22 1610   Encounter Summary   Services provided to: Patient   Referral/Consult From: Physician   Support System Family members;Parent   Continue Visiting   (2/2/22)   Complexity of Encounter Moderate   Length of Encounter 30 minutes   Routine   Type Follow up   Grief and Life Adjustment   Type Hospice   Assessment Approachable   Intervention Sustaining presence/ Ministry of presence;Prayer   Outcome Expressed gratitude     Electronically signed by Olive Galeana, Oncology Outpatient [facility] 31, [facility] Radiation Oncology  2/2/2022  4:13 PM

## 2022-02-02 NOTE — ONCOLOGY
SITA received call from Ryan Landin at [facility]. at number [phone]. Patient currently in chair 11 at [facility] and needs ambulette transfer to direct admission to Hospice. Patient going to [address] [place] [address] Street, [address]     SW called nurse's station at [phone]. Confirmed patient needs ambulette to Hospice. SITA arranged transportation for [employer]. Scheduled for 3 PM today. Provided nurse's station number to call upon arrival. [facility] emailed to SITA supervisor who faxed to [employer]. SITA called Hospice. Advised of 3 PM  from [facility]. SITA called nurse's station. Advised of 3 pm  and gave [facility] number to call if necessary.

## 2022-02-02 NOTE — TELEPHONE ENCOUNTER
Pt here for c1 d1 Claudius Mince, [place], [place]    Chemo teaching done as able, pt in severe pain and medicated for pain at all times. Pt in and out of sleepy state. Discussed basics of to jeanne for temperature near or above 110.4 and to obtain thermometer and to notify [facility] for not eating or drinking and notified will have low point in 7 -10 days. Cycle Q 3 weeks. [facility] drug information sheets given and to refer to avs for all appts. Chemo education folder with chemo and you and eating hints and support services and psychosocial forms to fill out as needed.     Pt will need much reinforcement due to medicated state a barrier to learning at this time

## 2022-02-02 NOTE — PROGRESS NOTES
Palliative care medication count. Patient in today for chemotherapy, is terrible pain, we were asked to see patient. Dr. Glendy Funk and Dr. Pineda Lam updated that patient was seen by Dalia Sawant palliative care at home, he is here for first chemotherapy and was hoping to see palliative care. Dr. Glendy Funk and Dr. Pineda Lam meet with patient. Pt decided to go to hospice care. Dr. Pineda Lam set up for admission to inpatient hopsice at [facility].  [address] will come to cancer center to have patient sign in.  Garfield Petersen will be coming to cancer [place] to take patient to Hospice. I updated Magda south. Magda updated patient's family of the change. Family to meet with pt her or go directly to hospice facility which ever they prefer.

## 2022-02-02 NOTE — PROGRESS NOTES
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, unknown primary at this point   His CT chest showing right suprahilar region in the lung suggesting possible lung primary, PET scan awaited    Treatment history:  Palliative radiation to left pelvic region [address] started on 12/17/2021 until 12/23  Plan to start on palliative systemic chemotherapy  Next generation sequencing with TEMPUS negative for EGFR/ALK/ROS1/RET and MSI stable and low tumor mutational burden, positive for K-farzana mutation  PD-L1 negative with less than 1%    INTERVAL HISTORY:    Patient is returning for follow-up visit and to discuss lab results and further recommendations. He is complaining of significant pain in his left thigh. He is following with palliative care for his pain management. I discussed the results of his genetic testing which showed no targetable mutation and he will be started on palliative chemotherapy today. He denies any chest pain, shortness of breath or fever chills. He wants to continue chemotherapy. I discussed goals of care with patient. During this visit patient's allergy, social, medical, surgical history and medications were reviewed and updated. BRIEF ONCOLOGIC HISTORY:    The patient is a 46 y.o.  male him to the hospital with left-sided leg pain. CT scan was done in the ED which showed lytic lesions involving left inferior pubic ramus extending into the posterior inferior portion of the left acetabulum. And one of the differential was metastatic versus primary bone neoplasm. Heme-onc has been consulted. For possible malignancy and work-up. He had a biopsy of the bone lesion. Patient hemodynamically stable. On  labs calcium is elevated.     Past Medical History:   has a past medical history of Abscess, Anxiety, Cellulitis due to MRSA, Chronic back pain, Depression, Hypertension, Metastatic cancer ([facility] [address] Multiple substance abuse ([facility] [address] and OCD (obsessive compulsive disorder). Past Surgical History:   has a past surgical history that includes shoulder surgery (Right); Eye surgery; CT BIOPSY SUPERFICIAL BONE PERCUTANEOUS (2021); and IR PORT PLACEMENT > 5 YEARS (2022). Medications:    Reviewed in Epic     Allergies:  Patient has no known allergies. Social History:   reports that he has quit smoking. His smoking use included cigarettes. He has a 12.50 pack-year smoking history. He has never used smokeless tobacco. He reports current drug use. Drug: Marijuana Tito Boyd). He reports that he does not drink alcohol. Family History: family history includes Diabetes in his mother; Hearing Loss in his father; High Blood Pressure in his father. REVIEW OF SYSTEMS:    Constitutional: No fever or chills. No night sweats, no weight loss   Eyes: No eye discharge, double vision, or eye pain   HEENT: negative for sore mouth, sore throat, hoarseness and voice change   Respiratory: negative for cough , sputum, dyspnea, wheezing, hemoptysis, chest pain   Cardiovascular: negative for chest pain, dyspnea, palpitations, orthopnea, PND   Gastrointestinal: negative for nausea, vomiting, diarrhea, constipation, abdominal pain, Dysphagia, hematemesis and hematochezia   Genitourinary: negative for frequency, dysuria, nocturia, urinary incontinence, and hematuria   Integument: negative for rash, skin lesions, bruises. Hematologic/Lymphatic: negative for easy bruising, bleeding, lymphadenopathy, or petechiae   Endocrine: negative for heat or cold intolerance,weight changes, change in bowel habits and hair loss   Musculoskeletal: negative for myalgias, arthralgias, pain, joint swelling,and bone pain   Neurological: negative for headaches, dizziness, seizures, weakness, numbness    PHYSICAL EXAM:      There were no vitals taken for this visit.    Temp (24hrs), Av.9 °F (36.6 °C), Min:97 °F (36.1 °C), Max:98.8 °F (37.1 °C)    General appearance - well appearing, no in pain or distress   Mental status - alert and cooperative   Eyes - pupils equal and reactive, extraocular eye movements intact   Ears - bilateral TM's and external ear canals normal   Mouth - mucous membranes moist, pharynx normal without lesions   Neck - supple, no significant adenopathy   Lymphatics - no palpable lymphadenopathy, no hepatosplenomegaly   Chest - clear to auscultation, no wheezes, rales or rhonchi, symmetric air entry   Heart - normal rate, regular rhythm, normal S1, S2, no murmurs  Abdomen - soft, nontender, nondistended, no masses or organomegaly   Neurological - alert, oriented, normal speech, no focal findings or movement disorder noted   Musculoskeletal - no joint tenderness, deformity or swelling   Extremities - peripheral pulses normal, no pedal edema, no clubbing or cyanosis   Skin - normal coloration and turgor, no rashes, no suspicious skin lesions noted ,    DATA:    Lab Results   Component Value Date    WBC 8.6 01/26/2022    HGB 8.1 (L) 01/26/2022    HCT 27.9 (L) 01/26/2022    .4 01/26/2022     01/26/2022    LYMPHOPCT 23 (L) 01/26/2022    RBC 2.78 (L) 01/26/2022    MCH 29.1 01/26/2022    MCHC 29.0 01/26/2022    RDW 14.4 01/26/2022         Lab Results   Component Value Date     01/26/2022    K 4.4 01/26/2022     01/26/2022    CO2 27 01/26/2022    BUN 16 01/26/2022    CREATININE 0.55 (L) 01/26/2022    GLUCOSE 89 01/26/2022    CALCIUM 9.5 01/26/2022    PROT 6.9 12/09/2021    LABALBU 3.1 (L) 09/28/2017    BILITOT 0.56 09/28/2017    ALKPHOS 60 09/28/2017    AST 20 09/28/2017    ALT 26 09/28/2017    LABGLOM >60 01/26/2022    GFRAA >60 01/26/2022    GLOB NOT REPORTED 08/20/2014     PATHOLOGIC DATA:  Surgical Pathology Report  SURGICAL PATHOLOGY REPORT  Collected: 12/09/21 0918   Lab status: Final   Resulting lab: Marco Vasco   Value: -- Diagnosis --     Left pelvic bone, biopsy of mass:     -  Metastatic adenocarcinoma. Comment: Morphology and immunophenotype are nonspecific with respect   to primary.  Considerations include lung, breast, pancreaticobiliary,   GI, and others. Microscopic Description   Biopsies samples adenocarcinoma with moderate degree of glandular   differentiation.  Carcinoma is further characterized utilizing control   appropriate immunostains.  Carcinoma cells are positive for CK7 and   JONATAN-3.  They are negative for TTF-1, CK20, PSA, and CDX2. Note: The case is reviewed in intradepartmental pathology consultation   with consensus (RUBÉN, JESSI).         IMAGING DATA:    CT CHEST 12/10/21  Impression   Right suprahilar specular lesion which abuts the mediastinum with   questionable invasion as measured above concerning for malignancy, recommend   further evaluation with tissue sampling and/or PET-CT.  Indeterminate right   adrenal lesion as measured above, consider further assessment with dedicated   adrenal protocol CT or MRI. *Likely hepatic fatty infiltration.  Slightly contracted gallbladder with   mild wall enhancement.  Consider further assessment with right upper quadrant   ultrasound. *Interval enlargement of partially visualized left renal cyst.       IMPRESSION:   1. Lytic bone lesion, and biopsy confirming metastatic adenocarcinoma, most likely lung primary, next-generation sequencing negative for targetable mutations including EGFR/ALK/ROS/RET/PD-L1. Positive for K-farzana mutation  2. History of substance abuse  3. Pain control: Managed by palliative care. OxyContin 80 mg twice daily, oxycodone 15 mg every 4 hours as needed and Xanax. PLAN:  1. I reviewed the laboratory data, biopsy results, imaging studies, diagnosis, prognosis and treatment recommendations   2. I reviewed the NCCN guidelines and goals of care with patient and family.   I explained that he has stage IV advanced disease and the treatment goal will be palliative in nature only to improve quality of life and in the process help prolong his survival   3. Pain management as per palliative care  4. Based on the imaging studies so far, I will proceed with systemic chemotherapy assuming possible lung primary   5. His TEMPUS testing showed no actionable mutation  6. He will receive carboplatin/Alimta/Keytruda today  7. I reviewed the risk benefits and side effects with patient and he is agreeable to proceed with   We reviewed the side effects of chemotherapy which include, but are not limited to, fatigue, nausea, vomiting, alopecia, myelosuppression, mucositis, allergic reaction, nephrotoxicity, ototoxicity, neurotoxicity, diarrhea, and constipation. Will plan to start him on Xgeva every 6 weeks  Patient's questions were sought and answered to his satisfaction and he agreed to proceed with the plan      Mario Gasca MD  Hematologist/Medical Oncologist    On this date 2/2/22  I have spent 40 minutes reviewing previous notes, test results and face to face with the patient discussing the diagnosis and importance of compliance with the treatment plan. Greater than 50% of that time was spent face-to-face with the patient in counseling and coordinating her care. This note is created with the assistance of a speech recognition program.  While intending to generate a document that actually reflects the content of the visit, the document can still have some errors including those of syntax and sound a like substitutions which may escape proof reading. It such instances, actual meaning can be extrapolated by contextual diversion.

## 2022-02-02 NOTE — PROGRESS NOTES
Palliative Care  Clinic Progress Note    HISTORY OF PRESENT ILLNESS:   The patient has a PMHx of chronic back pain, depression, HTN, substance abuse, OCD. He was recently diagnosed with metastatic adenoCA, unknown primary with a large lytic lesion of the left pubic ramus. He has been undergoing chemotherapy, however, his pain in intractable. He was seen at our clinic in January 2022, directly admitted to the hospital for pain management. Currently, he is on Oxycontin 80 BID, dilaudid 4 mg q4PRN. Interval history: 2/2/2022  The patient was seen during his chemotherapy session. He was in extreme pain in the left hip. We discussed his options with him for optimal pain control, which unfortunately, was limited to hospice care. He was apprehensive last month, however, he was willing to try hospice now. We explained that if his pain is controlled and he becomes strong enough to revisit chemotherapy, then that can be an option. He understood. has a past medical history of Abscess, Anxiety, Cellulitis due to MRSA, Chronic back pain, Depression, Hypertension, Metastatic cancer ([facility] [address] Multiple substance abuse ([facility] [address] and OCD (obsessive compulsive disorder). has a past surgical history that includes shoulder surgery (Right); Eye surgery; CT BIOPSY SUPERFICIAL BONE PERCUTANEOUS (12/9/2021); and IR PORT PLACEMENT > 5 YEARS (1/7/2022). family history includes Diabetes in his mother; Hearing Loss in his father; High Blood Pressure in his father. No Known Allergies    Medications   has a current medication list which includes the following prescription(s): hydromorphone, ondansetron, oxycodone, docusate sodium, alprazolam, naloxone, diclofenac sodium, magnesium hydroxide, nicotine, and citalopram, and the following Facility-Administered Medications: sodium chloride, CARBOplatin (PARAPLATIN) 750 mg in sodium chloride 0.9 % 500 mL chemo IVPB, sodium chloride flush, and heparin flush.       Family History   Problem Relation Age of Onset    Diabetes Mother     Hearing Loss Father     High Blood Pressure Father        Substance Abuse History:  Social History     Substance and Sexual Activity   Alcohol Use No    Alcohol/week: 0.0 standard drinks    Comment: has not  drank in 2 years     Social History     Substance and Sexual Activity   Drug Use Yes    Types: Marijuana (Weed)    Comment: hx of marijuana/ denies today       Review of Systems:  12 systems were reviewed, other then issues mentioned above, 14 system review were negative     Review of Systems   Constitutional: Positive for activity change and fatigue. Genitourinary: Positive for scrotal swelling. Musculoskeletal: Positive for arthralgias. Physical Exam:  Blood pressure (!) 130/93, pulse 111, temperature 96.2 °F (35.7 °C). Wt Readings from Last 3 Encounters:   02/02/22 209 lb 9.6 oz (95.1 kg)   01/26/22 213 lb (96.6 kg)   01/10/22 210 lb 3.2 oz (95.3 kg)       Physical Exam  Constitutional:       General: He is in acute distress. Comments: veyr ill-appearing. In acute pain crisis. Laying down, unable to sit up because of the left pelvic pain   HENT:      Head: Normocephalic. Cardiovascular:      Rate and Rhythm: Tachycardia present. Pulmonary:      Effort: Pulmonary effort is normal.   Neurological:      Mental Status: Mental status is at baseline.    Psychiatric:      Comments: Agitated              Palliative Performance Scale:  ___100% Full ambulation; normal activity/No disease; full self-care; normal intake; LOC full  ___90% full ambulation; normal activity/some disease; full self-care; normal intake; LOC full  ___80% ambulation full; normal activity with effort/some disease; full self-care; normal/reduced intake; LOC full  ___70% ambulation reduced; cannot do normal work/some disease; full self-care; normal/reduce intake; LOC full  ___60%  Ambulation reduced; Significant disease; Can't do hobbies/housework; intake normal or reduced; occasional assist; LOC full/confusion  _x_50%  Mainly sit/lie; Extensive disease; Can't do any work; Considerable assist; intake normal or reduced; LOC full/confusion  ___40%  Mainly in bed; Extensive disease; Mainly assist; intake normal or reduced; LOC full/confusion   ___30%  Bed Bound; Extensive disease; Total care; intake reduced; LOC full/confusion  ___20%  Bed Bound; Extensive disease; Total care; intake minimal; Drowsy/coma  ___10%  Bed Bound; Extensive disease;  Total care; Mouth care only; Drowsy/coma  ___0       Death        Pertinent Laboratory Studies Reviewed by Me Personally Today:  Lab Results   Component Value Date    WBC 18.2 (H) 02/02/2022    HGB 11.7 (L) 02/02/2022    HCT 37.2 (L) 02/02/2022    MCV 92.1 02/02/2022     (H) 02/02/2022       Chemistry        Component Value Date/Time     02/02/2022 0915    K 4.1 02/02/2022 0915     02/02/2022 0915    CO2 25 02/02/2022 0915    BUN 20 02/02/2022 0915    CREATININE 0.52 (L) 02/02/2022 0915        Component Value Date/Time    CALCIUM 9.7 02/02/2022 0915    ALKPHOS 75 02/02/2022 0915    AST 23 02/02/2022 0915    ALT 37 02/02/2022 0915    BILITOT 0.39 02/02/2022 0915            Lab Results   Component Value Date    TSH 1.20 02/02/2022           IMPRESSION/ PLAN  Patient Active Problem List   Diagnosis    Multiple substance abuse ([address]    Alcohol abuse    Facial cellulitis    Right shoulder pain    Rib pain on left side    Elevated LFTs    Polysubstance abuse ([address]    Thrombocytopenia (HCC)    Primary hypertension    Major depressive disorder, recurrent episode, severe (HCC)    Compulsive skin picking    Anxiety    Lesion of pelvic bone    Cannot walk    Lytic bone lesion of hip    Hypercalcemia of malignancy    Cancer related pain    Metastatic cancer ([address] -suspected left acetabular pathological fracture    Bone metastases ([address]    Tobacco use    Carcinoma of hilus of right lung (HCC)    Intractable pain    Constipation due to opioid therapy     1. Bone metastases ([facility] [address]    - External Referral To Hospice for better pain control. Hospice contacted. Plan to transport directly from the [facility]. Dr. Zia Azevedo notified of the patient's plan. 2. Intractable pain      - External Referral To Hospice for better pain control. Hospice contacted. Plan to transport directly from the [facility]. Dr. Zia Azevedo notified of the patient's plan. 3. Malignant neoplasm metastatic to bone [facility])      - External Referral To Hospice for better pain control. Hospice contacted. Plan to transport directly from the [facility]. Dr. Zia Azevedo notified of the patient's plan. 4. Carcinoma of hilus of right lung [facility])      - External Referral To Hospice for better pain control. Hospice contacted. Plan to transport directly from the [facility]. Dr. iZa Azevedo notified of the patient's plan.            Corby Chase MD  Hospice/Palliative Care Fellow  [facility], [place], [place]  2/2/2022 12:48 PM

## 2022-02-02 NOTE — TELEPHONE ENCOUNTER
CHUCK HERE FOR MD VISIT & TX  CHEMO TODAY AFTER LABS   CT SCAN IN A WK  RV C2  CT CHEST [facility] 2/11/22 @ [facility] ARRIVAL @ 2PM W/ PORT ACCESS @ 2:30PM  MD VISIT 2/23/22 @ 9:30AM TX @ 9AM  AVS PRINTED W/ INSTRUCTIONS AND GIVEN TO PT ON EXIT

## 2022-02-09 ENCOUNTER — TELEPHONE (OUTPATIENT)
Dept: CASE MANAGEMENT | Age: 52
End: 2022-02-09

## 2022-02-09 NOTE — TELEPHONE ENCOUNTER
Name: Jessica Rodriguez  : 1970  MRN: [id number]    Oncology Navigation Follow-Up Note  Navigator received call from Latoya Morris working in Triage today and pt. Has been calling and left several messages in Triage office. Writer reaching out to pt. , but could only leave a message. Writer also spoke with Pts. Mother and pt. Will be discharged tomorrow from inpt. Hospice, assuming Hospice will continue to follow pt. At home, but informed pts. Mother to contact navigator if needing assistance.    Electronically signed by Ney Avalos RN on 2022 at 1:41 PM

## 2022-02-15 ENCOUNTER — HOSPITAL ENCOUNTER (OUTPATIENT)
Facility: MEDICAL CENTER | Age: 52
End: 2022-02-15

## 2022-02-23 ENCOUNTER — APPOINTMENT (OUTPATIENT)
Dept: CT IMAGING | Age: 52
End: 2022-02-23
Payer: COMMERCIAL

## 2022-02-23 ENCOUNTER — APPOINTMENT (OUTPATIENT)
Dept: GENERAL RADIOLOGY | Age: 52
End: 2022-02-23
Payer: COMMERCIAL

## 2022-02-23 ENCOUNTER — HOSPITAL ENCOUNTER (EMERGENCY)
Age: 52
Discharge: HOME | End: 2022-02-23
Attending: EMERGENCY MEDICINE
Payer: COMMERCIAL

## 2022-02-23 ENCOUNTER — HOSPITAL ENCOUNTER (OUTPATIENT)
Dept: INFUSION THERAPY | Facility: MEDICAL CENTER | Age: 52
End: 2022-02-23

## 2022-02-23 ENCOUNTER — TELEPHONE (OUTPATIENT)
Dept: CASE MANAGEMENT | Age: 52
End: 2022-02-23

## 2022-02-23 VITALS
RESPIRATION RATE: 25 BRPM | BODY MASS INDEX: 27.89 KG/M2 | TEMPERATURE: 98.1 F | HEART RATE: 124 BPM | SYSTOLIC BLOOD PRESSURE: 109 MMHG | OXYGEN SATURATION: 100 % | DIASTOLIC BLOOD PRESSURE: 66 MMHG | WEIGHT: 200 LBS

## 2022-02-23 DIAGNOSIS — R52 INTRACTABLE PAIN: Primary | ICD-10-CM

## 2022-02-23 DIAGNOSIS — C79.9 METASTATIC MALIGNANT NEOPLASM, UNSPECIFIED SITE (HCC): ICD-10-CM

## 2022-02-23 LAB
ABSOLUTE EOS #: 0.03 K/UL (ref 0–0.44)
ABSOLUTE IMMATURE GRANULOCYTE: 0.4 K/UL (ref 0–0.3)
ABSOLUTE LYMPH #: 2.54 K/UL (ref 1.1–3.7)
ABSOLUTE MONO #: 1.29 K/UL (ref 0.1–1.2)
ANION GAP SERPL CALCULATED.3IONS-SCNC: 12 MMOL/L (ref 9–17)
BASOPHILS # BLD: 1 % (ref 0–2)
BASOPHILS ABSOLUTE: 0.1 K/UL (ref 0–0.2)
BUN BLDV-MCNC: 29 MG/DL (ref 6–20)
BUN/CREAT BLD: 35 (ref 9–20)
CALCIUM SERPL-MCNC: 11.8 MG/DL (ref 8.6–10.4)
CHLORIDE BLD-SCNC: 100 MMOL/L (ref 98–107)
CO2: 29 MMOL/L (ref 20–31)
CREAT SERPL-MCNC: 0.83 MG/DL (ref 0.7–1.2)
D-DIMER QUANTITATIVE: 10.34 MG/L FEU (ref 0–0.59)
EOSINOPHILS RELATIVE PERCENT: 0 % (ref 1–4)
GFR AFRICAN AMERICAN: >60 ML/MIN
GFR NON-AFRICAN AMERICAN: >60 ML/MIN
GFR SERPL CREATININE-BSD FRML MDRD: ABNORMAL ML/MIN/{1.73_M2}
GLUCOSE BLD-MCNC: 114 MG/DL (ref 70–99)
HCT VFR BLD CALC: 41.3 % (ref 40.7–50.3)
HEMOGLOBIN: 12.9 G/DL (ref 13–17)
IMMATURE GRANULOCYTES: 3 %
INR BLD: 1.1
LACTIC ACID, SEPSIS: 1.3 MMOL/L (ref 0.5–1.9)
LACTIC ACID, SEPSIS: 2.1 MMOL/L (ref 0.5–1.9)
LYMPHOCYTES # BLD: 17 % (ref 24–43)
MAGNESIUM: 1.7 MG/DL (ref 1.6–2.6)
MCH RBC QN AUTO: 29.4 PG (ref 25.2–33.5)
MCHC RBC AUTO-ENTMCNC: 31.2 G/DL (ref 28.4–34.8)
MCV RBC AUTO: 94.1 FL (ref 82.6–102.9)
MONOCYTES # BLD: 9 % (ref 3–12)
NRBC AUTOMATED: 0 PER 100 WBC
PARTIAL THROMBOPLASTIN TIME: 21.3 SEC (ref 23.9–33.8)
PDW BLD-RTO: 15.9 % (ref 11.8–14.4)
PLATELET # BLD: 298 K/UL (ref 138–453)
PMV BLD AUTO: 8.6 FL (ref 8.1–13.5)
POTASSIUM SERPL-SCNC: 4.2 MMOL/L (ref 3.7–5.3)
PROTHROMBIN TIME: 13.7 SEC (ref 11.5–14.2)
RBC # BLD: 4.39 M/UL (ref 4.21–5.77)
RBC # BLD: ABNORMAL 10*6/UL
SEG NEUTROPHILS: 70 % (ref 36–65)
SEGMENTED NEUTROPHILS ABSOLUTE COUNT: 10.45 K/UL (ref 1.5–8.1)
SODIUM BLD-SCNC: 141 MMOL/L (ref 135–144)
TROPONIN, HIGH SENSITIVITY: 26 NG/L (ref 0–22)
TROPONIN, HIGH SENSITIVITY: 27 NG/L (ref 0–22)
WBC # BLD: 14.8 K/UL (ref 3.5–11.3)

## 2022-02-23 PROCEDURE — 97166 OT EVAL MOD COMPLEX 45 MIN: CPT

## 2022-02-23 PROCEDURE — 93005 ELECTROCARDIOGRAM TRACING: CPT | Performed by: EMERGENCY MEDICINE

## 2022-02-23 PROCEDURE — 99283 EMERGENCY DEPT VISIT LOW MDM: CPT

## 2022-02-23 PROCEDURE — 85025 COMPLETE CBC W/AUTO DIFF WBC: CPT

## 2022-02-23 PROCEDURE — 2580000003 HC RX 258: Performed by: EMERGENCY MEDICINE

## 2022-02-23 PROCEDURE — 73502 X-RAY EXAM HIP UNI 2-3 VIEWS: CPT

## 2022-02-23 PROCEDURE — 85610 PROTHROMBIN TIME: CPT

## 2022-02-23 PROCEDURE — 70450 CT HEAD/BRAIN W/O DYE: CPT

## 2022-02-23 PROCEDURE — 97530 THERAPEUTIC ACTIVITIES: CPT

## 2022-02-23 PROCEDURE — 80048 BASIC METABOLIC PNL TOTAL CA: CPT

## 2022-02-23 PROCEDURE — 84484 ASSAY OF TROPONIN QUANT: CPT

## 2022-02-23 PROCEDURE — 97162 PT EVAL MOD COMPLEX 30 MIN: CPT

## 2022-02-23 PROCEDURE — 85379 FIBRIN DEGRADATION QUANT: CPT

## 2022-02-23 PROCEDURE — 83735 ASSAY OF MAGNESIUM: CPT

## 2022-02-23 PROCEDURE — 72125 CT NECK SPINE W/O DYE: CPT

## 2022-02-23 PROCEDURE — 83605 ASSAY OF LACTIC ACID: CPT

## 2022-02-23 PROCEDURE — 85730 THROMBOPLASTIN TIME PARTIAL: CPT

## 2022-02-23 PROCEDURE — 71045 X-RAY EXAM CHEST 1 VIEW: CPT

## 2022-02-23 RX ORDER — 0.9 % SODIUM CHLORIDE 0.9 %
1000 INTRAVENOUS SOLUTION INTRAVENOUS ONCE
Status: COMPLETED | OUTPATIENT
Start: 2022-02-23 | End: 2022-02-23

## 2022-02-23 RX ADMIN — SODIUM CHLORIDE 1000 ML: 9 INJECTION, SOLUTION INTRAVENOUS at 10:02

## 2022-02-23 ASSESSMENT — PAIN - FUNCTIONAL ASSESSMENT: PAIN_FUNCTIONAL_ASSESSMENT: 0-10

## 2022-02-23 ASSESSMENT — ENCOUNTER SYMPTOMS
EYE DISCHARGE: 0
SHORTNESS OF BREATH: 0
VOMITING: 0
RHINORRHEA: 0
COUGH: 0
NAUSEA: 0
COLOR CHANGE: 0
DIARRHEA: 0
SORE THROAT: 0
EYE REDNESS: 0

## 2022-02-23 ASSESSMENT — PAIN SCALES - GENERAL: PAINLEVEL_OUTOF10: 9

## 2022-02-23 NOTE — TELEPHONE ENCOUNTER
Name: Mary Dos Santos  : 1970  MRN: [id number]    Oncology Navigation Follow-Up Note  Pt. Under Hospice Care and writer noting pt. In ED with Hospice nurse at bedside.   Electronically signed by Paola Rodney RN on 2022 at 10:25 AM

## 2022-02-23 NOTE — ED NOTES
pts family informed staff that pt had an appt here at the cancer center. Spoke with staff in cancer center and they said pt would have to call to reschedule.      Aura Kinney RN  02/23/22 1809

## 2022-02-23 NOTE — PROGRESS NOTES
Occupational Therapy   Occupational Therapy Initial Assessment  Date: 2022   Patient Name: Nish Mitchell  MRN: [id number]     : 1970    RN reports patient is medically stable for therapy treatment this date. Chart reviewed prior to treatment and patient is agreeable for therapy. All lines intact and patient positioned comfortably at end of treatment. All patient needs addressed prior to ending therapy session. Date of Service: 2022    Discharge Recommendations:  Patient would benefit from continued therapy after discharge   Pt currently functioning below baseline. Would suggest additional therapy at time of discharge to maximize long term outcomes and prevent re-admission. Please refer to AM-PAC score for current level of function. OT Equipment Recommendations  Equipment Needed: Yes (CTA)    Assessment   Performance deficits / Impairments: Decreased functional mobility ; Decreased ADL status; Decreased strength;Decreased sensation;Decreased endurance;Decreased cognition;Decreased safe awareness;Decreased high-level IADLs;Decreased fine motor control;Decreased coordination  Assessment: Pt will require as reassessment for functional mobility and ADL transfers when appropriate to add goals to POC. Pt would benefit from continued skilled OT services to increase I and safety during functional tasks to reduce caregiver burden as able.   Prognosis: Good  Decision Making: Medium Complexity  OT Education: OT Role;Energy Conservation;Plan of Care;ADL Adaptive Strategies;Orientation  Patient Education: recommendations for continued therapy, benefits of therapy participation, safety in function, OT POC  REQUIRES OT FOLLOW UP: Yes  Activity Tolerance  Activity Tolerance: Patient limited by pain;Treatment limited secondary to decreased cognition  Activity Tolerance: poor  Safety Devices  Safety Devices in place: Yes  Type of devices: Nurse notified;Gait belt;Bed alarm in place;Call light within reach; Patient at risk for falls; Left in bed           Patient Diagnosis(es): The primary encounter diagnosis was Intractable pain. A diagnosis of Metastatic malignant neoplasm, unspecified site [facility]) was also pertinent to this visit. has a past medical history of Abscess, Anxiety, Cellulitis due to MRSA, Chronic back pain, Depression, Hypertension, Metastatic cancer ([facility] [address] Multiple substance abuse ([facility] [address] and OCD (obsessive compulsive disorder). has a past surgical history that includes shoulder surgery (Right); Eye surgery; CT BIOPSY SUPERFICIAL BONE PERCUTANEOUS (12/9/2021); and IR PORT PLACEMENT > 5 YEARS (1/7/2022). PER H&P:       Restrictions  Restrictions/Precautions  Restrictions/Precautions: General Precautions  Required Braces or Orthoses?: No  Position Activity Restriction  Other position/activity restrictions: R chest port, pain pump    Subjective   General  Chart Reviewed: Yes  Patient assessed for rehabilitation services?: Yes  Family / Caregiver Present: No  Subjective  Subjective: \" No no no no no no! \" Pt yelling at therapist  General Comment  Comments: Patient restless in bed, would occasionally open eyes, difficult to get patient to answer questions and to follow commands  Patient Currently in Pain: Yes  Pre Treatment Pain Screening  Comments / Details: Pt reporting severe pain, unable to rate.  Pt pushed pain button during session  Vital Signs  Patient Currently in Pain: Yes       Social/Functional History  Social/Functional History  Lives With: Family (elderly parents)  Type of Home: House  Home Layout: Two level,Able to Live on Main level with bedroom/bathroom  Home Access: Stairs to enter with rails  Entrance Stairs - Number of Steps: 1  Entrance Stairs - Rails: Right  Bathroom Shower/Tub: Tub/Shower unit,Shower chair without back  Bathroom Toilet: Handicap height  Bathroom Equipment: Shower chair  Home Equipment: Rolling walker  ADL Assistance: Needs assistance (dad helps with all ADLs)  Homemaking Assistance: Needs assistance (parents take care of all housework)  Ambulation Assistance: Independent  Transfer Assistance: Independent  Active : No  Patient's  Info: Parents drive or provided by [employer]  Occupation: On disability  Leisure & Hobbies: Riding and working on motorcycles. Working on building  Additional Comments: **Patient very restless, garbled speech and difficult to understand. All information taken from chart. Fall prior to admission       Objective   Observation/Palpation  Observation: lying in bed with knees bent, very restless, unkept appearance  Edema: none  Balance  Sitting Balance: Unable to assess(comment)  Standing Balance: Unable to assess(comment)  Functional Mobility  Functional Mobility Comments: Pt declining all mobility at this time d/t pain       ADL  Feeding: Moderate assistance  Grooming: Moderate assistance  UE Bathing: Moderate assistance  LE Bathing: Maximum assistance  UE Dressing: Moderate assistance  LE Dressing: Maximum assistance;Dependent/Total  Toileting: Dependent/Total  Additional Comments: Pt is limited by pain and cognition at this time. Tone RUE  RUE Tone: Normotonic  Tone LUE  LUE Tone: Normotonic  Coordination  Movements Are Fluid And Coordinated: No  Coordination and Movement description: Fine motor impairments;Tremors;Decreased speed;Decreased accuracy; Right UE;Left UE          Bed mobility  Bridging: Stand by assistance  Comment: Attempted to get pt sitting on EOB, pt resisting all movements and yelling out in pain/at therapist, attempted to instuct pt on log rolling tech but patient yelled \"No no no no. \"       Transfers  Sit to stand: Unable to assess  Stand to sit: Unable to assess  Transfer Comments: Pt declining all mobility this date d/t pain          Cognition  Overall Cognitive Status: Exceptions  Arousal/Alertness: Delayed responses to stimuli  Following Commands: Inconsistently follows commands  Attention Span: Difficulty attending to directions  Memory: Appears intact  Safety Judgement: Decreased awareness of need for assistance;Decreased awareness of need for safety  Insights: Fully aware of deficits  Initiation: Requires cues for all  Sequencing: Requires cues for all        Sensation  Overall Sensation Status: WFL (intact to light touch)        LUE PROM (degrees)  LUE PROM: WFL  LUE AROM (degrees)  LUE General AROM: Shld ~ 80 degrees, Rest appears WFL  Right Hand PROM (degrees)  Right Hand PROM: WFL  Right Hand General PROM: Shld ~ 80 degrees, Rest appears WFL  LUE Strength  LUE Strength Comment: unable to assess d/t poor direction follow              Plan   Plan  Times per week: 4-5x/wk 1x/day as yosef  Current Treatment Recommendations: Strengthening,Endurance Training,Balance Training,Functional Mobility Training,Safety Education & Training,Pain Management,Positioning,Home Management Training,Self-Care / ADL,Equipment Evaluation, Education, & procurement,Patient/Caregiver Education & Training,Cognitive/Perceptual Training                                     AM-PAC Score        AM-PAC Inpatient Daily Activity Raw Score: 10 (02/23/22 [facility])  AM-PAC Inpatient ADL T-Scale Score : 27.31 (02/23/22 1453)  ADL Inpatient CMS 0-100% Score: 74.7 (02/23/22 1453)  ADL Inpatient CMS G-Code Modifier : CL (02/23/22 1453)        Goals  Short term goals  Time Frame for Short term goals: By discharge, pt to demo  Short term goal 1: tolerance for reassessment of bed mobilitiy, functional mobility and ADL transfers when appropriate to add goals to POC. Short term goal 2: UB ADLs to Min and LB ADLs to Mod A with use of AD/AE as needed. Short term goal 3: increased B UE strength by 1/2 grade to assist with functional tasks/I with B UE HEP with use of handouts as needed. Short term goal 4: grooming task to Min A with use of AD/seated as needed.   Short term goal 5: I with discharge recommendations, fall prevention education, EC/WS tech with use of handouts as needed. Patient Goals   Patient goals : Unable to state. Therapy Time   Individual Concurrent Group Co-treatment   Time In 1252         Time Out 1318         Minutes 26          tx time: 10 min    Co-treatment with PT warranted secondary to decreased safety and independence requiring 2 skilled therapy professionals to address individual discipline's goals.         Collins Mcginnis, OT

## 2022-02-23 NOTE — ED PROVIDER NOTES
EMERGENCY DEPARTMENT ENCOUNTER    Pt Name: Ana Rosa Mendoza  MRN: [id number]  [place] 1970  Date of evaluation: 2/23/22  CHIEF COMPLAINT       Chief Complaint   Patient presents with    Fall    Hip Pain     HISTORY OF PRESENT ILLNESS   This is a 70-year-old male that presents with complaints of left hip pain. The patient has a history of a adenocarcinoma, uncertain primary with metastases. Patient has significant metastases to the left hip, he has a Dilaudid pump and is in hospice care, the patient has complaints of worsening pain and discomfort in his left hip associated with a fall. Patient is somewhat of a poor historian. He denies really any pain anywhere else. Uncertain loss of consciousness. REVIEW OF SYSTEMS     Review of Systems   Constitutional: Negative for chills and fever. HENT: Negative for rhinorrhea and sore throat. Eyes: Negative for discharge, redness and visual disturbance. Respiratory: Negative for cough and shortness of breath. Cardiovascular: Negative for chest pain, palpitations and leg swelling. Gastrointestinal: Negative for diarrhea, nausea and vomiting. Musculoskeletal: Negative for arthralgias, myalgias and neck pain. Left hip pain   Skin: Negative for color change and rash. Neurological: Negative for seizures, weakness and headaches. Psychiatric/Behavioral: Negative for hallucinations, self-injury and suicidal ideas. PASTMEDICAL HISTORY     Past Medical History:   Diagnosis Date    Abscess     rt ear    Anxiety 10/5/2015    Cellulitis due to MRSA     Chronic back pain     Depression     Hypertension     Metastatic cancer ([facility] [address] 12/17/2021    Multiple substance abuse ([facility] [address]     OCD (obsessive compulsive disorder)      Past Problem List  Patient Active Problem List   Diagnosis Code    Multiple substance abuse ([facility] [address] F19.10    Alcohol abuse F10.10    Facial cellulitis L03. [address] XXXA    Right shoulder pain M25.511  Rib pain on left side R07.81    Elevated LFTs R79.89    Polysubstance abuse (HCC) F19.10    Thrombocytopenia (HCC) D69.6    Primary hypertension I10    Major depressive disorder, recurrent episode, severe (HCC) F33.2    Compulsive skin picking F42.4    Anxiety F41.9    Lesion of pelvic bone M89.9    Cannot walk R26.2    Lytic bone lesion of hip M89.8X5    Hypercalcemia of malignancy E83.52    Cancer related pain G89.3    Metastatic cancer (HCC) -suspected left acetabular pathological fracture C79.9    Bone metastases (HCC) C79.51    Tobacco use Z72.0    Carcinoma of hilus of right lung (HCC) C34.01    Intractable pain R52    Constipation due to opioid therapy K59.03, T40.2X5A     SURGICAL HISTORY       Past Surgical History:   Procedure Laterality Date    CT BIOPSY PERCUTANEOUS SUPERFICIAL BONE  12/9/2021    CT BIOPSY PERCUTANEOUS SUPERFICIAL BONE 12/9/2021 [facility] CT SCAN    EYE SURGERY      plate    IR PORT PLACEMENT EQUAL OR GREATER THAN 5 YEARS  1/7/2022    IR PORT PLACEMENT EQUAL OR GREATER THAN 5 YEARS 1/7/2022 [facility] PROCED[facility]    SHOULDER SURGERY Right     [facility], labial tear     CURRENT MEDICATIONS       Current Discharge Medication List      CONTINUE these medications which have NOT CHANGED    Details   HYDROmorphone (DILAUDID) 4 MG tablet Take 4 mg by mouth every 4 hours as needed for Pain. ondansetron (ZOFRAN-ODT) 8 MG TBDP disintegrating tablet Place 1 tablet under the tongue every 8 hours as needed for Nausea or Vomiting  Qty: 40 tablet, Refills: 1    Associated Diagnoses: Adenocarcinoma ([address] Carcinoma of hilus of right lung (HCC)      oxyCODONE (OXYCONTIN) 80 MG extended release tablet Take 80 mg by mouth every 12 hours.  Taking 80 mg TID      docusate sodium (COLACE) 100 MG capsule Take 1 capsule by mouth 2 times daily  Qty: 60 capsule, Refills: 0      ALPRAZolam (XANAX) 1 MG tablet take 1 tablet by mouth every 6 hours if needed for anxiety  Qty: 120 tablet, Refills: 0    Associated Diagnoses: Adenocarcinoma ([facility] [address]      naloxone (NARCAN) 0.4 MG/ML injection Infuse 1 mL intravenously as needed (Opioid reversal)  Qty: 0.4 mL, Refills: 1      diclofenac sodium (VOLTAREN) 1 % GEL Apply 4 g topically three times daily for 8 doses  Qty: 32 g, Refills: 0      magnesium hydroxide (MILK OF MAGNESIA) 400 MG/5ML suspension Take 30 mLs by mouth daily      nicotine (NICODERM CQ) 21 MG/24HR Place 1 patch onto the skin daily  Qty: 30 patch, Refills: 0      citalopram (CELEXA) 20 MG tablet Take 1 tablet by mouth daily  Qty: 30 tablet, Refills: 3           ALLERGIES     has No Known Allergies. FAMILY HISTORY     He indicated that his mother is alive. He indicated that his father is alive. He indicated that his brother is alive. He indicated that his maternal grandmother is . He indicated that his maternal grandfather is . SOCIAL HISTORY       Social History     Tobacco Use    Smoking status: Former Smoker     Packs/day: 0.50     Years: 25.00     Pack years: 12.50     Types: Cigarettes    Smokeless tobacco: Never Used   Substance Use Topics    Alcohol use: No     Alcohol/week: 0.0 standard drinks     Comment: has not  drank in 2 years    Drug use: Yes     Types: Marijuana (Weed)     Comment: hx of marijuana/ denies today     PHYSICAL EXAM     INITIAL VITALS: /66   Pulse 124   Temp 98.1 °F (36.7 °C) (Oral)   Resp 25   Wt 200 lb (90.7 kg)   SpO2 100%   BMI 27.89 kg/m²    Physical Exam  Constitutional:       Appearance: He is underweight. He is ill-appearing. He is not toxic-appearing. Interventions: Cervical collar in place. HENT:      Head: Normocephalic and atraumatic. No raccoon eyes, Diallo's sign, contusion or laceration. Eyes:      Conjunctiva/sclera: Conjunctivae normal.      Pupils: Pupils are equal, round, and reactive to light. Neck:      Trachea: Trachea normal.   Cardiovascular:      Rate and Rhythm: Regular rhythm.  Tachycardia present. Heart sounds: S1 normal and S2 normal. No murmur heard. Pulmonary:      Effort: Pulmonary effort is normal. No accessory muscle usage or respiratory distress. Breath sounds: Normal breath sounds. Chest:      Chest wall: No deformity or tenderness. Abdominal:      General: Bowel sounds are normal. There is no distension or abdominal bruit. Palpations: Abdomen is not rigid. Tenderness: There is no abdominal tenderness. There is no guarding or rebound. Musculoskeletal:      Cervical back: Normal range of motion and neck supple. Legs:    Skin:     General: Skin is warm. Findings: No rash. Neurological:      Mental Status: He is oriented to person, place, and time. GCS: GCS eye subscore is 4. GCS verbal subscore is 5. GCS motor subscore is 6. Psychiatric:         Speech: Speech normal.         MEDICAL DECISION MAKIN-year-old male presents with complaints of a fall, questionable fracture. Plan is basic labs x-rays IV fluids and reevaluation. 3:12 PM EST  From initial patient evaluation the nursing staff from hospice came to the bedside. The patient is a DNR, if I had known this initially would have ordered the work-up that I did. The social work staff here in the emergency department tried to place the patient, we were unable to. They wanted us to admit the patient. When the hospitalist physician went to see the patient the patient refused admission stated that he would go home. He called his mother to come and get him. Patient was able to stand, ambulate and get dressed. Patient is oriented and is able to make his decisions.          CRITICAL CARE:       PROCEDURES:    Procedures    DIAGNOSTIC RESULTS   EKG:All EKG's are interpreted by the Emergency Department Physician who either signs or Co-signs this chart in the absence of a cardiologist.        RADIOLOGY:All plain film, CT, MRI, and formal ultrasound images (except ED bedside ultrasound) are read by the radiologist, see reports below, unless otherwisenoted in MDM or here. CT HEAD WO CONTRAST   Final Result   No acute intracranial abnormality. CT CERVICAL SPINE WO CONTRAST   Final Result   No acute abnormality of the cervical spine. Partially visualized masses in the right paratracheal region of the   mediastinum and in the right upper lobe. If clinically indicated, a chest CT   is suggested for further evaluation         XR CHEST PORTABLE   Final Result   Right upper lobe airspace disease redemonstrated, most likely representing   postobstructive atelectasis. Pneumonia cannot be excluded. Otherwise, no   radiographic evidence of acute cardiopulmonary disease. XR HIP 2-3 VW W PELVIS LEFT   Final Result   Large destructive lesion centered in the left inferior pubic ramus      Permeated appearance of the proximal left femur could represent osteopenia or   metastatic disease           LABS: All lab results were reviewed by myself, and all abnormals are listed below.   Labs Reviewed   BASIC METABOLIC PANEL - Abnormal; Notable for the following components:       Result Value    Glucose 114 (*)     BUN 29 (*)     Bun/Cre Ratio 35 (*)     Calcium 11.8 (*)     All other components within normal limits   CBC WITH AUTO DIFFERENTIAL - Abnormal; Notable for the following components:    WBC 14.8 (*)     Hemoglobin 12.9 (*)     RDW 15.9 (*)     Seg Neutrophils 70 (*)     Lymphocytes 17 (*)     Eosinophils % 0 (*)     Immature Granulocytes 3 (*)     Segs Absolute 10.45 (*)     Absolute Mono # 1.29 (*)     Absolute Immature Granulocyte 0.40 (*)     All other components within normal limits   D-DIMER, QUANTITATIVE - Abnormal; Notable for the following components:    D-Dimer, Quant 10.34 (*)     All other components within normal limits   APTT - Abnormal; Notable for the following components:    PTT 21.3 (*)     All other components within normal limits   TROPONIN - Abnormal; Notable for the following components:    Troponin, High Sensitivity 27 (*)     All other components within normal limits   TROPONIN - Abnormal; Notable for the following components:    Troponin, High Sensitivity 26 (*)     All other components within normal limits   LACTATE, SEPSIS - Abnormal; Notable for the following components:    Lactic Acid, Sepsis 2.1 (*)     All other components within normal limits   MAGNESIUM   PROTIME-INR   LACTATE, SEPSIS       EMERGENCY DEPARTMENTCOURSE:         Vitals:    Vitals:    02/23/22 0902 02/23/22 1017 02/23/22 1116 02/23/22 1201   BP: (!) 126/96 (!) 114/97 118/84 109/66   Pulse: 141 126 123 124   Resp: 26 (!) 36 18 25   Temp:       TempSrc:       SpO2: 93% 99% 97% 100%   Weight:           The patient was given the following medications while in the emergency department:  Orders Placed This Encounter   Medications    0.9 % sodium chloride bolus     CONSULTS:  [address] TO HOSPITALIST    FINAL IMPRESSION      1. Intractable pain    2. Metastatic malignant neoplasm, unspecified site [facility])          DISPOSITION/PLAN   DISPOSITION Decision To Discharge 02/23/2022 03:11:37 PM      PATIENT REFERRED TO:  Bella Boogie, APRN  [address]  [phone]    Schedule an appointment as soon as possible for a visit in 2 days      DISCHARGE MEDICATIONS:  Current Discharge Medication List        The care is provided during an unprecedented national emergency due to the novel coronavirus, COVID 19.   MD Ariella Cavazos MD  02/23/22 120

## 2022-02-23 NOTE — ED NOTES
Called and spoke with pts hospice nurse [facility]. Maritza updated that patient wishes to go home and not be admitted to hospital. All questions answered at this time.        [place]nette [place]  02/23/22 0128

## 2022-02-23 NOTE — ED NOTES
Pt to room by squad. Pt c/o pain to left hip since fall this morning. Pt states he is being tx for cancer and has dilaudid pump infusing. Pt A&O, rolling around in bed and aggitated.      Jessi Ramírez RN  02/23/22 5604

## 2022-02-23 NOTE — ED NOTES
Pt states he does not wish to be admitted to hospital for placement to a facility. Pt states he would go to a facility but he wanted to go home first. Pt said \"I will not die in a hospital\". Pt informed of risks of going home instead of getting admitted to hospital. Pt is aware of potential for worsening condition, loss of life or limb, etc. Pt awake and orientedx4. Pt verbalizes understanding of risks/benefits related to hospitalization but still demanding discharge. Pt called own cab to come and get him. Pt wheeled out of ED in wheelchair after able to dress self and ambulate self to wheelchair.      [facility]  02/23/22 1600

## 2022-02-23 NOTE — CARE COORDINATION
Social Work-Referrals faxed to several SNF that accept Sebastian. Spoke with Palliative. They will meet with patient regarding goals of care.  Anuradha Roman

## 2022-02-23 NOTE — CONSULTS
Met with patient in the ED. Pt was sleeping but awoke easily. Pt is alert and oriented and states he is [facility] here because I thought I broke my leg\". Pt does fall asleep during my conversation with him. Pt has a CADD pump on (from [facility] [address] [place] [address]). Pt is known to our service as we referred him to inpatient hospice directly from our clinic a few weeks ago, and then discharged from there to home with hospice. He lives at home with his parents. Pt has had falls at home and is reported that his elderly parents cannot care for him at home any longer. I talked to patient about this and he is willing to go to [facility] under hospice care. Insurance was verified with hospital  that this could be done. Pt initially agreeable to this plan. Later, I talked with Patsy Buck NP who states patient is refusing ECF and wants to go home. I went in and confirmed this was patient's wish. Pt states, \"I will go to an ECF but I need to go home first to get some things\". I explained he cannot go home first and must go straight from the hospital. He is now refusing going straight to the  ECF and states he is calling a cab to take him home. Multiple staff attempted to talk to patient about staying, including his nurse [facility], NP, ER doctor and myself. Pt adamantly refusing. Hospice doctor (Latonya Blandon) updated on the situation. Paige Kaiser [address], [facility], [address] [place] [address]   [phone] or [employer]

## 2022-02-23 NOTE — PROGRESS NOTES
Physical Therapy    Facility/Department: [facility] ED  Initial Assessment    NAME: Brinda Hernandez  : 1970  MRN: [id number]    Date of Service: 2022    Discharge Recommendations:  Patient would benefit from continued therapy after discharge     Pt currently functioning below baseline. Would suggest additional therapy at time of discharge to maximize long term outcomes and prevent re-admission. Please refer to AM-PAC score for current level of function. Assessment   Body structures, Functions, Activity limitations: Decreased functional mobility ; Increased pain;Decreased cognition;Decreased strength;Decreased safe awareness;Decreased endurance  Assessment: Limited assessment performed due to patient in severe pain and refusing to get out of bed. Ambulation and transfers need assessed when pain controlled. Recommend continued therapy following discharge. Specific instructions for Next Treatment: Assess mobility if appropriate  Prognosis: Fair  Decision Making: Medium Complexity  Exam: AROM, strength, AM-PAC, sensation  PT Education: Goals;PT Role;Plan of Care;General Safety  REQUIRES PT FOLLOW UP: Yes  Activity Tolerance  Activity Tolerance: Patient limited by pain; Patient limited by cognitive status       Patient Diagnosis(es): The primary encounter diagnosis was Intractable pain. A diagnosis of Metastatic malignant neoplasm, unspecified site [facility]) was also pertinent to this visit. has a past medical history of Abscess, Anxiety, Cellulitis due to MRSA, Chronic back pain, Depression, Hypertension, Metastatic cancer ([facility] [address] Multiple substance abuse ([facility] [address] and OCD (obsessive compulsive disorder). has a past surgical history that includes shoulder surgery (Right); Eye surgery; CT BIOPSY SUPERFICIAL BONE PERCUTANEOUS (2021); and IR PORT PLACEMENT > 5 YEARS (2022).     Restrictions  Restrictions/Precautions  Restrictions/Precautions: General Precautions  Required Braces or Orthoses?: No  Vision/Hearing        Subjective  General  Chart Reviewed: Yes  Patient assessed for rehabilitation services?: Yes  Family / Caregiver Present: No  General Comment  Comments: Светлана Greene RN states patient appropriate for therapy, states patient is in a lot of pain, has pain pump  Subjective  Subjective: patient restless in bed, would occasionally open eyes, difficult to get patient to answer questions and to follow commands          Orientation  Orientation  Overall Orientation Status: Within Functional Limits  Social/Functional History  Social/Functional History  Lives With: Family (mom and dad)  Type of Home: House  Home Layout: Two level  Home Access: Stairs to enter without rails  ADL Assistance: Needs assistance (dad helps with all ADLs)  Homemaking Assistance: Needs assistance (parents take care of all housework)  Ambulation Assistance: Independent  Transfer Assistance: Independent  Additional Comments: incomplete history due to patient very restless, garbled speech and difficult to understand  Cognition   Cognition  Overall Cognitive Status: Exceptions  Arousal/Alertness: Delayed responses to stimuli  Following Commands: Inconsistently follows commands  Attention Span: Difficulty attending to directions  Memory: Appears intact  Safety Judgement: Decreased awareness of need for assistance;Decreased awareness of need for safety  Insights: Fully aware of deficits  Initiation: Requires cues for all  Sequencing: Requires cues for all    Objective     Observation/Palpation  Observation: lying in bed with knees bent, very restless    AROM RLE (degrees)  RLE AROM: WFL  AROM LLE (degrees)  LLE General AROM: ankle WFL, knee and hip flexion WFL, patient would not extend knee due to pain  AROM RUE (degrees)  RUE General AROM: refer to OT assessment  AROM LUE (degrees)  LUE General AROM: refer to OT assessment  Strength RLE  Comment: MMT not done due to patient not following commands, able to move against gravity  Strength LLE  Comment: MMT not done due to patient not following commands and L LE very painful  Strength RUE  Comment: refer to OT assessment  Strength LUE  Comment: refer to OT assessment  Motor Control  Gross Motor?: WFL  Sensation  Overall Sensation Status: WFL (intact to light touch)  Bed mobility  Bridging: Stand by assistance  Comment: attempted to get patient sitting EOB, patient resisted movement and yelled out in pain.  Attempted to instruct in log rolling technique but patient yelled \"No.no.no.no\"  Transfers  Sit to Stand: Unable to assess  Stand to sit: Unable to assess  Ambulation  Ambulation?: No              Plan   Plan  Times per week: 1-2x/day for 5-6 days/week  Specific instructions for Next Treatment: Assess mobility if appropriate  Current Treatment Recommendations: Strengthening,ROM,Transfer Training,Gait Training,Safety Education & L[place] Ates Re-education,Patient/Caregiver Education & Training  Safety Devices  Type of devices: Left in bed,Call light within reach,Nurse notified      AM-PAC Score  AM-PAC Inpatient Mobility Raw Score : 6 (02/23/22 1430)  AM-PAC Inpatient T-Scale Score : 23.55 (02/23/22 1430)  Mobility Inpatient CMS 0-100% Score: 100 (02/23/22 1430)  Mobility Inpatient CMS G-Code Modifier : CN (02/23/22 1430)          Goals  Short term goals  Time Frame for Short term goals: 12 visits  Short term goal 1: Patient to tolerate A/AROM B LEs to facilitate improving strength  Short term goal 2: Bed mobility/transfers/ambulation needs to be assessed when appropriate  Patient Goals   Patient goals : to have less pain       Therapy Time   Individual Concurrent Group Co-treatment   Time In 1305         Time Out 1318 (additional 10 minutes for chart review)         Minutes 13+10=23                 Chester Shi, PT

## 2022-02-24 ENCOUNTER — HOSPITAL ENCOUNTER (OUTPATIENT)
Facility: MEDICAL CENTER | Age: 52
End: 2022-02-24

## 2022-02-24 ENCOUNTER — TELEPHONE (OUTPATIENT)
Dept: ONCOLOGY | Age: 52
End: 2022-02-24

## 2022-02-24 LAB
EKG ATRIAL RATE: 146 BPM
EKG P AXIS: 61 DEGREES
EKG P-R INTERVAL: 112 MS
EKG Q-T INTERVAL: 266 MS
EKG QRS DURATION: 62 MS
EKG QTC CALCULATION (BAZETT): 414 MS
EKG R AXIS: 66 DEGREES
EKG T AXIS: -43 DEGREES
EKG VENTRICULAR RATE: 146 BPM

## 2022-02-24 PROCEDURE — 93010 ELECTROCARDIOGRAM REPORT: CPT | Performed by: INTERNAL MEDICINE

## 2022-02-24 NOTE — TELEPHONE ENCOUNTER
[facility]URI [facility] (P) [phone] OR (P) [phone] CALLING TO SAY THAT IF PT IS KEEPING HIS SCHEDULED TREATMENT APPT ON 3/2/22, THEN HE WILL BE DISCHARGED FROM HOSPICE    SHE STATES PT HAS STATED THAT HE WANTS TO CONTINUE WITH TREATMENT.     WOULD LIKE NOTIFIED IF HOSPICE IS TO BE DISCONTINUED    NOTE TO MD FOR REVIEW

## 2022-03-01 ENCOUNTER — TELEPHONE (OUTPATIENT)
Dept: CASE MANAGEMENT | Age: 52
End: 2022-03-01

## 2022-03-01 ENCOUNTER — TELEPHONE (OUTPATIENT)
Dept: ONCOLOGY | Age: 52
End: 2022-03-01

## 2022-03-01 DIAGNOSIS — C80.1 ADENOCARCINOMA (HCC): Primary | ICD-10-CM

## 2022-03-01 NOTE — TELEPHONE ENCOUNTER
[facility] (P) [phone] CALLIING AND WANTS ANSWER RE: IF PT IS GOING TO CONTINUE TREATMENT OR NOT    PT HAS BEEN IN HOSPICE FOR A WEEK    NOTIFIED MAURI PER LEFT MESSAGE, WAS HOPING PT COULD COME TO HIS APPT AND DISCUSS WITH MD AND IF DECIDES , WANTS TO STAY WITH HOSPICE , THEN CAN DISCUSS WITH MD IN PERSON AND THEN IF DECIDES NOT TO TREAT, THEN PT WOULD NOT HAVE TO PROCEED WITH TREATMENT    PT WILL NOT GO TO APPT IF DR Redd Meek DOES NOT THINK TREATMENT WILL HELP HIM    LEFT MESSAGE FOR DR Redd Meek AT 1750 AND NOTIFIED OF ABOVE INFORMATION, NOTIFIED TO PLEASE SPEAK WITH WRITER WHEN HE ARRIVES TOMORROW, DUE TO PT 'S APPT IS AT 1100 ON 3/2/22    ALSO SPOKE TO SANDRA

## 2022-03-02 ENCOUNTER — TELEPHONE (OUTPATIENT)
Dept: ONCOLOGY | Age: 52
End: 2022-03-02

## 2022-03-02 ENCOUNTER — HOSPITAL ENCOUNTER (OUTPATIENT)
Dept: INFUSION THERAPY | Facility: MEDICAL CENTER | Age: 52
End: 2022-03-02

## 2022-03-02 NOTE — TELEPHONE ENCOUNTER
AT [facility] CALLED [phone] AND HAD TO LEAVE MESSAGE FOR SANDRA REGARDING AT 0810 WHEN DR Bryan Ramachandran ARRIVED--WRITER SPOKE WITH MD AND HE STATES THAT HE HAS HAD MANY CONVERSATIONS WITH THE PT REGARDING THE FACT THAT HIS CANCER IS \"NON-CURABLE\", \"FEEL HOSPICE IS APPROPRIATE\", \"UNLESS SOMETHING CHANGES\" IN HIS CONDITION. WRITER THEN CALLED HOSPICE PER MAIN NUMBER AND SPOKE WITH MAURI AND NOTIFIED OF ABOVE STATEMENTS FROM DR Bryan Ramachandran. AND NOTIFIED HAD TO LEAVE MESSAGE FOR FAMILY, DUE TO NO ANSWER. MAURI STATES PT'S CONDITION HAS \"DETERIORATED\", PT SLEEPING MOST OF TIME AND NOT EATING VERY MUCH:.  DID SEE MESSAGE ON TRIAGE LINE FROM MOTHER , PAU, MISSED CALL FROM [facility]. CALLED AT 0930 AND PAU VERIFIES THAT MAURI FROM HOSPICE CALLED TO NOTIFY PT AND FAMILY OF DR Bryan Ramachandran FEELS \"HOSPICE IS APPROPRIATE\". FAMILY STATES UNDERSTANDING OF ABOVE INFORMATION.

## 2023-03-01 NOTE — PROGRESS NOTES
Health Maintenance Due   Topic Date Due   • Hepatitis B Vaccine (1 of 3 - 3-dose series) Never done   • COVID-19 Vaccine (1) Never done   • Pneumococcal Vaccine 0-64 (1 - PCV) Never done   • Diabetes Eye Exam  Never done   • Colorectal Cancer Screen-  Never done   • Shingles Vaccine (1 of 2) Never done   • DTaP/Tdap/Td Vaccine (2 - Td or Tdap) 12/01/2019   • Diabetes Foot Exam  09/16/2021   • Influenza Vaccine (1) Never done   • Diabetes A1C  02/04/2023   • DM/CKD GFR  02/23/2023       Patient is due for topics as listed above but is not proceeding with Immunization(s) COVID-19, Dtap/Tdap/Td, Hep B, Influenza, Pneumococcal and Shingles at this time.    Today's Date: 1/10/2022  Patient Name: Kevin Rob  Date of admission: 1/5/2022 12:51 PM  Patient's age: 46 y.o., 1970  Admission Dx: Intractable pain [R52]    Reason for Consult: metastatic adenocarcinoma  Requesting Physician: Ean Johnson MD    CHIEF COMPLAINT:    Chief Complaint   Patient presents with    Back Pain     down left leg. cancer pt       History Obtained From:  patient and chart    Subjective    Pain is better controlled  No chest pain or shortness of breath  No bleeding  Anxious to go home    HISTORY OF PRESENT ILLNESS:      Kevin Rob is a 46 y.o. male who is admitted to the hospital on 1/5/2022  for pain control. Patient has a diagnosis of stage IV metastatic adenocarcinoma with most likely lung primary and was planning to get started on palliative chemotherapy. He has a large metastasis to his left pelvic region and has received bilateral injection therapy however his pain has been not well controlled despite on high pain medications. He had a CT of the hip at [facility] which showed possible acetabular fracture. Patient yesterday was seen by palliative care in the office and was sent to ER for further evaluation inpatient management of his pain. Past Medical History:   has a past medical history of Abscess, Anxiety, Cellulitis due to MRSA, Chronic back pain, Depression, Hypertension, Metastatic cancer ([address] Multiple substance abuse ([facility] [address] and OCD (obsessive compulsive disorder). Past Surgical History:   has a past surgical history that includes shoulder surgery (Right); Eye surgery; CT BIOPSY SUPERFICIAL BONE PERCUTANEOUS (12/9/2021); and IR PORT PLACEMENT > 5 YEARS (1/7/2022). Medications:    Prior to Admission medications    Medication Sig Start Date End Date Taking? Authorizing Provider   oxyCODONE (ROXICODONE) 20 MG immediate release tablet Take 1 tablet by mouth every 4 hours as needed for Pain for up to 14 days.  1/10/22 1/24/22 Yes sennosides-docusate sodium (SENOKOT-S) 8.6-50 MG tablet 1 tablet  1 tablet Oral BID PRN BOBBY Dempsey CNP        magnesium hydroxide (MILK OF MAGNESIA) 400 MG/5ML suspension 30 mL  30 mL Oral Daily Omar Anaya MD        docusate sodium (COLACE) capsule 100 mg  100 mg Oral BID Omar Anaya MD   100 mg at 01/10/22 0908    diclofenac sodium (VOLTAREN) 1 % gel 4 g  4 g Topical TID Cleo Sun MD   4 g at 01/08/22 1258    naloxone (NARCAN) injection 0.4 mg  0.4 mg IntraVENous PRN Cleo Sun MD        citalopram (CELEXA) tablet 20 mg  20 mg Oral Daily BOBBY Simeon CNP   20 mg at 01/10/22 0908    dexamethasone (DECADRON) tablet 4 mg  4 mg Oral BID WC BOBBY Simeon CNP   4 mg at 01/10/22 0908    oxyCODONE (OXYCONTIN) extended release tablet 80 mg  80 mg Oral Q12H BOBBY Simeon CNP   80 mg at 01/10/22 0908    sodium chloride flush 0.9 % injection 5-40 mL  5-40 mL IntraVENous 2 times per day BOBBY Simeon CNP        sodium chloride flush 0.9 % injection 10 mL  10 mL IntraVENous PRN BOBBY Simeon CNP        0.9 % sodium chloride infusion  25 mL IntraVENous PRN BOBBY Simeon CNP   Stopped at 01/05/22 1737    potassium chloride (KLOR-CON M) extended release tablet 40 mEq  40 mEq Oral PRN BOBBY Simeon CNP        Or    potassium bicarb-citric acid (EFFER-K) effervescent tablet 40 mEq  40 mEq Oral PRN BOBBY iSmeon CNP        Or    potassium chloride 10 mEq/100 mL IVPB (Peripheral Line)  10 mEq IntraVENous PRN BOBBY Simeon CNP        magnesium sulfate 1000 mg in dextrose 5% 100 mL IVPB  1,000 mg IntraVENous PRN BOBBY Simeon CNP        polyethylene glycol (GLYCOLAX) packet 17 g  17 g Oral Daily PRN Ying Kervin, APRN - CNP        acetaminophen (TYLENOL) tablet 650 mg  650 mg Oral Q6H PRN BOBBY Simeon - CNP        Or    acetaminophen (TYLENOL) suppository 650 mg  650 mg Rectal Q6H PRN Sang RaomsBOBBY guerrero CNP        enoxaparin (LOVENOX) injection 40 mg  40 mg SubCUTAneous Daily Sang Sutherland BOBBY Willian CNP   40 mg at 01/10/22 8052    nicotine (NICODERM CQ) 21 MG/24HR 1 patch  1 patch TransDERmal Daily BOBBY Ayon CNP   1 patch at 22       Allergies:  Morphine    Social History:   reports that he has quit smoking. His smoking use included cigarettes. He has a 12.50 pack-year smoking history. He has never used smokeless tobacco. He reports current drug use. [facility] He reports that he does not drink alcohol. Family History: family history includes Diabetes in his mother; Hearing Loss in his father; High Blood Pressure in his father. REVIEW OF SYSTEMS:    Constitutional: No fever or chills. No night sweats, no weight loss   Eyes: No eye discharge, double vision, or eye pain   HEENT: negative for sore mouth, sore throat, hoarseness and voice change   Respiratory: negative for cough , sputum, dyspnea, wheezing, hemoptysis, chest pain   Cardiovascular: negative for chest pain, dyspnea, palpitations, orthopnea, PND   Gastrointestinal: negative for nausea, vomiting, diarrhea, constipation, abdominal pain, Dysphagia, hematemesis and hematochezia   Genitourinary: negative for frequency, dysuria, nocturia, urinary incontinence, and hematuria   Integument: negative for rash, skin lesions, bruises.    Hematologic/Lymphatic: negative for easy bruising, bleeding, lymphadenopathy, or petechiae   Endocrine: negative for heat or cold intolerance,weight changes, change in bowel habits and hair loss   Musculoskeletal: Severe debilitating left hip pain  Neurological: negative for headaches, dizziness, seizures, weakness, numbness    PHYSICAL EXAM:      /80   Pulse 63   Temp 97.3 °F (36.3 °C) (Axillary)   Resp 18   Ht 5' 11\" (1.803 m)   Wt 210 lb 3.2 oz (95.3 kg)   SpO2 94%   BMI 29.32 kg/m²    Temp (24hrs), Av.6 °F (36.4 °C), Min:97.3 °F (36.3 °C), Max:98.1 °F (36.7 °C)    General appearance - well appearing, no in pain or distress   Mental status - alert and cooperative   Eyes - pupils equal and reactive, extraocular eye movements intact   Ears - bilateral TM's and external ear canals normal   Mouth - mucous membranes moist, pharynx normal without lesions   Neck - supple, no significant adenopathy   Lymphatics - no palpable lymphadenopathy, no hepatosplenomegaly   Chest - clear to auscultation, no wheezes, rales or rhonchi, symmetric air entry   Heart - normal rate, regular rhythm, normal S1, S2, no murmurs  Abdomen - soft, nontender, nondistended, no masses or organomegaly   Neurological - alert, oriented, normal speech, no focal findings or movement disorder noted   Musculoskeletal - no joint tenderness, deformity or swelling   Extremities - peripheral pulses normal, no pedal edema, no clubbing or cyanosis   Skin - normal coloration and turgor, no rashes, no suspicious skin lesions noted ,    DATA:    Labs:   CBC:   No results for input(s): WBC, HGB, HCT, PLT in the last 72 hours. BMP:   No results for input(s): NA, K, CO2, BUN, CREATININE, LABGLOM, GLUCOSE in the last 72 hours. PT/INR:   No results for input(s): PROTIME, INR in the last 72 hours. IMAGING DATA:  IR PORT PLACEMENT > 5 YEARS   Final Result   Successful ultrasound and fluoroscopy guided Port-A-Cath placement             Primary Problem  Intractable pain    Active Hospital Problems    Diagnosis Date Noted    Constipation due to opioid therapy [K59.03, [place] Climes 01/10/2022    Intractable pain [R52] 01/05/2022    Tobacco use [Z72.0] 12/21/2021    Metastatic cancer ([facility] [address] -suspected left acetabular pathological fracture [C79.9] 12/17/2021    Cancer related pain [G89.3] 12/16/2021    Anxiety [F41.9] 10/05/2015    Major depressive disorder, recurrent episode, severe ([address] [F33.2] 04/23/2015    Primary hypertension [I10] 08/21/2014     IMPRESSION:   1.  Metastatic adenocarcinoma with left hip metastasis, was planning to start chemotherapy   2. Patient has completed palliative radiation therapy to his bone lesion  3. Severe left hip pain currently on PCA pump with possible pathologic fracture  4. Constipation    RECOMMENDATIONS:  1. I reviewed the laboratory data, diagnosis, imaging studies and treatment recommendations with patient  2. Continue current management for his pain as per palliative care/  3. Discussed the case with orthopedic Dr. Scott Lr and recommended musculoskeletal oncology> transfer patient to facility with a oncologic orthopedic possible [place] [facility] [place] [facility] [place] [facility]/however patient does not want to be transferred at this time and would like to proceed as an outpatient  4. Plan for chemotherapy as outpatient   5.    nonweightbearing on the left lower extremity, walker to ambulate with the help of physical therapy  6. Stool softener and bowel regimen      Discussed with pt  and Nurse/orhto    Thank you for asking us to see this patient.    Michele Clay MD  Hematologist/Medical Oncologist        This note is created with the assistance of a speech recognition program.  While intending to generate a document that actually reflects the content of the visit, the document can still have some errors including those of syntax and sound a like substitutions which may escape proof reading. It such instances, actual meaning can be extrapolated by contextual diversion.